# Patient Record
Sex: FEMALE | Race: WHITE | NOT HISPANIC OR LATINO | Employment: OTHER | URBAN - METROPOLITAN AREA
[De-identification: names, ages, dates, MRNs, and addresses within clinical notes are randomized per-mention and may not be internally consistent; named-entity substitution may affect disease eponyms.]

---

## 2017-02-01 ENCOUNTER — LAB VISIT (OUTPATIENT)
Dept: LAB | Facility: HOSPITAL | Age: 62
End: 2017-02-01
Attending: INTERNAL MEDICINE
Payer: COMMERCIAL

## 2017-02-01 DIAGNOSIS — Z00.00 ANNUAL PHYSICAL EXAM: ICD-10-CM

## 2017-02-01 LAB
25(OH)D3+25(OH)D2 SERPL-MCNC: 19 NG/ML
ALBUMIN SERPL BCP-MCNC: 3.7 G/DL
ALP SERPL-CCNC: 79 U/L
ALT SERPL W/O P-5'-P-CCNC: 18 U/L
ANION GAP SERPL CALC-SCNC: 10 MMOL/L
AST SERPL-CCNC: 18 U/L
BASOPHILS # BLD AUTO: 0.01 K/UL
BASOPHILS NFR BLD: 0.1 %
BILIRUB SERPL-MCNC: 0.5 MG/DL
BUN SERPL-MCNC: 17 MG/DL
CALCIUM SERPL-MCNC: 9.6 MG/DL
CHLORIDE SERPL-SCNC: 104 MMOL/L
CHOLEST/HDLC SERPL: 4.1 {RATIO}
CO2 SERPL-SCNC: 23 MMOL/L
CREAT SERPL-MCNC: 0.8 MG/DL
DIFFERENTIAL METHOD: NORMAL
EOSINOPHIL # BLD AUTO: 0.2 K/UL
EOSINOPHIL NFR BLD: 3.4 %
ERYTHROCYTE [DISTWIDTH] IN BLOOD BY AUTOMATED COUNT: 13 %
EST. GFR  (AFRICAN AMERICAN): >60 ML/MIN/1.73 M^2
EST. GFR  (NON AFRICAN AMERICAN): >60 ML/MIN/1.73 M^2
ESTIMATED AVG GLUCOSE: 278 MG/DL
GLUCOSE SERPL-MCNC: 227 MG/DL
HBA1C MFR BLD HPLC: 11.3 %
HCT VFR BLD AUTO: 41.6 %
HCV AB SERPL QL IA: NEGATIVE
HDL/CHOLESTEROL RATIO: 24.5 %
HDLC SERPL-MCNC: 208 MG/DL
HDLC SERPL-MCNC: 51 MG/DL
HGB BLD-MCNC: 14.2 G/DL
LDLC SERPL CALC-MCNC: 127.8 MG/DL
LYMPHOCYTES # BLD AUTO: 2.5 K/UL
LYMPHOCYTES NFR BLD: 35.1 %
MCH RBC QN AUTO: 30 PG
MCHC RBC AUTO-ENTMCNC: 34.1 %
MCV RBC AUTO: 88 FL
MONOCYTES # BLD AUTO: 0.4 K/UL
MONOCYTES NFR BLD: 5.5 %
NEUTROPHILS # BLD AUTO: 3.9 K/UL
NEUTROPHILS NFR BLD: 55.8 %
NONHDLC SERPL-MCNC: 157 MG/DL
PLATELET # BLD AUTO: 284 K/UL
PMV BLD AUTO: 12.2 FL
POTASSIUM SERPL-SCNC: 4 MMOL/L
PROT SERPL-MCNC: 7.4 G/DL
RBC # BLD AUTO: 4.74 M/UL
SODIUM SERPL-SCNC: 137 MMOL/L
TRIGL SERPL-MCNC: 146 MG/DL
TSH SERPL DL<=0.005 MIU/L-ACNC: 1.71 UIU/ML
WBC # BLD AUTO: 7.03 K/UL

## 2017-02-01 PROCEDURE — 85025 COMPLETE CBC W/AUTO DIFF WBC: CPT

## 2017-02-01 PROCEDURE — 84443 ASSAY THYROID STIM HORMONE: CPT

## 2017-02-01 PROCEDURE — 80061 LIPID PANEL: CPT

## 2017-02-01 PROCEDURE — 82306 VITAMIN D 25 HYDROXY: CPT

## 2017-02-01 PROCEDURE — 83036 HEMOGLOBIN GLYCOSYLATED A1C: CPT

## 2017-02-01 PROCEDURE — 36415 COLL VENOUS BLD VENIPUNCTURE: CPT | Mod: PO

## 2017-02-01 PROCEDURE — 86803 HEPATITIS C AB TEST: CPT

## 2017-02-01 PROCEDURE — 80053 COMPREHEN METABOLIC PANEL: CPT

## 2017-02-03 ENCOUNTER — OFFICE VISIT (OUTPATIENT)
Dept: INTERNAL MEDICINE | Facility: CLINIC | Age: 62
End: 2017-02-03
Payer: COMMERCIAL

## 2017-02-03 VITALS
HEART RATE: 76 BPM | DIASTOLIC BLOOD PRESSURE: 88 MMHG | RESPIRATION RATE: 16 BRPM | BODY MASS INDEX: 32.09 KG/M2 | SYSTOLIC BLOOD PRESSURE: 124 MMHG | TEMPERATURE: 100 F | HEIGHT: 62 IN | WEIGHT: 174.38 LBS

## 2017-02-03 DIAGNOSIS — E11.9 TYPE 2 DIABETES MELLITUS WITHOUT COMPLICATION, WITHOUT LONG-TERM CURRENT USE OF INSULIN: Chronic | ICD-10-CM

## 2017-02-03 DIAGNOSIS — E78.5 HYPERLIPIDEMIA, UNSPECIFIED HYPERLIPIDEMIA TYPE: Chronic | ICD-10-CM

## 2017-02-03 DIAGNOSIS — Z00.00 ANNUAL PHYSICAL EXAM: Primary | ICD-10-CM

## 2017-02-03 DIAGNOSIS — I10 ESSENTIAL HYPERTENSION: Chronic | ICD-10-CM

## 2017-02-03 DIAGNOSIS — M79.89 SWELLING OF LOWER EXTREMITY: ICD-10-CM

## 2017-02-03 DIAGNOSIS — E55.9 VITAMIN D DEFICIENCY: ICD-10-CM

## 2017-02-03 PROCEDURE — 99396 PREV VISIT EST AGE 40-64: CPT | Mod: S$GLB,,, | Performed by: INTERNAL MEDICINE

## 2017-02-03 PROCEDURE — 99999 PR PBB SHADOW E&M-EST. PATIENT-LVL III: CPT | Mod: PBBFAC,,, | Performed by: INTERNAL MEDICINE

## 2017-02-03 PROCEDURE — 3074F SYST BP LT 130 MM HG: CPT | Mod: S$GLB,,, | Performed by: INTERNAL MEDICINE

## 2017-02-03 PROCEDURE — 3079F DIAST BP 80-89 MM HG: CPT | Mod: S$GLB,,, | Performed by: INTERNAL MEDICINE

## 2017-02-03 RX ORDER — GLIPIZIDE 5 MG/1
5 TABLET ORAL
Qty: 60 TABLET | Refills: 6 | Status: SHIPPED | OUTPATIENT
Start: 2017-02-03 | End: 2017-03-06

## 2017-02-03 RX ORDER — METFORMIN HYDROCHLORIDE 1000 MG/1
TABLET ORAL
Qty: 180 TABLET | Refills: 1 | Status: SHIPPED | OUTPATIENT
Start: 2017-02-03 | End: 2018-09-07 | Stop reason: SDUPTHER

## 2017-02-03 RX ORDER — PANTOPRAZOLE SODIUM 40 MG/1
40 TABLET, DELAYED RELEASE ORAL DAILY
Qty: 90 TABLET | Refills: 3 | Status: SHIPPED | OUTPATIENT
Start: 2017-02-03 | End: 2017-06-02 | Stop reason: SDUPTHER

## 2017-02-03 NOTE — PROGRESS NOTES
Subjective:       Patient ID: Fely Wetzel is a 61 y.o. female.    Chief Complaint: Annual Exam    Patient is a 61 y.o.female who presents today for annual.    Labs: reviewed  Vaccines: Influenza (done); Tetanus (2014) ; Prevnar ( done)  Eye exam: done; appt  In february, Dr. Tom  Mammogram:  june 2016  Gyn exam: hysterectomy  Colonoscopy: 2014; up to date  Exercise: not much exercise  Diet: she eats two corn tortillas with each meal; she has not been following the diabetic diet; she restarted 2 weeks ago  Dexa: pt wants to wait until next year       Review of Systems   Constitutional: Negative for appetite change, chills, diaphoresis, fatigue and fever.   HENT: Negative for congestion, dental problem, ear discharge, ear pain, hearing loss, postnasal drip, sinus pressure and sore throat.    Eyes: Negative for discharge, redness and itching.   Respiratory: Negative for cough, chest tightness, shortness of breath and wheezing.    Cardiovascular: Negative for chest pain, palpitations and leg swelling.   Gastrointestinal: Negative for abdominal pain, constipation, diarrhea, nausea and vomiting.   Endocrine: Negative for cold intolerance and heat intolerance.   Genitourinary: Negative for difficulty urinating, frequency, hematuria and urgency.   Musculoskeletal: Negative for arthralgias, back pain, gait problem, myalgias and neck pain.   Skin: Negative for color change and rash.   Neurological: Negative for dizziness, syncope and headaches.   Hematological: Negative for adenopathy.   Psychiatric/Behavioral: Negative for behavioral problems and sleep disturbance. The patient is not nervous/anxious.        Objective:      Physical Exam   Constitutional: She is oriented to person, place, and time. She appears well-developed and well-nourished. No distress.   HENT:   Head: Normocephalic and atraumatic.   Right Ear: External ear normal.   Left Ear: External ear normal.   Eyes: Conjunctivae and EOM are normal.  Pupils are equal, round, and reactive to light. Right eye exhibits no discharge. Left eye exhibits no discharge. No scleral icterus.   Neck: Normal range of motion. Neck supple. No JVD present. No thyromegaly present.   Cardiovascular: Normal rate, regular rhythm, normal heart sounds and intact distal pulses.  Exam reveals no gallop and no friction rub.    No murmur heard.  Pulses:       Dorsalis pedis pulses are 2+ on the right side, and 2+ on the left side.        Posterior tibial pulses are 2+ on the right side, and 2+ on the left side.   Pulmonary/Chest: Effort normal and breath sounds normal. No stridor. No respiratory distress. She has no wheezes. She has no rales. She exhibits no tenderness.   Abdominal: Soft. Bowel sounds are normal. She exhibits no distension. There is no tenderness. There is no rebound.   Musculoskeletal: Normal range of motion. She exhibits no edema or tenderness.        Right foot: There is normal range of motion and no deformity.        Left foot: There is normal range of motion and no deformity.   Feet:   Right Foot:   Protective Sensation: 3 sites tested. 3 sites sensed.   Skin Integrity: Negative for ulcer, blister, skin breakdown, erythema, warmth, callus or dry skin.   Left Foot:   Protective Sensation: 3 sites tested. 3 sites sensed.   Skin Integrity: Negative for ulcer, blister, skin breakdown, erythema, warmth, callus or dry skin.   Lymphadenopathy:     She has no cervical adenopathy.   Trace pretibial edema bilaterally   Neurological: She is alert and oriented to person, place, and time.   Skin: Skin is warm. No rash noted. She is not diaphoretic. No erythema.   Psychiatric: She has a normal mood and affect. Her behavior is normal.   Nursing note and vitals reviewed.      Assessment and Plan:       1. Annual physical exam  - labs reviewed  - eye appt scheduled this month  - foot exam done today  - mammo due in June  - colonoscopy up to date  - dexa; due now; pt wants to wait  until next year    2. Type 2 diabetes mellitus without complication, without long-term current use of insulin  - no improvement in hemoglobin a1c  - discussed diabetic diet  - pt declines dietician appt or endocrine appt  - continue metformin twice daily  - continue onglyza  - pt declines insulin or victoza  - start glipizide 10 mg po bid  - discussed in detail the diet  - notify clinic of fasting glucose in two weeks  - rtc in one month  - Basic metabolic panel; Future  - Hemoglobin A1c; Future    3. Hyperlipidemia, unspecified hyperlipidemia type  - improving  - goal LDL is less than 70  - continue statin and fenofibrate; cholesterol diet    4. Essential hypertension  - stable on current meds    5. Vitamin D deficiency  - start vitamin d3 2000 units daily    6. Swelling of lower extremity  - COMPRESSION STOCKINGS        No Follow-up on file.

## 2017-02-16 ENCOUNTER — TELEPHONE (OUTPATIENT)
Dept: INTERNAL MEDICINE | Facility: CLINIC | Age: 62
End: 2017-02-16

## 2017-02-16 NOTE — TELEPHONE ENCOUNTER
----- Message from Praveena Hamm sent at 2/16/2017  8:28 AM CST -----  Contact: Self/[phone]  Pt states the last script that was prescribed (pt did not know name of script), she is not taking it any more because it makes her dizzy.Please advise.

## 2017-02-16 NOTE — TELEPHONE ENCOUNTER
Is she willing to try a new medication at this time? If she prefers to hold off, she needs to call back in one week with her log of fasting glucose readings

## 2017-02-16 NOTE — TELEPHONE ENCOUNTER
Spoke to pt who stated that she doesn't want to take the glipizide anymore as it makes her dizzy and uncomfortable. Pt reported fasting BG of 85 this AM.     Please advise.

## 2017-02-25 ENCOUNTER — LAB VISIT (OUTPATIENT)
Dept: LAB | Facility: HOSPITAL | Age: 62
End: 2017-02-25
Attending: INTERNAL MEDICINE
Payer: COMMERCIAL

## 2017-02-25 DIAGNOSIS — E11.9 TYPE 2 DIABETES MELLITUS WITHOUT COMPLICATION, WITHOUT LONG-TERM CURRENT USE OF INSULIN: Chronic | ICD-10-CM

## 2017-02-25 LAB
ANION GAP SERPL CALC-SCNC: 9 MMOL/L
BUN SERPL-MCNC: 25 MG/DL
CALCIUM SERPL-MCNC: 10.2 MG/DL
CHLORIDE SERPL-SCNC: 99 MMOL/L
CO2 SERPL-SCNC: 27 MMOL/L
CREAT SERPL-MCNC: 0.8 MG/DL
EST. GFR  (AFRICAN AMERICAN): >60 ML/MIN/1.73 M^2
EST. GFR  (NON AFRICAN AMERICAN): >60 ML/MIN/1.73 M^2
GLUCOSE SERPL-MCNC: 235 MG/DL
POTASSIUM SERPL-SCNC: 4.3 MMOL/L
SODIUM SERPL-SCNC: 135 MMOL/L

## 2017-02-25 PROCEDURE — 83036 HEMOGLOBIN GLYCOSYLATED A1C: CPT

## 2017-02-25 PROCEDURE — 80048 BASIC METABOLIC PNL TOTAL CA: CPT

## 2017-02-25 PROCEDURE — 36415 COLL VENOUS BLD VENIPUNCTURE: CPT | Mod: PO

## 2017-02-27 LAB
ESTIMATED AVG GLUCOSE: 272 MG/DL
HBA1C MFR BLD HPLC: 11.1 %

## 2017-03-06 ENCOUNTER — OFFICE VISIT (OUTPATIENT)
Dept: INTERNAL MEDICINE | Facility: CLINIC | Age: 62
End: 2017-03-06
Payer: COMMERCIAL

## 2017-03-06 VITALS
HEART RATE: 82 BPM | BODY MASS INDEX: 31.61 KG/M2 | DIASTOLIC BLOOD PRESSURE: 88 MMHG | WEIGHT: 172.81 LBS | SYSTOLIC BLOOD PRESSURE: 132 MMHG | RESPIRATION RATE: 15 BRPM | TEMPERATURE: 99 F

## 2017-03-06 DIAGNOSIS — E11.9 TYPE 2 DIABETES MELLITUS WITHOUT COMPLICATION, WITHOUT LONG-TERM CURRENT USE OF INSULIN: Primary | Chronic | ICD-10-CM

## 2017-03-06 PROCEDURE — 3079F DIAST BP 80-89 MM HG: CPT | Mod: S$GLB,,, | Performed by: INTERNAL MEDICINE

## 2017-03-06 PROCEDURE — 99999 PR PBB SHADOW E&M-EST. PATIENT-LVL III: CPT | Mod: PBBFAC,,, | Performed by: INTERNAL MEDICINE

## 2017-03-06 PROCEDURE — 3060F POS MICROALBUMINURIA REV: CPT | Mod: S$GLB,,, | Performed by: INTERNAL MEDICINE

## 2017-03-06 PROCEDURE — 3046F HEMOGLOBIN A1C LEVEL >9.0%: CPT | Mod: S$GLB,,, | Performed by: INTERNAL MEDICINE

## 2017-03-06 PROCEDURE — 3075F SYST BP GE 130 - 139MM HG: CPT | Mod: S$GLB,,, | Performed by: INTERNAL MEDICINE

## 2017-03-06 PROCEDURE — 1160F RVW MEDS BY RX/DR IN RCRD: CPT | Mod: S$GLB,,, | Performed by: INTERNAL MEDICINE

## 2017-03-06 PROCEDURE — 2022F DILAT RTA XM EVC RTNOPTHY: CPT | Mod: S$GLB,,, | Performed by: INTERNAL MEDICINE

## 2017-03-06 PROCEDURE — 99213 OFFICE O/P EST LOW 20 MIN: CPT | Mod: S$GLB,,, | Performed by: INTERNAL MEDICINE

## 2017-03-06 RX ORDER — GLIMEPIRIDE 4 MG/1
4 TABLET ORAL
Qty: 90 TABLET | Refills: 3 | Status: SHIPPED | OUTPATIENT
Start: 2017-03-06 | End: 2017-06-02 | Stop reason: SDUPTHER

## 2017-03-06 NOTE — MR AVS SNAPSHOT
[facility]   [facility]  [place] [address]  Phone: [phone]  Fax: [phone]                  Fely Wetzel   3/6/2017 7:00 AM   Office Visit    Description:  Female : 1955   Provider:  Paula Barkley DO   Department:  [facility]           Reason for Visit     Follow-up     Diabetes           Diagnoses this Visit        Comments    Type 2 diabetes mellitus without complication, without long-term current use of insulin    -  Primary            To Do List           Goals (5 Years of Data)     None      Follow-Up and Disposition     Return in about 2 months (around 2017).       These Medications        Disp Refills Start End    glimepiride (AMARYL) 4 MG tablet 90 tablet 3 3/6/2017 3/6/2018    Take 1 tablet (4 mg total) by mouth before breakfast. - Oral    Pharmacy: [facility]/pharmacy #[id number] - [place] - [address] Ph #: [phone]         [facility] On Call     [facility] On Call Nurse Care Line -  Assistance  Registered nurses in the [facility] On Call Center provide clinical advisement, health education, appointment booking, and other advisory services.  Call for this free service at [phone].             Medications           Message regarding Medications     Verify the changes and/or additions to your medication regime listed below are the same as discussed with your clinician today.  If any of these changes or additions are incorrect, please notify your healthcare provider.        START taking these NEW medications        Refills    glimepiride (AMARYL) 4 MG tablet 3    Sig: Take 1 tablet (4 mg total) by mouth before breakfast.    Class: Normal    Route: Oral      STOP taking these medications     glipiZIDE (GLUCOTROL) 5 MG tablet Take 1 tablet (5 mg total) by mouth 2 (two) times daily before meals.           Verify that the below list of medications is an accurate representation of the medications you are  currently taking.  If none reported, the list may be blank. If incorrect, please contact your healthcare provider. Carry this list with you in case of emergency.           Current Medications     ACCU-CHEK JUAN Misc CHECK FASTING GLUCOSE AND CHECK GLUCOSE TWO HOURS AFTER LUNCH OR DINNER    aspirin (ECOTRIN) 81 MG EC tablet Take 1 tablet by mouth Daily. Every day    blood sugar diagnostic Strp Check glucose twice daily    blood-glucose meter Misc Check fasting glucose and check glucose two hours after lunch or dinner    fenofibrate micronized (LOFIBRA) 134 MG Cap Take 1 capsule (134 mg total) by mouth daily with breakfast.    hydrochlorothiazide (HYDRODIURIL) 25 MG tablet Take 1 tablet (25 mg total) by mouth once daily. With breakfast    lancets Misc Check glucose twice daily    metformin (GLUCOPHAGE) 1000 MG tablet TAKE 1 TABLET (1,000 MG TOTAL) BY MOUTH 2 (TWO) TIMES DAILY. WITH MEALS    pravastatin (PRAVACHOL) 40 MG tablet Take 1 tablet (40 mg total) by mouth once daily. With dinner    SAXagliptin (ONGLYZA) 5 mg Tab tablet Take 1 tablet (5 mg total) by mouth once daily.    econazole nitrate 1 % cream Apply topically 2 (two) times daily.    glimepiride (AMARYL) 4 MG tablet Take 1 tablet (4 mg total) by mouth before breakfast.    pantoprazole (PROTONIX) 40 MG tablet Take 1 tablet (40 mg total) by mouth once daily.           Clinical Reference Information           Your Vitals Were     BP Pulse Temp Resp Weight BMI    132/88 (BP Location: Right arm, Patient Position: Sitting, BP Method: Manual) 82 99.1 °F (37.3 °C) (Oral) 15 78.4 kg (172 lb 13.5 oz) 31.61 kg/m2      Blood Pressure          Most Recent Value    BP  132/88      Allergies as of 3/6/2017     Latex, Natural Rubber    Shellfish Containing Products    Vicodin [Hydrocodone-acetaminophen]    Vytorin 10-10  [Ezetimibe-simvastatin]      Immunizations Administered on Date of Encounter - 3/6/2017     None      Orders Placed During Today's Visit     Future  Labs/Procedures Expected by Expires    Comprehensive metabolic panel  3/6/2017 3/6/2019    Hemoglobin A1c  3/6/2017 5/5/2018    Lipid panel  3/6/2017 3/6/2019      Language Assistance Services     ATTENTION: Language assistance services are available, free of charge. Please call [phone].      ATENCIÓN: Si habla español, tiene a galindo disposición servicios gratuitos de asistencia lingüística. Llame al [phone].     CHÚ Ý: N?u b?n nói Ti?ng Vi?t, có các d?ch v? h? tr? ngôn ng? mi?n phí dành cho b?n. G?i s? [phone].         [facility] complies with applicable Federal civil rights laws and does not discriminate on the basis of race, color, national origin, age, disability, or sex.

## 2017-03-06 NOTE — PROGRESS NOTES
Subjective:       Patient ID: Fely Wetzel is a 61 y.o. female.    Chief Complaint: Follow-up and Diabetes    Patient is a 61 y.o.female who presents today for follow up. She was started on glipizide but it caused her to get dizzy.     Diabetes - Patient is on metformin and onglyza. She does check her BG at home, and it ranges from 85 to 200.   She does not have numbness/tingling of her feet.  Her last A1c's were   Hemoglobin A1C   Date Value Ref Range Status   02/25/2017 11.1 (H) 4.5 - 6.2 % Final     Comment:     According to ADA guidelines, hemoglobin A1C <7.0% represents  optimal control in non-pregnant diabetic patients.  Different  metrics may apply to specific populations.   Standards of Medical Care in Diabetes - 2016.  For the purpose of screening for the presence of diabetes:  <5.7%     Consistent with the absence of diabetes  5.7-6.4%  Consistent with increasing risk for diabetes   (prediabetes)  >or=6.5%  Consistent with diabetes  Currently no consensus exists for use of hemoglobin A1C  for diagnosis of diabetes for children.     02/01/2017 11.3 (H) 4.5 - 6.2 % Final     Comment:     According to ADA guidelines, hemoglobin A1C <7.0% represents  optimal control in non-pregnant diabetic patients.  Different  metrics may apply to specific populations.   Standards of Medical Care in Diabetes - 2016.  For the purpose of screening for the presence of diabetes:  <5.7%     Consistent with the absence of diabetes  5.7-6.4%  Consistent with increasing risk for diabetes   (prediabetes)  >or=6.5%  Consistent with diabetes  Currently no consensus exists for use of hemoglobin A1C  for diagnosis of diabetes for children.     11/11/2016 11.0 (H) 4.5 - 6.2 % Final     Comment:     According to ADA guidelines, hemoglobin A1C <7.0% represents  optimal control in non-pregnant diabetic patients.  Different  metrics may apply to specific populations.   Standards of Medical Care in Diabetes - 2016.  For the purpose  of screening for the presence of diabetes:  <5.7%     Consistent with the absence of diabetes  5.7-6.4%  Consistent with increasing risk for diabetes   (prediabetes)  >or=6.5%  Consistent with diabetes  Currently no consensus exists for use of hemoglobin A1C  for diagnosis of diabetes for children.     .    Review of Systems   Constitutional: Negative for appetite change, chills, diaphoresis, fatigue and fever.   HENT: Negative for congestion, dental problem, ear discharge, ear pain, hearing loss, postnasal drip, sinus pressure and sore throat.    Eyes: Negative for discharge, redness and itching.   Respiratory: Negative for cough, chest tightness, shortness of breath and wheezing.    Cardiovascular: Negative for chest pain, palpitations and leg swelling.   Gastrointestinal: Negative for abdominal pain, constipation, diarrhea, nausea and vomiting.   Endocrine: Negative for cold intolerance and heat intolerance.   Genitourinary: Negative for difficulty urinating, frequency, hematuria and urgency.   Musculoskeletal: Negative for arthralgias, back pain, gait problem, myalgias and neck pain.   Skin: Negative for color change and rash.   Neurological: Negative for dizziness, syncope and headaches.   Hematological: Negative for adenopathy.   Psychiatric/Behavioral: Negative for behavioral problems and sleep disturbance. The patient is not nervous/anxious.        Objective:      Physical Exam   Constitutional: She is oriented to person, place, and time. She appears well-developed and well-nourished. No distress.   HENT:   Head: Normocephalic and atraumatic.   Right Ear: External ear normal.   Left Ear: External ear normal.   Eyes: Conjunctivae and EOM are normal. Pupils are equal, round, and reactive to light. Right eye exhibits no discharge. Left eye exhibits no discharge. No scleral icterus.   Neck: Normal range of motion. Neck supple. No JVD present. No thyromegaly present.   Cardiovascular: Normal rate, regular rhythm,  normal heart sounds and intact distal pulses.  Exam reveals no gallop and no friction rub.    No murmur heard.  Pulmonary/Chest: Effort normal and breath sounds normal. No stridor. No respiratory distress. She has no wheezes. She has no rales. She exhibits no tenderness.   Abdominal: Soft. Bowel sounds are normal. She exhibits no distension. There is no tenderness. There is no rebound.   Musculoskeletal: Normal range of motion. She exhibits no edema or tenderness.   Lymphadenopathy:     She has no cervical adenopathy.   Neurological: She is alert and oriented to person, place, and time.   Skin: Skin is warm. No rash noted. She is not diaphoretic. No erythema.   Psychiatric: She has a normal mood and affect. Her behavior is normal.   Nursing note and vitals reviewed.      Assessment and Plan:       1. Type 2 diabetes mellitus without complication, without long-term current use of insulin  - fasting glucose still too high at home; labs reviewed  - stopped glipizide due to dizziness  - start amaryl 4 mg in morning with breakfast  - continue metformin and onglyza  - refuses endocrine appt or insulin/ victoza  - rtc in two months with labs:  - Hemoglobin A1c; Future  - Comprehensive metabolic panel; Future  - Lipid panel; Future        No Follow-up on file.

## 2017-03-10 RX ORDER — IRBESARTAN 300 MG/1
TABLET ORAL
Qty: 90 TABLET | Refills: 2 | Status: SHIPPED | OUTPATIENT
Start: 2017-03-10 | End: 2017-06-02 | Stop reason: SDUPTHER

## 2017-03-24 DIAGNOSIS — Z00.00 ANNUAL PHYSICAL EXAM: ICD-10-CM

## 2017-03-24 RX ORDER — FENOFIBRATE 134 MG/1
CAPSULE ORAL
Qty: 90 CAPSULE | Refills: 0 | Status: SHIPPED | OUTPATIENT
Start: 2017-03-24 | End: 2017-06-02 | Stop reason: SDUPTHER

## 2017-05-08 LAB
LEFT EYE DM RETINOPATHY: NORMAL
RIGHT EYE DM RETINOPATHY: NORMAL

## 2017-05-30 ENCOUNTER — TELEPHONE (OUTPATIENT)
Dept: INTERNAL MEDICINE | Facility: CLINIC | Age: 62
End: 2017-05-30

## 2017-05-30 NOTE — TELEPHONE ENCOUNTER
----- Message from Jose Hilton sent at 5/30/2017  7:58 AM CDT -----  Contact: Self [phone]  Pt would like to speak with the nurse regarding she has a appt on Friday for a 4 month f/u and would like to know is there any way she can get in sooner because she will be losing her Insurance,Please call

## 2017-05-31 ENCOUNTER — TELEPHONE (OUTPATIENT)
Dept: INTERNAL MEDICINE | Facility: CLINIC | Age: 62
End: 2017-05-31

## 2017-05-31 RX ORDER — PRAVASTATIN SODIUM 40 MG/1
40 TABLET ORAL DAILY
Qty: 90 TABLET | Refills: 3 | Status: SHIPPED | OUTPATIENT
Start: 2017-05-31 | End: 2017-06-02 | Stop reason: SDUPTHER

## 2017-05-31 NOTE — TELEPHONE ENCOUNTER
----- Message from Erika Ordonez sent at 5/31/2017  8:49 AM CDT -----  Contact: Home: [phone]   Patient is ok on insurance and will be here for her appointment on  Friday.

## 2017-06-02 ENCOUNTER — OFFICE VISIT (OUTPATIENT)
Dept: INTERNAL MEDICINE | Facility: CLINIC | Age: 62
End: 2017-06-02
Payer: COMMERCIAL

## 2017-06-02 ENCOUNTER — HOSPITAL ENCOUNTER (OUTPATIENT)
Dept: RADIOLOGY | Facility: HOSPITAL | Age: 62
Discharge: HOME | End: 2017-06-02
Attending: INTERNAL MEDICINE
Payer: COMMERCIAL

## 2017-06-02 VITALS
BODY MASS INDEX: 34.07 KG/M2 | TEMPERATURE: 99 F | DIASTOLIC BLOOD PRESSURE: 82 MMHG | WEIGHT: 186.31 LBS | SYSTOLIC BLOOD PRESSURE: 134 MMHG | RESPIRATION RATE: 15 BRPM | HEART RATE: 69 BPM

## 2017-06-02 DIAGNOSIS — Z12.31 VISIT FOR SCREENING MAMMOGRAM: ICD-10-CM

## 2017-06-02 DIAGNOSIS — M79.644 FINGER PAIN, RIGHT: ICD-10-CM

## 2017-06-02 DIAGNOSIS — I10 ESSENTIAL HYPERTENSION: Chronic | ICD-10-CM

## 2017-06-02 DIAGNOSIS — E78.1 HYPERTRIGLYCERIDEMIA: ICD-10-CM

## 2017-06-02 DIAGNOSIS — E78.5 HYPERLIPIDEMIA, UNSPECIFIED HYPERLIPIDEMIA TYPE: Chronic | ICD-10-CM

## 2017-06-02 DIAGNOSIS — E11.9 TYPE 2 DIABETES MELLITUS WITHOUT COMPLICATION, WITHOUT LONG-TERM CURRENT USE OF INSULIN: Primary | Chronic | ICD-10-CM

## 2017-06-02 DIAGNOSIS — E55.9 VITAMIN D DEFICIENCY: ICD-10-CM

## 2017-06-02 PROCEDURE — 99999 PR PBB SHADOW E&M-EST. PATIENT-LVL III: CPT | Mod: PBBFAC,,, | Performed by: INTERNAL MEDICINE

## 2017-06-02 PROCEDURE — 73130 X-RAY EXAM OF HAND: CPT | Mod: 26,RT,, | Performed by: RADIOLOGY

## 2017-06-02 PROCEDURE — 99214 OFFICE O/P EST MOD 30 MIN: CPT | Mod: S$GLB,,, | Performed by: INTERNAL MEDICINE

## 2017-06-02 PROCEDURE — 3046F HEMOGLOBIN A1C LEVEL >9.0%: CPT | Mod: S$GLB,,, | Performed by: INTERNAL MEDICINE

## 2017-06-02 PROCEDURE — 4010F ACE/ARB THERAPY RXD/TAKEN: CPT | Mod: S$GLB,,, | Performed by: INTERNAL MEDICINE

## 2017-06-02 PROCEDURE — 73130 X-RAY EXAM OF HAND: CPT | Mod: TC,PO,RT

## 2017-06-02 RX ORDER — SAXAGLIPTIN 5 MG/1
5 TABLET, FILM COATED ORAL DAILY
Qty: 90 TABLET | Refills: 3 | Status: SHIPPED | OUTPATIENT
Start: 2017-06-02 | End: 2018-08-17

## 2017-06-02 RX ORDER — PANTOPRAZOLE SODIUM 40 MG/1
40 TABLET, DELAYED RELEASE ORAL DAILY
Qty: 90 TABLET | Refills: 3 | Status: SHIPPED | OUTPATIENT
Start: 2017-06-02 | End: 2018-11-27 | Stop reason: ALTCHOICE

## 2017-06-02 RX ORDER — GLIMEPIRIDE 4 MG/1
4 TABLET ORAL
Qty: 90 TABLET | Refills: 3 | Status: SHIPPED | OUTPATIENT
Start: 2017-06-02 | End: 2018-08-17 | Stop reason: ALTCHOICE

## 2017-06-02 RX ORDER — IRBESARTAN 300 MG/1
TABLET ORAL
Qty: 90 TABLET | Refills: 2 | Status: SHIPPED | OUTPATIENT
Start: 2017-06-02 | End: 2018-11-12 | Stop reason: SDUPTHER

## 2017-06-02 RX ORDER — PRAVASTATIN SODIUM 40 MG/1
40 TABLET ORAL DAILY
Qty: 90 TABLET | Refills: 3 | Status: SHIPPED | OUTPATIENT
Start: 2017-06-02 | End: 2019-02-22

## 2017-06-02 RX ORDER — HYDROCHLOROTHIAZIDE 25 MG/1
25 TABLET ORAL DAILY
Qty: 90 TABLET | Refills: 3 | Status: SHIPPED | OUTPATIENT
Start: 2017-06-02 | End: 2018-12-21 | Stop reason: SDUPTHER

## 2017-06-02 RX ORDER — FENOFIBRATE 134 MG/1
134 CAPSULE ORAL
Qty: 90 CAPSULE | Refills: 0 | Status: SHIPPED | OUTPATIENT
Start: 2017-06-02 | End: 2017-09-17 | Stop reason: SDUPTHER

## 2017-06-02 NOTE — PROGRESS NOTES
Subjective:       Patient ID: Fely Wetzel is a 61 y.o. female.    Chief Complaint: Follow-up; Hyperlipidemia; and Hypertension    Patient is a 61 y.o.female who presents today for follow up on chronic medical conditions. She lost her job a few days ago.    Vaccines: Influenza (done); Tetanus (2014) ; Prevnar ( done)  Eye exam: done; appt  In february, Dr. Tom  Mammogram: due now  Gyn exam: hysterectomy  Colonoscopy: 2014; up to date       Diabetes - Patient is on metformin, onglyza and amaryl. She does check her BG at home, and it ranges from 130 to 160.   Her last eye exam was in february.  She does check their feet on a regular basis.  She does not have numbness/tingling of her feet.  Her last A1c's were   Hemoglobin A1C   Date Value Ref Range Status   02/25/2017 11.1 (H) 4.5 - 6.2 % Final     Comment:     According to ADA guidelines, hemoglobin A1C <7.0% represents  optimal control in non-pregnant diabetic patients.  Different  metrics may apply to specific populations.   Standards of Medical Care in Diabetes - 2016.  For the purpose of screening for the presence of diabetes:  <5.7%     Consistent with the absence of diabetes  5.7-6.4%  Consistent with increasing risk for diabetes   (prediabetes)  >or=6.5%  Consistent with diabetes  Currently no consensus exists for use of hemoglobin A1C  for diagnosis of diabetes for children.     02/01/2017 11.3 (H) 4.5 - 6.2 % Final     Comment:     According to ADA guidelines, hemoglobin A1C <7.0% represents  optimal control in non-pregnant diabetic patients.  Different  metrics may apply to specific populations.   Standards of Medical Care in Diabetes - 2016.  For the purpose of screening for the presence of diabetes:  <5.7%     Consistent with the absence of diabetes  5.7-6.4%  Consistent with increasing risk for diabetes   (prediabetes)  >or=6.5%  Consistent with diabetes  Currently no consensus exists for use of hemoglobin A1C  for diagnosis of diabetes for  children.     11/11/2016 11.0 (H) 4.5 - 6.2 % Final     Comment:     According to ADA guidelines, hemoglobin A1C <7.0% represents  optimal control in non-pregnant diabetic patients.  Different  metrics may apply to specific populations.   Standards of Medical Care in Diabetes - 2016.  For the purpose of screening for the presence of diabetes:  <5.7%     Consistent with the absence of diabetes  5.7-6.4%  Consistent with increasing risk for diabetes   (prediabetes)  >or=6.5%  Consistent with diabetes  Currently no consensus exists for use of hemoglobin A1C  for diagnosis of diabetes for children.     .    Review of Systems   Constitutional: Negative for appetite change, chills, diaphoresis, fatigue and fever.   HENT: Negative for congestion, dental problem, ear discharge, ear pain, hearing loss, postnasal drip, sinus pressure and sore throat.    Eyes: Negative for discharge, redness and itching.   Respiratory: Negative for cough, chest tightness, shortness of breath and wheezing.    Cardiovascular: Negative for chest pain, palpitations and leg swelling.   Gastrointestinal: Negative for abdominal pain, constipation, diarrhea, nausea and vomiting.   Endocrine: Negative for cold intolerance and heat intolerance.   Genitourinary: Negative for difficulty urinating, frequency, hematuria and urgency.   Musculoskeletal: Negative for arthralgias, back pain, gait problem, myalgias and neck pain.   Skin: Negative for color change and rash.   Neurological: Negative for dizziness, syncope and headaches.   Hematological: Negative for adenopathy.   Psychiatric/Behavioral: Negative for behavioral problems and sleep disturbance. The patient is not nervous/anxious.        Objective:      Physical Exam   Constitutional: She is oriented to person, place, and time. She appears well-developed and well-nourished. No distress.   HENT:   Head: Normocephalic and atraumatic.   Right Ear: External ear normal.   Left Ear: External ear normal.    Nose: Nose normal.   Mouth/Throat: Oropharynx is clear and moist. No oropharyngeal exudate.   Eyes: Conjunctivae and EOM are normal. Pupils are equal, round, and reactive to light. Right eye exhibits no discharge. Left eye exhibits no discharge. No scleral icterus.   Neck: Normal range of motion. Neck supple. No JVD present. No thyromegaly present.   Cardiovascular: Normal rate, regular rhythm, normal heart sounds and intact distal pulses.  Exam reveals no gallop and no friction rub.    No murmur heard.  Pulmonary/Chest: Effort normal and breath sounds normal. No stridor. No respiratory distress. She has no wheezes. She has no rales. She exhibits no tenderness.   Abdominal: Soft. Bowel sounds are normal. She exhibits no distension. There is no tenderness. There is no rebound.   Musculoskeletal: Normal range of motion. She exhibits no edema or tenderness.   Lymphadenopathy:     She has no cervical adenopathy.   Neurological: She is alert and oriented to person, place, and time. No cranial nerve deficit.   Skin: Skin is warm and dry. No rash noted. She is not diaphoretic. No erythema.   Psychiatric: She has a normal mood and affect. Her behavior is normal.   Nursing note and vitals reviewed.      Assessment and Plan:       1. Type 2 diabetes mellitus without complication, without long-term current use of insulin  - on amaryl, metformin and onglyza  - Hemoglobin A1c; Future  - Comprehensive metabolic panel; Future  - Lipid panel; Future    2. Hyperlipidemia, unspecified hyperlipidemia type  - on statin  - Hemoglobin A1c; Future  - Comprehensive metabolic panel; Future  - Lipid panel; Future    3. Essential hypertension  - good control  - Hemoglobin A1c; Future  - Comprehensive metabolic panel; Future  - Lipid panel; Future    4. Vitamin D deficiency  - Hemoglobin A1c; Future  - Comprehensive metabolic panel; Future  - Lipid panel; Future    5. Visit for screening mammogram  - Mammo Digital Screening Bilat with CAD;  Future    6. HLD:  - fenofibrate micronized (LOFIBRA) 134 MG Cap; Take 1 capsule (134 mg total) by mouth before breakfast.  Dispense: 90 capsule; Refill: 0    7. Finger pain, right  - X-Ray Hand Complete Right; Future          Return in about 3 months (around 9/2/2017).

## 2017-06-02 NOTE — TELEPHONE ENCOUNTER
----- Message from Paula Barkley DO sent at 6/2/2017  8:36 AM CDT -----  Hand xray shows soft tissue swelling but no arthritis; recommend ice to the finger once a day x 1 week; recommend aleve 1 tab daily x 1 week

## 2017-06-03 ENCOUNTER — TELEPHONE (OUTPATIENT)
Dept: INTERNAL MEDICINE | Facility: CLINIC | Age: 62
End: 2017-06-03

## 2017-06-03 NOTE — TELEPHONE ENCOUNTER
Notify pt: cholesterol is borderline; recommend low fat and low cholesterol diet; diabetic marker improved to 8.1; continue metformin and glimepiride; stop onglyza due to cost; increase glimepiride to twice daily; does she want this script sent to her pharmacy?

## 2017-06-06 NOTE — TELEPHONE ENCOUNTER
Spoke to pt and informed of lab results, pt verbalized understanding. Pt recently refilled medication, will call when ready for early refill.

## 2017-06-12 ENCOUNTER — HOSPITAL ENCOUNTER (OUTPATIENT)
Dept: RADIOLOGY | Facility: HOSPITAL | Age: 62
Discharge: HOME | End: 2017-06-12
Attending: INTERNAL MEDICINE
Payer: COMMERCIAL

## 2017-06-12 VITALS — BODY MASS INDEX: 34.23 KG/M2 | WEIGHT: 186 LBS | HEIGHT: 62 IN

## 2017-06-12 DIAGNOSIS — Z12.31 VISIT FOR SCREENING MAMMOGRAM: ICD-10-CM

## 2017-06-12 PROCEDURE — 77063 BREAST TOMOSYNTHESIS BI: CPT | Mod: 26,,, | Performed by: RADIOLOGY

## 2017-06-12 PROCEDURE — 77067 SCR MAMMO BI INCL CAD: CPT | Mod: TC

## 2017-06-12 PROCEDURE — 77067 SCR MAMMO BI INCL CAD: CPT | Mod: 26,,, | Performed by: RADIOLOGY

## 2017-09-17 DIAGNOSIS — E78.1 HYPERTRIGLYCERIDEMIA: ICD-10-CM

## 2017-09-17 RX ORDER — FENOFIBRATE 134 MG/1
134 CAPSULE ORAL
Qty: 90 CAPSULE | Refills: 0 | Status: SHIPPED | OUTPATIENT
Start: 2017-09-17 | End: 2018-05-14 | Stop reason: SDUPTHER

## 2018-05-14 DIAGNOSIS — E78.1 HYPERTRIGLYCERIDEMIA: ICD-10-CM

## 2018-05-14 RX ORDER — FENOFIBRATE 134 MG/1
134 CAPSULE ORAL
Qty: 90 CAPSULE | Refills: 0 | Status: SHIPPED | OUTPATIENT
Start: 2018-05-14 | End: 2018-08-04 | Stop reason: SDUPTHER

## 2018-05-30 ENCOUNTER — TELEPHONE (OUTPATIENT)
Dept: INTERNAL MEDICINE | Facility: CLINIC | Age: 63
End: 2018-05-30

## 2018-08-04 DIAGNOSIS — E78.1 HYPERTRIGLYCERIDEMIA: ICD-10-CM

## 2018-08-06 RX ORDER — FENOFIBRATE 134 MG/1
134 CAPSULE ORAL
Qty: 90 CAPSULE | Refills: 1 | Status: SHIPPED | OUTPATIENT
Start: 2018-08-06 | End: 2018-08-22

## 2018-08-14 NOTE — PROGRESS NOTES
Subjective:       Patient ID: Fely Wetzel is a 62 y.o. female.    Chief Complaint: Follow-up (arthritis: hands and possibly feet)    Patient is a 62 y.o.female who presents today for follow up.    Labs: due now  Vaccines: Influenza (done); Tetanus (2014) ; Prevnar ( done)  Eye exam: done; appt  In february, Dr. Tom  Mammogram: done in may; dis ; normal  Gyn exam: hysterectomy  Colonoscopy: 2014; up to date      Arthritis: hands and feet; she went to [facility]. She went to a dermatologist and was diagnosed with psoriasis. She was given otezla but it caused too much nausea. She was also given clobetasol.     Review of Systems   Constitutional: Negative for appetite change, chills, diaphoresis, fatigue and fever.   HENT: Negative for congestion, dental problem, ear discharge, ear pain, hearing loss, postnasal drip, sinus pressure and sore throat.    Eyes: Negative for discharge, redness and itching.   Respiratory: Negative for cough, chest tightness, shortness of breath and wheezing.    Cardiovascular: Negative for chest pain, palpitations and leg swelling.   Gastrointestinal: Negative for abdominal pain, constipation, diarrhea, nausea and vomiting.   Endocrine: Negative for cold intolerance and heat intolerance.   Genitourinary: Negative for difficulty urinating, frequency, hematuria and urgency.   Musculoskeletal: Positive for arthralgias and myalgias. Negative for back pain, gait problem and neck pain.   Skin: Negative for color change and rash.   Neurological: Negative for dizziness, syncope and headaches.   Hematological: Negative for adenopathy.   Psychiatric/Behavioral: Negative for behavioral problems and sleep disturbance. The patient is not nervous/anxious.        Objective:      Physical Exam   Constitutional: She is oriented to person, place, and time. She appears well-developed and well-nourished. No distress.   HENT:   Head: Normocephalic and atraumatic.   Right Ear: External ear  normal.   Left Ear: External ear normal.   Nose: Nose normal.   Mouth/Throat: Oropharynx is clear and moist. No oropharyngeal exudate.   Eyes: Conjunctivae and EOM are normal. Pupils are equal, round, and reactive to light. Right eye exhibits no discharge. Left eye exhibits no discharge. No scleral icterus.   Neck: Normal range of motion. Neck supple. No JVD present. No thyromegaly present.   Cardiovascular: Normal rate, regular rhythm, normal heart sounds and intact distal pulses. Exam reveals no gallop and no friction rub.   No murmur heard.  Pulses:       Dorsalis pedis pulses are 2+ on the right side, and 2+ on the left side.        Posterior tibial pulses are 2+ on the right side, and 2+ on the left side.   Pulmonary/Chest: Effort normal and breath sounds normal. No stridor. No respiratory distress. She has no wheezes. She has no rales. She exhibits no tenderness.   Abdominal: Soft. Bowel sounds are normal. She exhibits no distension. There is no tenderness. There is no rebound.   Musculoskeletal: Normal range of motion. She exhibits no edema or tenderness.        Right foot: There is normal range of motion and no deformity.        Left foot: There is normal range of motion and no deformity.   Feet:   Right Foot:   Protective Sensation: 3 sites tested. 3 sites sensed.   Skin Integrity: Negative for ulcer, blister, skin breakdown, erythema, warmth, callus or dry skin.   Left Foot:   Protective Sensation: 3 sites tested. 3 sites sensed.   Skin Integrity: Negative for ulcer, blister, skin breakdown, erythema, warmth, callus or dry skin.   Lymphadenopathy:     She has no cervical adenopathy.   Neurological: She is alert and oriented to person, place, and time. No cranial nerve deficit.   Skin: Skin is warm and dry. No rash noted. She is not diaphoretic. No erythema.   Psychiatric: She has a normal mood and affect. Her behavior is normal.   Nursing note and vitals reviewed.      Assessment and Plan:       1.  Essential hypertension  - good control; check kidney function  - CBC auto differential; Future  - Comprehensive metabolic panel; Future  - Hemoglobin A1c; Future  - Lipid panel; Future  - TSH; Future  - Urinalysis; Future  - Vitamin D; Future    2. Hyperlipidemia, unspecified hyperlipidemia type  - on statin; due for labs  - CBC auto differential; Future  - Comprehensive metabolic panel; Future  - Hemoglobin A1c; Future  - Lipid panel; Future  - TSH; Future  - Urinalysis; Future  - Vitamin D; Future    3. Type 2 diabetes mellitus without complication, without long-term current use of insulin  - taking glipizide and metformin; not checking sugars lately; has lost about ten pounds  - CBC auto differential; Future  - Comprehensive metabolic panel; Future  - Hemoglobin A1c; Future  - Lipid panel; Future  - TSH; Future  - Urinalysis; Future  - Vitamin D; Future    4. Visit for screening mammogram  - done at [facility]    5. Arthritis  - taking mobic 7.5 mg daily    6. Myalgia    - Cyclic citrul peptide antibody, IgG; Future  - Rheumatoid factor; Future  - CHANDRA; Future    7. Arthralgia, unspecified joint    - Cyclic citrul peptide antibody, IgG; Future  - Rheumatoid factor; Future  - CHANDRA; Future    8. Psoriasis  - not taking any medicatio currently    9. Bilateral hand pain  - X-Ray Hand 3 View Bilateral; Future          No Follow-up on file.

## 2018-08-17 ENCOUNTER — OFFICE VISIT (OUTPATIENT)
Dept: INTERNAL MEDICINE | Facility: CLINIC | Age: 63
End: 2018-08-17
Payer: MEDICAID

## 2018-08-17 VITALS
HEART RATE: 82 BPM | HEIGHT: 62 IN | SYSTOLIC BLOOD PRESSURE: 140 MMHG | RESPIRATION RATE: 18 BRPM | TEMPERATURE: 99 F | WEIGHT: 171.75 LBS | BODY MASS INDEX: 31.6 KG/M2 | DIASTOLIC BLOOD PRESSURE: 74 MMHG | OXYGEN SATURATION: 97 %

## 2018-08-17 DIAGNOSIS — M79.10 MYALGIA: ICD-10-CM

## 2018-08-17 DIAGNOSIS — Z12.31 VISIT FOR SCREENING MAMMOGRAM: ICD-10-CM

## 2018-08-17 DIAGNOSIS — M25.50 ARTHRALGIA, UNSPECIFIED JOINT: ICD-10-CM

## 2018-08-17 DIAGNOSIS — I10 ESSENTIAL HYPERTENSION: Primary | Chronic | ICD-10-CM

## 2018-08-17 DIAGNOSIS — M79.641 BILATERAL HAND PAIN: ICD-10-CM

## 2018-08-17 DIAGNOSIS — E78.5 HYPERLIPIDEMIA, UNSPECIFIED HYPERLIPIDEMIA TYPE: Chronic | ICD-10-CM

## 2018-08-17 DIAGNOSIS — M19.90 ARTHRITIS: ICD-10-CM

## 2018-08-17 DIAGNOSIS — L40.9 PSORIASIS: ICD-10-CM

## 2018-08-17 DIAGNOSIS — E11.9 TYPE 2 DIABETES MELLITUS WITHOUT COMPLICATION, WITHOUT LONG-TERM CURRENT USE OF INSULIN: Chronic | ICD-10-CM

## 2018-08-17 DIAGNOSIS — M79.642 BILATERAL HAND PAIN: ICD-10-CM

## 2018-08-17 PROCEDURE — 99396 PREV VISIT EST AGE 40-64: CPT | Mod: S$PBB,,, | Performed by: INTERNAL MEDICINE

## 2018-08-17 PROCEDURE — 99999 PR PBB SHADOW E&M-EST. PATIENT-LVL IV: CPT | Mod: PBBFAC,,, | Performed by: INTERNAL MEDICINE

## 2018-08-17 PROCEDURE — 99214 OFFICE O/P EST MOD 30 MIN: CPT | Mod: PBBFAC,PO | Performed by: INTERNAL MEDICINE

## 2018-08-17 RX ORDER — MELOXICAM 7.5 MG/1
7.5 TABLET ORAL DAILY
Refills: 2 | COMMUNITY
Start: 2018-08-08 | End: 2018-08-22

## 2018-08-17 RX ORDER — MAGNESIUM 200 MG
TABLET ORAL ONCE
COMMUNITY
End: 2019-09-10

## 2018-08-17 RX ORDER — UBIDECARENONE 75 MG
1000 CAPSULE ORAL DAILY
COMMUNITY

## 2018-08-17 RX ORDER — GLIPIZIDE 10 MG/1
TABLET ORAL
Refills: 1 | COMMUNITY
Start: 2018-08-04 | End: 2018-12-12 | Stop reason: SDUPTHER

## 2018-08-17 RX ORDER — VITAMIN E (DL,TOCOPHERYL ACET) 180 MG
CAPSULE ORAL
COMMUNITY
End: 2019-09-10

## 2018-08-17 RX ORDER — APREMILAST 10-20-30MG
KIT ORAL
COMMUNITY
End: 2018-08-17

## 2018-08-20 ENCOUNTER — TELEPHONE (OUTPATIENT)
Dept: INTERNAL MEDICINE | Facility: CLINIC | Age: 63
End: 2018-08-20

## 2018-08-20 ENCOUNTER — HOSPITAL ENCOUNTER (OUTPATIENT)
Dept: RADIOLOGY | Facility: HOSPITAL | Age: 63
Discharge: HOME | End: 2018-08-20
Attending: INTERNAL MEDICINE
Payer: MEDICAID

## 2018-08-20 DIAGNOSIS — M79.642 BILATERAL HAND PAIN: ICD-10-CM

## 2018-08-20 DIAGNOSIS — M79.641 BILATERAL HAND PAIN: ICD-10-CM

## 2018-08-20 PROBLEM — M19.90 INFLAMMATORY ARTHRITIS: Status: ACTIVE | Noted: 2018-08-20

## 2018-08-20 PROCEDURE — 73130 X-RAY EXAM OF HAND: CPT | Mod: 26,50,, | Performed by: RADIOLOGY

## 2018-08-20 PROCEDURE — 73130 X-RAY EXAM OF HAND: CPT | Mod: 50,TC,PO

## 2018-08-20 NOTE — PROGRESS NOTES
Subjective:       Patient ID: Fely Wetzel is a 62 y.o. female.    Chief Complaint: Follow-up (lab results)    Patient is a 62 y.o.female who presents today for follow up    Her hand xray reveals inflammatory arthritis. Needs to see rheum    Diabetes - Patient is on metformin and she does check her BG at home, and it ranges good.   Her last eye exam was in the year.  She does check their feet on a regular basis.  She does not have numbness/tingling of her feet.  Her last A1c's were   Hemoglobin A1C   Date Value Ref Range Status   08/20/2018 6.4 (H) 4.0 - 5.6 % Final     Comment:     ADA Screening Guidelines:  5.7-6.4%  Consistent with prediabetes  >or=6.5%  Consistent with diabetes  High levels of fetal hemoglobin interfere with the HbA1C  assay. Heterozygous hemoglobin variants (HbS, HgC, etc)do  not significantly interfere with this assay.   However, presence of multiple variants may affect accuracy.     06/02/2017 8.1 (H) 4.5 - 6.2 % Final     Comment:     According to ADA guidelines, hemoglobin A1C <7.0% represents  optimal control in non-pregnant diabetic patients.  Different  metrics may apply to specific populations.   Standards of Medical Care in Diabetes - 2016.  For the purpose of screening for the presence of diabetes:  <5.7%     Consistent with the absence of diabetes  5.7-6.4%  Consistent with increasing risk for diabetes   (prediabetes)  >or=6.5%  Consistent with diabetes  Currently no consensus exists for use of hemoglobin A1C  for diagnosis of diabetes for children.     02/25/2017 11.1 (H) 4.5 - 6.2 % Final     Comment:     According to ADA guidelines, hemoglobin A1C <7.0% represents  optimal control in non-pregnant diabetic patients.  Different  metrics may apply to specific populations.   Standards of Medical Care in Diabetes - 2016.  For the purpose of screening for the presence of diabetes:  <5.7%     Consistent with the absence of diabetes  5.7-6.4%  Consistent with increasing risk for  diabetes   (prediabetes)  >or=6.5%  Consistent with diabetes  Currently no consensus exists for use of hemoglobin A1C  for diagnosis of diabetes for children.     .  Review of Systems   Constitutional: Negative for appetite change, chills, diaphoresis, fatigue and fever.   HENT: Negative for congestion, dental problem, ear discharge, ear pain, hearing loss, postnasal drip, sinus pressure and sore throat.    Eyes: Negative for discharge, redness and itching.   Respiratory: Negative for cough, chest tightness, shortness of breath and wheezing.    Cardiovascular: Negative for chest pain, palpitations and leg swelling.   Gastrointestinal: Negative for abdominal pain, constipation, diarrhea, nausea and vomiting.   Endocrine: Negative for cold intolerance and heat intolerance.   Genitourinary: Negative for difficulty urinating, frequency, hematuria and urgency.   Musculoskeletal: Negative for arthralgias, back pain, gait problem, myalgias and neck pain.   Skin: Negative for color change and rash.   Neurological: Negative for dizziness, syncope and headaches.   Hematological: Negative for adenopathy.   Psychiatric/Behavioral: Negative for behavioral problems and sleep disturbance. The patient is not nervous/anxious.        Objective:      Physical Exam   Constitutional: She is oriented to person, place, and time. She appears well-developed and well-nourished. No distress.   HENT:   Head: Normocephalic and atraumatic.   Right Ear: External ear normal.   Left Ear: External ear normal.   Nose: Nose normal.   Mouth/Throat: Oropharynx is clear and moist. No oropharyngeal exudate.   Eyes: Conjunctivae and EOM are normal. Pupils are equal, round, and reactive to light. Right eye exhibits no discharge. Left eye exhibits no discharge. No scleral icterus.   Neck: Normal range of motion. Neck supple. No JVD present. No thyromegaly present.   Cardiovascular: Normal rate, regular rhythm, normal heart sounds and intact distal pulses.  Exam reveals no gallop and no friction rub.   No murmur heard.  Pulmonary/Chest: Effort normal and breath sounds normal. No stridor. No respiratory distress. She has no wheezes. She has no rales. She exhibits no tenderness.   Abdominal: Soft. Bowel sounds are normal. She exhibits no distension. There is no tenderness. There is no rebound.   Musculoskeletal: Normal range of motion. She exhibits no edema or tenderness.   Lymphadenopathy:     She has no cervical adenopathy.   Neurological: She is alert and oriented to person, place, and time. No cranial nerve deficit.   Skin: Skin is warm and dry. No rash noted. She is not diaphoretic. No erythema.   Psychiatric: She has a normal mood and affect. Her behavior is normal.   Nursing note and vitals reviewed.      Assessment and Plan:       1. Type 2 diabetes mellitus without complication, without long-term current use of insulin  - a1c well controlled at 6.4  - continue diet, metformin and glipizide    2. Inflammatory arthritis  - found on hand xray  - recommend rheum referral; pt will contact her insurance  - continue mobic but increase to 15 mg daily    3. Essential hypertension  - good control; on arb for diabetes    4. Vitamin D deficiency  - start otc supplementation    5. Hyperlipidemia, unspecified hyperlipidemia type  - onstatin; LDL almost at goal; discussed low cholesterol diet  - pt wants to stop fenofibrate and repeat labs in three months    rtc in three months        No Follow-up on file.

## 2018-08-20 NOTE — TELEPHONE ENCOUNTER
Notify pt that her hand xray is concerning for inflammatory arthritis. Recommend rheum referral. She has medicaid so she needs to find out who is covered.

## 2018-08-22 ENCOUNTER — OFFICE VISIT (OUTPATIENT)
Dept: INTERNAL MEDICINE | Facility: CLINIC | Age: 63
End: 2018-08-22
Payer: MEDICAID

## 2018-08-22 VITALS
DIASTOLIC BLOOD PRESSURE: 70 MMHG | OXYGEN SATURATION: 97 % | BODY MASS INDEX: 31.56 KG/M2 | HEIGHT: 62 IN | HEART RATE: 78 BPM | TEMPERATURE: 99 F | SYSTOLIC BLOOD PRESSURE: 128 MMHG | WEIGHT: 171.5 LBS | RESPIRATION RATE: 18 BRPM

## 2018-08-22 DIAGNOSIS — E55.9 VITAMIN D DEFICIENCY: ICD-10-CM

## 2018-08-22 DIAGNOSIS — M19.90 INFLAMMATORY ARTHRITIS: ICD-10-CM

## 2018-08-22 DIAGNOSIS — E78.5 HYPERLIPIDEMIA, UNSPECIFIED HYPERLIPIDEMIA TYPE: Chronic | ICD-10-CM

## 2018-08-22 DIAGNOSIS — I10 ESSENTIAL HYPERTENSION: Chronic | ICD-10-CM

## 2018-08-22 DIAGNOSIS — E11.9 TYPE 2 DIABETES MELLITUS WITHOUT COMPLICATION, WITHOUT LONG-TERM CURRENT USE OF INSULIN: Primary | Chronic | ICD-10-CM

## 2018-08-22 PROCEDURE — 99214 OFFICE O/P EST MOD 30 MIN: CPT | Mod: S$PBB,,, | Performed by: INTERNAL MEDICINE

## 2018-08-22 PROCEDURE — 99999 PR PBB SHADOW E&M-EST. PATIENT-LVL IV: CPT | Mod: PBBFAC,,, | Performed by: INTERNAL MEDICINE

## 2018-08-22 PROCEDURE — 99214 OFFICE O/P EST MOD 30 MIN: CPT | Mod: PBBFAC,PO | Performed by: INTERNAL MEDICINE

## 2018-08-22 RX ORDER — MELOXICAM 15 MG/1
15 TABLET ORAL DAILY
Qty: 30 TABLET | Refills: 11 | Status: SHIPPED | OUTPATIENT
Start: 2018-08-22 | End: 2019-07-17 | Stop reason: SDUPTHER

## 2018-08-24 ENCOUNTER — TELEPHONE (OUTPATIENT)
Dept: INTERNAL MEDICINE | Facility: CLINIC | Age: 63
End: 2018-08-24

## 2018-08-24 DIAGNOSIS — M19.90 INFLAMMATORY ARTHRITIS: Primary | ICD-10-CM

## 2018-08-24 NOTE — TELEPHONE ENCOUNTER
----- Message from Sara Coreas sent at 8/24/2018 12:32 PM CDT -----  Contact: Pt [phone]  [facility] She states in order for her to see him she needs a referral to him.     Fax number [phone]

## 2018-09-05 ENCOUNTER — TELEPHONE (OUTPATIENT)
Dept: INTERNAL MEDICINE | Facility: CLINIC | Age: 63
End: 2018-09-05

## 2018-09-05 DIAGNOSIS — M19.90 INFLAMMATORY ARTHRITIS: Primary | ICD-10-CM

## 2018-09-07 ENCOUNTER — TELEPHONE (OUTPATIENT)
Dept: INTERNAL MEDICINE | Facility: CLINIC | Age: 63
End: 2018-09-07

## 2018-09-07 RX ORDER — METFORMIN HYDROCHLORIDE 1000 MG/1
TABLET ORAL
Qty: 180 TABLET | Refills: 1 | Status: SHIPPED | OUTPATIENT
Start: 2018-09-07 | End: 2019-02-27 | Stop reason: SDUPTHER

## 2018-09-07 NOTE — TELEPHONE ENCOUNTER
"----- Message from Praveena Hamm sent at 9/7/2018 11:54 AM CDT -----  Contact: Self/[phone]  RX request - refill or new RX.  Is this a refill or new RX:  refill  RX name and strength: metformin (GLUCOPHAGE) 1000 MG tablet   Directions: TAKE 1 TABLET (1,000 MG TOTAL) BY MOUTH 2 (TWO) TIMES DAILY. WITH MEALS  Is this a 30 day or 90 day RX:    Local pharmacy or mail order pharmacy:  Local  Pharmacy name and phone # (DON'T enter "on file" or "in chart"): [facility]- TAKE 1 TABLET (1,000 MG TOTAL) BY MOUTH 2 (TWO) TIMES DAILY. WITH MEALS  Comments:        "

## 2018-11-12 RX ORDER — IRBESARTAN 300 MG/1
TABLET ORAL
Qty: 90 TABLET | Refills: 0 | Status: SHIPPED | OUTPATIENT
Start: 2018-11-12 | End: 2018-11-15 | Stop reason: SDUPTHER

## 2018-11-12 NOTE — TELEPHONE ENCOUNTER
----- Message from Haylie Vergara sent at 11/12/2018 10:39 AM CST -----  Contact: Pt Mobile/Home [phone]  Patient is calling in regards to her pharmacy [facility] telling her that her script for  irbesartan (AVAPRO) 300 MG tablet was denied on last week. She said that she called them on last week and tried to have her medication refilled  but she was told to call her doctor. She would like a call back in regards to wanting to know what is the reason for the denial and what should she do.

## 2018-11-13 NOTE — PROGRESS NOTES
Subjective:       Patient ID: Fely Wetzel is a 63 y.o. female.    Chief Complaint: Follow-up (diabetes)    Patient is a 63 y.o.female who presents today for follow up    Arthritis: otezla, methotrexate and folic acid was prescribed by rheum, Dr. Garcia    Labs: reviewed  Vaccines: Influenza (done); Tetanus (2014) ; Prevnar ( done)  Eye exam: done; appt  In february, Dr. Tom  Mammogram: done in may; dis ; normal  Gyn exam: hysterectomy  Colonoscopy: 2014; up to date    Review of Systems   Constitutional: Negative for appetite change, chills, diaphoresis, fatigue and fever.   HENT: Negative for congestion, dental problem, ear discharge, ear pain, hearing loss, postnasal drip, sinus pressure and sore throat.    Eyes: Negative for discharge, redness and itching.   Respiratory: Negative for cough, chest tightness, shortness of breath and wheezing.    Cardiovascular: Negative for chest pain, palpitations and leg swelling.   Gastrointestinal: Negative for abdominal pain, constipation, diarrhea, nausea and vomiting.   Endocrine: Negative for cold intolerance and heat intolerance.   Genitourinary: Negative for difficulty urinating, frequency, hematuria and urgency.   Musculoskeletal: Negative for arthralgias, back pain, gait problem, myalgias and neck pain.   Skin: Negative for color change and rash.   Neurological: Negative for dizziness, syncope and headaches.   Hematological: Negative for adenopathy.   Psychiatric/Behavioral: Negative for behavioral problems and sleep disturbance. The patient is not nervous/anxious.        Objective:      Physical Exam   Constitutional: She is oriented to person, place, and time. She appears well-developed and well-nourished. No distress.   HENT:   Head: Normocephalic and atraumatic.   Right Ear: External ear normal.   Left Ear: External ear normal.   Nose: Nose normal.   Mouth/Throat: Oropharynx is clear and moist. No oropharyngeal exudate.   Eyes: Conjunctivae and EOM are  normal. Pupils are equal, round, and reactive to light. Right eye exhibits no discharge. Left eye exhibits no discharge. No scleral icterus.   Neck: Normal range of motion. Neck supple. No JVD present. No thyromegaly present.   Cardiovascular: Normal rate, regular rhythm, normal heart sounds and intact distal pulses. Exam reveals no gallop and no friction rub.   No murmur heard.  Pulmonary/Chest: Effort normal and breath sounds normal. No stridor. No respiratory distress. She has no wheezes. She has no rales. She exhibits no tenderness.   Abdominal: Soft. Bowel sounds are normal. She exhibits no distension. There is no tenderness. There is no rebound.   Musculoskeletal: Normal range of motion. She exhibits no edema or tenderness.   Lymphadenopathy:     She has no cervical adenopathy.   Neurological: She is alert and oriented to person, place, and time. No cranial nerve deficit.   Skin: Skin is warm and dry. No rash noted. She is not diaphoretic. No erythema.   Psychiatric: She has a normal mood and affect. Her behavior is normal.   Nursing note and vitals reviewed.      Assessment and Plan:       1. Hyperlipidemia, unspecified hyperlipidemia type  - LDL increased off cholesterol med; pt would like three months to see if she can get it lower without meds    2. Essential hypertension  - good control at home    3. Type 2 diabetes mellitus without complication, without long-term current use of insulin  - stable control; labs reviewed    4. Inflammatory arthritis  - seeing rheum          No Follow-up on file.

## 2018-11-15 RX ORDER — IRBESARTAN 300 MG/1
TABLET ORAL
Qty: 90 TABLET | Refills: 3 | Status: SHIPPED | OUTPATIENT
Start: 2018-11-15 | End: 2019-10-12 | Stop reason: SDUPTHER

## 2018-11-15 NOTE — TELEPHONE ENCOUNTER
----- Message from Gabi Duque sent at 11/15/2018  9:52 AM CST -----  Contact: self/[phone]  Patient called in regards needing to talk with Dr Graham about patient has tried to get the blood pressure medication through the pharmacy but they are needing authorization from Dr Graham. Patient stated that she is out of medication.irbesartan (AVAPRO) 300 MG tablet [facility]/pharmacy #[id number] - [facility] [address] AT [facility] [phone] (Phone) [phone] (Fax). Thank you

## 2018-11-20 ENCOUNTER — LAB VISIT (OUTPATIENT)
Dept: LAB | Facility: HOSPITAL | Age: 63
End: 2018-11-20
Attending: INTERNAL MEDICINE
Payer: MEDICAID

## 2018-11-20 DIAGNOSIS — E11.9 TYPE 2 DIABETES MELLITUS WITHOUT COMPLICATION, WITHOUT LONG-TERM CURRENT USE OF INSULIN: Chronic | ICD-10-CM

## 2018-11-20 DIAGNOSIS — M19.90 INFLAMMATORY ARTHRITIS: ICD-10-CM

## 2018-11-20 LAB
ALBUMIN SERPL BCP-MCNC: 3.4 G/DL
ALP SERPL-CCNC: 106 U/L
ALT SERPL W/O P-5'-P-CCNC: 10 U/L
ANION GAP SERPL CALC-SCNC: 9 MMOL/L
AST SERPL-CCNC: 15 U/L
BILIRUB SERPL-MCNC: 0.3 MG/DL
BUN SERPL-MCNC: 22 MG/DL
CALCIUM SERPL-MCNC: 9.9 MG/DL
CHLORIDE SERPL-SCNC: 106 MMOL/L
CHOLEST SERPL-MCNC: 235 MG/DL
CHOLEST/HDLC SERPL: 5.5 {RATIO}
CO2 SERPL-SCNC: 23 MMOL/L
CREAT SERPL-MCNC: 0.6 MG/DL
CRP SERPL-MCNC: 38.13 MG/L
ERYTHROCYTE [SEDIMENTATION RATE] IN BLOOD BY WESTERGREN METHOD: 81 MM/HR
EST. GFR  (AFRICAN AMERICAN): >60 ML/MIN/1.73 M^2
EST. GFR  (NON AFRICAN AMERICAN): >60 ML/MIN/1.73 M^2
ESTIMATED AVG GLUCOSE: 143 MG/DL
GLUCOSE SERPL-MCNC: 129 MG/DL
HBA1C MFR BLD HPLC: 6.6 %
HDLC SERPL-MCNC: 43 MG/DL
HDLC SERPL: 18.3 %
LDLC SERPL CALC-MCNC: 160.6 MG/DL
NONHDLC SERPL-MCNC: 192 MG/DL
POTASSIUM SERPL-SCNC: 4.1 MMOL/L
PROT SERPL-MCNC: 7.9 G/DL
SODIUM SERPL-SCNC: 138 MMOL/L
TRIGL SERPL-MCNC: 157 MG/DL

## 2018-11-20 PROCEDURE — 83036 HEMOGLOBIN GLYCOSYLATED A1C: CPT

## 2018-11-20 PROCEDURE — 80061 LIPID PANEL: CPT

## 2018-11-20 PROCEDURE — 85652 RBC SED RATE AUTOMATED: CPT

## 2018-11-20 PROCEDURE — 80053 COMPREHEN METABOLIC PANEL: CPT

## 2018-11-20 PROCEDURE — 36415 COLL VENOUS BLD VENIPUNCTURE: CPT | Mod: PO

## 2018-11-20 PROCEDURE — 86141 C-REACTIVE PROTEIN HS: CPT

## 2018-11-27 ENCOUNTER — OFFICE VISIT (OUTPATIENT)
Dept: INTERNAL MEDICINE | Facility: CLINIC | Age: 63
End: 2018-11-27
Payer: MEDICAID

## 2018-11-27 VITALS
OXYGEN SATURATION: 99 % | RESPIRATION RATE: 18 BRPM | WEIGHT: 180.31 LBS | DIASTOLIC BLOOD PRESSURE: 88 MMHG | HEIGHT: 62 IN | BODY MASS INDEX: 33.18 KG/M2 | SYSTOLIC BLOOD PRESSURE: 136 MMHG | TEMPERATURE: 99 F | HEART RATE: 78 BPM

## 2018-11-27 DIAGNOSIS — I10 ESSENTIAL HYPERTENSION: Chronic | ICD-10-CM

## 2018-11-27 DIAGNOSIS — E78.5 HYPERLIPIDEMIA, UNSPECIFIED HYPERLIPIDEMIA TYPE: Primary | Chronic | ICD-10-CM

## 2018-11-27 DIAGNOSIS — M19.90 INFLAMMATORY ARTHRITIS: ICD-10-CM

## 2018-11-27 DIAGNOSIS — E11.9 TYPE 2 DIABETES MELLITUS WITHOUT COMPLICATION, WITHOUT LONG-TERM CURRENT USE OF INSULIN: Chronic | ICD-10-CM

## 2018-11-27 PROCEDURE — 99213 OFFICE O/P EST LOW 20 MIN: CPT | Mod: PBBFAC,PO | Performed by: INTERNAL MEDICINE

## 2018-11-27 PROCEDURE — 99999 PR PBB SHADOW E&M-EST. PATIENT-LVL III: CPT | Mod: PBBFAC,,, | Performed by: INTERNAL MEDICINE

## 2018-11-27 PROCEDURE — 99214 OFFICE O/P EST MOD 30 MIN: CPT | Mod: S$PBB,,, | Performed by: INTERNAL MEDICINE

## 2018-11-27 RX ORDER — METHOTREXATE 2.5 MG/1
TABLET ORAL
Refills: 5 | COMMUNITY
Start: 2018-11-16 | End: 2019-07-01

## 2018-11-27 RX ORDER — FOLIC ACID 1 MG/1
1000 TABLET ORAL DAILY
Refills: 11 | COMMUNITY
Start: 2018-11-16 | End: 2019-09-10

## 2018-12-03 ENCOUNTER — TELEPHONE (OUTPATIENT)
Dept: INTERNAL MEDICINE | Facility: CLINIC | Age: 63
End: 2018-12-03

## 2018-12-03 DIAGNOSIS — E78.5 HYPERLIPIDEMIA, UNSPECIFIED HYPERLIPIDEMIA TYPE: Chronic | ICD-10-CM

## 2018-12-03 DIAGNOSIS — I10 ESSENTIAL HYPERTENSION: Chronic | ICD-10-CM

## 2018-12-03 DIAGNOSIS — E11.9 TYPE 2 DIABETES MELLITUS WITHOUT COMPLICATION, WITHOUT LONG-TERM CURRENT USE OF INSULIN: Primary | Chronic | ICD-10-CM

## 2018-12-03 NOTE — TELEPHONE ENCOUNTER
----- Message from Marv Perez sent at 12/3/2018  9:12 AM CST -----  Contact: Self [phone]  Caller is requesting a sooner appointment. Caller declined first available appointment listed below. Caller will not accept being placed on the wait list and is requesting a message be sent to the provider.    When is the next available appointment:  Unavailable   Did you offer to schedule the next available appt and put the patient on the wait list?:   yes  What visit type: EP  Symptoms:  Follow up  Patient preference of timeframe to be scheduled:  This week or next  What is the reason the patient is requesting a sooner appointment? (insurance terminating, changing jobs):  No  Would you prefer an answer via [employer]?:  no  Comments:  Pt will like to speak to the nurse

## 2018-12-12 RX ORDER — GLIPIZIDE 10 MG/1
TABLET ORAL
Qty: 60 TABLET | Refills: 2 | Status: SHIPPED | OUTPATIENT
Start: 2018-12-12 | End: 2019-03-11 | Stop reason: SDUPTHER

## 2018-12-21 RX ORDER — HYDROCHLOROTHIAZIDE 25 MG/1
25 TABLET ORAL DAILY
Qty: 90 TABLET | Refills: 0 | Status: SHIPPED | OUTPATIENT
Start: 2018-12-21 | End: 2019-03-11 | Stop reason: SDUPTHER

## 2018-12-21 NOTE — TELEPHONE ENCOUNTER
"----- Message from Nena Hernandez sent at 12/21/2018 11:58 AM CST -----  Contact: self   RX request - refill or new RX.  Is this a refill or new RX:    RX name and strength: hydrochlorothiazide (HYDRODIURIL) 25 MG tablet 90 tablet   Directions:   Is this a 30 day or 90 day RX:    Local pharmacy or mail order pharmacy:    Pharmacy name and phone # (DON'T enter "on file" or "in chart"): [facility]/pharmacy #[id number] - [place] - [address] AT [facility] [phone] (Phone)  [phone] (Fax)      Comments:        "

## 2019-02-05 ENCOUNTER — LAB VISIT (OUTPATIENT)
Dept: LAB | Facility: HOSPITAL | Age: 64
End: 2019-02-05
Attending: INTERNAL MEDICINE
Payer: MEDICAID

## 2019-02-05 DIAGNOSIS — E78.5 HYPERLIPIDEMIA, UNSPECIFIED HYPERLIPIDEMIA TYPE: Chronic | ICD-10-CM

## 2019-02-05 DIAGNOSIS — E11.9 TYPE 2 DIABETES MELLITUS WITHOUT COMPLICATION, WITHOUT LONG-TERM CURRENT USE OF INSULIN: Chronic | ICD-10-CM

## 2019-02-05 DIAGNOSIS — I10 ESSENTIAL HYPERTENSION: Chronic | ICD-10-CM

## 2019-02-05 LAB
25(OH)D3+25(OH)D2 SERPL-MCNC: 21 NG/ML
ALBUMIN SERPL BCP-MCNC: 3.2 G/DL
ALP SERPL-CCNC: 114 U/L
ALT SERPL W/O P-5'-P-CCNC: 18 U/L
ANION GAP SERPL CALC-SCNC: 8 MMOL/L
AST SERPL-CCNC: 15 U/L
BASOPHILS # BLD AUTO: 0.02 K/UL
BASOPHILS NFR BLD: 0.2 %
BILIRUB SERPL-MCNC: 0.4 MG/DL
BUN SERPL-MCNC: 21 MG/DL
CALCIUM SERPL-MCNC: 10.2 MG/DL
CHLORIDE SERPL-SCNC: 104 MMOL/L
CHOLEST SERPL-MCNC: 232 MG/DL
CHOLEST/HDLC SERPL: 5 {RATIO}
CO2 SERPL-SCNC: 24 MMOL/L
CREAT SERPL-MCNC: 0.7 MG/DL
DIFFERENTIAL METHOD: ABNORMAL
EOSINOPHIL # BLD AUTO: 0.3 K/UL
EOSINOPHIL NFR BLD: 3.3 %
ERYTHROCYTE [DISTWIDTH] IN BLOOD BY AUTOMATED COUNT: 15.2 %
EST. GFR  (AFRICAN AMERICAN): >60 ML/MIN/1.73 M^2
EST. GFR  (NON AFRICAN AMERICAN): >60 ML/MIN/1.73 M^2
ESTIMATED AVG GLUCOSE: 171 MG/DL
GLUCOSE SERPL-MCNC: 164 MG/DL
HBA1C MFR BLD HPLC: 7.6 %
HCT VFR BLD AUTO: 37.1 %
HDLC SERPL-MCNC: 46 MG/DL
HDLC SERPL: 19.8 %
HGB BLD-MCNC: 11.6 G/DL
IMM GRANULOCYTES # BLD AUTO: 0.03 K/UL
IMM GRANULOCYTES NFR BLD AUTO: 0.3 %
LDLC SERPL CALC-MCNC: 148.4 MG/DL
LYMPHOCYTES # BLD AUTO: 2.5 K/UL
LYMPHOCYTES NFR BLD: 28.1 %
MCH RBC QN AUTO: 26.9 PG
MCHC RBC AUTO-ENTMCNC: 31.3 G/DL
MCV RBC AUTO: 86 FL
MONOCYTES # BLD AUTO: 0.5 K/UL
MONOCYTES NFR BLD: 5.3 %
NEUTROPHILS # BLD AUTO: 5.7 K/UL
NEUTROPHILS NFR BLD: 62.8 %
NONHDLC SERPL-MCNC: 186 MG/DL
NRBC BLD-RTO: 0 /100 WBC
PLATELET # BLD AUTO: 368 K/UL
PMV BLD AUTO: 11.5 FL
POTASSIUM SERPL-SCNC: 4.2 MMOL/L
PROT SERPL-MCNC: 7.7 G/DL
RBC # BLD AUTO: 4.31 M/UL
SODIUM SERPL-SCNC: 136 MMOL/L
TRIGL SERPL-MCNC: 188 MG/DL
TSH SERPL DL<=0.005 MIU/L-ACNC: 2.2 UIU/ML
WBC # BLD AUTO: 9.04 K/UL

## 2019-02-05 PROCEDURE — 80061 LIPID PANEL: CPT

## 2019-02-05 PROCEDURE — 84443 ASSAY THYROID STIM HORMONE: CPT

## 2019-02-05 PROCEDURE — 36415 COLL VENOUS BLD VENIPUNCTURE: CPT | Mod: PO

## 2019-02-05 PROCEDURE — 80053 COMPREHEN METABOLIC PANEL: CPT

## 2019-02-05 PROCEDURE — 83036 HEMOGLOBIN GLYCOSYLATED A1C: CPT

## 2019-02-05 PROCEDURE — 85025 COMPLETE CBC W/AUTO DIFF WBC: CPT

## 2019-02-05 PROCEDURE — 82306 VITAMIN D 25 HYDROXY: CPT

## 2019-02-12 ENCOUNTER — TELEPHONE (OUTPATIENT)
Dept: INTERNAL MEDICINE | Facility: CLINIC | Age: 64
End: 2019-02-12

## 2019-02-12 NOTE — TELEPHONE ENCOUNTER
----- Message from Leslee Sequeira sent at 2/12/2019 11:50 AM CST -----  Contact: self/[phone]  Type: Returning a call    Who left a message? Sara     When did the practice call? Today     Comments: Please advise.          Thank You

## 2019-02-12 NOTE — TELEPHONE ENCOUNTER
----- Message from Nena Hernandez sent at 2/12/2019  7:10 AM CST -----  Contact: self/ [phone]  Patient says she cant come in this morning due to the weather and next available phys is July. Please call with patient  lab results and schedule sooner than July.

## 2019-02-12 NOTE — PROGRESS NOTES
Subjective:       Patient ID: Fely Wetzel is a 63 y.o. female.    Chief Complaint: Annual Exam    Patient is a 63 y.o.female who presents today for follow up    Arthritis:  methotrexate and folic acid was prescribed by rheum, Dr. Garcia. She is taking 10 pills per week of methotrexate    Blood pressure elevated at home over last 3 weeks.    Labs: reviewed  Vaccines: Influenza (done); Tetanus (2014) ; Prevnar ( done)  Eye exam: dr wall  Mammogram: done in may; dis ; normal  Gyn exam: hysterectomy  Colonoscopy: 2014; up to date    Review of Systems   Constitutional: Negative for appetite change, chills, diaphoresis, fatigue and fever.   HENT: Negative for congestion, dental problem, ear discharge, ear pain, hearing loss, postnasal drip, sinus pressure and sore throat.    Eyes: Negative for discharge, redness and itching.   Respiratory: Negative for cough, chest tightness, shortness of breath and wheezing.    Cardiovascular: Negative for chest pain, palpitations and leg swelling.   Gastrointestinal: Negative for abdominal pain, constipation, diarrhea, nausea and vomiting.   Endocrine: Negative for cold intolerance and heat intolerance.   Genitourinary: Negative for difficulty urinating, frequency, hematuria and urgency.   Musculoskeletal: Negative for arthralgias, back pain, gait problem, myalgias and neck pain.   Skin: Negative for color change and rash.   Neurological: Negative for dizziness, syncope and headaches.   Hematological: Negative for adenopathy.   Psychiatric/Behavioral: Negative for behavioral problems and sleep disturbance. The patient is not nervous/anxious.        Objective:      Physical Exam   Constitutional: She is oriented to person, place, and time. She appears well-developed and well-nourished. No distress.   HENT:   Head: Normocephalic and atraumatic.   Right Ear: External ear normal.   Left Ear: External ear normal.   Nose: Nose normal.   Mouth/Throat: Oropharynx is clear and  moist. No oropharyngeal exudate.   Eyes: Conjunctivae and EOM are normal. Pupils are equal, round, and reactive to light. Right eye exhibits no discharge. Left eye exhibits no discharge. No scleral icterus.   Neck: Normal range of motion. Neck supple. No JVD present. No thyromegaly present.   Cardiovascular: Normal rate, regular rhythm, normal heart sounds and intact distal pulses. Exam reveals no gallop and no friction rub.   No murmur heard.  Pulmonary/Chest: Effort normal and breath sounds normal. No stridor. No respiratory distress. She has no wheezes. She has no rales. She exhibits no tenderness.   Abdominal: Soft. Bowel sounds are normal. She exhibits no distension. There is no tenderness. There is no rebound.   Musculoskeletal: Normal range of motion. She exhibits no edema or tenderness.   Lymphadenopathy:     She has no cervical adenopathy.   Neurological: She is alert and oriented to person, place, and time. No cranial nerve deficit.   Skin: Skin is warm and dry. No rash noted. She is not diaphoretic. No erythema.   Psychiatric: She has a normal mood and affect. Her behavior is normal.   Nursing note and vitals reviewed.      Assessment and Plan:       1. Hyperlipidemia, unspecified hyperlipidemia type  - stop pravastatin; LDL not at goal  - start lipitor 40 mg daily  - low cholesterol diet    2. Essential hypertension  - continue irbesartan and hctz  - add amlodipine at night  - low salt  - call clinic with readings in 1-2 weeks    3. Inflammatory arthritis  - f/u with rheum  - trial of diclofenac gel    4. Type 2 diabetes mellitus without complication, without long-term current use of insulin  - not at goal; wants to restart diet and continue current med    5. Vitamin D deficiency  - otc supplement          No Follow-up on file.

## 2019-02-22 ENCOUNTER — OFFICE VISIT (OUTPATIENT)
Dept: INTERNAL MEDICINE | Facility: CLINIC | Age: 64
End: 2019-02-22
Payer: MEDICAID

## 2019-02-22 VITALS
WEIGHT: 178.81 LBS | DIASTOLIC BLOOD PRESSURE: 82 MMHG | TEMPERATURE: 99 F | SYSTOLIC BLOOD PRESSURE: 168 MMHG | HEART RATE: 72 BPM | HEIGHT: 62 IN | BODY MASS INDEX: 32.91 KG/M2 | RESPIRATION RATE: 16 BRPM

## 2019-02-22 DIAGNOSIS — E55.9 VITAMIN D DEFICIENCY: ICD-10-CM

## 2019-02-22 DIAGNOSIS — E78.5 HYPERLIPIDEMIA, UNSPECIFIED HYPERLIPIDEMIA TYPE: Primary | Chronic | ICD-10-CM

## 2019-02-22 DIAGNOSIS — M19.90 INFLAMMATORY ARTHRITIS: ICD-10-CM

## 2019-02-22 DIAGNOSIS — E11.9 TYPE 2 DIABETES MELLITUS WITHOUT COMPLICATION, WITHOUT LONG-TERM CURRENT USE OF INSULIN: Chronic | ICD-10-CM

## 2019-02-22 DIAGNOSIS — I10 ESSENTIAL HYPERTENSION: Chronic | ICD-10-CM

## 2019-02-22 PROCEDURE — 99214 OFFICE O/P EST MOD 30 MIN: CPT | Mod: S$PBB,,, | Performed by: INTERNAL MEDICINE

## 2019-02-22 PROCEDURE — 99213 OFFICE O/P EST LOW 20 MIN: CPT | Mod: PBBFAC,PO | Performed by: INTERNAL MEDICINE

## 2019-02-22 PROCEDURE — 99999 PR PBB SHADOW E&M-EST. PATIENT-LVL III: CPT | Mod: PBBFAC,,, | Performed by: INTERNAL MEDICINE

## 2019-02-22 PROCEDURE — 99214 PR OFFICE/OUTPT VISIT, EST, LEVL IV, 30-39 MIN: ICD-10-PCS | Mod: S$PBB,,, | Performed by: INTERNAL MEDICINE

## 2019-02-22 PROCEDURE — 99999 PR PBB SHADOW E&M-EST. PATIENT-LVL III: ICD-10-PCS | Mod: PBBFAC,,, | Performed by: INTERNAL MEDICINE

## 2019-02-22 RX ORDER — ATORVASTATIN CALCIUM 40 MG/1
40 TABLET, FILM COATED ORAL NIGHTLY
Qty: 90 TABLET | Refills: 1 | Status: SHIPPED | OUTPATIENT
Start: 2019-02-22 | End: 2019-06-11 | Stop reason: SDUPTHER

## 2019-02-22 RX ORDER — DICLOFENAC SODIUM 10 MG/G
2 GEL TOPICAL 4 TIMES DAILY
Qty: 100 G | Refills: 1 | Status: SHIPPED | OUTPATIENT
Start: 2019-02-22 | End: 2019-10-09

## 2019-02-22 RX ORDER — AMLODIPINE BESYLATE 5 MG/1
5 TABLET ORAL NIGHTLY
Qty: 90 TABLET | Refills: 1 | Status: SHIPPED | OUTPATIENT
Start: 2019-02-22 | End: 2019-06-28

## 2019-02-27 ENCOUNTER — TELEPHONE (OUTPATIENT)
Dept: INTERNAL MEDICINE | Facility: CLINIC | Age: 64
End: 2019-02-27

## 2019-02-27 DIAGNOSIS — E78.5 HYPERLIPIDEMIA, UNSPECIFIED HYPERLIPIDEMIA TYPE: Chronic | ICD-10-CM

## 2019-02-27 DIAGNOSIS — E11.9 TYPE 2 DIABETES MELLITUS WITHOUT COMPLICATION, WITHOUT LONG-TERM CURRENT USE OF INSULIN: Primary | Chronic | ICD-10-CM

## 2019-02-27 DIAGNOSIS — I10 ESSENTIAL HYPERTENSION: Chronic | ICD-10-CM

## 2019-02-27 RX ORDER — METFORMIN HYDROCHLORIDE 1000 MG/1
TABLET ORAL
Qty: 180 TABLET | Refills: 1 | Status: SHIPPED | OUTPATIENT
Start: 2019-02-27 | End: 2019-08-16 | Stop reason: SDUPTHER

## 2019-02-27 NOTE — TELEPHONE ENCOUNTER
----- Message from Denisha Guidry sent at 2/27/2019  9:10 AM CST -----  Doctor appointment and lab have been scheduled.  Please link lab orders to the lab appointment.  Date of doctor appointment:  05/23/19  Physical or EP:  EP  Date of lab appointment:05/16/19    Comments:

## 2019-02-27 NOTE — TELEPHONE ENCOUNTER
----- Message from Denisha Guidry sent at 2/27/2019  9:05 AM CST -----  Contact: [phone]  Pt states she was prescribed diclofenac sodium (VOLTAREN) 1 % Gel but unable to get from pharmacy. Pt states a Prior Authorization is required to receive medication. Please advise

## 2019-03-06 ENCOUNTER — TELEPHONE (OUTPATIENT)
Dept: INTERNAL MEDICINE | Facility: CLINIC | Age: 64
End: 2019-03-06

## 2019-03-06 NOTE — TELEPHONE ENCOUNTER
Spoke to pt. Pt stated that her symptoms have somewhat improved. Pt stated that she does not want to take any oral anti-inflammatory. Pt stated that she will discuss this further with Dr. Graham at her upcoming appointment in May.

## 2019-03-06 NOTE — TELEPHONE ENCOUNTER
Fax received from [facility] #[id number]    PA denied for:  DICLOFENAC SODIUM 1% GEL    Pt requesting a different Rx    [facility]:  [phone]

## 2019-03-06 NOTE — TELEPHONE ENCOUNTER
Please verify with patient she has no issues with peptic ulcer disease. She has no chronic kidney disease.  It looks like she has Mobic 15 mg daily.  There is unfortunately not another anti-inflammatory that can be called to pharmacy.  If she would like, we can try different anti-inflammatory from the Mobic.

## 2019-03-11 RX ORDER — HYDROCHLOROTHIAZIDE 25 MG/1
25 TABLET ORAL DAILY
Qty: 90 TABLET | Refills: 3 | Status: SHIPPED | OUTPATIENT
Start: 2019-03-11 | End: 2020-02-26

## 2019-03-11 RX ORDER — GLIPIZIDE 10 MG/1
TABLET ORAL
Qty: 60 TABLET | Refills: 7 | Status: SHIPPED | OUTPATIENT
Start: 2019-03-11 | End: 2019-10-26 | Stop reason: SDUPTHER

## 2019-04-25 ENCOUNTER — TELEPHONE (OUTPATIENT)
Dept: ADMINISTRATIVE | Facility: HOSPITAL | Age: 64
End: 2019-04-25

## 2019-04-26 DIAGNOSIS — M25.512 LEFT SHOULDER PAIN: Primary | ICD-10-CM

## 2019-05-16 ENCOUNTER — LAB VISIT (OUTPATIENT)
Dept: LAB | Facility: HOSPITAL | Age: 64
End: 2019-05-16
Attending: INTERNAL MEDICINE
Payer: MEDICAID

## 2019-05-16 DIAGNOSIS — E78.5 HYPERLIPIDEMIA, UNSPECIFIED HYPERLIPIDEMIA TYPE: Chronic | ICD-10-CM

## 2019-05-16 DIAGNOSIS — I10 ESSENTIAL HYPERTENSION: Chronic | ICD-10-CM

## 2019-05-16 DIAGNOSIS — E11.9 TYPE 2 DIABETES MELLITUS WITHOUT COMPLICATION, WITHOUT LONG-TERM CURRENT USE OF INSULIN: Chronic | ICD-10-CM

## 2019-05-16 LAB
ALBUMIN SERPL BCP-MCNC: 3.5 G/DL (ref 3.5–5.2)
ALP SERPL-CCNC: 122 U/L (ref 55–135)
ALT SERPL W/O P-5'-P-CCNC: 12 U/L (ref 10–44)
ANION GAP SERPL CALC-SCNC: 11 MMOL/L (ref 8–16)
AST SERPL-CCNC: 14 U/L (ref 10–40)
BILIRUB SERPL-MCNC: 0.4 MG/DL (ref 0.1–1)
BUN SERPL-MCNC: 19 MG/DL (ref 8–23)
CALCIUM SERPL-MCNC: 10.1 MG/DL (ref 8.7–10.5)
CHLORIDE SERPL-SCNC: 104 MMOL/L (ref 95–110)
CHOLEST SERPL-MCNC: 243 MG/DL (ref 120–199)
CHOLEST/HDLC SERPL: 5.5 {RATIO} (ref 2–5)
CO2 SERPL-SCNC: 22 MMOL/L (ref 23–29)
CREAT SERPL-MCNC: 0.7 MG/DL (ref 0.5–1.4)
EST. GFR  (AFRICAN AMERICAN): >60 ML/MIN/1.73 M^2
EST. GFR  (NON AFRICAN AMERICAN): >60 ML/MIN/1.73 M^2
ESTIMATED AVG GLUCOSE: 163 MG/DL (ref 68–131)
GLUCOSE SERPL-MCNC: 148 MG/DL (ref 70–110)
HBA1C MFR BLD HPLC: 7.3 % (ref 4–5.6)
HDLC SERPL-MCNC: 44 MG/DL (ref 40–75)
HDLC SERPL: 18.1 % (ref 20–50)
LDLC SERPL CALC-MCNC: 144.2 MG/DL (ref 63–159)
NONHDLC SERPL-MCNC: 199 MG/DL
POTASSIUM SERPL-SCNC: 4.1 MMOL/L (ref 3.5–5.1)
PROT SERPL-MCNC: 7.8 G/DL (ref 6–8.4)
SODIUM SERPL-SCNC: 137 MMOL/L (ref 136–145)
TRIGL SERPL-MCNC: 274 MG/DL (ref 30–150)

## 2019-05-16 PROCEDURE — 36415 COLL VENOUS BLD VENIPUNCTURE: CPT | Mod: PO

## 2019-05-16 PROCEDURE — 83036 HEMOGLOBIN GLYCOSYLATED A1C: CPT

## 2019-05-16 PROCEDURE — 80061 LIPID PANEL: CPT

## 2019-05-16 PROCEDURE — 80053 COMPREHEN METABOLIC PANEL: CPT

## 2019-05-21 ENCOUNTER — TELEPHONE (OUTPATIENT)
Dept: INTERNAL MEDICINE | Facility: CLINIC | Age: 64
End: 2019-05-21

## 2019-05-21 NOTE — TELEPHONE ENCOUNTER
----- Message from Shannan Turner sent at 5/21/2019 11:09 AM CDT -----  Sara, this pt was in the bookout for Thursday and she has medicaid and I cant get her in till July.  Can you make a spot for a Medicaid pt!

## 2019-05-28 NOTE — PROGRESS NOTES
Subjective:       Patient ID: Fely Wetzel is a 63 y.o. female.    Chief Complaint: Follow-up    Patient is a 63 y.o.female who presents today for follow up. Pt hasn't been taking cholesterol med. Hasnt been taking methotrexate; too many side effects. Wants to see if she can go back on onglyza b/c it helped her sugar and weight better.      Labs: reviewed  Vaccines: Influenza (done); Tetanus (2014) ; Prevnar ( done)  Eye exam: appt scheduled  Mammogram: due  Gyn exam: hysterectomy  Colonoscopy: 2014; up to date  Dexa: due  Review of Systems   Constitutional: Negative for appetite change, chills, diaphoresis, fatigue and fever.   HENT: Negative for congestion, dental problem, ear discharge, ear pain, hearing loss, postnasal drip, sinus pressure and sore throat.    Eyes: Negative for discharge, redness and itching.   Respiratory: Negative for cough, chest tightness, shortness of breath and wheezing.    Cardiovascular: Negative for chest pain, palpitations and leg swelling.   Gastrointestinal: Negative for abdominal pain, constipation, diarrhea, nausea and vomiting.   Endocrine: Negative for cold intolerance and heat intolerance.   Genitourinary: Negative for difficulty urinating, frequency, hematuria and urgency.   Musculoskeletal: Negative for arthralgias, back pain, gait problem, myalgias and neck pain.   Skin: Negative for color change and rash.   Neurological: Negative for dizziness, syncope and headaches.   Hematological: Negative for adenopathy.   Psychiatric/Behavioral: Negative for behavioral problems and sleep disturbance. The patient is not nervous/anxious.        Objective:      Physical Exam   Constitutional: She is oriented to person, place, and time. She appears well-developed and well-nourished. No distress.   HENT:   Head: Normocephalic and atraumatic.   Right Ear: External ear normal.   Left Ear: External ear normal.   Nose: Nose normal.   Mouth/Throat: Oropharynx is clear and moist. No  oropharyngeal exudate.   Eyes: Pupils are equal, round, and reactive to light. Conjunctivae and EOM are normal. Right eye exhibits no discharge. Left eye exhibits no discharge. No scleral icterus.   Neck: Normal range of motion. Neck supple. No JVD present. No thyromegaly present.   Cardiovascular: Normal rate, regular rhythm, normal heart sounds and intact distal pulses. Exam reveals no gallop and no friction rub.   No murmur heard.  Pulmonary/Chest: Effort normal and breath sounds normal. No stridor. No respiratory distress. She has no wheezes. She has no rales. She exhibits no tenderness.   Abdominal: Soft. Bowel sounds are normal. She exhibits no distension. There is no tenderness. There is no rebound.   Musculoskeletal: Normal range of motion. She exhibits no edema or tenderness.   Lymphadenopathy:     She has no cervical adenopathy.   Neurological: She is alert and oriented to person, place, and time. No cranial nerve deficit.   Skin: Skin is warm and dry. No rash noted. She is not diaphoretic. No erythema.   Psychiatric: She has a normal mood and affect. Her behavior is normal.   Nursing note and vitals reviewed.      Assessment and Plan:       1. Type 2 diabetes mellitus without complication, without long-term current use of insulin  - hold glipizide; restart onglyza; continue metformin    2. Essential hypertension  - stable on meds    3. Hyperlipidemia, unspecified hyperlipidemia type  - restart statin; labs in three months    4. Visit for screening mammogram    - Mammo Digital Screening Bilat without CA; Future    5. Postmenopausal    - DXA Bone Density Spine And Hip; Future          No follow-ups on file.

## 2019-06-11 ENCOUNTER — TELEPHONE (OUTPATIENT)
Dept: INTERNAL MEDICINE | Facility: CLINIC | Age: 64
End: 2019-06-11

## 2019-06-11 ENCOUNTER — OFFICE VISIT (OUTPATIENT)
Dept: INTERNAL MEDICINE | Facility: CLINIC | Age: 64
End: 2019-06-11
Payer: MEDICAID

## 2019-06-11 VITALS
BODY MASS INDEX: 33.55 KG/M2 | HEIGHT: 62 IN | HEART RATE: 77 BPM | WEIGHT: 182.31 LBS | DIASTOLIC BLOOD PRESSURE: 84 MMHG | RESPIRATION RATE: 16 BRPM | TEMPERATURE: 99 F | SYSTOLIC BLOOD PRESSURE: 138 MMHG

## 2019-06-11 DIAGNOSIS — Z78.0 POSTMENOPAUSAL: ICD-10-CM

## 2019-06-11 DIAGNOSIS — E11.9 TYPE 2 DIABETES MELLITUS WITHOUT COMPLICATION, WITHOUT LONG-TERM CURRENT USE OF INSULIN: Primary | Chronic | ICD-10-CM

## 2019-06-11 DIAGNOSIS — E11.9 TYPE 2 DIABETES MELLITUS WITHOUT COMPLICATION, WITHOUT LONG-TERM CURRENT USE OF INSULIN: Primary | ICD-10-CM

## 2019-06-11 DIAGNOSIS — Z01.00 DIABETIC EYE EXAM: Primary | ICD-10-CM

## 2019-06-11 DIAGNOSIS — E11.9 DIABETIC EYE EXAM: Primary | ICD-10-CM

## 2019-06-11 DIAGNOSIS — I10 ESSENTIAL HYPERTENSION: Chronic | ICD-10-CM

## 2019-06-11 DIAGNOSIS — E78.5 HYPERLIPIDEMIA, UNSPECIFIED HYPERLIPIDEMIA TYPE: Chronic | ICD-10-CM

## 2019-06-11 DIAGNOSIS — Z12.31 VISIT FOR SCREENING MAMMOGRAM: ICD-10-CM

## 2019-06-11 PROCEDURE — 99213 OFFICE O/P EST LOW 20 MIN: CPT | Mod: PBBFAC,PO | Performed by: INTERNAL MEDICINE

## 2019-06-11 PROCEDURE — 99999 PR PBB SHADOW E&M-EST. PATIENT-LVL III: ICD-10-PCS | Mod: PBBFAC,,, | Performed by: INTERNAL MEDICINE

## 2019-06-11 PROCEDURE — 99999 PR PBB SHADOW E&M-EST. PATIENT-LVL III: CPT | Mod: PBBFAC,,, | Performed by: INTERNAL MEDICINE

## 2019-06-11 PROCEDURE — 99214 OFFICE O/P EST MOD 30 MIN: CPT | Mod: S$PBB,,, | Performed by: INTERNAL MEDICINE

## 2019-06-11 PROCEDURE — 99214 PR OFFICE/OUTPT VISIT, EST, LEVL IV, 30-39 MIN: ICD-10-PCS | Mod: S$PBB,,, | Performed by: INTERNAL MEDICINE

## 2019-06-11 RX ORDER — ATORVASTATIN CALCIUM 40 MG/1
40 TABLET, FILM COATED ORAL NIGHTLY
Qty: 90 TABLET | Refills: 1 | Status: SHIPPED | OUTPATIENT
Start: 2019-06-11 | End: 2020-02-12

## 2019-06-11 RX ORDER — SAXAGLIPTIN 5 MG/1
5 TABLET, FILM COATED ORAL DAILY
Qty: 90 TABLET | Refills: 3 | Status: SHIPPED | OUTPATIENT
Start: 2019-06-11 | End: 2019-07-01

## 2019-06-11 NOTE — TELEPHONE ENCOUNTER
----- Message from Sara Coreas sent at 6/11/2019  9:10 AM CDT -----  Pt needs a 3 month follow up but has medicaid and I am unable to schedule.     Pt also needs labs ordered for 9/5    Thank you!

## 2019-06-12 ENCOUNTER — CLINICAL SUPPORT (OUTPATIENT)
Dept: REHABILITATION | Facility: OTHER | Age: 64
End: 2019-06-12
Attending: SPECIALIST
Payer: MEDICAID

## 2019-06-12 DIAGNOSIS — M25.612 DECREASED RANGE OF MOTION OF LEFT SHOULDER: ICD-10-CM

## 2019-06-12 DIAGNOSIS — G89.29 CHRONIC LEFT SHOULDER PAIN: ICD-10-CM

## 2019-06-12 DIAGNOSIS — M25.512 CHRONIC LEFT SHOULDER PAIN: ICD-10-CM

## 2019-06-12 PROCEDURE — 97110 THERAPEUTIC EXERCISES: CPT | Mod: PN | Performed by: PHYSICAL THERAPIST

## 2019-06-12 PROCEDURE — 97162 PT EVAL MOD COMPLEX 30 MIN: CPT | Mod: PN | Performed by: PHYSICAL THERAPIST

## 2019-06-12 NOTE — PLAN OF CARE
[facility]  Physical Therapy Initial Evaluation    Name: Fely Wetzel  Clinic Number: [id number]    Therapy Diagnosis:   Encounter Diagnoses   Name Primary?    Chronic left shoulder pain     Decreased range of motion of left shoulder      Physician: Ismael Garcia MD    Physician Orders: PT Eval and Treat   Medical Diagnosis from Referral: M25.512 (ICD-10-CM) - Left shoulder pain   Evaluation Date: 6/12/2019  Authorization Period Expiration: 6/30/2019  Plan of Care Expiration: 9/6/2019  Visit # / Visits authorized: 1/ 8    Time In: 7:45 AM  Time Out: 8:30  Total Billable Time: 45 minutes    Precautions: Standard and Diabetes    Subjective   Date of onset: 6 months  History of current condition - Fely reports: insidious onset of L shoulder pain about 6 months ago. Tried different medications without relief. Pain with shoulder elevation. Able to carry at her side, but unable to lift. Denies numbness/tingling. Reports occasional neck pain, relates to sleep. Pt is R hand dominant.        Past Medical History:   Diagnosis Date    Diabetes mellitus type II     Hyperlipidemia     Hypertension     Obesity     Osteopenia      Fely Wetzel  has a past surgical history that includes Hysterectomy.    Fely has a current medication list which includes the following prescription(s): accu-chek justen, amlodipine, aspirin, atorvastatin, blood sugar diagnostic, blood-glucose meter, calcium carbonate/vitamin d3, cyanocobalamin, diclofenac sodium, flucelvax quad [id number] (pf), folic acid, glipizide, hydrochlorothiazide, irbesartan, lancets, magnesium, meloxicam, metformin, methotrexate, saxagliptin, and turmeric-turmeric ext-pepper.    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 Reports no difficulty with driving    Pain:  Current 2/10, worst 7/10, best 2/10   Location: left shoulder - superior/lateral  Description: Aching  Aggravating Factors: reaching (overhead or into cabinet), pain with upper body dressing and grooming  Easing Factors: meloxicam Rx, Tylenol    Pts goals: decrease pain and improve motion    Objective     Posture: mild forward head/rounded shoulders in sitting posture      Passive Range of Motion:   Shoulder Left Right   Flexion 80    Abduction 60       Active Range of Motion:   Shoulder Left Right   Flexion 100 160   Abduction 85 160   ER at 45 45    IR 50      Upper Extremity Strength   (L) UE (R) UE   Shoulder flexion: 4-/5 5/5   Shoulder Abduction: 4-/5 4+/5   Shoulder extension 5/5 5/5   Shoulder ER 4/5 4+/5   Shoulder IR 4/5 5/5   Elbow flexion: 4+/5 5/5   Elbow extension: 4+/5 5/5         Special Tests:  AC Joint Left Right   Empty Can Test + -   Drop Arm test - -   Hawkin's Hemant + -   Neer's Test + -   Speed's test - -       Apley's Scratch Test   Left Right   Combined ER Ear  T2   Combined IR L3 L1   Cross-body reach Anterior opposite shoulder Posterior opposite shoulder       Joint Mobility: increased capsular tightness noted grossly    Palpation: tenderness to L UT, rhomboid, infraspinatus, teres minor, proximal biceps, deltoid. Mild swelling noted with tenderness to anterior deltoid    Sensation: grossly intact to light touch B UE          CMS Impairment/Limitation/Restriction for FOTO Shoulder Survey    Therapist reviewed FOTO scores  "for Fely Wetzel on 6/12/2019.   FOTO documents entered into [employer] - see Media section.    Limitation Score: 59%  Category: Body Position    Current : CK = at least 40% but < 60% impaired, limited or restricted  Goal: CK = at least 40% but < 60% impaired, limited or restricted  Discharge: n/a         TREATMENT   Treatment Time In: 8:00 AM  Treatment Time Out: 8:25 AM  Total Treatment time separate from Evaluation: 25 minutes    Fely received therapeutic exercises to develop strength, endurance, ROM and posture for 25 minutes including:  Scap squeezes 5" x 20  Shoulder Isometrics all planes 5" x 10  Table slides flexion 10" x 10       Home Exercises and Patient Education Provided    Education provided:   - Therapy rationale and plan of care.    Written Home Exercises Provided: yes.  Exercises were reviewed and Fely was able to demonstrate them prior to the end of the session.  Fely demonstrated good  understanding of the education provided.     See EMR under Patient Instructions for exercises provided 6/12/2019.    Assessment   Fely is a 63 y.o. female referred to outpatient Physical Therapy with a medical diagnosis of M25.512 (ICD-10-CM) - Left shoulder pain. Pt presents with limited L shoulder ROM, with pain with elevation. Insidious onset of shoulder pain, gradually exacerbated over the past few months. Passive motion is limited in all planes. Functional limitations with reaching, lifting, carrying, and upper body dressing/grooming.     Pt prognosis is Good.   Pt will benefit from skilled outpatient Physical Therapy to address the deficits stated above and in the chart below, provide pt/family education, and to maximize pt's level of independence.     Plan of care discussed with patient: Yes  Pt's spiritual, cultural and educational needs considered and patient is agreeable to the plan of care and goals as stated below:     Anticipated Barriers for therapy: none    Medical Necessity is " demonstrated by the following  History  Co-morbidities and personal factors that may impact the plan of care Co-morbidities:   diabetes, HTN and osteopenia    Personal Factors:   no deficits     moderate   Examination  Body Structures and Functions, activity limitations and participation restrictions that may impact the plan of care Body Regions:   upper extremities    Body Systems:    ROM  strength    Participation Restrictions:   Lifting, reaching, carrying    Activity limitations:   Learning and applying knowledge  no deficits    General Tasks and Commands  no deficits    Communication  no deficits    Mobility  lifting and carrying objects    Self care  washing oneself (bathing, drying, washing hands)  caring for body parts (brushing teeth, shaving, grooming)  dressing    Domestic Life  doing house work (cleaning house, washing dishes, laundry)  assisting others    Interactions/Relationships  no deficits    Life Areas  no deficits    Community and Social Life  community life  recreation and leisure         moderate   Clinical Presentation evolving clinical presentation with changing clinical characteristics moderate   Decision Making/ Complexity Score: moderate     Goals:  Short Term Goals (4 Weeks):   1. Pt to demonstrate improved PROM by 10% to allow pt to perform self care activities with increased ease.  2. Pt to demonstrate improved flexibility by a half grade to allow improved ADLs with increased ease.   3. Pt will report <5/10 pain at worst within the L shoulder for ease with dressing.  4. Pt will report being independent with her HEP for maintenance of improvements gained during therapy sessions  5. Pt to demonstrate improved functional ability with FOTO limitation <=50% disability.    Long Term Goals (20 Weeks):   1. Pt to demonstrate improved ROM to WNL, R=L, to allow pt to perform self care with increased ease.  2. Pt to demonstrate improved flexibility by a full grade to allow improved postural  alignment with increased ease.  3. Pt will demonstrate >=4+/5 strength within the L shoulder for ease with house hold chores  4. Pt to demonstrate improved functional ability with FOTO limitation <=41% disability.  5. Pt independent with HEP and demonstrates good return technique.    Plan   Plan of care Certification: 6/12/2019 to 9/6/2019.    Outpatient Physical Therapy 2 times weekly for 12 weeks to include the following interventions: Manual Therapy, Moist Heat/ Ice, Neuromuscular Re-ed, Patient Education and Therapeutic Exercise.     Char Neri, PT

## 2019-06-21 ENCOUNTER — CLINICAL SUPPORT (OUTPATIENT)
Dept: REHABILITATION | Facility: OTHER | Age: 64
End: 2019-06-21
Payer: MEDICAID

## 2019-06-21 DIAGNOSIS — M25.612 DECREASED RANGE OF MOTION OF LEFT SHOULDER: ICD-10-CM

## 2019-06-21 DIAGNOSIS — G89.29 CHRONIC LEFT SHOULDER PAIN: ICD-10-CM

## 2019-06-21 DIAGNOSIS — M25.512 CHRONIC LEFT SHOULDER PAIN: ICD-10-CM

## 2019-06-21 PROCEDURE — 97110 THERAPEUTIC EXERCISES: CPT | Mod: PN

## 2019-06-21 NOTE — PROGRESS NOTES
"  Physical Therapy Daily Treatment Note     Name: Fely Wetzel  Clinic Number: [id number]    Therapy Diagnosis:   Encounter Diagnoses   Name Primary?    Chronic left shoulder pain     Decreased range of motion of left shoulder      Physician: Ismael Garcia MD    Visit Date: 6/21/2019    Physician Orders: PT Eval and Treat   Medical Diagnosis from Referral: M25.512 (ICD-10-CM) - Left shoulder pain   Evaluation Date: 6/12/2019  Authorization Period Expiration: 6/30/2019  Plan of Care Expiration: 9/6/2019  Visit # / Visits authorized: 2/ 8     Time In: 9:00 AM  Time Out: 9:53  Total Billable Time: 53 minutes     Precautions: Standard and Diabetes      Subjective     Pt reports: mild reduction of pain.   She was compliant with home exercise program.  Response to previous treatment: fair  Functional change: min    Pain: 4/10  Location: left shoulder      Objective     Fely received therapeutic exercises to develop strength, endurance, ROM and posture for 53 minutes including:  Pulleys flex, scap: 3 min ea  Scap squeezes 5" x 20  Shoulder Isometrics all planes 5" x 10  Table slides flexion 10" x 10   Table slides scap 10 x 10"  Sleeper stretch 5 x 10"  SL abd 2 x 10  SL er 2 x 10  Supine flashers 1 x 15 x YTB      Home Exercises Provided and Patient Education Provided     Education provided:   - none today    Written Home Exercises Provided: Patient instructed to cont prior HEP.  Exercises were reviewed and Fely was able to demonstrate them prior to the end of the session.  Fely demonstrated good  understanding of the education provided.     See EMR under Patient Instructions for exercises provided prior visit.    Assessment     Good return technique of HEP, indicating good compliance at home. Good tolerance with therex today with god training effect noted. Improved OH ROM with AA but continued difficulty with AROM. Decreased ability to maintain scapular positioning with SL ER despite VC/TC possibly " due to decreased scap strength.  Fely is progressing well towards her goals.   Pt prognosis is Good.     Pt will continue to benefit from skilled outpatient physical therapy to address the deficits listed in the problem list box on initial evaluation, provide pt/family education and to maximize pt's level of independence in the home and community environment.     Pt's spiritual, cultural and educational needs considered and pt agreeable to plan of care and goals.     Anticipated barriers to physical therapy: none    Goals:   Short Term Goals (4 Weeks):   1. Pt to demonstrate improved PROM by 10% to allow pt to perform self care activities with increased ease. - not met, progressing  2. Pt to demonstrate improved flexibility by a half grade to allow improved ADLs with increased ease.  - not met, progressing  3. Pt will report <5/10 pain at worst within the L shoulder for ease with dressing. - not met, progressing  4. Pt will report being independent with her HEP for maintenance of improvements gained during therapy sessions - not met, progressing  5. Pt to demonstrate improved functional ability with FOTO limitation <=50% disability. - not met, progressing     Long Term Goals (20 Weeks):   1. Pt to demonstrate improved ROM to WNL, R=L, to allow pt to perform self care with increased ease. - not met, progressing  2. Pt to demonstrate improved flexibility by a full grade to allow improved postural alignment with increased ease. - not met, progressing  3. Pt will demonstrate >=4+/5 strength within the L shoulder for ease with house hold chores - not met, progressing  4. Pt to demonstrate improved functional ability with FOTO limitation <=41% disability. - not met, progressing  5. Pt independent with HEP and demonstrates good return technique. - not met, progressing      Plan     Continue with POC for ROM, flexibility, and strength.  Plan of care Certification: 6/12/2019 to 9/6/2019.       Karla Lu, PT

## 2019-06-24 ENCOUNTER — HOSPITAL ENCOUNTER (OUTPATIENT)
Dept: RADIOLOGY | Facility: HOSPITAL | Age: 64
Discharge: HOME | End: 2019-06-24
Attending: INTERNAL MEDICINE
Payer: MEDICAID

## 2019-06-24 DIAGNOSIS — Z12.31 VISIT FOR SCREENING MAMMOGRAM: ICD-10-CM

## 2019-06-24 PROCEDURE — 77063 MAMMO DIGITAL SCREENING BILAT WITH TOMOSYNTHESIS_CAD: ICD-10-PCS | Mod: 26,,, | Performed by: RADIOLOGY

## 2019-06-24 PROCEDURE — 77067 MAMMO DIGITAL SCREENING BILAT WITH TOMOSYNTHESIS_CAD: ICD-10-PCS | Mod: 26,,, | Performed by: RADIOLOGY

## 2019-06-24 PROCEDURE — 77067 SCR MAMMO BI INCL CAD: CPT | Mod: 26,,, | Performed by: RADIOLOGY

## 2019-06-24 PROCEDURE — 77067 SCR MAMMO BI INCL CAD: CPT | Mod: TC,PO

## 2019-06-24 PROCEDURE — 77063 BREAST TOMOSYNTHESIS BI: CPT | Mod: 26,,, | Performed by: RADIOLOGY

## 2019-06-25 ENCOUNTER — CLINICAL SUPPORT (OUTPATIENT)
Dept: REHABILITATION | Facility: OTHER | Age: 64
End: 2019-06-25
Payer: MEDICAID

## 2019-06-25 DIAGNOSIS — G89.29 CHRONIC LEFT SHOULDER PAIN: ICD-10-CM

## 2019-06-25 DIAGNOSIS — M25.612 DECREASED RANGE OF MOTION OF LEFT SHOULDER: ICD-10-CM

## 2019-06-25 DIAGNOSIS — M25.512 CHRONIC LEFT SHOULDER PAIN: ICD-10-CM

## 2019-06-25 PROCEDURE — 97110 THERAPEUTIC EXERCISES: CPT | Mod: PN

## 2019-06-26 ENCOUNTER — OFFICE VISIT (OUTPATIENT)
Dept: OPTOMETRY | Facility: CLINIC | Age: 64
End: 2019-06-26
Payer: MEDICAID

## 2019-06-26 DIAGNOSIS — H52.203 HYPEROPIA OF BOTH EYES WITH ASTIGMATISM AND PRESBYOPIA: ICD-10-CM

## 2019-06-26 DIAGNOSIS — E11.3293 MILD NONPROLIFERATIVE DIABETIC RETINOPATHY OF BOTH EYES WITHOUT MACULAR EDEMA ASSOCIATED WITH TYPE 2 DIABETES MELLITUS: Primary | ICD-10-CM

## 2019-06-26 DIAGNOSIS — H52.03 HYPEROPIA OF BOTH EYES WITH ASTIGMATISM AND PRESBYOPIA: ICD-10-CM

## 2019-06-26 DIAGNOSIS — H52.4 HYPEROPIA OF BOTH EYES WITH ASTIGMATISM AND PRESBYOPIA: ICD-10-CM

## 2019-06-26 DIAGNOSIS — H25.13 NUCLEAR SCLEROSIS, BILATERAL: ICD-10-CM

## 2019-06-26 PROCEDURE — 99212 OFFICE O/P EST SF 10 MIN: CPT | Mod: PBBFAC,PO | Performed by: OPTOMETRIST

## 2019-06-26 PROCEDURE — 92015 DETERMINE REFRACTIVE STATE: CPT | Mod: ,,, | Performed by: OPTOMETRIST

## 2019-06-26 PROCEDURE — 99999 PR PBB SHADOW E&M-EST. PATIENT-LVL II: CPT | Mod: PBBFAC,,, | Performed by: OPTOMETRIST

## 2019-06-26 PROCEDURE — 99999 PR PBB SHADOW E&M-EST. PATIENT-LVL II: ICD-10-PCS | Mod: PBBFAC,,, | Performed by: OPTOMETRIST

## 2019-06-26 PROCEDURE — 92015 PR REFRACTION: ICD-10-PCS | Mod: ,,, | Performed by: OPTOMETRIST

## 2019-06-26 PROCEDURE — 92004 PR EYE EXAM, NEW PATIENT,COMPREHESV: ICD-10-PCS | Mod: S$PBB,,, | Performed by: OPTOMETRIST

## 2019-06-26 PROCEDURE — 92004 COMPRE OPH EXAM NEW PT 1/>: CPT | Mod: S$PBB,,, | Performed by: OPTOMETRIST

## 2019-06-26 NOTE — PROGRESS NOTES
HPI     ANJEL: 2017  Pt states no VA problems wears OTC readers +2.75 that she states she can   see good with them. Some blur at distance  No f/f  No gtts   LBS: didn't check today   Hemoglobin A1C       Date                     Value               Ref Range             Status                05/16/2019               7.3 (H)             4.0 - 5.6 %           Final                    Last edited by Sohail Sweeney, OD on 6/26/2019  8:42 AM. (History)        ROS     Positive for: Endocrine (DM)    Negative for: Constitutional, Gastrointestinal, Neurological, Skin,   Genitourinary, Musculoskeletal, HENT, Cardiovascular, Eyes, Respiratory,   Psychiatric, Allergic/Imm, Heme/Lymph    Last edited by Sohail Sweeney, OD on 6/26/2019  8:42 AM. (History)        Assessment /Plan     For exam results, see Encounter Report.    Mild nonproliferative diabetic retinopathy of both eyes without macular edema associated with type 2 diabetes mellitus    Nuclear sclerosis, bilateral    Hyperopia of both eyes with astigmatism and presbyopia      1. Small cats OU--pt wearing otc readers, but now needs distance correction.  Wrote bifocal Rx--discussed PAls/adaptation  2. DM w mild NPDR OU.  Advised yearly DFE    PLAN:    rtc 1 yr

## 2019-06-26 NOTE — LETTER
June 26, 2019      Paula Barkley,   [address]  [place] [address]           [place] [facility]  [address] [address]  Phone: [phone]  Fax: [phone]          Patient: Fely Wetzel   MR Number: [id number]   YOB: 1955   Date of Visit: 6/26/2019       Dear Dr. Paula Barkley:    Thank you for referring Fely Wetzel to me for evaluation. Attached you will find relevant portions of my assessment and plan of care.    If you have questions, please do not hesitate to call me. I look forward to following Fely Wetzel along with you.    Sincerely,    Sohail Sweeney, OD    Enclosure  CC:  No Recipients    If you would like to receive this communication electronically, please contact [email or web address] or [phone] to request more information on [facility] Link access.    For providers and/or their staff who would like to refer a patient to [facility], please contact us through our one-stop-shop provider referral line, [facility] , at [phone].    If you feel you have received this communication in error or would no longer like to receive these types of communications, please e-mail [email or web address]

## 2019-06-27 ENCOUNTER — HOSPITAL ENCOUNTER (EMERGENCY)
Facility: HOSPITAL | Age: 64
Discharge: HOME | End: 2019-06-28
Attending: EMERGENCY MEDICINE
Payer: MEDICAID

## 2019-06-27 ENCOUNTER — CLINICAL SUPPORT (OUTPATIENT)
Dept: REHABILITATION | Facility: OTHER | Age: 64
End: 2019-06-27
Payer: MEDICAID

## 2019-06-27 DIAGNOSIS — M25.512 CHRONIC LEFT SHOULDER PAIN: ICD-10-CM

## 2019-06-27 DIAGNOSIS — I48.0 PAROXYSMAL ATRIAL FIBRILLATION: Primary | ICD-10-CM

## 2019-06-27 DIAGNOSIS — R00.0 TACHYCARDIA: ICD-10-CM

## 2019-06-27 DIAGNOSIS — M25.612 DECREASED RANGE OF MOTION OF LEFT SHOULDER: ICD-10-CM

## 2019-06-27 DIAGNOSIS — G89.29 CHRONIC LEFT SHOULDER PAIN: ICD-10-CM

## 2019-06-27 LAB
APTT BLDCRRT: 24.4 SEC (ref 21–32)
BASOPHILS # BLD AUTO: 0.03 K/UL (ref 0–0.2)
BASOPHILS NFR BLD: 0.3 % (ref 0–1.9)
DIFFERENTIAL METHOD: ABNORMAL
EOSINOPHIL # BLD AUTO: 0.3 K/UL (ref 0–0.5)
EOSINOPHIL NFR BLD: 2.9 % (ref 0–8)
ERYTHROCYTE [DISTWIDTH] IN BLOOD BY AUTOMATED COUNT: 13.6 % (ref 11.5–14.5)
HCT VFR BLD AUTO: 34.6 % (ref 37–48.5)
HGB BLD-MCNC: 11.4 G/DL (ref 12–16)
IMM GRANULOCYTES # BLD AUTO: 0.05 K/UL (ref 0–0.04)
IMM GRANULOCYTES NFR BLD AUTO: 0.4 % (ref 0–0.5)
INR PPP: 1 (ref 0.8–1.2)
LYMPHOCYTES # BLD AUTO: 3.7 K/UL (ref 1–4.8)
LYMPHOCYTES NFR BLD: 31.4 % (ref 18–48)
MCH RBC QN AUTO: 28.5 PG (ref 27–31)
MCHC RBC AUTO-ENTMCNC: 32.9 G/DL (ref 32–36)
MCV RBC AUTO: 87 FL (ref 82–98)
MONOCYTES # BLD AUTO: 0.7 K/UL (ref 0.3–1)
MONOCYTES NFR BLD: 6 % (ref 4–15)
NEUTROPHILS # BLD AUTO: 7.1 K/UL (ref 1.8–7.7)
NEUTROPHILS NFR BLD: 59 % (ref 38–73)
NRBC BLD-RTO: 0 /100 WBC
PLATELET # BLD AUTO: 319 K/UL (ref 150–350)
PMV BLD AUTO: 11.1 FL (ref 9.2–12.9)
PROTHROMBIN TIME: 10.3 SEC (ref 9–12.5)
RBC # BLD AUTO: 4 M/UL (ref 4–5.4)
WBC # BLD AUTO: 11.92 K/UL (ref 3.9–12.7)

## 2019-06-27 PROCEDURE — 97110 THERAPEUTIC EXERCISES: CPT | Mod: PN

## 2019-06-27 PROCEDURE — 84443 ASSAY THYROID STIM HORMONE: CPT

## 2019-06-27 PROCEDURE — 99291 PR CRITICAL CARE, E/M 30-74 MINUTES: ICD-10-PCS | Mod: ,,, | Performed by: EMERGENCY MEDICINE

## 2019-06-27 PROCEDURE — 85025 COMPLETE CBC W/AUTO DIFF WBC: CPT

## 2019-06-27 PROCEDURE — 83735 ASSAY OF MAGNESIUM: CPT

## 2019-06-27 PROCEDURE — 93010 ELECTROCARDIOGRAM REPORT: CPT | Mod: ,,, | Performed by: INTERNAL MEDICINE

## 2019-06-27 PROCEDURE — 93010 EKG 12-LEAD: ICD-10-PCS | Mod: ,,, | Performed by: INTERNAL MEDICINE

## 2019-06-27 PROCEDURE — 96374 THER/PROPH/DIAG INJ IV PUSH: CPT

## 2019-06-27 PROCEDURE — 99291 CRITICAL CARE FIRST HOUR: CPT | Mod: ,,, | Performed by: EMERGENCY MEDICINE

## 2019-06-27 PROCEDURE — 83880 ASSAY OF NATRIURETIC PEPTIDE: CPT

## 2019-06-27 PROCEDURE — 25000003 PHARM REV CODE 250: Performed by: STUDENT IN AN ORGANIZED HEALTH CARE EDUCATION/TRAINING PROGRAM

## 2019-06-27 PROCEDURE — 99291 CRITICAL CARE FIRST HOUR: CPT | Mod: 25

## 2019-06-27 PROCEDURE — 84484 ASSAY OF TROPONIN QUANT: CPT

## 2019-06-27 PROCEDURE — 93005 ELECTROCARDIOGRAM TRACING: CPT

## 2019-06-27 PROCEDURE — 85610 PROTHROMBIN TIME: CPT

## 2019-06-27 PROCEDURE — 80053 COMPREHEN METABOLIC PANEL: CPT

## 2019-06-27 PROCEDURE — 85730 THROMBOPLASTIN TIME PARTIAL: CPT

## 2019-06-27 RX ORDER — DILTIAZEM HYDROCHLORIDE 5 MG/ML
20 INJECTION INTRAVENOUS
Status: COMPLETED | OUTPATIENT
Start: 2019-06-27 | End: 2019-06-27

## 2019-06-27 RX ADMIN — DILTIAZEM HYDROCHLORIDE 20 MG: 5 INJECTION INTRAVENOUS at 11:06

## 2019-06-27 NOTE — PROGRESS NOTES
"  Physical Therapy Daily Treatment Note     Name: Fely Wetzel  Clinic Number: [id number]    Therapy Diagnosis:   Encounter Diagnoses   Name Primary?    Chronic left shoulder pain     Decreased range of motion of left shoulder      Physician: Ismael Garcia MD    Visit Date: 6/27/2019    Physician Orders: PT Eval and Treat   Medical Diagnosis from Referral: M25.512 (ICD-10-CM) - Left shoulder pain   Evaluation Date: 6/12/2019  Authorization Period Expiration: 6/30/2019  Plan of Care Expiration: 9/6/2019  Visit # / Visits authorized: 4/8     Time In: 3:00 PM  Time Out: 4:00 PM  Total Billable Time: 30 minutes     Precautions: Standard and Diabetes      Subjective     Pt reports: that her shoulders feel better. Stated that it only hurts during L shoulder elevation.  She was compliant with home exercise program.  Response to previous treatment: fair  Functional change: less pain    Pain: 0/10 ; 3/10 during elevation  Location: left shoulder      Objective     Fely received therapeutic exercises to develop strength, endurance, ROM and posture for 53 minutes including:  Pulleys flex, scap: 3 min ea  Scap squeezes 5" x 20  Shoulder Isometrics all planes 5" x 10  Table slides flexion 10" x 10   Table slides scap 10 x 10"  Sleeper stretch 5 x 10"  SL abd 2 x 10  SL er 2 x 10  Supine flashers 1 x 15 x YTB  +Rows w/ OTB 2 x 10  +Ext pulldown w/ OTB 2 x 10  +PROM all 4 ways x 5 mins      Home Exercises Provided and Patient Education Provided     Education provided:   - none today    Written Home Exercises Provided: Patient instructed to cont prior HEP.  Exercises were reviewed and Fely was able to demonstrate them prior to the end of the session.  Fely demonstrated good  understanding of the education provided.     See EMR under Patient Instructions for exercises provided prior visit.    Assessment     Pt tolerated therex without exacerbation of shoulder pain. Pt continues to require maximum VC / TC for " proper technique with all exercises, indicating poor compliance with HEP. Pt continues to display overactive UT recruitment during sidelying shoulder exercises.  Fely is progressing well towards her goals.   Pt prognosis is Good.     Pt will continue to benefit from skilled outpatient physical therapy to address the deficits listed in the problem list box on initial evaluation, provide pt/family education and to maximize pt's level of independence in the home and community environment.     Pt's spiritual, cultural and educational needs considered and pt agreeable to plan of care and goals.     Anticipated barriers to physical therapy: none    Goals:   Short Term Goals (4 Weeks):   1. Pt to demonstrate improved PROM by 10% to allow pt to perform self care activities with increased ease. - not met, progressing  2. Pt to demonstrate improved flexibility by a half grade to allow improved ADLs with increased ease.  - not met, progressing  3. Pt will report <5/10 pain at worst within the L shoulder for ease with dressing. - not met, progressing  4. Pt will report being independent with her HEP for maintenance of improvements gained during therapy sessions - not met, progressing  5. Pt to demonstrate improved functional ability with FOTO limitation <=50% disability. - not met, progressing     Long Term Goals (20 Weeks):   1. Pt to demonstrate improved ROM to WNL, R=L, to allow pt to perform self care with increased ease. - not met, progressing  2. Pt to demonstrate improved flexibility by a full grade to allow improved postural alignment with increased ease. - not met, progressing  3. Pt will demonstrate >=4+/5 strength within the L shoulder for ease with house hold chores - not met, progressing  4. Pt to demonstrate improved functional ability with FOTO limitation <=41% disability. - not met, progressing  5. Pt independent with HEP and demonstrates good return technique. - not met, progressing      Plan     Continue  with POC for ROM, flexibility, and strength.  Plan of care Certification: 6/12/2019 to 9/6/2019.       Usama Woods, PTA

## 2019-06-28 VITALS
RESPIRATION RATE: 14 BRPM | WEIGHT: 182.31 LBS | OXYGEN SATURATION: 98 % | BODY MASS INDEX: 33.35 KG/M2 | TEMPERATURE: 99 F | SYSTOLIC BLOOD PRESSURE: 132 MMHG | DIASTOLIC BLOOD PRESSURE: 63 MMHG | HEART RATE: 69 BPM

## 2019-06-28 PROBLEM — I48.0 PAF (PAROXYSMAL ATRIAL FIBRILLATION): Status: ACTIVE | Noted: 2019-06-28

## 2019-06-28 LAB
ALBUMIN SERPL BCP-MCNC: 3.5 G/DL (ref 3.5–5.2)
ALP SERPL-CCNC: 130 U/L (ref 55–135)
ALT SERPL W/O P-5'-P-CCNC: 19 U/L (ref 10–44)
ANION GAP SERPL CALC-SCNC: 14 MMOL/L (ref 8–16)
AST SERPL-CCNC: 20 U/L (ref 10–40)
BILIRUB SERPL-MCNC: 0.2 MG/DL (ref 0.1–1)
BNP SERPL-MCNC: 14 PG/ML (ref 0–99)
BUN SERPL-MCNC: 23 MG/DL (ref 8–23)
CALCIUM SERPL-MCNC: 10.4 MG/DL (ref 8.7–10.5)
CHLORIDE SERPL-SCNC: 103 MMOL/L (ref 95–110)
CO2 SERPL-SCNC: 20 MMOL/L (ref 23–29)
CREAT SERPL-MCNC: 0.8 MG/DL (ref 0.5–1.4)
EST. GFR  (AFRICAN AMERICAN): >60 ML/MIN/1.73 M^2
EST. GFR  (NON AFRICAN AMERICAN): >60 ML/MIN/1.73 M^2
GLUCOSE SERPL-MCNC: 222 MG/DL (ref 70–110)
MAGNESIUM SERPL-MCNC: 1.4 MG/DL (ref 1.6–2.6)
POTASSIUM SERPL-SCNC: 3.5 MMOL/L (ref 3.5–5.1)
PROT SERPL-MCNC: 7.3 G/DL (ref 6–8.4)
SODIUM SERPL-SCNC: 137 MMOL/L (ref 136–145)
TROPONIN I SERPL DL<=0.01 NG/ML-MCNC: 0.01 NG/ML (ref 0–0.03)
TSH SERPL DL<=0.005 MIU/L-ACNC: 2.29 UIU/ML (ref 0.4–4)

## 2019-06-28 PROCEDURE — 25000003 PHARM REV CODE 250: Performed by: STUDENT IN AN ORGANIZED HEALTH CARE EDUCATION/TRAINING PROGRAM

## 2019-06-28 RX ORDER — METOPROLOL SUCCINATE 50 MG/1
50 TABLET, EXTENDED RELEASE ORAL
Status: COMPLETED | OUTPATIENT
Start: 2019-06-28 | End: 2019-06-28

## 2019-06-28 RX ORDER — METOPROLOL SUCCINATE 50 MG/1
50 TABLET, EXTENDED RELEASE ORAL DAILY
Status: DISCONTINUED | OUTPATIENT
Start: 2019-06-28 | End: 2019-06-28

## 2019-06-28 RX ORDER — METOPROLOL SUCCINATE 50 MG/1
50 TABLET, EXTENDED RELEASE ORAL DAILY
Qty: 30 TABLET | Refills: 0 | Status: SHIPPED | OUTPATIENT
Start: 2019-06-28 | End: 2019-07-25 | Stop reason: SDUPTHER

## 2019-06-28 RX ADMIN — METOPROLOL SUCCINATE 50 MG: 50 TABLET, EXTENDED RELEASE ORAL at 12:06

## 2019-06-28 NOTE — PROGRESS NOTES
Subjective:       Patient ID: Fely Wetzel is a 63 y.o. female.    Chief Complaint: Follow-up (er)    Patient is a 63 y.o.female who presents today for ER follow up. Pt seen in ER on 6/27 and diagnosed with new onset atrial fibrillation. She was placed on eliquis and metoprolol. She is feeling well; was advised to see a cardiologist. No further palpitations. bp has been good at home. She never got the blood thinner at her pharmacy.    Review of Systems   Constitutional: Negative for appetite change, chills, diaphoresis and fever.   HENT: Negative for congestion, ear discharge, ear pain, postnasal drip, tinnitus, trouble swallowing and voice change.    Eyes: Negative for discharge, redness and itching.   Respiratory: Negative for cough, chest tightness, shortness of breath and wheezing.    Cardiovascular: Negative for chest pain, palpitations and leg swelling.   Gastrointestinal: Negative for abdominal pain, constipation, diarrhea, nausea and vomiting.   Endocrine: Negative for cold intolerance and heat intolerance.   Genitourinary: Negative for difficulty urinating, flank pain, hematuria and urgency.   Musculoskeletal: Negative for arthralgias, gait problem, myalgias and neck stiffness.   Skin: Negative for color change and rash.   Neurological: Negative for dizziness, seizures, syncope and headaches.   Hematological: Negative for adenopathy.   Psychiatric/Behavioral: Negative for agitation, behavioral problems, confusion and sleep disturbance.       Objective:      Physical Exam   Constitutional: She is oriented to person, place, and time. She appears well-developed and well-nourished. No distress.   HENT:   Head: Normocephalic and atraumatic.   Right Ear: External ear normal.   Left Ear: External ear normal.   Nose: Nose normal.   Mouth/Throat: Oropharynx is clear and moist. No oropharyngeal exudate.   Eyes: Pupils are equal, round, and reactive to light. Conjunctivae and EOM are normal. Right eye  exhibits no discharge. Left eye exhibits no discharge. No scleral icterus.   Neck: Neck supple. No JVD present. No tracheal deviation present. No thyromegaly present.   Cardiovascular: Normal rate, normal heart sounds and intact distal pulses. Exam reveals no gallop and no friction rub.   No murmur heard.  Pulmonary/Chest: Effort normal and breath sounds normal. No stridor. No respiratory distress. She has no wheezes. She has no rales. She exhibits no tenderness.   Abdominal: Soft. Bowel sounds are normal. She exhibits no distension. There is no tenderness. There is no rebound.   Musculoskeletal: She exhibits no edema or tenderness.   Lymphadenopathy:     She has no cervical adenopathy.   Neurological: She is alert and oriented to person, place, and time.   Skin: Skin is warm and dry. No rash noted. She is not diaphoretic. No erythema.   Psychiatric: She has a normal mood and affect. Her behavior is normal.   Nursing note and vitals reviewed.      Assessment and Plan:       1. PAF (paroxysmal atrial fibrillation)  - resent eliquis  - continue metoprolol  - will get appt for cardio  - apixaban (ELIQUIS) 5 mg Tab; Take 1 tablet (5 mg total) by mouth 2 (two) times daily.  Dispense: 60 tablet; Refill: 11  - Ambulatory Referral to Cardiology          No follow-ups on file.

## 2019-06-28 NOTE — ED NOTES
Fely Wetzel, a 63 y.o. female presents to the ED w/ complaint of Afib. Pt states she was on the couch watching tv when she experienced palpitations. Pt denies history of Afib.     Triage note:  Chief Complaint   Patient presents with    Atrial Fibrillation     Patient brought in with AFib RVR. Orginally heart rate was 170s to 220s. Patient was given 20mg of Cardizem total by EMS. Patient now has heart rate of 110s to 140s. Patient denies history of AFib. Palpitations started at approx 2100.      Review of patient's allergies indicates:   Allergen Reactions    Corticosteroids (glucocorticoids)     Latex, natural rubber     Shellfish containing products Other (See Comments)     Other reaction(s): Unknown  Other reaction(s): Anaphylaxis    Vicodin [hydrocodone-acetaminophen]     Vytorin 10-10  [ezetimibe-simvastatin]      Other reaction(s): Joint pain  Other reaction(s): Joint pain     Past Medical History:   Diagnosis Date    Diabetes mellitus type II     Hyperlipidemia     Hypertension     Obesity     Osteopenia      Adult Physical Assessment  LOC: Fely Wetzel, 63 y.o. female verified via two identifiers.  The patient is awake, alert, oriented and speaking appropriately at this time.  APPEARANCE: Patient resting comfortably and appears to be in no acute distress at this time. Patient is clean and well groomed, patient's clothing is properly fastened.  SKIN:The skin is warm and dry, color consistent with ethnicity, patient has normal skin turgor and moist mucus membranes, skin intact, no breakdown or brusing noted.  MUSCULOSKELETAL: Patient moving all extremities well, no obvious swelling or deformities noted.  RESPIRATORY: Airway is open and patent, respirations are spontaneous, patient has a normal effort and rate, no accessory muscle use noted. Breath sounds clear bilaterally.  CARDIAC: Patient in AFIB with RVR; , no periphreal edema noted in any extremity, capillary refill <  "3 seconds in all extremities. Pt denies SOB and CP. Pt reports "palpitations."  ABDOMEN: Soft and non tender to palpation, no abdominal distention noted. Bowel sounds present in all four quadrants. Denies N/V/D.  NEUROLOGIC: Eyes open spontaneously, behavior appropriate to situation, follows commands, facial expression symmetrical, bilateral hand grasp equal and even, purposeful motor response noted, normal sensation in all extremities when touched with a finger. Denies numbness/tingling.    "

## 2019-06-28 NOTE — ED TRIAGE NOTES
Atrial Fibrillation (Patient brought in with AFib RVR New Onset. . Orginally heart rate was 170s to 220s. Patient was given 20mg of Cardizem @ 2237 total by EMS. Patient now has heart rate of 110s to 140s. Patient denies history of AFib. Palpitations started at approx 2100.

## 2019-06-28 NOTE — ED NOTES
Pt resting comfortably in stretcher. HR NSR on monitor. No further needs at this time. Will continue to monitor.

## 2019-06-28 NOTE — ED PROVIDER NOTES
Encounter Date: 6/27/2019       History     Chief Complaint   Patient presents with    Atrial Fibrillation     Patient brought in with AFib RVR. Orginally heart rate was 170s to 220s. Patient was given 20mg of Cardizem total by EMS. Patient now has heart rate of 110s to 140s. Patient denies history of AFib. Palpitations started at approx 2100.      HPI   63F w/ Hx DM, HTN, as well as Hx as below w/ acute onset palpitations at 9pm, 2h PTA. Pt states she was sitting, watching television, and developed sudden onset of palpitations, w/o associated CP, SOB, dizziness, diaphoresis. Denies caffeine use or other drug/substance use recently. No Hx of similar symptoms. Never had palpitations in the past. No recent prolonged immobility. EMS provided w/ 10mg diltiazem x2 w/ reduction in HR to the 130's, remaining irregular, after finding pt w/ HR in the 190's.     Review of patient's allergies indicates:   Allergen Reactions    Corticosteroids (glucocorticoids)     Latex, natural rubber     Shellfish containing products Other (See Comments)     Other reaction(s): Unknown  Other reaction(s): Anaphylaxis    Vicodin [hydrocodone-acetaminophen]     Vytorin 10-10  [ezetimibe-simvastatin]      Other reaction(s): Joint pain  Other reaction(s): Joint pain     Past Medical History:   Diagnosis Date    Diabetes mellitus type II     Hyperlipidemia     Hypertension     Obesity     Osteopenia      Past Surgical History:   Procedure Laterality Date    COLONOSCOPY N/A 11/20/2014    Performed by Darrell [facility]ado [facility] at [facility] ([facility])    HYSTERECTOMY       Family History   Problem Relation Age of Onset    Diabetes Mother     Glaucoma Mother     Heart disease Father     Cancer Father         prostate cancer    Heart disease Brother     Cataracts Brother      Social History     Tobacco Use    Smoking status: Never Smoker   Substance Use Topics    Alcohol use: Yes     Alcohol/week: 0.0 oz     Comment: rarely    Drug  use: No     Review of Systems   Constitutional: Negative for chills and fever.   HENT: Negative for congestion and rhinorrhea.    Respiratory: Negative for cough and shortness of breath.    Cardiovascular: Positive for palpitations. Negative for chest pain.   Gastrointestinal: Negative for nausea and vomiting.   Genitourinary: Negative for dysuria and frequency.   Musculoskeletal: Negative for arthralgias and myalgias.   Skin: Negative for color change and pallor.   Neurological: Negative for dizziness and headaches.   Psychiatric/Behavioral: Negative for agitation and confusion.   All other systems reviewed and are negative.      Physical Exam     Initial Vitals [06/27/19 2244]   BP Pulse Resp Temp SpO2   (!) 108/52 (!) 130 18 98.6 °F (37 °C) 96 %      MAP       --         Physical Exam    Nursing note and vitals reviewed.  Constitutional: She appears well-developed. No distress.   HENT:   Head: Normocephalic and atraumatic.   Eyes: Conjunctivae are normal. No scleral icterus.   Neck: Normal range of motion.   Cardiovascular: Intact distal pulses. Exam reveals no gallop and no friction rub.    No murmur heard.  Irregularly irregular HR in 120-140's, then in 170's.    Pulmonary/Chest: Breath sounds normal. No stridor. No respiratory distress.   Abdominal: Soft. She exhibits no distension.   Musculoskeletal: Normal range of motion. She exhibits no edema.   Neurological: She is alert and oriented to person, place, and time.   Skin: Skin is warm. No pallor.   Psychiatric: She has a normal mood and affect. Her behavior is normal.         ED Course   Procedures         Labs Reviewed   CBC W/ AUTO DIFFERENTIAL - Abnormal; Notable for the following components:       Result Value    Hemoglobin 11.4 (*)     Hematocrit 34.6 (*)     Immature Grans (Abs) 0.05 (*)     All other components within normal limits   COMPREHENSIVE METABOLIC PANEL - Abnormal; Notable for the following components:    CO2 20 (*)     Glucose 222 (*)      "All other components within normal limits   MAGNESIUM - Abnormal; Notable for the following components:    Magnesium 1.4 (*)     All other components within normal limits   TROPONIN I   TSH   PROTIME-INR   APTT   B-TYPE NATRIURETIC PEPTIDE        ECG Results          EKG 12-lead (In process)  Result time 06/28/19 07:53:14    In process by Interface, Lab In [facility] (06/28/19 07:53:14)                 Narrative:    Test Reason : R00.0,    Vent. Rate : 158 BPM     Atrial Rate : 150 BPM     P-R Int : 000 ms          QRS Dur : 084 ms      QT Int : 314 ms       P-R-T Axes : 000 -21 064 degrees     QTc Int : 509 ms    Atrial fibrillation with rapid ventricular response with premature  ventricular or aberrantly conducted complexes  Septal infarct ,age undetermined  Abnormal ECG  When compared with ECG of 15-MAR-2016 13:15,  Previous ECG has undetermined rhythm, needs review  Septal infarct is now Present  ST now depressed in Inferior leads  ST now depressed in Lateral leads    Referred By: [email or web address]   SELF           Confirmed By:                             Imaging Results          X-Ray Chest AP Portable (Final result)  Result time 06/27/19 23:50:20    Final result by Robbin Vlaverde MD (06/27/19 23:50:20)                 Impression:      No convincing acute process identified on this hypoventilatory single-view.      Electronically signed by: Robbin Valverde MD  Date:    06/27/2019  Time:    23:50             Narrative:    EXAMINATION:  XR CHEST AP PORTABLE    CLINICAL HISTORY:  Provided history is "  Tachycardia, unspecified".    TECHNIQUE:  One view of the chest.    COMPARISON:  03/07/2016.    FINDINGS:  Cardiac wires overlie the chest.  Cardiac silhouette is magnified by portable technique but appears stable.  Lung volumes are relatively low.  No large focal consolidation.  No sizable pleural effusion.  No pneumothorax.  No detrimental change.                                       APC / Resident Notes:   House " Officer Medical Decision Makin y.o. female, Hx HTN, DM, emergently evaluated for acute onset palpitations, w/ vital signs irregular tachycardia, and cardiopulmonary auscultation that is unremarkable. Targeted exam is notable for absence of volume overload, shock, confusion, ischemic-type chest pain, or other suggestion of end-organ effect from tachycardia. Pt is hemodynamically stable and non-toxic appearing. EKG identifies Atrial Fibrillation w/ RVR, no ST/TW changes in regional pattern to suggest acute ischemia or heart strain.   DDx includes but not limited primary arrhythmia, thyroid dysfunction, metabolic derangement, ACS.   Dispo pending cardiac biomarkers, dose of diltiazem for rate control, re-assessment.    Ismael Viramontes MD  House Officer, PGY-3  Emergency Medicine  2019 11:07 PM    -3 Update:  After 20mg Diltiazem IV push, pt now in sinus rhythm, HR 76, w/ no symptoms. Case discussed with in-house cardiologist. Based on [id number] score of 2, will discuss anticoagulation w/ patient, and otherwise will recommend daily dosing of BBlocker for rate control w/ outpatient follow-up if labwork shows no signficant metabolic derangement. Dispo pending.   Ismael Viramontes M.D.  Emergency Medicine, PGY-3  2019 11:31 PM    -3 Update:  TSH not elevated. Tn negative. Remaining of labs do not suggest acute metabolic reason for Afib. After discussion w/ patient, will place on Eliquis 5mg BID in addition to Metoprolol for rate control, w/ f/u.   Ismael Viramontes M.D.  Emergency Medicine, PGY-3  2019 12:23 AM             Attending Attestation:   Physician Attestation Statement for Resident:  As the supervising MD   Physician Attestation Statement: I have personally seen and examined this patient.   I agree with the above history. -:   As the supervising MD I agree with the above PE.    As the supervising MD I agree with the above treatment, course, plan, and disposition.        Attending  Critical Care:   Critical Care Times:   Direct Patient Care (initial evaluation, reassessments, and time considering the case)................................................................15 minutes.   Additional History from reviewing old medical records or taking additional history from the family, EMS, PCP, etc.......................10 minutes.   Ordering, Reviewing, and Interpreting Diagnostic Studies...............................................................................................................5 minutes.   Documentation..................................................................................................................................................................................5 minutes.   Consultation with other Physicians. .................................................................................................................................................5 minutes.   ==============================================================  · Total Critical Care Time - exclusive of procedural time: 40 minutes.  ==============================================================  Critical care was necessary to treat or prevent imminent or life-threatening deterioration of the following conditions: cardiac arrhythmia.       Attending ED Notes:   Emergent evaluation of acute cardiac dysrhythmia.  Patient presented with a flutter, RVR.  Received medication by EMS but was not rate controlled on arrival.  Blood pressure low, but not hypotensive or hemodynamically unstable.  She was given additional IV diltiazem which rate controlled and converted her.  Discussed with Cardiology.  Based on her risk factors, will start anticoagulant.  Referred to Cardiology for follow-up.             Clinical Impression:       ICD-10-CM ICD-9-CM   1. Paroxysmal atrial fibrillation I48.0 427.31   2. Tachycardia R00.0 785.0                                Suresh Daley MD  06/29/19 0018

## 2019-07-01 ENCOUNTER — OFFICE VISIT (OUTPATIENT)
Dept: INTERNAL MEDICINE | Facility: CLINIC | Age: 64
End: 2019-07-01
Payer: MEDICAID

## 2019-07-01 VITALS
DIASTOLIC BLOOD PRESSURE: 80 MMHG | TEMPERATURE: 99 F | SYSTOLIC BLOOD PRESSURE: 122 MMHG | HEART RATE: 76 BPM | HEIGHT: 62 IN | WEIGHT: 181.88 LBS | BODY MASS INDEX: 33.47 KG/M2

## 2019-07-01 DIAGNOSIS — I48.0 PAF (PAROXYSMAL ATRIAL FIBRILLATION): ICD-10-CM

## 2019-07-01 PROCEDURE — 99213 OFFICE O/P EST LOW 20 MIN: CPT | Mod: S$PBB,,, | Performed by: INTERNAL MEDICINE

## 2019-07-01 PROCEDURE — 99214 OFFICE O/P EST MOD 30 MIN: CPT | Mod: PBBFAC,PO | Performed by: INTERNAL MEDICINE

## 2019-07-01 PROCEDURE — 99999 PR PBB SHADOW E&M-EST. PATIENT-LVL IV: ICD-10-PCS | Mod: PBBFAC,,, | Performed by: INTERNAL MEDICINE

## 2019-07-01 PROCEDURE — 99999 PR PBB SHADOW E&M-EST. PATIENT-LVL IV: CPT | Mod: PBBFAC,,, | Performed by: INTERNAL MEDICINE

## 2019-07-01 PROCEDURE — 99213 PR OFFICE/OUTPT VISIT, EST, LEVL III, 20-29 MIN: ICD-10-PCS | Mod: S$PBB,,, | Performed by: INTERNAL MEDICINE

## 2019-07-01 RX ORDER — AMLODIPINE BESYLATE 5 MG/1
5 TABLET ORAL DAILY
Qty: 30 TABLET | Refills: 11
Start: 2019-07-01 | End: 2019-10-09 | Stop reason: SDUPTHER

## 2019-07-03 NOTE — PROGRESS NOTES
"  Physical Therapy Daily Treatment Note     Name: Fely Wetzel  Clinic Number: [id number]    Therapy Diagnosis:   Encounter Diagnoses   Name Primary?    Chronic left shoulder pain     Decreased range of motion of left shoulder      Physician: Ismael Garcia MD    Visit Date: 7/5/2019    Physician Orders: PT Eval and Treat   Medical Diagnosis from Referral: M25.512 (ICD-10-CM) - Left shoulder pain   Evaluation Date: 6/12/2019  Authorization Period Expiration: 6/30/2019  Plan of Care Expiration: 9/6/2019  Visit # / Visits authorized: 5/8     Time In: 9:00 AM  Time Out: 10:00 AM  Total Billable Time: 60 minutes     Precautions: Standard and Diabetes      Subjective     Pt reports: she feels better after therapy, but still has stiffness in the mornings and pain with elevation of arm.   She was compliant with home exercise program.  Response to previous treatment: fair  Functional change: less pain    Pain: 0/10 ; 5/10 during elevation  Location: left shoulder      Objective     Fely received therapeutic exercises to develop strength, endurance, ROM and posture for 60 minutes including:  Pulleys flex, scap: 3 min ea  Scap squeezes 5" x 20  Shoulder Isometrics all planes 5" x 10  Table slides flexion 10" x 10   Table slides scap 10 x 10"  Sleeper stretch 5 x 10"  SL abd 2 x 10  SL er 2 x 10  Flashers 1 x 15 x YTB  Rows w/ OTB 2 x 10  Ext pulldown w/ OTB 2 x 10  PROM all 4 ways x 5 mins (+manual subscap release)      Home Exercises Provided and Patient Education Provided     Education provided:   - none today    Written Home Exercises Provided: Patient instructed to cont prior HEP.  Exercises were reviewed and Fely was able to demonstrate them prior to the end of the session.  Fely demonstrated good  understanding of the education provided.     See EMR under Patient Instructions for exercises provided 6/12/2019.    Assessment     Good tolerance to treatment today. With PROM, noted restriction to L " subscap, manual release performed today.   Fely is progressing well towards her goals.   Pt prognosis is Good.     Pt will continue to benefit from skilled outpatient physical therapy to address the deficits listed in the problem list box on initial evaluation, provide pt/family education and to maximize pt's level of independence in the home and community environment.     Pt's spiritual, cultural and educational needs considered and pt agreeable to plan of care and goals.     Anticipated barriers to physical therapy: none    Goals:   Short Term Goals (4 Weeks):   1. Pt to demonstrate improved PROM by 10% to allow pt to perform self care activities with increased ease. - not met, progressing  2. Pt to demonstrate improved flexibility by a half grade to allow improved ADLs with increased ease.  - not met, progressing  3. Pt will report <5/10 pain at worst within the L shoulder for ease with dressing. - not met, progressing  4. Pt will report being independent with her HEP for maintenance of improvements gained during therapy sessions - not met, progressing  5. Pt to demonstrate improved functional ability with FOTO limitation <=50% disability. - not met, progressing     Long Term Goals (20 Weeks):   1. Pt to demonstrate improved ROM to WNL, R=L, to allow pt to perform self care with increased ease. - not met, progressing  2. Pt to demonstrate improved flexibility by a full grade to allow improved postural alignment with increased ease. - not met, progressing  3. Pt will demonstrate >=4+/5 strength within the L shoulder for ease with house hold chores - not met, progressing  4. Pt to demonstrate improved functional ability with FOTO limitation <=41% disability. - not met, progressing  5. Pt independent with HEP and demonstrates good return technique. - not met, progressing      Plan     Continue with POC for ROM, flexibility, and strength.  Plan of care Certification: 6/12/2019 to 9/6/2019.       Char Neri, PT

## 2019-07-05 ENCOUNTER — CLINICAL SUPPORT (OUTPATIENT)
Dept: REHABILITATION | Facility: OTHER | Age: 64
End: 2019-07-05
Attending: SPECIALIST
Payer: MEDICAID

## 2019-07-05 DIAGNOSIS — G89.29 CHRONIC LEFT SHOULDER PAIN: ICD-10-CM

## 2019-07-05 DIAGNOSIS — M25.612 DECREASED RANGE OF MOTION OF LEFT SHOULDER: ICD-10-CM

## 2019-07-05 DIAGNOSIS — M25.512 CHRONIC LEFT SHOULDER PAIN: ICD-10-CM

## 2019-07-05 PROCEDURE — 97110 THERAPEUTIC EXERCISES: CPT | Mod: PN | Performed by: PHYSICAL THERAPIST

## 2019-07-09 ENCOUNTER — DOCUMENTATION ONLY (OUTPATIENT)
Dept: REHABILITATION | Facility: OTHER | Age: 64
End: 2019-07-09

## 2019-07-09 NOTE — PROGRESS NOTES
Physical Therapy No Show Note       Patient was scheduled for physical therapy at [facility] for 7/9/2019. Pt failed to appear for appointment without prior notification for today.        Char Neri, PT

## 2019-07-10 ENCOUNTER — TELEPHONE (OUTPATIENT)
Dept: INTERNAL MEDICINE | Facility: CLINIC | Age: 64
End: 2019-07-10

## 2019-07-10 NOTE — TELEPHONE ENCOUNTER
Ok. See if pt is willing to try januvia first. We can stop the glipizide or metformin and do januvia instead if she likes

## 2019-07-11 ENCOUNTER — DOCUMENTATION ONLY (OUTPATIENT)
Dept: REHABILITATION | Facility: OTHER | Age: 64
End: 2019-07-11

## 2019-07-11 NOTE — TELEPHONE ENCOUNTER
I told her dr's comments.  She prefers to stay on glipizide and metformin.  She feels like she can do better with diet.    I reminded her to eat more clean foods and less prepared  Foods that have hidden sugars.  She expressed understanding.  I told her I would let dr pedraza know and if she wanted glucose checked sooner than sept as scheduled we would let her know.      She was also concerned she has to wait to see card till October for afib.  I added her to waiting list for cancellation in dept and told her someone would call when a sooner appt opens up.    I spoke to dr pedraza about patient decision on meds and she said repeat labs as scheduled is fine.

## 2019-07-11 NOTE — PROGRESS NOTES
Physical Therapy No Show Note       Patient was scheduled for physical therapy at [facility] at [facility] for 7/11/2019. Pt failed to appear for appointment without prior notification for today, as well as 7/9/2019. Pt has no further appointments scheduled. Per clinic policy, pt will need to contact the clinic for future appointments.        Char Neri, PT

## 2019-07-17 RX ORDER — MELOXICAM 15 MG/1
TABLET ORAL
Qty: 30 TABLET | Refills: 11 | Status: SHIPPED | OUTPATIENT
Start: 2019-07-17 | End: 2021-03-03

## 2019-07-25 ENCOUNTER — TELEPHONE (OUTPATIENT)
Dept: INTERNAL MEDICINE | Facility: CLINIC | Age: 64
End: 2019-07-25

## 2019-07-25 RX ORDER — METOPROLOL SUCCINATE 50 MG/1
50 TABLET, EXTENDED RELEASE ORAL DAILY
Qty: 30 TABLET | Refills: 11 | Status: SHIPPED | OUTPATIENT
Start: 2019-07-25 | End: 2019-12-10 | Stop reason: SDUPTHER

## 2019-07-25 NOTE — TELEPHONE ENCOUNTER
----- Message from Nena Hernandez sent at 7/25/2019 12:39 PM CDT -----  Contact: self/ [phone]  Patient is calling for an RX refill or new RX.  Is this a refill or new RX:    RX name and strength: metoprolol succinate (TOPROL-XL) 50 MG 24 hr tablet  Directions (copy/paste from chart):    Is this a 30 day or 90 day RX:    Local pharmacy or mail order pharmacy:    Pharmacy name and phone # (copy/paste from chart):  [facility]/pharmacy #[id number] - [place], [place] - [address] AT [facility] [phone] (Phone)  [phone] (Fax)       Comments:

## 2019-07-29 ENCOUNTER — TELEPHONE (OUTPATIENT)
Dept: INTERNAL MEDICINE | Facility: CLINIC | Age: 64
End: 2019-07-29

## 2019-07-29 NOTE — TELEPHONE ENCOUNTER
----- Message from Justine Harris sent at 7/29/2019 11:51 AM CDT -----  Contact: self   Patient is calling about her Rx for metoprolol succinate (TOPROL-XL) 50 MG 24 hr tablet. Patient states her [facility] pharmacy sent her a text stating they are trying to reach the ER doctor  Ismael Márquez. Please call & advise

## 2019-08-10 RX ORDER — AMLODIPINE BESYLATE 5 MG/1
TABLET ORAL
Qty: 30 TABLET | Refills: 5 | Status: SHIPPED | OUTPATIENT
Start: 2019-08-10 | End: 2020-01-30

## 2019-08-16 RX ORDER — METFORMIN HYDROCHLORIDE 1000 MG/1
TABLET ORAL
Qty: 60 TABLET | Refills: 5 | Status: SHIPPED | OUTPATIENT
Start: 2019-08-16 | End: 2019-12-10 | Stop reason: SDUPTHER

## 2019-08-21 ENCOUNTER — OFFICE VISIT (OUTPATIENT)
Dept: OPTOMETRY | Facility: CLINIC | Age: 64
End: 2019-08-21
Payer: MEDICAID

## 2019-08-21 DIAGNOSIS — H52.4 HYPEROPIA OF BOTH EYES WITH ASTIGMATISM AND PRESBYOPIA: Primary | ICD-10-CM

## 2019-08-21 DIAGNOSIS — H52.03 HYPEROPIA OF BOTH EYES WITH ASTIGMATISM AND PRESBYOPIA: Primary | ICD-10-CM

## 2019-08-21 DIAGNOSIS — H52.203 HYPEROPIA OF BOTH EYES WITH ASTIGMATISM AND PRESBYOPIA: Primary | ICD-10-CM

## 2019-08-21 PROCEDURE — 99999 PR PBB SHADOW E&M-EST. PATIENT-LVL II: ICD-10-PCS | Mod: PBBFAC,,, | Performed by: OPTOMETRIST

## 2019-08-21 PROCEDURE — 99499 NO LOS: ICD-10-PCS | Mod: S$PBB,,, | Performed by: OPTOMETRIST

## 2019-08-21 PROCEDURE — 99499 UNLISTED E&M SERVICE: CPT | Mod: S$PBB,,, | Performed by: OPTOMETRIST

## 2019-08-21 PROCEDURE — 99999 PR PBB SHADOW E&M-EST. PATIENT-LVL II: CPT | Mod: PBBFAC,,, | Performed by: OPTOMETRIST

## 2019-08-21 PROCEDURE — 99212 OFFICE O/P EST SF 10 MIN: CPT | Mod: PBBFAC,PO | Performed by: OPTOMETRIST

## 2019-08-21 NOTE — PROGRESS NOTES
HPI     DLS: 6/26/19  Pt states she picked up her new glasses about 2 weeks ago,and was having   trouble seeing with them. States she tried wearing them for two weeks  and   states she can not read with them on. Dist. [place] is fine .   No f/f  Visin gtts   LBS: didn't check yet   Hemoglobin A1C       Date                     Value               Ref Range             Status                05/16/2019               7.3 (H)             4.0 - 5.6 %           Final                     02/05/2019               7.6 (H)             4.0 - 5.6 %           Final                    11/20/2018               6.6 (H)             4.0 - 5.6 %           Final                 Last edited by Dana Willingham MA on 8/21/2019  8:16 AM. (History)        ROS     Positive for: Endocrine (DM)    Negative for: Constitutional, Gastrointestinal, Neurological, Skin,   Genitourinary, Musculoskeletal, HENT, Cardiovascular, Eyes, Respiratory,   Psychiatric, Allergic/Imm, Heme/Lymph    Last edited by Sohail Sweeney, ARDEN on 8/21/2019  8:56 AM. (History)        Assessment /Plan     For exam results, see Encounter Report.    Hyperopia of both eyes with astigmatism and presbyopia      First time PAL wearer--got at [facility].  Near portion too low--rests on pts cheek    PLAN:    Will have Figueroa Cartagena call pt to sched time for adjustment.  May need frame w nosepads  NOLOS EPRx today

## 2019-08-27 NOTE — PROGRESS NOTES
Subjective:       Patient ID: Fely Wetzel is a 63 y.o. female.    Chief Complaint: Follow-up    Patient is a 63 y.o.female who presents today for follow up    Labs: reviewed  Vaccines: Influenza (done); Tetanus (2014) ; Prevnar ( done)  Eye exam: up to date; dr gonzalez  Mammogram: june 2019  Gyn exam: hysterectomy  Colonoscopy: 2014; up to date  Dexa: 2019    DM: she couldn't get onglyza. She is taking glipizide and metformin; not following diet and activity.   Review of Systems   Constitutional: Negative for appetite change, chills, diaphoresis and fever.   HENT: Negative for congestion, ear discharge, ear pain, postnasal drip, tinnitus, trouble swallowing and voice change.    Eyes: Negative for discharge, redness and itching.   Respiratory: Negative for cough, chest tightness, shortness of breath and wheezing.    Cardiovascular: Negative for chest pain, palpitations and leg swelling.   Gastrointestinal: Negative for abdominal pain, constipation, diarrhea, nausea and vomiting.   Endocrine: Negative for cold intolerance and heat intolerance.   Genitourinary: Negative for difficulty urinating, flank pain, hematuria and urgency.   Musculoskeletal: Negative for arthralgias, gait problem, myalgias and neck stiffness.   Skin: Negative for color change and rash.   Neurological: Negative for dizziness, seizures, syncope and headaches.   Hematological: Negative for adenopathy.   Psychiatric/Behavioral: Negative for agitation, behavioral problems, confusion and sleep disturbance.       Objective:      Physical Exam   Constitutional: She is oriented to person, place, and time. She appears well-developed and well-nourished. No distress.   HENT:   Head: Normocephalic and atraumatic.   Right Ear: External ear normal.   Left Ear: External ear normal.   Nose: Nose normal.   Mouth/Throat: Oropharynx is clear and moist. No oropharyngeal exudate.   Eyes: Pupils are equal, round, and reactive to light. Conjunctivae and EOM  are normal. Right eye exhibits no discharge. Left eye exhibits no discharge. No scleral icterus.   Neck: Neck supple. No JVD present. No tracheal deviation present. No thyromegaly present.   Cardiovascular: Normal rate, normal heart sounds and intact distal pulses. Exam reveals no gallop and no friction rub.   No murmur heard.  Pulses:       Dorsalis pedis pulses are 2+ on the right side, and 2+ on the left side.        Posterior tibial pulses are 2+ on the right side, and 2+ on the left side.   Pulmonary/Chest: Effort normal and breath sounds normal. No stridor. No respiratory distress. She has no wheezes. She has no rales. She exhibits no tenderness.   Abdominal: Soft. Bowel sounds are normal. She exhibits no distension. There is no tenderness. There is no rebound.   Musculoskeletal: She exhibits no edema or tenderness.        Right foot: There is normal range of motion and no deformity.        Left foot: There is normal range of motion and no deformity.   Feet:   Right Foot:   Protective Sensation: 3 sites tested. 3 sites sensed.   Skin Integrity: Negative for ulcer, blister, skin breakdown, erythema, warmth or callus.   Left Foot:   Protective Sensation: 3 sites tested. 3 sites sensed.   Skin Integrity: Negative for ulcer, blister, skin breakdown, erythema, warmth, callus or dry skin.   Lymphadenopathy:     She has no cervical adenopathy.   Neurological: She is alert and oriented to person, place, and time.   Skin: Skin is warm and dry. No rash noted. She is not diaphoretic. No erythema.   Psychiatric: She has a normal mood and affect. Her behavior is normal.   Nursing note and vitals reviewed.      Assessment and Plan:       1. Hyperlipidemia, unspecified hyperlipidemia type  - improved ; low fat diet; on lipitor  - CBC auto differential; Future  - Comprehensive metabolic panel; Future  - Lipid panel; Future  - Hemoglobin A1c; Future  - Microalbumin/creatinine urine ratio; Future  - TSH; Future  - Urinalysis;  Future  - Vitamin D; Future    2. Essential hypertension  - CBC auto differential; Future  - Comprehensive metabolic panel; Future  - Lipid panel; Future  - Hemoglobin A1c; Future  - Microalbumin/creatinine urine ratio; Future  - TSH; Future  - Urinalysis; Future  - Vitamin D; Future    3. Type 2 diabetes mellitus without complication, without long-term current use of insulin  - not at goal; continue metformin and glipizide; add tradjenta daily  - CBC auto differential; Future  - Comprehensive metabolic panel; Future  - Lipid panel; Future  - Hemoglobin A1c; Future  - Microalbumin/creatinine urine ratio; Future  - TSH; Future  - Urinalysis; Future  - Vitamin D; Future    4. Vitamin D deficiency    - CBC auto differential; Future  - Comprehensive metabolic panel; Future  - Lipid panel; Future  - Hemoglobin A1c; Future  - Microalbumin/creatinine urine ratio; Future  - TSH; Future  - Urinalysis; Future  - Vitamin D; Future          No follow-ups on file.

## 2019-09-05 ENCOUNTER — LAB VISIT (OUTPATIENT)
Dept: LAB | Facility: HOSPITAL | Age: 64
End: 2019-09-05
Attending: INTERNAL MEDICINE
Payer: MEDICAID

## 2019-09-05 DIAGNOSIS — E11.9 TYPE 2 DIABETES MELLITUS WITHOUT COMPLICATION, WITHOUT LONG-TERM CURRENT USE OF INSULIN: ICD-10-CM

## 2019-09-05 LAB
ALBUMIN SERPL BCP-MCNC: 3.8 G/DL (ref 3.5–5.2)
ALP SERPL-CCNC: 115 U/L (ref 55–135)
ALT SERPL W/O P-5'-P-CCNC: 12 U/L (ref 10–44)
ANION GAP SERPL CALC-SCNC: 9 MMOL/L (ref 8–16)
AST SERPL-CCNC: 14 U/L (ref 10–40)
BILIRUB SERPL-MCNC: 0.5 MG/DL (ref 0.1–1)
BUN SERPL-MCNC: 15 MG/DL (ref 8–23)
CALCIUM SERPL-MCNC: 9.9 MG/DL (ref 8.7–10.5)
CHLORIDE SERPL-SCNC: 105 MMOL/L (ref 95–110)
CHOLEST SERPL-MCNC: 184 MG/DL (ref 120–199)
CHOLEST/HDLC SERPL: 4 {RATIO} (ref 2–5)
CO2 SERPL-SCNC: 27 MMOL/L (ref 23–29)
CREAT SERPL-MCNC: 0.7 MG/DL (ref 0.5–1.4)
EST. GFR  (AFRICAN AMERICAN): >60 ML/MIN/1.73 M^2
EST. GFR  (NON AFRICAN AMERICAN): >60 ML/MIN/1.73 M^2
ESTIMATED AVG GLUCOSE: 174 MG/DL (ref 68–131)
GLUCOSE SERPL-MCNC: 143 MG/DL (ref 70–110)
HBA1C MFR BLD HPLC: 7.7 % (ref 4–5.6)
HDLC SERPL-MCNC: 46 MG/DL (ref 40–75)
HDLC SERPL: 25 % (ref 20–50)
LDLC SERPL CALC-MCNC: 97.6 MG/DL (ref 63–159)
NONHDLC SERPL-MCNC: 138 MG/DL
POTASSIUM SERPL-SCNC: 3.8 MMOL/L (ref 3.5–5.1)
PROT SERPL-MCNC: 7.8 G/DL (ref 6–8.4)
SODIUM SERPL-SCNC: 141 MMOL/L (ref 136–145)
TRIGL SERPL-MCNC: 202 MG/DL (ref 30–150)

## 2019-09-05 PROCEDURE — 80053 COMPREHEN METABOLIC PANEL: CPT

## 2019-09-05 PROCEDURE — 83036 HEMOGLOBIN GLYCOSYLATED A1C: CPT

## 2019-09-05 PROCEDURE — 36415 COLL VENOUS BLD VENIPUNCTURE: CPT | Mod: PO

## 2019-09-05 PROCEDURE — 80061 LIPID PANEL: CPT

## 2019-09-10 ENCOUNTER — OFFICE VISIT (OUTPATIENT)
Dept: INTERNAL MEDICINE | Facility: CLINIC | Age: 64
End: 2019-09-10
Payer: MEDICAID

## 2019-09-10 ENCOUNTER — TELEPHONE (OUTPATIENT)
Dept: INTERNAL MEDICINE | Facility: CLINIC | Age: 64
End: 2019-09-10

## 2019-09-10 VITALS
HEIGHT: 62 IN | RESPIRATION RATE: 16 BRPM | BODY MASS INDEX: 34.37 KG/M2 | HEART RATE: 72 BPM | WEIGHT: 186.75 LBS | TEMPERATURE: 99 F | SYSTOLIC BLOOD PRESSURE: 136 MMHG | DIASTOLIC BLOOD PRESSURE: 76 MMHG

## 2019-09-10 DIAGNOSIS — E55.9 VITAMIN D DEFICIENCY: ICD-10-CM

## 2019-09-10 DIAGNOSIS — E11.9 TYPE 2 DIABETES MELLITUS WITHOUT COMPLICATION, WITHOUT LONG-TERM CURRENT USE OF INSULIN: Primary | ICD-10-CM

## 2019-09-10 DIAGNOSIS — E11.9 TYPE 2 DIABETES MELLITUS WITHOUT COMPLICATION, WITHOUT LONG-TERM CURRENT USE OF INSULIN: Chronic | ICD-10-CM

## 2019-09-10 DIAGNOSIS — I10 ESSENTIAL HYPERTENSION: Chronic | ICD-10-CM

## 2019-09-10 DIAGNOSIS — E78.5 HYPERLIPIDEMIA, UNSPECIFIED HYPERLIPIDEMIA TYPE: Primary | Chronic | ICD-10-CM

## 2019-09-10 PROCEDURE — 99214 OFFICE O/P EST MOD 30 MIN: CPT | Mod: S$PBB,,, | Performed by: INTERNAL MEDICINE

## 2019-09-10 PROCEDURE — 99999 PR PBB SHADOW E&M-EST. PATIENT-LVL III: ICD-10-PCS | Mod: PBBFAC,,, | Performed by: INTERNAL MEDICINE

## 2019-09-10 PROCEDURE — 99999 PR PBB SHADOW E&M-EST. PATIENT-LVL III: CPT | Mod: PBBFAC,,, | Performed by: INTERNAL MEDICINE

## 2019-09-10 PROCEDURE — 99214 PR OFFICE/OUTPT VISIT, EST, LEVL IV, 30-39 MIN: ICD-10-PCS | Mod: S$PBB,,, | Performed by: INTERNAL MEDICINE

## 2019-09-10 PROCEDURE — 99213 OFFICE O/P EST LOW 20 MIN: CPT | Mod: PBBFAC,PO | Performed by: INTERNAL MEDICINE

## 2019-09-10 NOTE — TELEPHONE ENCOUNTER
----- Message from Sara Coreas sent at 9/10/2019  8:41 AM CDT -----  Pt needs a 3 month follow up she prefers a Tuesday between 8a-9a  I am unable to schedule pt she has medicaid. Her labs are scheduled for 12/3

## 2019-09-12 ENCOUNTER — TELEPHONE (OUTPATIENT)
Dept: INTERNAL MEDICINE | Facility: CLINIC | Age: 64
End: 2019-09-12

## 2019-09-12 NOTE — TELEPHONE ENCOUNTER
----- Message from Haylie Vergara sent at 9/12/2019 11:09 AM CDT -----  Contact: Ms. Doran @ [facility] Direct# [phone]  Ms. Doran a  at [facility] is calling in regards to her wanting to speak with you about taking the patient off of her blood thinners for four days. She said that the patient is having a surgery on next Tuesday and she would like a call back.     Comment: Ms. Doran said that they need to speak with you on today please.

## 2019-09-12 NOTE — TELEPHONE ENCOUNTER
She cannot do so, she was recently diagnosed with atrial fibrillation; she needs to see cardio to make that decision; pt has an appt upcoming; I will not clear her prior to her seeing them

## 2019-10-02 ENCOUNTER — TELEPHONE (OUTPATIENT)
Dept: INTERNAL MEDICINE | Facility: CLINIC | Age: 64
End: 2019-10-02

## 2019-10-02 NOTE — TELEPHONE ENCOUNTER
Pt returning call. C/o headache, sore throat, stuffy nose, and cough. Pt reported coughing up a little bit of phlegm last night. Doesn't report color.     Spoke to Dr. Graham who recommended xyzal 5 mg nightly and delsym for 2-3 days and o/v if no improvement. Pt verbalized understanding.

## 2019-10-02 NOTE — TELEPHONE ENCOUNTER
----- Message from Kavitha Gorman sent at 10/2/2019  7:45 AM CDT -----  Contact: patient [phone]  Patient wants to know if you can prescribe medicine for a cold. Pt c/o sore throat/ stuffy nose and cough and is a diabetic so she can't take otc medication.Please notify pt if you order meds.    [id number] - [place], [place] - [address] AT [facility] [phone]

## 2019-10-04 ENCOUNTER — PATIENT OUTREACH (OUTPATIENT)
Dept: ADMINISTRATIVE | Facility: OTHER | Age: 64
End: 2019-10-04

## 2019-10-09 ENCOUNTER — OFFICE VISIT (OUTPATIENT)
Dept: CARDIOLOGY | Facility: CLINIC | Age: 64
End: 2019-10-09
Payer: MEDICAID

## 2019-10-09 ENCOUNTER — TELEPHONE (OUTPATIENT)
Dept: ELECTROPHYSIOLOGY | Facility: CLINIC | Age: 64
End: 2019-10-09

## 2019-10-09 VITALS
HEART RATE: 88 BPM | SYSTOLIC BLOOD PRESSURE: 144 MMHG | DIASTOLIC BLOOD PRESSURE: 80 MMHG | BODY MASS INDEX: 34.29 KG/M2 | WEIGHT: 186.31 LBS | HEIGHT: 62 IN

## 2019-10-09 DIAGNOSIS — I48.0 PAF (PAROXYSMAL ATRIAL FIBRILLATION): ICD-10-CM

## 2019-10-09 DIAGNOSIS — E11.00 TYPE 2 DIABETES MELLITUS WITH HYPEROSMOLARITY WITHOUT COMA, WITHOUT LONG-TERM CURRENT USE OF INSULIN: Chronic | ICD-10-CM

## 2019-10-09 DIAGNOSIS — R69 DIAGNOSIS DEFERRED: ICD-10-CM

## 2019-10-09 DIAGNOSIS — I48.0 PAF (PAROXYSMAL ATRIAL FIBRILLATION): Primary | ICD-10-CM

## 2019-10-09 DIAGNOSIS — I10 ESSENTIAL HYPERTENSION: Primary | Chronic | ICD-10-CM

## 2019-10-09 DIAGNOSIS — E66.01 CLASS 2 SEVERE OBESITY DUE TO EXCESS CALORIES WITH SERIOUS COMORBIDITY IN ADULT, UNSPECIFIED BMI: ICD-10-CM

## 2019-10-09 DIAGNOSIS — E78.2 MIXED HYPERLIPIDEMIA: Chronic | ICD-10-CM

## 2019-10-09 PROCEDURE — 93010 ELECTROCARDIOGRAM REPORT: CPT | Mod: S$PBB,,, | Performed by: INTERNAL MEDICINE

## 2019-10-09 PROCEDURE — 99999 PR PBB SHADOW E&M-EST. PATIENT-LVL III: ICD-10-PCS | Mod: PBBFAC,,, | Performed by: INTERNAL MEDICINE

## 2019-10-09 PROCEDURE — 93005 ELECTROCARDIOGRAM TRACING: CPT | Mod: PBBFAC,PO | Performed by: INTERNAL MEDICINE

## 2019-10-09 PROCEDURE — 99204 PR OFFICE/OUTPT VISIT, NEW, LEVL IV, 45-59 MIN: ICD-10-PCS | Mod: S$PBB,,, | Performed by: INTERNAL MEDICINE

## 2019-10-09 PROCEDURE — 93010 EKG 12-LEAD: ICD-10-PCS | Mod: S$PBB,,, | Performed by: INTERNAL MEDICINE

## 2019-10-09 PROCEDURE — 99213 OFFICE O/P EST LOW 20 MIN: CPT | Mod: PBBFAC,PO,25 | Performed by: INTERNAL MEDICINE

## 2019-10-09 PROCEDURE — 99999 PR PBB SHADOW E&M-EST. PATIENT-LVL III: CPT | Mod: PBBFAC,,, | Performed by: INTERNAL MEDICINE

## 2019-10-09 PROCEDURE — 99204 OFFICE O/P NEW MOD 45 MIN: CPT | Mod: S$PBB,,, | Performed by: INTERNAL MEDICINE

## 2019-10-09 NOTE — LETTER
October 9, 2019      Paula Barkley DO  [address] [facility] [facility]  [place] [address]           [place] - Cardiology  [address]  [facility] [address]  Phone: [phone]          Patient: Fely Wetzel   MR Number: [id number]   YOB: 1955   Date of Visit: 10/9/2019       Dear Dr. Paula Barkley:    Thank you for referring Fely Wetzel to me for evaluation. Attached you will find relevant portions of my assessment and plan of care.    If you have questions, please do not hesitate to call me. I look forward to following Fely Wetzel along with you.    Sincerely,    Marie Dominguez MD    Enclosure  CC:  No Recipients    If you would like to receive this communication electronically, please contact [email or web address] or [phone] to request more information on [employer] Link access.    For providers and/or their staff who would like to refer a patient to [facility], please contact us through our one-stop-shop provider referral line, [facility] , at [phone].    If you feel you have received this communication in error or would no longer like to receive these types of communications, please e-mail [email or web address]

## 2019-10-09 NOTE — PROGRESS NOTES
Subjective:   Patient ID:  Fely Wetzel is a 63 y.o. female who presents for evaulation of Atrial Fibrillation      HPI: Hypertensive, diabetic middle-aged female presents for cardiac evaluation.  She was in her usual state of health when suddenly in June at evening she felt palpitations. They did not resolve with drinking water. Patient ended up in ER, where she was diagnosed with atrial fibrillation with uncontrolled VR. ( strips and ECG's from that event are missing in from EMR). High [id number] score. Started on Eliquis and Metoprolol. Now in SNR , no recurrences of palpitations. Last ECG NSR.    Patient would like to be cleared for dental procedure.  She also would like to now if there is any chance she could be taken off chronic anticoagulation.     Past Medical History:   Diagnosis Date    Diabetes mellitus type II     Hyperlipidemia     Hypertension     Obesity     Osteopenia        Past Surgical History:   Procedure Laterality Date    HYSTERECTOMY         Social History     Socioeconomic History    Marital status: Single     Spouse name: Not on file    Number of children: Not on file    Years of education: Not on file    Highest education level: Not on file   Occupational History    Occupation: works at a [employer]     Employer: Identify    Occupation: [employer]     Employer: [employer]   Social Needs    Financial resource strain: Not on file    Food insecurity:     Worry: Not on file     Inability: Not on file    Transportation needs:     Medical: Not on file     Non-medical: Not on file   Tobacco Use    Smoking status: Never Smoker    Smokeless tobacco: Never Used   Substance and Sexual Activity    Alcohol use: Yes     Alcohol/week: 0.0 standard drinks     Comment: rarely    Drug use: No    Sexual activity: Never     Partners: Male   Lifestyle    Physical activity:     Days per week: Not on file     Minutes per session: Not on file    Stress: Not  on file   Relationships    Social connections:     Talks on phone: Not on file     Gets together: Not on file     Attends [employer] service: Not on file     Active member of club or organization: Not on file     Attends meetings of clubs or organizations: Not on file     Relationship status: Not on file   Other Topics Concern    Not on file   Social History Narrative    She does not exercise regularly.       Review of patient's allergies indicates:   Allergen Reactions    Corticosteroids (glucocorticoids)     Latex, natural rubber     Shellfish containing products Other (See Comments)     Other reaction(s): Unknown  Other reaction(s): Anaphylaxis    Vicodin [hydrocodone-acetaminophen]     Vytorin 10-10  [ezetimibe-simvastatin]      Other reaction(s): Joint pain  Other reaction(s): Joint pain       Lab Results   Component Value Date     09/05/2019    K 3.8 09/05/2019     09/05/2019    CO2 27 09/05/2019    BUN 15 09/05/2019    CREATININE 0.7 09/05/2019     (H) 09/05/2019    HGBA1C 7.7 (H) 09/05/2019    MG 1.4 (L) 06/27/2019    AST 14 09/05/2019    ALT 12 09/05/2019    ALBUMIN 3.8 09/05/2019    PROT 7.8 09/05/2019    BILITOT 0.5 09/05/2019    WBC 11.92 06/27/2019    HGB 11.4 (L) 06/27/2019    HCT 34.6 (L) 06/27/2019    MCV 87 06/27/2019     06/27/2019    INR 1.0 06/27/2019    TSH 2.292 06/27/2019    CHOL 184 09/05/2019    HDL 46 09/05/2019    LDLCALC 97.6 09/05/2019    TRIG 202 (H) 09/05/2019       Review of Systems   Constitution: Negative.   HENT: Negative.    Eyes: Negative.    Cardiovascular: Negative.  Negative for chest pain, claudication, dyspnea on exertion, irregular heartbeat, leg swelling, near-syncope, palpitations and syncope.   Respiratory: Negative.  Negative for cough, shortness of breath, snoring and wheezing.    Endocrine: Negative.  Negative for cold intolerance, heat intolerance, polydipsia, polyphagia and polyuria.   Skin: Negative.    Musculoskeletal: Positive for  "arthritis, joint pain and muscle cramps.   Gastrointestinal: Negative.    Genitourinary: Negative.    Neurological: Negative.    Psychiatric/Behavioral: Negative.        Objective:   Physical Exam   Constitutional: She is oriented to person, place, and time. She appears well-developed and well-nourished.   BP (!) 144/80   Pulse 88   Ht 5' 2" (1.575 m)   Wt 84.5 kg (186 lb 4.6 oz)   BMI 34.07 kg/m²      HENT:   Head: Normocephalic.   Eyes: Pupils are equal, round, and reactive to light.   Neck: Normal range of motion. Neck supple. Carotid bruit is not present. No thyromegaly present.   Cardiovascular: Normal rate, regular rhythm, normal heart sounds and intact distal pulses. Exam reveals no gallop and no friction rub.   No murmur heard.  Pulses:       Carotid pulses are 2+ on the right side, and 2+ on the left side.       Radial pulses are 2+ on the right side, and 2+ on the left side.        Femoral pulses are 2+ on the right side, and 2+ on the left side.       Popliteal pulses are 2+ on the right side, and 2+ on the left side.        Dorsalis pedis pulses are 2+ on the right side, and 2+ on the left side.        Posterior tibial pulses are 2+ on the right side, and 2+ on the left side.   Pulmonary/Chest: Effort normal and breath sounds normal. No respiratory distress. She has no wheezes. She has no rales. She exhibits no tenderness.   Abdominal: Soft. Bowel sounds are normal.   Musculoskeletal: Normal range of motion. She exhibits no edema.   Neurological: She is alert and oriented to person, place, and time.   Skin: Skin is warm and dry.   Psychiatric: She has a normal mood and affect.   Nursing note and vitals reviewed.      Current Outpatient Medications   Medication Sig    ACCU-CHEK JUAN Misc CHECK FASTING GLUCOSE AND CHECK GLUCOSE TWO HOURS AFTER LUNCH OR DINNER    amLODIPine (NORVASC) 5 MG tablet TAKE 1 TABLET BY MOUTH EVERY DAY IN THE EVENING    apixaban (ELIQUIS) 5 mg Tab Take 1 tablet (5 mg total) " by mouth 2 (two) times daily.    atorvastatin (LIPITOR) 40 MG tablet Take 1 tablet (40 mg total) by mouth every evening.    blood sugar diagnostic Strp Check glucose twice daily    blood-glucose meter Misc Check fasting glucose and check glucose two hours after lunch or dinner    calcium carbonate/vitamin D3 (VITAMIN D-3 ORAL) Take 1 tablet by mouth once daily.     cyanocobalamin (VITAMIN B-12) 500 MCG tablet Take 500 mcg by mouth once daily.    glipiZIDE (GLUCOTROL) 10 MG tablet TAKE 1 TABLET (10 MG) BY ORAL ROUTE 2 TIMES PER DAY BEFORE MEALS    hydroCHLOROthiazide (HYDRODIURIL) 25 MG tablet TAKE 1 TABLET (25 MG TOTAL) BY MOUTH ONCE DAILY. WITH BREAKFAST    irbesartan (AVAPRO) 300 MG tablet TAKE 1 TABLET (300 MG TOTAL) BY MOUTH ONCE DAILY. WITH BREAKFAST    lancets Misc Check glucose twice daily    linaGLIPtin (TRADJENTA) 5 mg Tab tablet Take 1 tablet (5 mg total) by mouth once daily.    meloxicam (MOBIC) 15 MG tablet TAKE 1 TABLET BY MOUTH EVERY DAY    metFORMIN (GLUCOPHAGE) 1000 MG tablet TAKE 1 TABLET (1,000 MG TOTAL) BY MOUTH 2 (TWO) TIMES DAILY. WITH MEALS    metoprolol succinate (TOPROL-XL) 50 MG 24 hr tablet Take 1 tablet (50 mg total) by mouth once daily.     No current facility-administered medications for this visit.        Assessment and Plan:     Problem List Items Addressed This Visit        Cardiology Problems    Hypertension - Primary (Chronic)    Relevant Orders    IN OFFICE EKG 12-LEAD (to Muse)    Echo Color Flow Doppler? Yes    Hyperlipidemia (Chronic)    Relevant Orders    IN OFFICE EKG 12-LEAD (to Muse)    Echo Color Flow Doppler? Yes    PAF (paroxysmal atrial fibrillation)    Relevant Orders    IN OFFICE EKG 12-LEAD (to Muse)    Echo Color Flow Doppler? Yes       Other    Diabetes mellitus, type 2 (Chronic)    Relevant Orders    IN OFFICE EKG 12-LEAD (to Muse)    Echo Color Flow Doppler? Yes    Obesity    Relevant Orders    IN OFFICE EKG 12-LEAD (to Muse)    Echo Color Flow  Doppler? Yes      Other Visit Diagnoses     Diagnosis deferred        Relevant Orders    IN OFFICE EKG 12-LEAD (to [place])          Patient's Medications   New Prescriptions    No medications on file   Previous Medications    ACCU-CHEK JUAN MISC    CHECK FASTING GLUCOSE AND CHECK GLUCOSE TWO HOURS AFTER LUNCH OR DINNER    AMLODIPINE (NORVASC) 5 MG TABLET    TAKE 1 TABLET BY MOUTH EVERY DAY IN THE EVENING    APIXABAN (ELIQUIS) 5 MG TAB    Take 1 tablet (5 mg total) by mouth 2 (two) times daily.    ATORVASTATIN (LIPITOR) 40 MG TABLET    Take 1 tablet (40 mg total) by mouth every evening.    BLOOD SUGAR DIAGNOSTIC STRP    Check glucose twice daily    BLOOD-GLUCOSE METER MISC    Check fasting glucose and check glucose two hours after lunch or dinner    CALCIUM CARBONATE/VITAMIN D3 (VITAMIN D-3 ORAL)    Take 1 tablet by mouth once daily.     CYANOCOBALAMIN (VITAMIN B-12) 500 MCG TABLET    Take 500 mcg by mouth once daily.    GLIPIZIDE (GLUCOTROL) 10 MG TABLET    TAKE 1 TABLET (10 MG) BY ORAL ROUTE 2 TIMES PER DAY BEFORE MEALS    HYDROCHLOROTHIAZIDE (HYDRODIURIL) 25 MG TABLET    TAKE 1 TABLET (25 MG TOTAL) BY MOUTH ONCE DAILY. WITH BREAKFAST    IRBESARTAN (AVAPRO) 300 MG TABLET    TAKE 1 TABLET (300 MG TOTAL) BY MOUTH ONCE DAILY. WITH BREAKFAST    LANCETS MISC    Check glucose twice daily    LINAGLIPTIN (TRADJENTA) 5 MG TAB TABLET    Take 1 tablet (5 mg total) by mouth once daily.    MELOXICAM (MOBIC) 15 MG TABLET    TAKE 1 TABLET BY MOUTH EVERY DAY    METFORMIN (GLUCOPHAGE) 1000 MG TABLET    TAKE 1 TABLET (1,000 MG TOTAL) BY MOUTH 2 (TWO) TIMES DAILY. WITH MEALS    METOPROLOL SUCCINATE (TOPROL-XL) 50 MG 24 HR TABLET    Take 1 tablet (50 mg total) by mouth once daily.   Modified Medications    No medications on file   Discontinued Medications    AMLODIPINE (NORVASC) 5 MG TABLET    Take 1 tablet (5 mg total) by mouth once daily.    DICLOFENAC SODIUM (VOLTAREN) 1 % GEL    Apply 2 g topically 4 (four) times daily.   She has  increased CHADS score.  Before making any recommendations I would like to review actual strips/ECG  from her ER visit. Will try to obtain.  I will repeat CFD to assess LV systolic and diastolic function, Aston and LVH and PA pressure.  If CD is normal I would consider ILR and possibly PVI in order to even entertain the option of d/c DOAC.  Refer to EP service for further recommendations.  Dental procedures are considered low risk bleeding risk and in general there is no need to stop Elquis except for skipping the dose on the morning of the procedure.  If dentist feels that procedure is a moderate bleeding risk it can be held for 24 hours before the procedure and if the bleeding risk is high then 48 hours is recommended.  Defer risk assessment to patient's dentist.

## 2019-10-09 NOTE — TELEPHONE ENCOUNTER
Received call from cardiology requesting sooner apt for consult visit with Dr. Mathias. Pt referred by Dr. Dominguez. Called pt and arranged apt on 10/22 at 8 AM with EKG prior. Pt agrees and verbalized understanding. Patient denies outside records.

## 2019-10-12 RX ORDER — IRBESARTAN 300 MG/1
TABLET ORAL
Qty: 30 TABLET | Refills: 11 | Status: SHIPPED | OUTPATIENT
Start: 2019-10-12 | End: 2020-02-05

## 2019-10-15 ENCOUNTER — HOSPITAL ENCOUNTER (OUTPATIENT)
Dept: CARDIOLOGY | Facility: CLINIC | Age: 64
Discharge: HOME | End: 2019-10-15
Attending: INTERNAL MEDICINE
Payer: MEDICAID

## 2019-10-15 ENCOUNTER — TELEPHONE (OUTPATIENT)
Dept: CARDIOLOGY | Facility: CLINIC | Age: 64
End: 2019-10-15

## 2019-10-15 VITALS
DIASTOLIC BLOOD PRESSURE: 64 MMHG | SYSTOLIC BLOOD PRESSURE: 134 MMHG | HEART RATE: 65 BPM | WEIGHT: 186 LBS | BODY MASS INDEX: 34.23 KG/M2 | HEIGHT: 62 IN

## 2019-10-15 DIAGNOSIS — I48.0 PAF (PAROXYSMAL ATRIAL FIBRILLATION): ICD-10-CM

## 2019-10-15 DIAGNOSIS — E78.2 MIXED HYPERLIPIDEMIA: ICD-10-CM

## 2019-10-15 DIAGNOSIS — E66.01 CLASS 2 SEVERE OBESITY DUE TO EXCESS CALORIES WITH SERIOUS COMORBIDITY IN ADULT, UNSPECIFIED BMI: ICD-10-CM

## 2019-10-15 DIAGNOSIS — E11.00 TYPE 2 DIABETES MELLITUS WITH HYPEROSMOLARITY WITHOUT COMA, WITHOUT LONG-TERM CURRENT USE OF INSULIN: ICD-10-CM

## 2019-10-15 DIAGNOSIS — I10 ESSENTIAL HYPERTENSION: ICD-10-CM

## 2019-10-15 LAB
ASCENDING AORTA: 3.18 CM
BSA FOR ECHO PROCEDURE: 1.92 M2
CV ECHO LV RWT: 0.28 CM
DOP CALC LVOT AREA: 3.3 CM2
DOP CALC LVOT DIAMETER: 2.05 CM
DOP CALC LVOT PEAK VEL: 1.35 M/S
DOP CALC LVOT STROKE VOLUME: 97.88 CM3
DOP CALCLVOT PEAK VEL VTI: 29.67 CM
E WAVE DECELERATION TIME: 175.54 MSEC
E/A RATIO: 0.91
E/E' RATIO: 10.5 M/S
ECHO LV POSTERIOR WALL: 0.72 CM (ref 0.6–1.1)
FRACTIONAL SHORTENING: 51 % (ref 28–44)
INTERVENTRICULAR SEPTUM: 0.74 CM (ref 0.6–1.1)
IVRT: 0.09 MSEC
LA MAJOR: 5.6 CM
LA MINOR: 5.48 CM
LA WIDTH: 4.44 CM
LEFT ATRIUM SIZE: 4.2 CM
LEFT ATRIUM VOLUME INDEX: 47.4 ML/M2
LEFT ATRIUM VOLUME: 87.8 CM3
LEFT INTERNAL DIMENSION IN SYSTOLE: 2.51 CM (ref 2.1–4)
LEFT VENTRICLE DIASTOLIC VOLUME INDEX: 68.11 ML/M2
LEFT VENTRICLE DIASTOLIC VOLUME: 126.24 ML
LEFT VENTRICLE MASS INDEX: 68 G/M2
LEFT VENTRICLE SYSTOLIC VOLUME INDEX: 12.1 ML/M2
LEFT VENTRICLE SYSTOLIC VOLUME: 22.51 ML
LEFT VENTRICULAR INTERNAL DIMENSION IN DIASTOLE: 5.14 CM (ref 3.5–6)
LEFT VENTRICULAR MASS: 126.81 G
LV LATERAL E/E' RATIO: 9.33 M/S
LV SEPTAL E/E' RATIO: 12 M/S
MV PEAK A VEL: 0.92 M/S
MV PEAK E VEL: 0.84 M/S
PISA TR MAX VEL: 2.75 M/S
PULM VEIN S/D RATIO: 2.38
PV PEAK D VEL: 0.26 M/S
PV PEAK S VEL: 0.62 M/S
RA MAJOR: 4.8 CM
RA PRESSURE: 3 MMHG
RA WIDTH: 3.5 CM
RIGHT VENTRICULAR END-DIASTOLIC DIMENSION: 3.49 CM
RV TISSUE DOPPLER FREE WALL SYSTOLIC VELOCITY 1 (APICAL 4 CHAMBER VIEW): 16.35 CM/S
SINUS: 3.59 CM
STJ: 2.78 CM
TDI LATERAL: 0.09 M/S
TDI SEPTAL: 0.07 M/S
TDI: 0.08 M/S
TR MAX PG: 30 MMHG
TRICUSPID ANNULAR PLANE SYSTOLIC EXCURSION: 3.46 CM
TV REST PULMONARY ARTERY PRESSURE: 33 MMHG

## 2019-10-15 PROCEDURE — 93306 TTE W/DOPPLER COMPLETE: CPT | Mod: PBBFAC | Performed by: INTERNAL MEDICINE

## 2019-10-15 PROCEDURE — 93306 ECHO (CUPID ONLY): ICD-10-PCS | Mod: 26,S$PBB,, | Performed by: INTERNAL MEDICINE

## 2019-10-15 NOTE — TELEPHONE ENCOUNTER
----- Message from Marie Dominguez MD sent at 10/15/2019 10:23 AM CDT -----  Please inform the patient that her heart echo showed normal heart function and moderately enlarged small chamber of the heart. This could predispose her to recurrent atrial fibrillation. Let;s see if the arrhythmia docotrs have anyu other opinion, But I woould continue on Anticoagulation

## 2019-10-18 ENCOUNTER — PATIENT OUTREACH (OUTPATIENT)
Dept: ADMINISTRATIVE | Facility: OTHER | Age: 64
End: 2019-10-18

## 2019-10-22 ENCOUNTER — INITIAL CONSULT (OUTPATIENT)
Dept: ELECTROPHYSIOLOGY | Facility: CLINIC | Age: 64
End: 2019-10-22
Payer: MEDICAID

## 2019-10-22 VITALS
HEART RATE: 76 BPM | BODY MASS INDEX: 34.78 KG/M2 | DIASTOLIC BLOOD PRESSURE: 72 MMHG | SYSTOLIC BLOOD PRESSURE: 141 MMHG | HEIGHT: 62 IN | WEIGHT: 189 LBS

## 2019-10-22 DIAGNOSIS — I48.0 PAF (PAROXYSMAL ATRIAL FIBRILLATION): Primary | ICD-10-CM

## 2019-10-22 DIAGNOSIS — I10 ESSENTIAL HYPERTENSION: Chronic | ICD-10-CM

## 2019-10-22 DIAGNOSIS — E11.00 TYPE 2 DIABETES MELLITUS WITH HYPEROSMOLARITY WITHOUT COMA, WITHOUT LONG-TERM CURRENT USE OF INSULIN: Chronic | ICD-10-CM

## 2019-10-22 PROCEDURE — 99999 PR PBB SHADOW E&M-EST. PATIENT-LVL IV: CPT | Mod: PBBFAC,,, | Performed by: INTERNAL MEDICINE

## 2019-10-22 PROCEDURE — 93010 ELECTROCARDIOGRAM REPORT: CPT | Mod: S$PBB,,, | Performed by: INTERNAL MEDICINE

## 2019-10-22 PROCEDURE — 99204 OFFICE O/P NEW MOD 45 MIN: CPT | Mod: S$PBB,,, | Performed by: INTERNAL MEDICINE

## 2019-10-22 PROCEDURE — 99204 PR OFFICE/OUTPT VISIT, NEW, LEVL IV, 45-59 MIN: ICD-10-PCS | Mod: S$PBB,,, | Performed by: INTERNAL MEDICINE

## 2019-10-22 PROCEDURE — 99999 PR PBB SHADOW E&M-EST. PATIENT-LVL IV: ICD-10-PCS | Mod: PBBFAC,,, | Performed by: INTERNAL MEDICINE

## 2019-10-22 PROCEDURE — 93010 EKG 12-LEAD: ICD-10-PCS | Mod: S$PBB,,, | Performed by: INTERNAL MEDICINE

## 2019-10-22 PROCEDURE — 99214 OFFICE O/P EST MOD 30 MIN: CPT | Mod: PBBFAC | Performed by: INTERNAL MEDICINE

## 2019-10-22 PROCEDURE — 93005 ELECTROCARDIOGRAM TRACING: CPT | Mod: PBBFAC | Performed by: INTERNAL MEDICINE

## 2019-10-22 NOTE — LETTER
October 22, 2019      Marie Dominguez MD  [address]  [address]  [place] [address]           [facility] - Arrhythmia  [address]  [facility] [address]  Phone: [phone]  Fax: [phone]          Patient: Fely Wetzel   MR Number: [id number]   YOB: 1955   Date of Visit: 10/22/2019       Dear Dr. Marie Dominguez:    Thank you for referring Fely Wetzel to me for evaluation. Attached you will find relevant portions of my assessment and plan of care.    If you have questions, please do not hesitate to call me. I look forward to following Fely Wetzel along with you.    Sincerely,    Beck Mathias MD    Enclosure  CC:  No Recipients    If you would like to receive this communication electronically, please contact [email or web address] or [phone] to request more information on [employer] Link access.    For providers and/or their staff who would like to refer a patient to [facility], please contact us through our one-stop-shop provider referral line, [facility], at [phone].    If you feel you have received this communication in error or would no longer like to receive these types of communications, please e-mail [email or web address]

## 2019-10-22 NOTE — PROGRESS NOTES
Subjective:    Patient ID:  Fely Wetzel is a 63 y.o. female who presents for evaluation of No chief complaint on file.    Referring Cardiologist: Marie Dominguez MD  Primary Care Physician: Paula Barkley, DO    HPI  I had the pleasure of seeing Mrs. Wetzel today in our electrophysiology clinic in consultation for her atrial fibrillation. As you are aware she is a pleasant 63 year-old woman with hypertension, diabetes mellitus type 2, and obesity. She was in her usual state of health until June 2019 when she developed sudden palpitations. She called EMS and reportedly was in AF with ventricular rates up to 170s-220s. She was given IV cardizem and her ventricular rate lowered to 110s-170s. Per ER physician she was in atrial fibrillation however no ECGs document her arrhythmia. She was given 20mg more of cardizem and spontaneously converted to sinus rhythm. TSH was normal. She was discharged on eliquis and metoprolol. She was referred to Dr. Dominguez and discussed with her if there was possibility at some point of stopping anticoagulation. She reports for the past few weeks she has been having episodes of palpitations with radiation into her neck. These are different than when she went to the ER.     An exercise stress echocardiogram noted normal LV function and no ischemic changes. An echocardiogram performed on 10/15/2019 showed normal LV function, normal valves, and moderate LA enlargement (MURIEL 47).    I reviewed all available ECGs in Epic which show sinus rhythm.    My interpretation of today's in clinic ECG is normal sinus rhythm with normal intervals.    Review of Systems   Constitution: Negative for fever and malaise/fatigue.   HENT: Negative for congestion and sore throat.    Eyes: Negative for blurred vision and visual disturbance.   Cardiovascular: Positive for irregular heartbeat (per HPI) and palpitations (per HPI). Negative for chest pain and dyspnea on exertion.   Respiratory: Positive  for sleep disturbances due to breathing and snoring. Negative for cough and shortness of breath.    Hematologic/Lymphatic: Negative for bleeding problem. Does not bruise/bleed easily.   Skin: Negative.    Musculoskeletal: Negative.    Gastrointestinal: Negative for bloating and abdominal pain.   Neurological: Positive for excessive daytime sleepiness. Negative for dizziness and focal weakness.        Objective:    Physical Exam   Constitutional: She is oriented to person, place, and time. She appears well-developed and well-nourished. No distress.   HENT:   Head: Normocephalic and atraumatic.   Eyes: Conjunctivae are normal. Right eye exhibits no discharge. Left eye exhibits no discharge.   Neck: Neck supple. No JVD present.   Cardiovascular: Normal rate and regular rhythm. Exam reveals no gallop and no friction rub.   No murmur heard.  Pulmonary/Chest: Effort normal and breath sounds normal. No respiratory distress. She has no wheezes. She has no rales.   Abdominal: Soft. Bowel sounds are normal. She exhibits no distension. There is no tenderness. There is no rebound.   Musculoskeletal: She exhibits no edema.   Neurological: She is alert and oriented to person, place, and time.   Skin: Skin is warm and dry. She is not diaphoretic.   Psychiatric: She has a normal mood and affect. Her behavior is normal. Judgment and thought content normal.   Vitals reviewed.        Assessment:       1. PAF (paroxysmal atrial fibrillation)    2. Essential hypertension    3. Type 2 diabetes mellitus with hyperosmolarity without coma, without long-term current use of insulin         Plan:       In summary, Mrs. Wetzel is a pleasant 63 year-old woman with hypertension, diabetes mellitus type 2, and obesity presenting for evaluation of new onset symptomatic paroxysmal atrial fibrillation with RVR. I had a long discussion with the patient about the pathophysiology and risks of atrial fibrillation and its basic pathophysiology, including  its health implications and treatment options. Specifically, I addressed the need for CVA (stroke) prophylaxis with aspirin versus oral anticoagulation (warfarin vs DOACs, discussed bleeding risks, and need to come to the ER for any head trauma for CT scanning even if asymptomatic). Her [id number] score is 3 and indefinite anticoagulation is recommended. We discussed unfortunately at this point long-term anticoagulation recommendations are based on long-term stroke risk factors and not on long-term ability to provide effective rhythm control. I also discussed the goal to reduce symptomatic arrhythmic episodes by pharmacologic and/or procedural methods and utilizing a rhythm versus a rate control strategy. Since she was very symptomatic with her episode I recommend rhythm control strategy. I would like to see what her arrhythmia is that she is feeling over the past week (an SVT?) as it sounds like something different that atrial fibrillation. If it is short paroxysms of AF I would recommend starting flecainide. Recommend sleep study as well.    Plan  30 day monitor  Sleep study  Likely will start flecainide after 30 day monitor is completed.    Thank you for allowing me to participate in the care of this patient. Please do not hesitate to call me with any questions or concerns.    Beck Mathias MD, PhD  Cardiac Electrophysiology

## 2019-10-23 ENCOUNTER — IMMUNIZATION (OUTPATIENT)
Dept: PHARMACY | Facility: CLINIC | Age: 64
End: 2019-10-23
Payer: MEDICAID

## 2019-10-26 RX ORDER — GLIPIZIDE 10 MG/1
TABLET ORAL
Qty: 60 TABLET | Refills: 7 | Status: SHIPPED | OUTPATIENT
Start: 2019-10-26 | End: 2019-12-10 | Stop reason: SDUPTHER

## 2019-11-25 ENCOUNTER — HOSPITAL ENCOUNTER (EMERGENCY)
Facility: HOSPITAL | Age: 64
Discharge: HOME | End: 2019-11-25
Attending: EMERGENCY MEDICINE
Payer: MEDICAID

## 2019-11-25 VITALS
DIASTOLIC BLOOD PRESSURE: 67 MMHG | TEMPERATURE: 98 F | OXYGEN SATURATION: 97 % | SYSTOLIC BLOOD PRESSURE: 156 MMHG | WEIGHT: 180 LBS | HEIGHT: 62 IN | BODY MASS INDEX: 33.13 KG/M2 | RESPIRATION RATE: 13 BRPM | HEART RATE: 61 BPM

## 2019-11-25 DIAGNOSIS — R00.2 PALPITATIONS: ICD-10-CM

## 2019-11-25 DIAGNOSIS — R07.9 CHEST PAIN: ICD-10-CM

## 2019-11-25 DIAGNOSIS — I48.0 PAROXYSMAL ATRIAL FIBRILLATION: Primary | ICD-10-CM

## 2019-11-25 DIAGNOSIS — E83.42 HYPOMAGNESEMIA: ICD-10-CM

## 2019-11-25 LAB
ALBUMIN SERPL BCP-MCNC: 3.4 G/DL (ref 3.5–5.2)
ALP SERPL-CCNC: 129 U/L (ref 55–135)
ALT SERPL W/O P-5'-P-CCNC: 16 U/L (ref 10–44)
ANION GAP SERPL CALC-SCNC: 11 MMOL/L (ref 8–16)
AST SERPL-CCNC: 16 U/L (ref 10–40)
BASOPHILS # BLD AUTO: 0.03 K/UL (ref 0–0.2)
BASOPHILS NFR BLD: 0.3 % (ref 0–1.9)
BILIRUB SERPL-MCNC: 0.5 MG/DL (ref 0.1–1)
BNP SERPL-MCNC: 34 PG/ML (ref 0–99)
BUN SERPL-MCNC: 17 MG/DL (ref 8–23)
CALCIUM SERPL-MCNC: 9.4 MG/DL (ref 8.7–10.5)
CHLORIDE SERPL-SCNC: 105 MMOL/L (ref 95–110)
CO2 SERPL-SCNC: 21 MMOL/L (ref 23–29)
CREAT SERPL-MCNC: 0.8 MG/DL (ref 0.5–1.4)
DIFFERENTIAL METHOD: NORMAL
EOSINOPHIL # BLD AUTO: 0.2 K/UL (ref 0–0.5)
EOSINOPHIL NFR BLD: 2.4 % (ref 0–8)
ERYTHROCYTE [DISTWIDTH] IN BLOOD BY AUTOMATED COUNT: 13.5 % (ref 11.5–14.5)
EST. GFR  (AFRICAN AMERICAN): >60 ML/MIN/1.73 M^2
EST. GFR  (NON AFRICAN AMERICAN): >60 ML/MIN/1.73 M^2
GLUCOSE SERPL-MCNC: 323 MG/DL (ref 70–110)
HCT VFR BLD AUTO: 39.9 % (ref 37–48.5)
HGB BLD-MCNC: 13.2 G/DL (ref 12–16)
IMM GRANULOCYTES # BLD AUTO: 0.03 K/UL (ref 0–0.04)
IMM GRANULOCYTES NFR BLD AUTO: 0.3 % (ref 0–0.5)
LYMPHOCYTES # BLD AUTO: 3.1 K/UL (ref 1–4.8)
LYMPHOCYTES NFR BLD: 32.6 % (ref 18–48)
MAGNESIUM SERPL-MCNC: 1.5 MG/DL (ref 1.6–2.6)
MCH RBC QN AUTO: 29.1 PG (ref 27–31)
MCHC RBC AUTO-ENTMCNC: 33.1 G/DL (ref 32–36)
MCV RBC AUTO: 88 FL (ref 82–98)
MONOCYTES # BLD AUTO: 0.6 K/UL (ref 0.3–1)
MONOCYTES NFR BLD: 6.4 % (ref 4–15)
NEUTROPHILS # BLD AUTO: 5.5 K/UL (ref 1.8–7.7)
NEUTROPHILS NFR BLD: 58 % (ref 38–73)
NRBC BLD-RTO: 0 /100 WBC
PLATELET # BLD AUTO: 302 K/UL (ref 150–350)
PMV BLD AUTO: 11.4 FL (ref 9.2–12.9)
POTASSIUM SERPL-SCNC: 3.6 MMOL/L (ref 3.5–5.1)
PROT SERPL-MCNC: 7.5 G/DL (ref 6–8.4)
RBC # BLD AUTO: 4.54 M/UL (ref 4–5.4)
SODIUM SERPL-SCNC: 137 MMOL/L (ref 136–145)
TROPONIN I SERPL DL<=0.01 NG/ML-MCNC: <0.006 NG/ML (ref 0–0.03)
WBC # BLD AUTO: 9.51 K/UL (ref 3.9–12.7)

## 2019-11-25 PROCEDURE — 85025 COMPLETE CBC W/AUTO DIFF WBC: CPT

## 2019-11-25 PROCEDURE — 83735 ASSAY OF MAGNESIUM: CPT

## 2019-11-25 PROCEDURE — 93010 EKG 12-LEAD: ICD-10-PCS | Mod: ,,, | Performed by: INTERNAL MEDICINE

## 2019-11-25 PROCEDURE — 99284 PR EMERGENCY DEPT VISIT,LEVEL IV: ICD-10-PCS | Mod: ,,, | Performed by: PHYSICIAN ASSISTANT

## 2019-11-25 PROCEDURE — 83880 ASSAY OF NATRIURETIC PEPTIDE: CPT

## 2019-11-25 PROCEDURE — 84484 ASSAY OF TROPONIN QUANT: CPT

## 2019-11-25 PROCEDURE — 25000003 PHARM REV CODE 250: Performed by: EMERGENCY MEDICINE

## 2019-11-25 PROCEDURE — 63600175 PHARM REV CODE 636 W HCPCS: Performed by: EMERGENCY MEDICINE

## 2019-11-25 PROCEDURE — 99284 EMERGENCY DEPT VISIT MOD MDM: CPT | Mod: ,,, | Performed by: PHYSICIAN ASSISTANT

## 2019-11-25 PROCEDURE — 99285 EMERGENCY DEPT VISIT HI MDM: CPT | Mod: 25

## 2019-11-25 PROCEDURE — 93005 ELECTROCARDIOGRAM TRACING: CPT

## 2019-11-25 PROCEDURE — 93010 ELECTROCARDIOGRAM REPORT: CPT | Mod: ,,, | Performed by: INTERNAL MEDICINE

## 2019-11-25 PROCEDURE — 96365 THER/PROPH/DIAG IV INF INIT: CPT

## 2019-11-25 PROCEDURE — 96375 TX/PRO/DX INJ NEW DRUG ADDON: CPT

## 2019-11-25 PROCEDURE — 80053 COMPREHEN METABOLIC PANEL: CPT

## 2019-11-25 RX ORDER — DILTIAZEM HYDROCHLORIDE 5 MG/ML
10 INJECTION INTRAVENOUS
Status: COMPLETED | OUTPATIENT
Start: 2019-11-25 | End: 2019-11-25

## 2019-11-25 RX ORDER — DILTIAZEM HCL 1 MG/ML
5 INJECTION, SOLUTION INTRAVENOUS CONTINUOUS
Status: DISCONTINUED | OUTPATIENT
Start: 2019-11-25 | End: 2019-11-25

## 2019-11-25 RX ADMIN — MAGNESIUM SULFATE HEPTAHYDRATE 1 G: 500 INJECTION, SOLUTION INTRAMUSCULAR; INTRAVENOUS at 07:11

## 2019-11-25 RX ADMIN — DILTIAZEM HYDROCHLORIDE 10 MG: 5 INJECTION INTRAVENOUS at 05:11

## 2019-11-25 NOTE — PROVIDER PROGRESS NOTES - EMERGENCY DEPT.
Encounter Date: 11/25/2019    ED Physician Progress Notes             ED Physician Hand-off Note:    ED Course: I assumed care of patient from off-going ED physician team. Briefly, Patient is a 64-year-old female with history of paroxysmal atrial fibrillation currently on Eliquis, type 2 diabetes, hypertension, hyperlipidemia presenting with AFib with RVR.  She was given a dose of diltiazem, with improvement in symptoms. Case was discussed with Cardiology, pending labs..    At the time of signout plan was pending labs.    CMP and basic labs resulted.  Patient was found to be hypomagnesemic, treated with IV magnesium given arrhythmia.  Discharged home with plan in place. Patient agreeable to discharge plan. Strict ED precautions and return instructions discussed at length and patient verbalized understanding. All questions were answered and ample time was given for questions.        Medications given in the ED:    Medications   diltiaZEM injection 10 mg (10 mg Intravenous Given 11/25/19 0520)   diltiaZEM injection 10 mg (10 mg Intravenous Given 11/25/19 0528)   magnesium sulfate 1 g in dextrose 5 % 50 mL IVPB (0 g Intravenous Stopped 11/25/19 0755)       Imaging Results          X-Ray Chest AP Portable (Final result)  Result time 11/25/19 05:53:37    Final result by Blair Celis MD (11/25/19 05:53:37)                 Impression:      No significant intrathoracic abnormality directly referable to the clinical history of chest pain.  No significant detrimental interval change in the appearance of the chest since 06/27/2019.      Electronically signed by: Blair Celis MD  Date:    11/25/2019  Time:    05:53             Narrative:    EXAMINATION:  XR CHEST AP PORTABLE    CLINICAL HISTORY:  Chest Pain;    COMPARISON:  Comparison is made to 06/27/2019.    FINDINGS:  Heart size is normal, as is the appearance of the pulmonary vascularity.  Inferior left hemothorax is obscured to a considerable degree by an opacity relating to  structure external to the patient, but the lung zones overall appear clear and are free of significant airspace consolidation or volume loss.  No pleural fluid.  No abnormal mediastinal widening.  No pneumothorax.                                Disposition:  Discharge    Patient comfortable with discharge. Patient counseled regarding exam, results, diagnosis, treatment, and plan.    Impression:     Final diagnoses:  [R00.2] Palpitations  [R07.9] Chest pain  [I48.0] Paroxysmal atrial fibrillation (Primary)  [E83.42] Hypomagnesemia    Sukumar Regalado DO  Dept of Emergency Medicine   [facility]  Spectralink: [id number]

## 2019-11-25 NOTE — DISCHARGE INSTRUCTIONS
Diagnosis:  Paroxysmal atrial fibrillation    Follow-Up Plan:  - Follow-up with primary care doctor within 3 - 5 days  - Additional testing and/or evaluation as directed by your primary doctor  - Follow-up with your electrophysiologist    Return to the Emergency Department for symptoms including but not limited to: worsening symptoms, shortness of breath or chest pain, vomiting with inability to hold down fluids, fevers greater than 100.4°F, passing out/fainting/unconsciousness, or other concerning symptoms.

## 2019-11-25 NOTE — ED PROVIDER NOTES
"Encounter Date: 11/25/2019       History     Chief Complaint   Patient presents with    Palpitations     Pt c/o palpitations and "very fast heart rate" since this morning. PT hx of CHUCK Lopez, reports compliance with medications.      Ms Wetzel is a 64yoF presents for AFib RVR; pertinent PMHx history paroxysmal AFib on Eliquis, dm 2, HTN, HLD, h/o hysterectomy.  Patient awoke this morning to severe palpitations described as fast, irregular heartbeat.  Associated with a "weird feeling" at the base of her anterior neck.  Denies current shortness of breath, chest pain or pressure, other neck pain, shoulder pain, abdominal pain, nausea vomiting, lightheadedness.  She has been in usual state of health over the past few days and denies fever, chills, GI losses, recent illness.  Compliant with metoprolol and amlodipine.  The patients available PMH, PSH, Social History, medications, allergies, and triage vital signs were reviewed just prior to their medical evaluation.  A ten point review of systems was completed and is negative except as documented above.  Patient denies any other acute medical complaint.    Please be advised this text was dictated with [employer] software and may contain errors due to translation.           Review of patient's allergies indicates:   Allergen Reactions    Corticosteroids (glucocorticoids)     Latex, natural rubber     Shellfish containing products Other (See Comments)     Other reaction(s): Unknown  Other reaction(s): Anaphylaxis    Vicodin [hydrocodone-acetaminophen]     Vytorin 10-10  [ezetimibe-simvastatin]      Other reaction(s): Joint pain  Other reaction(s): Joint pain     Past Medical History:   Diagnosis Date    Diabetes mellitus type II     Hyperlipidemia     Hypertension     Obesity     Osteopenia      Past Surgical History:   Procedure Laterality Date    HYSTERECTOMY       Family History   Problem Relation Age of Onset    Diabetes Mother     Glaucoma Mother     " Hyperlipidemia Mother     Hypertension Mother     Heart disease Father     Cancer Father         prostate cancer    Heart attack Father     Heart disease Brother     Cataracts Brother     Heart failure Neg Hx     Stroke Neg Hx      Social History     Tobacco Use    Smoking status: Never Smoker    Smokeless tobacco: Never Used   Substance Use Topics    Alcohol use: Yes     Alcohol/week: 0.0 standard drinks     Comment: rarely    Drug use: No     Review of Systems   Constitutional: Negative for chills and fever.   Respiratory: Negative for shortness of breath.    Cardiovascular: Positive for palpitations. Negative for chest pain and leg swelling.   Gastrointestinal: Negative for abdominal pain, nausea and vomiting.   Musculoskeletal: Negative for neck pain.   Neurological: Negative for weakness, light-headedness and headaches.   Psychiatric/Behavioral: Negative for confusion.       Physical Exam     Initial Vitals [11/25/19 0507]   BP Pulse Resp Temp SpO2   (!) 147/70 (!) 127 16 98.1 °F (36.7 °C) 99 %      MAP       --         Physical Exam    Vitals reviewed.  Constitutional: She appears well-developed and well-nourished. She is not diaphoretic. No distress.   HENT:   Head: Normocephalic and atraumatic.   Right Ear: External ear normal.   Left Ear: External ear normal.   Nose: Nose normal.   Mouth/Throat: Oropharynx is clear and moist. No oropharyngeal exudate.   Eyes: Conjunctivae and EOM are normal. Pupils are equal, round, and reactive to light. No scleral icterus.   Neck: No JVD present.   Cardiovascular: Normal heart sounds and intact distal pulses.   Irregular irregular tachycardia 120s-180s   Pulmonary/Chest: Breath sounds normal. No respiratory distress. She has no wheezes. She has no rhonchi. She has no rales.   Abdominal: Soft. There is no tenderness.   Musculoskeletal: Edema: Trace BLE.   Neurological: She is alert and oriented to person, place, and time. She has normal strength. No cranial  nerve deficit or sensory deficit.   Skin: Skin is warm and dry. Capillary refill takes less than 2 seconds. No erythema. No pallor.   Psychiatric: She has a normal mood and affect. Her behavior is normal. Judgment and thought content normal.         ED Course   Procedures  Labs Reviewed   COMPREHENSIVE METABOLIC PANEL - Abnormal; Notable for the following components:       Result Value    CO2 21 (*)     Glucose 323 (*)     Albumin 3.4 (*)     All other components within normal limits    Narrative:     ADD ON MAGNESIUM PER DR DI COSTA/ORDER# [id number] @ 5:54AM   11/25/19   MAGNESIUM - Abnormal; Notable for the following components:    Magnesium 1.5 (*)     All other components within normal limits    Narrative:     ADD ON MAGNESIUM PER DR DI COSTA/ORDER# [id number] @ 5:54AM   11/25/19   CBC W/ AUTO DIFFERENTIAL    Narrative:     ADD ON MAGNESIUM PER DR DI COSTA/ORDER# [id number] @ 5:54AM   11/25/19   TROPONIN I    Narrative:     ADD ON MAGNESIUM PER DR DI COSTA/ORDER# [id number] @ 5:54AM   11/25/19   B-TYPE NATRIURETIC PEPTIDE    Narrative:     ADD ON MAGNESIUM PER DR DI COSTA/ORDER# [id number] @ 5:54AM   11/25/19   MAGNESIUM        ECG Results          EKG 12-lead (Final result)  Result time 11/25/19 17:30:31    Final result by Interface, Lab In [facility] (11/25/19 17:30:31)                 Narrative:    Test Reason : R07.9,    Vent. Rate : 067 BPM     Atrial Rate : 067 BPM     P-R Int : 156 ms          QRS Dur : 080 ms      QT Int : 410 ms       P-R-T Axes : 042 079 029 degrees     QTc Int : 433 ms    Normal sinus rhythm  Normal ECG  When compared with ECG of 25-NOV-2019 05:13,  Sinus rhythm has replaced Atrial fibrillation  Vent. rate has decreased BY  96 BPM  ST no longer depressed in Inferior leads  ST no longer depressed in Lateral leads  Confirmed by Yeison Jones MD ([id number]) on 11/25/2019 5:30:21 PM    Referred By: JUAN   SELF           Confirmed By:Yeison Jones MD                              EKG 12-lead (Final result)  Result time 11/25/19 17:32:17    Final result by Interface, Lab In [facility] (11/25/19 17:32:17)                 Narrative:    Test Reason : R00.2,    Vent. Rate : 163 BPM     Atrial Rate : 174 BPM     P-R Int : 000 ms          QRS Dur : 082 ms      QT Int : 292 ms       P-R-T Axes : 000 -08 057 degrees     QTc Int : 480 ms    Atrial fibrillation with rapid ventricular response  ST changes consisted with demand ischemia.  Abnormal ECG  When compared with ECG of 22-OCT-2019 07:52,  Atrial fibrillation has replaced Sinus rhythm  Vent. rate has increased BY  87 BPM  ST now depressed in Inferior leads  ST now depressed in Anterior-lateral leads  Confirmed by Yeison Jones MD ([id number]) on 11/25/2019 5:32:07 PM    Referred By: DONNAERR   SELF           Confirmed By:Yeison Jones MD                            Imaging Results          X-Ray Chest AP Portable (Final result)  Result time 11/25/19 05:53:37    Final result by Blair Celis MD (11/25/19 05:53:37)                 Impression:      No significant intrathoracic abnormality directly referable to the clinical history of chest pain.  No significant detrimental interval change in the appearance of the chest since 06/27/2019.      Electronically signed by: Blair Celis MD  Date:    11/25/2019  Time:    05:53             Narrative:    EXAMINATION:  XR CHEST AP PORTABLE    CLINICAL HISTORY:  Chest Pain;    COMPARISON:  Comparison is made to 06/27/2019.    FINDINGS:  Heart size is normal, as is the appearance of the pulmonary vascularity.  Inferior left hemothorax is obscured to a considerable degree by an opacity relating to structure external to the patient, but the lung zones overall appear clear and are free of significant airspace consolidation or volume loss.  No pleural fluid.  No abnormal mediastinal widening.  No pneumothorax.                                 Medical Decision Making:   History:   Old Medical Records: I decided  to obtain old medical records.  Old Records Summarized: records from clinic visits and records from previous admission(s).  Initial Assessment:   Patient with history paroxysmal Afib (previously converted after 20 mg IV Cardizem) presents for AFib RVR rate 120s to 180s + palpitations, BP stable, afebrile, no recent illness  Differential Diagnosis:   DDx includes AFib RVR, electrolyte disturbance. Physical exam and history taking lower clinical suspicion for SVT, anginal equivalent, aortic dissection.  Independently Interpreted Test(s):   I have ordered and independently interpreted X-rays - see prior notes.  I have ordered and independently interpreted EKG Reading(s) - see prior notes  Clinical Tests:   Lab Tests: Ordered and Reviewed  Radiological Study: Ordered and Reviewed  Medical Tests: Ordered and Reviewed  ED Management:  EKG with afib RVR rate 167, ST elevations in AVR with reciprocal depressions in inferolateral leads, likely rate dependent.  Given 10 mg IV Cardizem.  BP remained stable >105/70.  Rate continued to be 120s to 140s Without resolution of palpitation symptom.  Given additional dose 10 mg IV Cardizem with spontaneous conversion into sinus rhythm, BP stable, rate 68.  Improvement palpitations.  Improvement in rate dependent ST elevation in AVR.  Will continue to monitor.  Labs ordered.  Signed out to oncoming team at shift change pending remainder of lab work.  The patient remains stable in ED, I suspect discharge home as she is already anticoagulated and on appropriate medications with close follow-up to EP. Patient agreed to plan of care and voiced understanding.  Update: discussed case with cardiology, agree with outpatient f/u to EP next week. Referral placed.     Shannna Thomas PA-C  11/25/2019    I discussed the following case, diagnosis and plan of care with attending physician.    Other:   I have discussed this case with another health care provider.              Attending Attestation:      Physician Attestation Statement for NP/PA:   I reviewed the chart but I did not personally examine the patient. The face to face encounter was performed by the NP/PA.    Other NP/PA Attestation Additions:      Medical Decision Makin-year-old female with history of paroxysmal atrial fibrillation, presenting to ER with complaint of atrial fibrillation with RVR.  The patient cardioverted with administration of diltiazem which was given for purpose of rate control.  Post cardioversion EKG is sinus rhythm.  Labs unremarkable.  No inciting event or prodrome is noted.  Patient is compliant with her Eliquis and metoprolol.  Plan for follow-up with electrophysiology.                               Clinical Impression:       ICD-10-CM ICD-9-CM   1. Paroxysmal atrial fibrillation I48.0 427.31   2. Palpitations R00.2 785.1   3. Chest pain R07.9 786.50   4. Hypomagnesemia E83.42 275.2         Disposition:   Disposition: Discharged  Condition: Stable                     Shannan Thomas PA-C  19       Shannan Thomas PA-C  19       Mariam Garcia MD  [phone]

## 2019-11-25 NOTE — ED NOTES
"Pt resting in ED stretcher, AAOX4. Breathing equal and non-labored, in no acute distress. Pt States, "I feel much better now." No new needs voiced at this time. Side rails upx2, call light in reach, family at bedside. Will continue to frequently monitor.   "

## 2019-11-26 ENCOUNTER — PES CALL (OUTPATIENT)
Dept: ADMINISTRATIVE | Facility: CLINIC | Age: 64
End: 2019-11-26

## 2019-11-26 NOTE — PROGRESS NOTES
Subjective:       Patient ID: Fely Wetzel is a 64 y.o. female.    Chief Complaint: Follow-up (3 mo)    Patient is a 64 y.o.female who presents today for follow up    Labs: reviewed  Vaccines: Influenza (done); Tetanus (2014) ; Prevnar ( done)  Eye exam: up to date; dr gonzalez  Mammogram: june 2019  Gyn exam: hysterectomy  Colonoscopy: 2014; up to date  Dexa: 2019      DM2: she has not been taking tradjenta; she has been taking the metformin and glipizide regularly. She has been following the diet closely.   Review of Systems   Constitutional: Negative for appetite change, chills, diaphoresis and fever.   HENT: Negative for congestion, ear discharge, ear pain, postnasal drip, tinnitus, trouble swallowing and voice change.    Eyes: Negative for discharge, redness and itching.   Respiratory: Negative for cough, chest tightness, shortness of breath and wheezing.    Cardiovascular: Negative for chest pain, palpitations and leg swelling.   Gastrointestinal: Negative for abdominal pain, constipation, diarrhea, nausea and vomiting.   Endocrine: Negative for cold intolerance and heat intolerance.   Genitourinary: Negative for difficulty urinating, flank pain, hematuria and urgency.   Musculoskeletal: Negative for arthralgias, gait problem, myalgias and neck stiffness.   Skin: Negative for color change and rash.   Neurological: Negative for dizziness, seizures, syncope and headaches.   Hematological: Negative for adenopathy.   Psychiatric/Behavioral: Negative for agitation, behavioral problems, confusion and sleep disturbance.       Objective:      Physical Exam   Constitutional: She is oriented to person, place, and time. She appears well-developed and well-nourished. No distress.   HENT:   Head: Normocephalic and atraumatic.   Right Ear: External ear normal.   Left Ear: External ear normal.   Nose: Nose normal.   Mouth/Throat: Oropharynx is clear and moist. No oropharyngeal exudate.   Eyes: Pupils are equal,  round, and reactive to light. Conjunctivae and EOM are normal. Right eye exhibits no discharge. Left eye exhibits no discharge. No scleral icterus.   Neck: Neck supple. No JVD present. No tracheal deviation present. No thyromegaly present.   Cardiovascular: Normal rate, normal heart sounds and intact distal pulses. Exam reveals no gallop and no friction rub.   No murmur heard.  Pulmonary/Chest: Effort normal and breath sounds normal. No stridor. No respiratory distress. She has no wheezes. She has no rales. She exhibits no tenderness.   Abdominal: Soft. Bowel sounds are normal. She exhibits no distension. There is no tenderness. There is no rebound.   Musculoskeletal: She exhibits no edema or tenderness.   Lymphadenopathy:     She has no cervical adenopathy.   Neurological: She is alert and oriented to person, place, and time.   Skin: Skin is warm and dry. No rash noted. She is not diaphoretic. No erythema.   Psychiatric: She has a normal mood and affect. Her behavior is normal.   Nursing note and vitals reviewed.      Assessment and Plan:       1. PAF (paroxysmal atrial fibrillation)  - going to do a monitor soon; on beta blocker and anticoagulant    2. Essential hypertension  - stable    3. Mixed hyperlipidemia  - not at goal; better diet discussed    4. Type 2 diabetes mellitus with hyperosmolarity without coma, without long-term current use of insulin  - pt wants to start tradjenta instead of insulin; continue metformin and glipizide    5. Vitamin D deficiency  - stable          No follow-ups on file.

## 2019-12-03 ENCOUNTER — LAB VISIT (OUTPATIENT)
Dept: LAB | Facility: HOSPITAL | Age: 64
End: 2019-12-03
Attending: INTERNAL MEDICINE
Payer: MEDICAID

## 2019-12-03 ENCOUNTER — CLINICAL SUPPORT (OUTPATIENT)
Dept: CARDIOLOGY | Facility: HOSPITAL | Age: 64
End: 2019-12-03
Attending: INTERNAL MEDICINE
Payer: MEDICAID

## 2019-12-03 DIAGNOSIS — I48.0 PAF (PAROXYSMAL ATRIAL FIBRILLATION): ICD-10-CM

## 2019-12-03 DIAGNOSIS — E11.9 TYPE 2 DIABETES MELLITUS WITHOUT COMPLICATION, WITHOUT LONG-TERM CURRENT USE OF INSULIN: ICD-10-CM

## 2019-12-03 LAB
ALBUMIN SERPL BCP-MCNC: 3.5 G/DL (ref 3.5–5.2)
ALP SERPL-CCNC: 123 U/L (ref 55–135)
ALT SERPL W/O P-5'-P-CCNC: 14 U/L (ref 10–44)
ANION GAP SERPL CALC-SCNC: 11 MMOL/L (ref 8–16)
AST SERPL-CCNC: 15 U/L (ref 10–40)
BILIRUB SERPL-MCNC: 0.5 MG/DL (ref 0.1–1)
BUN SERPL-MCNC: 13 MG/DL (ref 8–23)
CALCIUM SERPL-MCNC: 9.8 MG/DL (ref 8.7–10.5)
CHLORIDE SERPL-SCNC: 104 MMOL/L (ref 95–110)
CHOLEST SERPL-MCNC: 214 MG/DL (ref 120–199)
CHOLEST/HDLC SERPL: 4.6 {RATIO} (ref 2–5)
CO2 SERPL-SCNC: 23 MMOL/L (ref 23–29)
CREAT SERPL-MCNC: 0.7 MG/DL (ref 0.5–1.4)
EST. GFR  (AFRICAN AMERICAN): >60 ML/MIN/1.73 M^2
EST. GFR  (NON AFRICAN AMERICAN): >60 ML/MIN/1.73 M^2
ESTIMATED AVG GLUCOSE: 226 MG/DL (ref 68–131)
GLUCOSE SERPL-MCNC: 234 MG/DL (ref 70–110)
HBA1C MFR BLD HPLC: 9.5 % (ref 4–5.6)
HDLC SERPL-MCNC: 47 MG/DL (ref 40–75)
HDLC SERPL: 22 % (ref 20–50)
LDLC SERPL CALC-MCNC: 108.2 MG/DL (ref 63–159)
NONHDLC SERPL-MCNC: 167 MG/DL
POTASSIUM SERPL-SCNC: 4.3 MMOL/L (ref 3.5–5.1)
PROT SERPL-MCNC: 7.7 G/DL (ref 6–8.4)
SODIUM SERPL-SCNC: 138 MMOL/L (ref 136–145)
TRIGL SERPL-MCNC: 294 MG/DL (ref 30–150)

## 2019-12-03 PROCEDURE — 83036 HEMOGLOBIN GLYCOSYLATED A1C: CPT

## 2019-12-03 PROCEDURE — 80053 COMPREHEN METABOLIC PANEL: CPT

## 2019-12-03 PROCEDURE — 93271 ECG/MONITORING AND ANALYSIS: CPT

## 2019-12-03 PROCEDURE — 80061 LIPID PANEL: CPT

## 2019-12-03 PROCEDURE — 36415 COLL VENOUS BLD VENIPUNCTURE: CPT | Mod: PO

## 2019-12-10 ENCOUNTER — OFFICE VISIT (OUTPATIENT)
Dept: INTERNAL MEDICINE | Facility: CLINIC | Age: 64
End: 2019-12-10
Payer: MEDICAID

## 2019-12-10 ENCOUNTER — TELEPHONE (OUTPATIENT)
Dept: INTERNAL MEDICINE | Facility: CLINIC | Age: 64
End: 2019-12-10

## 2019-12-10 VITALS
BODY MASS INDEX: 34.85 KG/M2 | TEMPERATURE: 99 F | RESPIRATION RATE: 18 BRPM | HEART RATE: 68 BPM | HEIGHT: 62 IN | SYSTOLIC BLOOD PRESSURE: 130 MMHG | DIASTOLIC BLOOD PRESSURE: 80 MMHG | WEIGHT: 189.38 LBS

## 2019-12-10 DIAGNOSIS — E11.00 TYPE 2 DIABETES MELLITUS WITH HYPEROSMOLARITY WITHOUT COMA, WITHOUT LONG-TERM CURRENT USE OF INSULIN: Chronic | ICD-10-CM

## 2019-12-10 DIAGNOSIS — E55.9 VITAMIN D DEFICIENCY: ICD-10-CM

## 2019-12-10 DIAGNOSIS — I48.0 PAF (PAROXYSMAL ATRIAL FIBRILLATION): Primary | ICD-10-CM

## 2019-12-10 DIAGNOSIS — I10 ESSENTIAL HYPERTENSION: Chronic | ICD-10-CM

## 2019-12-10 DIAGNOSIS — E78.2 MIXED HYPERLIPIDEMIA: Chronic | ICD-10-CM

## 2019-12-10 PROCEDURE — 99214 PR OFFICE/OUTPT VISIT, EST, LEVL IV, 30-39 MIN: ICD-10-PCS | Mod: S$PBB,,, | Performed by: INTERNAL MEDICINE

## 2019-12-10 PROCEDURE — 99214 OFFICE O/P EST MOD 30 MIN: CPT | Mod: S$PBB,,, | Performed by: INTERNAL MEDICINE

## 2019-12-10 PROCEDURE — 99999 PR PBB SHADOW E&M-EST. PATIENT-LVL III: CPT | Mod: PBBFAC,,, | Performed by: INTERNAL MEDICINE

## 2019-12-10 PROCEDURE — 99999 PR PBB SHADOW E&M-EST. PATIENT-LVL III: ICD-10-PCS | Mod: PBBFAC,,, | Performed by: INTERNAL MEDICINE

## 2019-12-10 PROCEDURE — 99213 OFFICE O/P EST LOW 20 MIN: CPT | Mod: PBBFAC,PO | Performed by: INTERNAL MEDICINE

## 2019-12-10 RX ORDER — METOPROLOL SUCCINATE 50 MG/1
50 TABLET, EXTENDED RELEASE ORAL DAILY
Qty: 90 TABLET | Refills: 3 | Status: SHIPPED | OUTPATIENT
Start: 2019-12-10 | End: 2020-12-31 | Stop reason: SDUPTHER

## 2019-12-10 RX ORDER — METFORMIN HYDROCHLORIDE 1000 MG/1
1000 TABLET ORAL 2 TIMES DAILY WITH MEALS
Qty: 180 TABLET | Refills: 3 | Status: SHIPPED | OUTPATIENT
Start: 2019-12-10 | End: 2021-01-02 | Stop reason: SDUPTHER

## 2019-12-10 RX ORDER — GLIPIZIDE 10 MG/1
10 TABLET ORAL 2 TIMES DAILY WITH MEALS
Qty: 180 TABLET | Refills: 3 | Status: SHIPPED | OUTPATIENT
Start: 2019-12-10 | End: 2021-01-02 | Stop reason: SDUPTHER

## 2019-12-10 NOTE — TELEPHONE ENCOUNTER
----- Message from Sara Coreas sent at 12/10/2019  8:52 AM CST -----  Contact: pt [phone]  Pt needs annual scheduled but has medicaid so I am unable to book the appt please call pt to schedule

## 2020-01-15 ENCOUNTER — OFFICE VISIT (OUTPATIENT)
Dept: OPTOMETRY | Facility: CLINIC | Age: 65
End: 2020-01-15
Payer: MEDICAID

## 2020-01-15 DIAGNOSIS — E11.3293 MILD NONPROLIFERATIVE DIABETIC RETINOPATHY OF BOTH EYES WITHOUT MACULAR EDEMA ASSOCIATED WITH TYPE 2 DIABETES MELLITUS: Primary | ICD-10-CM

## 2020-01-15 DIAGNOSIS — H25.13 NUCLEAR SCLEROSIS, BILATERAL: ICD-10-CM

## 2020-01-15 PROCEDURE — 99999 PR PBB SHADOW E&M-EST. PATIENT-LVL III: ICD-10-PCS | Mod: PBBFAC,,, | Performed by: OPTOMETRIST

## 2020-01-15 PROCEDURE — 92014 COMPRE OPH EXAM EST PT 1/>: CPT | Mod: S$PBB,,, | Performed by: OPTOMETRIST

## 2020-01-15 PROCEDURE — 99999 PR PBB SHADOW E&M-EST. PATIENT-LVL III: CPT | Mod: PBBFAC,,, | Performed by: OPTOMETRIST

## 2020-01-15 PROCEDURE — 99213 OFFICE O/P EST LOW 20 MIN: CPT | Mod: PBBFAC,PO | Performed by: OPTOMETRIST

## 2020-01-15 PROCEDURE — 92014 PR EYE EXAM, EST PATIENT,COMPREHESV: ICD-10-PCS | Mod: S$PBB,,, | Performed by: OPTOMETRIST

## 2020-01-15 NOTE — PROGRESS NOTES
HPI     DLS: 8/21/19  Pt states  the week of christmas she was driving and had a sudden change   on her dist VA with blurry, pt states she was not wearing her glasses but   when she did put them of her VA slowly got better. Pt states she still can   not get used to her glasses, states she might want to get two pair of   glasses or might want to try contacts. Pt has never worn contacts, also   states she did see zhanna mackey.   No f/f  Visine gtts Prn   Hemoglobin A1C       Date                     Value               Ref Range             Status                12/03/2019               9.5 (H)             4.0 - 5.6 %           Final              Comment:      ----------        Last edited by Sohail Sweeney, OD on 1/15/2020 10:54 AM. (History)        ROS     Positive for: Endocrine (DM)    Negative for: Constitutional, Gastrointestinal, Neurological, Skin,   Genitourinary, Musculoskeletal, HENT, Cardiovascular, Eyes, Respiratory,   Psychiatric, Allergic/Imm, Heme/Lymph    Last edited by Sohail Sweeney, OD on 1/15/2020 12:55 PM. (History)        Assessment /Plan     For exam results, see Encounter Report.    Mild nonproliferative diabetic retinopathy of both eyes without macular edema associated with type 2 diabetes mellitus    Nuclear sclerosis, bilateral      See notes from 6 mos ago:  1. Small cats OU--pt wearing otc readers, but now needs distance correction.  Wrote bifocal Rx--discussed PAls/adaptation  2. DM w mild NPDR OU.  Advised yearly DFE    See notes from 4 mos ago:  First time PAL wearer--got at [facility].  Near portion too low--rests on pts cheek    Pt presents today w 2 complaints:  1. Pt was driving last month and noticed her vision suddenly changing and getting blurry. w as not wearing her spex.  Her A1C was high that month, and pt says her sugar has been very high, but getting better now that the holidays are over.  DFE today shows no mac edema--discussed w pt visual flux assoc w sugar flux  2.  Pt still has difficulty getting used to periph blur in PALs, even after re-adjustment.  She wishes to try distance CLs and wear otc readers    PLAN:    rtc for CLFIT--discussed monthly vs daily disp--pt will figure out what she wants to try and let me know her decision at the fitting

## 2020-01-17 ENCOUNTER — TELEPHONE (OUTPATIENT)
Dept: SLEEP MEDICINE | Facility: CLINIC | Age: 65
End: 2020-01-17

## 2020-01-17 NOTE — TELEPHONE ENCOUNTER
----- Message from Nika Mosqueda sent at 1/17/2020  1:21 PM CST -----  Contact: YANDY STEVENS [[id number]]  Contact: YANDY STEVENS [[id number]]    What is the request in detail:   Requesting a call in regards to scheduling from referral patient states she doesn't know what she is being scheduled for      Can the clinic reply by [facility]:  No      [facility]:  [phone]

## 2020-01-28 ENCOUNTER — OFFICE VISIT (OUTPATIENT)
Dept: OPTOMETRY | Facility: CLINIC | Age: 65
End: 2020-01-28
Payer: MEDICAID

## 2020-01-28 DIAGNOSIS — H52.203 HYPEROPIA OF BOTH EYES WITH ASTIGMATISM AND PRESBYOPIA: Primary | ICD-10-CM

## 2020-01-28 DIAGNOSIS — H52.03 HYPEROPIA OF BOTH EYES WITH ASTIGMATISM AND PRESBYOPIA: Primary | ICD-10-CM

## 2020-01-28 DIAGNOSIS — H52.4 HYPEROPIA OF BOTH EYES WITH ASTIGMATISM AND PRESBYOPIA: Primary | ICD-10-CM

## 2020-01-28 PROCEDURE — 92310 CONTACT LENS FITTING OU: CPT | Mod: ,,, | Performed by: OPTOMETRIST

## 2020-01-28 PROCEDURE — 92310 PR CONTACT LENS FITTING (NO CHANGE): ICD-10-PCS | Mod: ,,, | Performed by: OPTOMETRIST

## 2020-01-28 PROCEDURE — 99212 OFFICE O/P EST SF 10 MIN: CPT | Mod: PBBFAC,PO | Performed by: OPTOMETRIST

## 2020-01-28 PROCEDURE — 99499 UNLISTED E&M SERVICE: CPT | Mod: S$PBB,,, | Performed by: OPTOMETRIST

## 2020-01-28 PROCEDURE — 99999 PR PBB SHADOW E&M-EST. PATIENT-LVL II: CPT | Mod: PBBFAC,,, | Performed by: OPTOMETRIST

## 2020-01-28 PROCEDURE — 99499 NO LOS: ICD-10-PCS | Mod: S$PBB,,, | Performed by: OPTOMETRIST

## 2020-01-28 PROCEDURE — 99999 PR PBB SHADOW E&M-EST. PATIENT-LVL II: ICD-10-PCS | Mod: PBBFAC,,, | Performed by: OPTOMETRIST

## 2020-01-28 NOTE — PROGRESS NOTES
HPI     DLS; 1/15/2020  Pt states she is here for a contacts fit, never worn contacts before.   No f/f  visin gtts   Hemoglobin A1C       Date                     Value               Ref Range             Status                12/03/2019               9.5 (H)             4.0 - 5.6 %           Final                     09/05/2019               7.7 (H)             4.0 - 5.6 %           Final                      05/16/2019               7.3 (H)             4.0 - 5.6 %           Final                   Last edited by Dana Willingham MA on 1/28/2020  8:32 AM. (History)        ROS     Positive for: Endocrine (DM)    Negative for: Constitutional, Gastrointestinal, Neurological, Skin,   Genitourinary, Musculoskeletal, HENT, Cardiovascular, Eyes, Respiratory,   Psychiatric, Allergic/Imm, Heme/Lymph    Last edited by Sohail Sweeney, ARDEN on 1/28/2020  9:17 AM. (History)        Assessment /Plan     For exam results, see Encounter Report.    Hyperopia of both eyes with astigmatism and presbyopia      See prev notes:  1. Small cats OU--pt wearing otc readers, but now needs distance correction.  Wrote bifocal Rx--discussed PAls/adaptation  2. DM w mild NPDR OU.  Advised yearly DFE    Pt here TODAY for CLFIT because had difficulty w PALs.  Wishes to try dist only Cls and will wear otc readers--discussed charleen, but pt feels too expensive.  Good fit/VA today w [employer]YS CLs    PLAN:    DISP TRIAL CLS  Reviewed insertion/removal and cleaning  Build up wearing time-4 hours first day, add one hour per day, not to exceed 12 hours per day wear  DW only, clean and disinfect nightly, exchange monthly  Pt advised to use refresh ATs prn for dryness  Pt will get otc readers to wear with Cls  rtc 1 week CLFU

## 2020-01-30 RX ORDER — AMLODIPINE BESYLATE 5 MG/1
TABLET ORAL
Qty: 30 TABLET | Refills: 5 | Status: SHIPPED | OUTPATIENT
Start: 2020-01-30 | End: 2020-09-21 | Stop reason: SDUPTHER

## 2020-02-05 ENCOUNTER — TELEPHONE (OUTPATIENT)
Dept: INTERNAL MEDICINE | Facility: CLINIC | Age: 65
End: 2020-02-05

## 2020-02-05 ENCOUNTER — TELEPHONE (OUTPATIENT)
Dept: OPTOMETRY | Facility: CLINIC | Age: 65
End: 2020-02-05

## 2020-02-05 RX ORDER — LOSARTAN POTASSIUM 100 MG/1
100 TABLET ORAL DAILY
Qty: 30 TABLET | Refills: 11 | Status: SHIPPED | OUTPATIENT
Start: 2020-02-05 | End: 2020-02-24 | Stop reason: SDUPTHER

## 2020-02-05 NOTE — TELEPHONE ENCOUNTER
Message from pharmacist states that med is not in stock in any strength, please advise of alternative.

## 2020-02-05 NOTE — TELEPHONE ENCOUNTER
----- Message from Nena Hernandez sent at 2/5/2020  1:47 PM CST -----  Contact: [facility]  Pharmacy is calling to clarify an RX.  RX name: irbesartan (AVAPRO) 300 MG tablet   What do they need to clarify:  Is not in stock in any strength, please call to change medication.  Comments: [facility]/pharmacy #[id number] - [place], [place] - [address] AT [facility] [phone] (Phone)  [phone] (Fax)

## 2020-02-06 NOTE — TELEPHONE ENCOUNTER
----- Message from Iris Dimas sent at 2/6/2020  2:09 PM CST -----  Contact: self [phone]  Patient is returning a phone call.  Who left a message for the patient: Sara  Does patient know what this is regarding:  hima  Comments:

## 2020-02-07 ENCOUNTER — DOCUMENTATION ONLY (OUTPATIENT)
Dept: CARDIOLOGY | Facility: HOSPITAL | Age: 65
End: 2020-02-07

## 2020-02-07 NOTE — PROGRESS NOTES
Patient has notified [employer] that they would not like to proceed with event monitor. Test canceled.

## 2020-02-10 ENCOUNTER — PATIENT OUTREACH (OUTPATIENT)
Dept: ADMINISTRATIVE | Facility: HOSPITAL | Age: 65
End: 2020-02-10

## 2020-02-10 NOTE — PROGRESS NOTES
Subjective:       Patient ID: Fely Wetzel is a 64 y.o. female.    Chief Complaint: Annual Exam    Patient is a 64 y.o.female who presents today for annual    Labs: reviewed  Vaccines: Influenza (done); Tetanus (2014) ; Prevnar ( done)  Eye exam: up to date; dr gonzalez  Mammogram: june 2019  Gyn exam: hysterectomy  Colonoscopy: 2014; up to date  Dexa: 2019    Pt was not able to complete the event monitor. She called cardio to reschedule with dr ross but staff was rude. Pt wants us to schedule her with him again so she doesn't have to call over there again.  Review of Systems   Constitutional: Negative for appetite change, chills, diaphoresis and fever.   HENT: Negative for congestion, ear discharge, ear pain, postnasal drip, tinnitus, trouble swallowing and voice change.    Eyes: Negative for discharge, redness and itching.   Respiratory: Negative for cough, chest tightness, shortness of breath and wheezing.    Cardiovascular: Negative for chest pain, palpitations and leg swelling.   Gastrointestinal: Negative for abdominal pain, constipation, diarrhea, nausea and vomiting.   Endocrine: Negative for cold intolerance and heat intolerance.   Genitourinary: Negative for difficulty urinating, flank pain, hematuria and urgency.   Musculoskeletal: Negative for arthralgias, gait problem, myalgias and neck stiffness.   Skin: Negative for color change and rash.   Neurological: Negative for dizziness, seizures, syncope and headaches.   Hematological: Negative for adenopathy.   Psychiatric/Behavioral: Negative for agitation, behavioral problems, confusion and sleep disturbance.       Objective:      Physical Exam   Constitutional: She is oriented to person, place, and time. She appears well-developed and well-nourished. No distress.   HENT:   Head: Normocephalic and atraumatic.   Right Ear: External ear normal.   Left Ear: External ear normal.   Nose: Nose normal.   Mouth/Throat: Oropharynx is clear and moist. No  oropharyngeal exudate.   Eyes: Pupils are equal, round, and reactive to light. Conjunctivae and EOM are normal. Right eye exhibits no discharge. Left eye exhibits no discharge. No scleral icterus.   Neck: Neck supple. No JVD present. No tracheal deviation present. No thyromegaly present.   Cardiovascular: Normal rate, normal heart sounds and intact distal pulses. Exam reveals no gallop and no friction rub.   No murmur heard.  Pulmonary/Chest: Effort normal and breath sounds normal. No stridor. No respiratory distress. She has no wheezes. She has no rales. She exhibits no tenderness.   Abdominal: Soft. Bowel sounds are normal. She exhibits no distension. There is no tenderness. There is no rebound.   Musculoskeletal: She exhibits no edema or tenderness.   Lymphadenopathy:     She has no cervical adenopathy.   Neurological: She is alert and oriented to person, place, and time.   Skin: Skin is warm and dry. No rash noted. She is not diaphoretic. No erythema.   Psychiatric: She has a normal mood and affect. Her behavior is normal.   Nursing note and vitals reviewed.      Assessment and Plan:       1. Annual physical exam  - labs reviewed    2. PAF (paroxysmal atrial fibrillation)  - will get pt a f/u with cardio  - Ambulatory referral/consult to Cardiology; Future    3. Essential hypertension  - good control; on losartan instead of irbesartan due to back order    4. Mixed hyperlipidemia  - improving    5. Type 2 diabetes mellitus with hyperosmolarity without coma, without long-term current use of insulin  - slow improvement in a1c; continue meds and diet    6. Vitamin D deficiency  - stable; otc supplement    7. Uti: macrobid          No follow-ups on file.

## 2020-02-12 RX ORDER — ATORVASTATIN CALCIUM 40 MG/1
TABLET, FILM COATED ORAL
Qty: 30 TABLET | Refills: 5 | Status: SHIPPED | OUTPATIENT
Start: 2020-02-12 | End: 2020-08-05

## 2020-02-13 ENCOUNTER — OFFICE VISIT (OUTPATIENT)
Dept: OPTOMETRY | Facility: CLINIC | Age: 65
End: 2020-02-13
Payer: MEDICAID

## 2020-02-13 DIAGNOSIS — Z46.0 ENCOUNTER FOR FITTING OR ADJUSTMENT OF SPECTACLES OR CONTACT LENSES: Primary | ICD-10-CM

## 2020-02-13 PROCEDURE — 99499 NO LOS: ICD-10-PCS | Mod: S$PBB,,, | Performed by: OPTOMETRIST

## 2020-02-13 PROCEDURE — 99499 UNLISTED E&M SERVICE: CPT | Mod: S$PBB,,, | Performed by: OPTOMETRIST

## 2020-02-13 PROCEDURE — 92310 PR CONTACT LENS FITTING (NO CHANGE): ICD-10-PCS | Mod: ,,, | Performed by: OPTOMETRIST

## 2020-02-13 PROCEDURE — 92310 CONTACT LENS FITTING OU: CPT | Mod: ,,, | Performed by: OPTOMETRIST

## 2020-02-13 NOTE — PROGRESS NOTES
HPI     DLS:1/28/20  Pt states he is happy with her contacts ([employer]), wears RX readers.   No f/f  No gtts      Last edited by Sohail Sweeney, OD on 2/13/2020  8:50 AM. (History)        ROS     Positive for: Endocrine (DM)    Negative for: Constitutional, Gastrointestinal, Neurological, Skin,   Genitourinary, Musculoskeletal, HENT, Cardiovascular, Eyes, Respiratory,   Psychiatric, Allergic/Imm, Heme/Lymph    Last edited by Sohail Sweeney, OD on 2/13/2020  8:50 AM. (History)        Assessment /Plan     For exam results, see Encounter Report.    Encounter for fitting or adjustment of spectacles or contact lenses      See prev notes:  1. Small cats OU--pt wearing otc readers, but now needs distance correction.    2. DM w mild NPDR OU.  Advised yearly DFE    TODAY pt presents for 2 week CLFU--new fit w [employer] CLs w otc readers.  Good fit/VA--pt happy    PLAN:    1. wrote CLRx  2. Continue Daily Wear schedule.  NO SLEEPING IN CONTACT LENSES. Clean and disinfect nightly.  exchange monthly.    3. rtc 1 yr

## 2020-02-17 ENCOUNTER — LAB VISIT (OUTPATIENT)
Dept: LAB | Facility: HOSPITAL | Age: 65
End: 2020-02-17
Attending: INTERNAL MEDICINE
Payer: MEDICAID

## 2020-02-17 DIAGNOSIS — E55.9 VITAMIN D DEFICIENCY: ICD-10-CM

## 2020-02-17 DIAGNOSIS — E11.00 TYPE 2 DIABETES MELLITUS WITH HYPEROSMOLARITY WITHOUT COMA, WITHOUT LONG-TERM CURRENT USE OF INSULIN: Chronic | ICD-10-CM

## 2020-02-17 DIAGNOSIS — I48.0 PAF (PAROXYSMAL ATRIAL FIBRILLATION): ICD-10-CM

## 2020-02-17 DIAGNOSIS — E78.2 MIXED HYPERLIPIDEMIA: Chronic | ICD-10-CM

## 2020-02-17 DIAGNOSIS — I10 ESSENTIAL HYPERTENSION: Chronic | ICD-10-CM

## 2020-02-17 LAB
25(OH)D3+25(OH)D2 SERPL-MCNC: 20 NG/ML (ref 30–96)
ALBUMIN SERPL BCP-MCNC: 3.8 G/DL (ref 3.5–5.2)
ALP SERPL-CCNC: 109 U/L (ref 55–135)
ALT SERPL W/O P-5'-P-CCNC: 15 U/L (ref 10–44)
ANION GAP SERPL CALC-SCNC: 12 MMOL/L (ref 8–16)
AST SERPL-CCNC: 19 U/L (ref 10–40)
BASOPHILS # BLD AUTO: 0.02 K/UL (ref 0–0.2)
BASOPHILS NFR BLD: 0.2 % (ref 0–1.9)
BILIRUB SERPL-MCNC: 0.3 MG/DL (ref 0.1–1)
BUN SERPL-MCNC: 16 MG/DL (ref 8–23)
CALCIUM SERPL-MCNC: 9.6 MG/DL (ref 8.7–10.5)
CHLORIDE SERPL-SCNC: 106 MMOL/L (ref 95–110)
CHOLEST SERPL-MCNC: 179 MG/DL (ref 120–199)
CHOLEST/HDLC SERPL: 3.9 {RATIO} (ref 2–5)
CO2 SERPL-SCNC: 21 MMOL/L (ref 23–29)
CREAT SERPL-MCNC: 0.7 MG/DL (ref 0.5–1.4)
DIFFERENTIAL METHOD: ABNORMAL
EOSINOPHIL # BLD AUTO: 0.2 K/UL (ref 0–0.5)
EOSINOPHIL NFR BLD: 1.9 % (ref 0–8)
ERYTHROCYTE [DISTWIDTH] IN BLOOD BY AUTOMATED COUNT: 14 % (ref 11.5–14.5)
EST. GFR  (AFRICAN AMERICAN): >60 ML/MIN/1.73 M^2
EST. GFR  (NON AFRICAN AMERICAN): >60 ML/MIN/1.73 M^2
ESTIMATED AVG GLUCOSE: 206 MG/DL (ref 68–131)
GLUCOSE SERPL-MCNC: 127 MG/DL (ref 70–110)
HBA1C MFR BLD HPLC: 8.8 % (ref 4–5.6)
HCT VFR BLD AUTO: 41.8 % (ref 37–48.5)
HDLC SERPL-MCNC: 46 MG/DL (ref 40–75)
HDLC SERPL: 25.7 % (ref 20–50)
HGB BLD-MCNC: 12.9 G/DL (ref 12–16)
IMM GRANULOCYTES # BLD AUTO: 0.03 K/UL (ref 0–0.04)
IMM GRANULOCYTES NFR BLD AUTO: 0.3 % (ref 0–0.5)
LDLC SERPL CALC-MCNC: 97.8 MG/DL (ref 63–159)
LYMPHOCYTES # BLD AUTO: 2.5 K/UL (ref 1–4.8)
LYMPHOCYTES NFR BLD: 25.1 % (ref 18–48)
MCH RBC QN AUTO: 28.2 PG (ref 27–31)
MCHC RBC AUTO-ENTMCNC: 30.9 G/DL (ref 32–36)
MCV RBC AUTO: 92 FL (ref 82–98)
MONOCYTES # BLD AUTO: 0.6 K/UL (ref 0.3–1)
MONOCYTES NFR BLD: 5.9 % (ref 4–15)
NEUTROPHILS # BLD AUTO: 6.6 K/UL (ref 1.8–7.7)
NEUTROPHILS NFR BLD: 66.6 % (ref 38–73)
NONHDLC SERPL-MCNC: 133 MG/DL
NRBC BLD-RTO: 0 /100 WBC
PLATELET # BLD AUTO: 265 K/UL (ref 150–350)
PMV BLD AUTO: 12.8 FL (ref 9.2–12.9)
POTASSIUM SERPL-SCNC: 4.2 MMOL/L (ref 3.5–5.1)
PROT SERPL-MCNC: 8.1 G/DL (ref 6–8.4)
RBC # BLD AUTO: 4.57 M/UL (ref 4–5.4)
SODIUM SERPL-SCNC: 139 MMOL/L (ref 136–145)
TRIGL SERPL-MCNC: 176 MG/DL (ref 30–150)
TSH SERPL DL<=0.005 MIU/L-ACNC: 1.51 UIU/ML (ref 0.4–4)
WBC # BLD AUTO: 9.9 K/UL (ref 3.9–12.7)

## 2020-02-17 PROCEDURE — 80061 LIPID PANEL: CPT

## 2020-02-17 PROCEDURE — 36415 COLL VENOUS BLD VENIPUNCTURE: CPT | Mod: PO

## 2020-02-17 PROCEDURE — 80053 COMPREHEN METABOLIC PANEL: CPT

## 2020-02-17 PROCEDURE — 85025 COMPLETE CBC W/AUTO DIFF WBC: CPT

## 2020-02-17 PROCEDURE — 84443 ASSAY THYROID STIM HORMONE: CPT

## 2020-02-17 PROCEDURE — 82306 VITAMIN D 25 HYDROXY: CPT

## 2020-02-17 PROCEDURE — 83036 HEMOGLOBIN GLYCOSYLATED A1C: CPT

## 2020-02-24 ENCOUNTER — OFFICE VISIT (OUTPATIENT)
Dept: INTERNAL MEDICINE | Facility: CLINIC | Age: 65
End: 2020-02-24
Payer: MEDICAID

## 2020-02-24 ENCOUNTER — TELEPHONE (OUTPATIENT)
Dept: ELECTROPHYSIOLOGY | Facility: CLINIC | Age: 65
End: 2020-02-24

## 2020-02-24 VITALS
TEMPERATURE: 98 F | RESPIRATION RATE: 18 BRPM | DIASTOLIC BLOOD PRESSURE: 86 MMHG | HEIGHT: 62 IN | SYSTOLIC BLOOD PRESSURE: 136 MMHG | WEIGHT: 187.19 LBS | HEART RATE: 77 BPM | OXYGEN SATURATION: 96 % | BODY MASS INDEX: 34.45 KG/M2

## 2020-02-24 DIAGNOSIS — I10 ESSENTIAL HYPERTENSION: Chronic | ICD-10-CM

## 2020-02-24 DIAGNOSIS — E55.9 VITAMIN D DEFICIENCY: ICD-10-CM

## 2020-02-24 DIAGNOSIS — N39.0 URINARY TRACT INFECTION WITHOUT HEMATURIA, SITE UNSPECIFIED: ICD-10-CM

## 2020-02-24 DIAGNOSIS — E78.2 MIXED HYPERLIPIDEMIA: Chronic | ICD-10-CM

## 2020-02-24 DIAGNOSIS — I49.8 OTHER SPECIFIED CARDIAC ARRHYTHMIAS: Primary | ICD-10-CM

## 2020-02-24 DIAGNOSIS — Z00.00 ANNUAL PHYSICAL EXAM: Primary | ICD-10-CM

## 2020-02-24 DIAGNOSIS — I48.0 PAF (PAROXYSMAL ATRIAL FIBRILLATION): ICD-10-CM

## 2020-02-24 DIAGNOSIS — E11.00 TYPE 2 DIABETES MELLITUS WITH HYPEROSMOLARITY WITHOUT COMA, WITHOUT LONG-TERM CURRENT USE OF INSULIN: Chronic | ICD-10-CM

## 2020-02-24 PROCEDURE — 99999 PR PBB SHADOW E&M-EST. PATIENT-LVL IV: CPT | Mod: PBBFAC,,, | Performed by: INTERNAL MEDICINE

## 2020-02-24 PROCEDURE — 99396 PR PREVENTIVE VISIT,EST,40-64: ICD-10-PCS | Mod: S$PBB,,, | Performed by: INTERNAL MEDICINE

## 2020-02-24 PROCEDURE — 99396 PREV VISIT EST AGE 40-64: CPT | Mod: S$PBB,,, | Performed by: INTERNAL MEDICINE

## 2020-02-24 PROCEDURE — 99214 OFFICE O/P EST MOD 30 MIN: CPT | Mod: PBBFAC,PO | Performed by: INTERNAL MEDICINE

## 2020-02-24 PROCEDURE — 99999 PR PBB SHADOW E&M-EST. PATIENT-LVL IV: ICD-10-PCS | Mod: PBBFAC,,, | Performed by: INTERNAL MEDICINE

## 2020-02-24 RX ORDER — NITROFURANTOIN 25; 75 MG/1; MG/1
100 CAPSULE ORAL 2 TIMES DAILY
Qty: 10 CAPSULE | Refills: 0 | Status: SHIPPED | OUTPATIENT
Start: 2020-02-24 | End: 2022-04-06

## 2020-02-24 RX ORDER — LOSARTAN POTASSIUM 100 MG/1
100 TABLET ORAL DAILY
Qty: 30 TABLET | Refills: 11 | Status: SHIPPED | OUTPATIENT
Start: 2020-02-24 | End: 2021-01-29

## 2020-02-24 NOTE — TELEPHONE ENCOUNTER
Pt needs to be scheduled with  , she is an established pt   ----- Message from Nan Stevens sent at 2/24/2020  8:30 AM CST -----  Contact: [phone]  Hi,     Dr. Lafluer put in an order for patient to see cardiology. She has seen Dr. Mathias before and would like to see him again. Can we get her in?     Diagnostics code: PAF (paroxysmal atrial fibrillation) [I48.0]

## 2020-02-24 NOTE — TELEPHONE ENCOUNTER
----- Message from Lilo Crandall sent at 2/24/2020  9:12 AM CST -----  Contact: Patient  The Pt is returning a call. Please call her back @ [phone]. Thanks, Lilo

## 2020-02-26 RX ORDER — HYDROCHLOROTHIAZIDE 25 MG/1
25 TABLET ORAL DAILY
Qty: 30 TABLET | Refills: 11 | Status: SHIPPED | OUTPATIENT
Start: 2020-02-26 | End: 2021-01-29

## 2020-03-02 ENCOUNTER — TELEPHONE (OUTPATIENT)
Dept: ELECTROPHYSIOLOGY | Facility: CLINIC | Age: 65
End: 2020-03-02

## 2020-03-02 DIAGNOSIS — I49.8 OTHER SPECIFIED CARDIAC ARRHYTHMIAS: ICD-10-CM

## 2020-03-02 DIAGNOSIS — I48.0 PAF (PAROXYSMAL ATRIAL FIBRILLATION): Primary | ICD-10-CM

## 2020-03-02 NOTE — TELEPHONE ENCOUNTER
Voice message left for patient to have a 30 day monitor prior to seeing KEL Wan NP. Requested patient to call [phone] to schedule 30 day monitor.

## 2020-03-06 ENCOUNTER — CLINICAL SUPPORT (OUTPATIENT)
Dept: CARDIOLOGY | Facility: HOSPITAL | Age: 65
End: 2020-03-06
Attending: INTERNAL MEDICINE
Payer: MEDICAID

## 2020-03-06 DIAGNOSIS — I48.0 PAF (PAROXYSMAL ATRIAL FIBRILLATION): ICD-10-CM

## 2020-03-06 DIAGNOSIS — I49.8 OTHER SPECIFIED CARDIAC ARRHYTHMIAS: ICD-10-CM

## 2020-03-06 PROCEDURE — 93272 CARDIAC EVENT MONITOR (CUPID ONLY): ICD-10-PCS | Mod: ,,, | Performed by: INTERNAL MEDICINE

## 2020-03-06 PROCEDURE — 93272 ECG/REVIEW INTERPRET ONLY: CPT | Mod: ,,, | Performed by: INTERNAL MEDICINE

## 2020-03-06 PROCEDURE — 93271 ECG/MONITORING AND ANALYSIS: CPT

## 2020-03-09 ENCOUNTER — TELEPHONE (OUTPATIENT)
Dept: INTERNAL MEDICINE | Facility: CLINIC | Age: 65
End: 2020-03-09

## 2020-03-09 NOTE — TELEPHONE ENCOUNTER
----- Message from Maira Llyod sent at 3/9/2020  3:00 PM CDT -----  Contact: Patient [phone]  Patient wants to know when she had the flu shot.    Please call and advise.    Thank You

## 2020-04-01 ENCOUNTER — TELEPHONE (OUTPATIENT)
Dept: ELECTROPHYSIOLOGY | Facility: CLINIC | Age: 65
End: 2020-04-01

## 2020-04-01 NOTE — TELEPHONE ENCOUNTER
Spoke with patient concerning appointments on 4/16/2020 with KEL Wan being cancelled. She is still wearing the 30 day event monitor (appt date 3/6/2020, when applied.) Denies current cardiac/arrhythmia issues/concerns at this time. Would prefer to defer virtual visual visit with Dr. Mathias at this time, at least until after 30 day monitor is complete. Has a smart phone. May want to wait before deciding whether to have a virtual visit.

## 2020-04-01 NOTE — TELEPHONE ENCOUNTER
Attempted to call patient concerning appointments on 4/16/2020. No answer, unable to leave voice message.

## 2020-05-11 ENCOUNTER — TELEPHONE (OUTPATIENT)
Dept: ELECTROPHYSIOLOGY | Facility: CLINIC | Age: 65
End: 2020-05-11

## 2020-05-11 NOTE — TELEPHONE ENCOUNTER
Lvm to r/s canceled appointment with .      ----- Message from Gray Barrett MA sent at 5/11/2020  1:35 PM CDT -----  Contact: Patient  Overdue f/u. Wore 30day monitor  ----- Message -----  From: Lilo Crandall  Sent: 5/11/2020   1:27 PM CDT  To: Russel Rogers Staff    The Pt is calling to reschedule her canceled appointment. Please call her back @ [phone]. Thanks, Lilo

## 2020-05-12 ENCOUNTER — TELEPHONE (OUTPATIENT)
Dept: ELECTROPHYSIOLOGY | Facility: CLINIC | Age: 65
End: 2020-05-12

## 2020-05-15 ENCOUNTER — OFFICE VISIT (OUTPATIENT)
Dept: OTOLARYNGOLOGY | Facility: CLINIC | Age: 65
End: 2020-05-15
Payer: MEDICAID

## 2020-05-15 VITALS — WEIGHT: 183.88 LBS | BODY MASS INDEX: 33.84 KG/M2 | HEIGHT: 62 IN

## 2020-05-15 DIAGNOSIS — H61.22 IMPACTED CERUMEN OF LEFT EAR: Primary | ICD-10-CM

## 2020-05-15 PROCEDURE — 69210 REMOVE IMPACTED EAR WAX UNI: CPT | Mod: PBBFAC | Performed by: OTOLARYNGOLOGY

## 2020-05-15 PROCEDURE — 69210 REMOVE IMPACTED EAR WAX UNI: CPT | Mod: S$PBB,,, | Performed by: OTOLARYNGOLOGY

## 2020-05-15 PROCEDURE — 99499 NO LOS: ICD-10-PCS | Mod: S$PBB,,, | Performed by: OTOLARYNGOLOGY

## 2020-05-15 PROCEDURE — 99213 OFFICE O/P EST LOW 20 MIN: CPT | Mod: PBBFAC | Performed by: OTOLARYNGOLOGY

## 2020-05-15 PROCEDURE — 99499 UNLISTED E&M SERVICE: CPT | Mod: S$PBB,,, | Performed by: OTOLARYNGOLOGY

## 2020-05-15 PROCEDURE — 69210 PR REMOVAL IMPACTED CERUMEN REQUIRING INSTRUMENTATION, UNILATERAL: ICD-10-PCS | Mod: S$PBB,,, | Performed by: OTOLARYNGOLOGY

## 2020-05-15 PROCEDURE — 99999 PR PBB SHADOW E&M-EST. PATIENT-LVL III: CPT | Mod: PBBFAC,,, | Performed by: OTOLARYNGOLOGY

## 2020-05-15 PROCEDURE — 99999 PR PBB SHADOW E&M-EST. PATIENT-LVL III: ICD-10-PCS | Mod: PBBFAC,,, | Performed by: OTOLARYNGOLOGY

## 2020-05-15 NOTE — PROGRESS NOTES
Name: Fely Wetzel  Clinic Number: [id number]  Date of Treatment: 05/15/2020   Diagnosis: No diagnosis found.      Subjective: 65 y/o F presents with cerumen impaction of the left ear that has been present for a month and a half. She complains of decreased hearing and tinnitus in this ear as well. Denies any otalgia, otorrhea, or vertigo. She feels like her right ear is normal. No other issues.      Objective    Past Medical History:   Diagnosis Date    Diabetes mellitus type II     Hyperlipidemia     Hypertension     Obesity     Osteopenia      Past Surgical History:   Procedure Laterality Date    HYSTERECTOMY       Current Outpatient Medications on File Prior to Visit   Medication Sig Dispense Refill    ACCU-CHEK JUAN Misc CHECK FASTING GLUCOSE AND CHECK GLUCOSE TWO HOURS AFTER LUNCH OR DINNER 1 each 0    amLODIPine (NORVASC) 5 MG tablet TAKE 1 TABLET BY MOUTH EVERY DAY IN THE EVENING 30 tablet 5    apixaban (ELIQUIS) 5 mg Tab Take 1 tablet (5 mg total) by mouth 2 (two) times daily. 60 tablet 11    atorvastatin (LIPITOR) 40 MG tablet TAKE 1 TABLET BY MOUTH EVERY DAY IN THE EVENING 30 tablet 5    blood sugar diagnostic Strp Check glucose twice daily 100 each 3    blood-glucose meter Misc Check fasting glucose and check glucose two hours after lunch or dinner 1 each 0    calcium carbonate/vitamin D3 (VITAMIN D-3 ORAL) Take 1 tablet by mouth once daily.       cyanocobalamin (VITAMIN B-12) 500 MCG tablet Take 500 mcg by mouth once daily.      glipiZIDE (GLUCOTROL) 10 MG tablet Take 1 tablet (10 mg total) by mouth 2 (two) times daily with meals. 180 tablet 3    hydroCHLOROthiazide (HYDRODIURIL) 25 MG tablet TAKE 1 TABLET (25 MG TOTAL) BY MOUTH ONCE DAILY. WITH BREAKFAST 30 tablet 11    lancets Misc Check glucose twice daily 100 each 3    linaGLIPtin (TRADJENTA) 5 mg Tab tablet Take 1 tablet (5 mg total) by mouth once daily. 90 tablet 3    losartan (COZAAR) 100 MG tablet Take 1 tablet (100 mg  total) by mouth once daily. 30 tablet 11    meloxicam (MOBIC) 15 MG tablet TAKE 1 TABLET BY MOUTH EVERY DAY 30 tablet 11    metFORMIN (GLUCOPHAGE) 1000 MG tablet Take 1 tablet (1,000 mg total) by mouth 2 (two) times daily with meals. 180 tablet 3    metoprolol succinate (TOPROL-XL) 50 MG 24 hr tablet Take 1 tablet (50 mg total) by mouth once daily. 90 tablet 3    nitrofurantoin, macrocrystal-monohydrate, (MACROBID) 100 MG capsule Take 1 capsule (100 mg total) by mouth 2 (two) times daily. 10 capsule 0     No current facility-administered medications on file prior to visit.      Review of Systems   Constitutional: Negative for chills and fever.   HENT: Positive for hearing loss and tinnitus. Negative for ear discharge, ear pain and nosebleeds.    Eyes: Negative for pain and discharge.   Respiratory: Negative.    Cardiovascular: Negative.    Gastrointestinal: Negative.    Genitourinary: Negative.    Musculoskeletal: Negative.    Skin: Positive for itching.   Neurological: Negative.  Negative for dizziness and headaches.   Endo/Heme/Allergies: Does not bruise/bleed easily.   Psychiatric/Behavioral: Negative.      Physical Exam   Constitutional: She is oriented to person, place, and time and well-developed, well-nourished, and in no distress. No distress.   HENT:   Head: Normocephalic and atraumatic.   Right Ear: External ear normal. Tympanic membrane is not injected, not scarred, not erythematous and not retracted. No middle ear effusion.   Left Ear: External ear normal. Tympanic membrane is not injected, not scarred, not erythematous and not retracted.  No middle ear effusion. Decreased hearing is noted.   Left ear with cerumen impaction abutting tympanic membrane. Erythematous and dry EAC bilaterally.   Eyes: Pupils are equal, round, and reactive to light.   Neck: Normal range of motion.   Cardiovascular: Normal rate.   Pulmonary/Chest: Effort normal.   Musculoskeletal: Normal range of motion.   Neurological: She  is alert and oriented to person, place, and time.   Skin: Skin is warm and dry. She is not diaphoretic.     After verbal consent was obtained, cerumen removal was performed under the operating microscope without complication using hydrogen peroxide drops to moisten cerumen followed by suction. Patient tolerated the procedure well.    Assessment:    65 y/o F with left cerumen impaction removed under the operating microscope.      Plan:  -mineral drops to bilateral ears  -RTC prn

## 2020-05-20 NOTE — PROGRESS NOTES
Subjective:    Patient ID:  Fely Wetzel is a 64 y.o. female who presents for evaluation of Atrial Fibrillation    Referring Cardiologist: Marie Dominguez MD  Primary Care Physician: Paula Barkley DO    HPI  Prior Hx:  I had the pleasure of seeing Mrs. Wetzel today in our electrophysiology clinic in follow-up for her atrial fibrillation. As you are aware she is a pleasant 64 year-old woman with hypertension, diabetes mellitus type 2, and obesity. She was in her usual state of health until June 2019 when she developed sudden palpitations. She called EMS and reportedly was in AF with ventricular rates up to 170s-220s. She was given IV cardizem and her ventricular rate lowered to 110s-170s. Per ER physician she was in atrial fibrillation however no ECGs document her arrhythmia. She was given 20mg more of cardizem and spontaneously converted to sinus rhythm. TSH was normal. She was discharged on eliquis and metoprolol. She was referred to Dr. Dominguez and discussed with her if there was possibility at some point of stopping anticoagulation. She reports for the past few weeks she has been having episodes of palpitations with radiation into her neck. These are different than when she went to the ER.     An exercise stress echocardiogram noted normal LV function and no ischemic changes. An echocardiogram performed on 10/15/2019 showed normal LV function, normal valves, and moderate LA enlargement (MURIEL 47).    Our plan was for a 30 day monitor, sleep study and possibly starting flecainide.    Interim Hx:  Mrs. Wetzel presents for follow-up. A 30 day monitor done in March of 2020 which showed no atrial fibrillation. She presented to the ER in November of 2019 with recurrent symptomatic AF with RVR that was rate controlled with cardizem and spontaneously converted. She notes occasional palpitations at night. She has not yet had a sleep study.    My interpretation of today's in clinic ECG is sinus rhythm at  74 bpm with a narrow QRS.      Review of Systems   Constitution: Negative for fever and malaise/fatigue.   HENT: Negative for congestion and sore throat.    Eyes: Negative for blurred vision and visual disturbance.   Cardiovascular: Positive for irregular heartbeat (per HPI) and palpitations (per HPI). Negative for chest pain and dyspnea on exertion.   Respiratory: Positive for sleep disturbances due to breathing and snoring. Negative for cough and shortness of breath.    Hematologic/Lymphatic: Negative for bleeding problem. Does not bruise/bleed easily.   Skin: Negative.    Musculoskeletal: Negative.    Gastrointestinal: Negative for bloating and abdominal pain.   Neurological: Positive for excessive daytime sleepiness. Negative for dizziness and focal weakness.        Objective:    Physical Exam   Constitutional: She is oriented to person, place, and time. She appears well-developed and well-nourished. No distress.   HENT:   Head: Normocephalic and atraumatic.   Eyes: Conjunctivae are normal. Right eye exhibits no discharge. Left eye exhibits no discharge.   Neck: Neck supple. No JVD present.   Cardiovascular: Normal rate and regular rhythm. Exam reveals no gallop and no friction rub.   No murmur heard.  Pulmonary/Chest: Effort normal and breath sounds normal. No respiratory distress. She has no wheezes. She has no rales.   Abdominal: Soft. Bowel sounds are normal. She exhibits no distension. There is no tenderness. There is no rebound.   Musculoskeletal: She exhibits no edema.   Neurological: She is alert and oriented to person, place, and time.   Skin: Skin is warm and dry. She is not diaphoretic.   Psychiatric: She has a normal mood and affect. Her behavior is normal. Judgment and thought content normal.   Vitals reviewed.        Assessment:       1. PAF (paroxysmal atrial fibrillation)    2. Type 2 diabetes mellitus with hyperosmolarity without coma, without long-term current use of insulin    3. Essential  hypertension         Plan:       In summary, Mrs. Wetzel is a pleasant 64 year-old woman with hypertension, diabetes mellitus type 2, and obesity presenting for follow-up for recurrent symptomatic paroxysmal atrial fibrillation with RVR. I had a long discussion with the patient about the pathophysiology and risks of atrial fibrillation and its basic pathophysiology, including its health implications and treatment options. Specifically, I addressed the need for CVA (stroke) prophylaxis with aspirin versus oral anticoagulation (warfarin vs DOACs, discussed bleeding risks, and need to come to the ER for any head trauma for CT scanning even if asymptomatic). Her [id number] score is 3 and indefinite anticoagulation is recommended. She remains on eliquis. Discussed starting low-dose flecainide. She is agreeable    Plan:  Flecainide 50mg bid  Continue metoprolol/eliquis    RTC in 6 months, sooner if needed      Thank you for allowing me to participate in the care of this patient. Please do not hesitate to call me with any questions or concerns.    Beck Mathias MD, PhD  Cardiac Electrophysiology

## 2020-05-21 ENCOUNTER — HOSPITAL ENCOUNTER (OUTPATIENT)
Dept: CARDIOLOGY | Facility: CLINIC | Age: 65
Discharge: HOME | End: 2020-05-21
Payer: MEDICAID

## 2020-05-21 ENCOUNTER — OFFICE VISIT (OUTPATIENT)
Dept: ELECTROPHYSIOLOGY | Facility: CLINIC | Age: 65
End: 2020-05-21
Payer: MEDICAID

## 2020-05-21 VITALS
SYSTOLIC BLOOD PRESSURE: 122 MMHG | HEIGHT: 62 IN | DIASTOLIC BLOOD PRESSURE: 90 MMHG | HEART RATE: 74 BPM | WEIGHT: 185.44 LBS | BODY MASS INDEX: 34.12 KG/M2

## 2020-05-21 DIAGNOSIS — I10 ESSENTIAL HYPERTENSION: Chronic | ICD-10-CM

## 2020-05-21 DIAGNOSIS — I48.0 PAF (PAROXYSMAL ATRIAL FIBRILLATION): Primary | ICD-10-CM

## 2020-05-21 DIAGNOSIS — I49.8 OTHER SPECIFIED CARDIAC ARRHYTHMIAS: ICD-10-CM

## 2020-05-21 DIAGNOSIS — E11.00 TYPE 2 DIABETES MELLITUS WITH HYPEROSMOLARITY WITHOUT COMA, WITHOUT LONG-TERM CURRENT USE OF INSULIN: Chronic | ICD-10-CM

## 2020-05-21 PROCEDURE — 99214 PR OFFICE/OUTPT VISIT, EST, LEVL IV, 30-39 MIN: ICD-10-PCS | Mod: S$PBB,,, | Performed by: INTERNAL MEDICINE

## 2020-05-21 PROCEDURE — 93010 RHYTHM STRIP: ICD-10-PCS | Mod: S$PBB,,, | Performed by: INTERNAL MEDICINE

## 2020-05-21 PROCEDURE — 99999 PR PBB SHADOW E&M-EST. PATIENT-LVL III: CPT | Mod: PBBFAC,,, | Performed by: INTERNAL MEDICINE

## 2020-05-21 PROCEDURE — 99214 OFFICE O/P EST MOD 30 MIN: CPT | Mod: S$PBB,,, | Performed by: INTERNAL MEDICINE

## 2020-05-21 PROCEDURE — 99213 OFFICE O/P EST LOW 20 MIN: CPT | Mod: PBBFAC,25 | Performed by: INTERNAL MEDICINE

## 2020-05-21 PROCEDURE — 93005 ELECTROCARDIOGRAM TRACING: CPT | Mod: PBBFAC | Performed by: INTERNAL MEDICINE

## 2020-05-21 PROCEDURE — 99999 PR PBB SHADOW E&M-EST. PATIENT-LVL III: ICD-10-PCS | Mod: PBBFAC,,, | Performed by: INTERNAL MEDICINE

## 2020-05-21 PROCEDURE — 93010 ELECTROCARDIOGRAM REPORT: CPT | Mod: S$PBB,,, | Performed by: INTERNAL MEDICINE

## 2020-05-21 RX ORDER — FLECAINIDE ACETATE 50 MG/1
50 TABLET ORAL EVERY 12 HOURS
Qty: 60 TABLET | Refills: 11 | Status: SHIPPED | OUTPATIENT
Start: 2020-05-21 | End: 2021-04-16

## 2020-05-21 NOTE — LETTER
May 21, 2020      Paula Barkley, DO  [address]  [place] [address]           [facility] Arrhythm[facility]  [address]  [place] [address]  Phone: [phone]  Fax: [phone]          Patient: Fely Wetzel   MR Number: [id number]   YOB: 1955   Date of Visit: 5/21/2020       Dear Dr. Palua Barkley:    Thank you for referring Fely Wetzel to me for evaluation. Attached you will find relevant portions of my assessment and plan of care.    If you have questions, please do not hesitate to call me. I look forward to following Fely Wetzel along with you.    Sincerely,    Beck Mathias MD    Enclosure  CC:  No Recipients    If you would like to receive this communication electronically, please contact [email or web address] or [phone] to request more information on [employer] access.    For providers and/or their staff who would like to refer a patient to [facility], please contact us through our one-stop-shop provider referral line, [facility], at [phone].    If you feel you have received this communication in error or would no longer like to receive these types of communications, please e-mail [email or web address]

## 2020-05-25 ENCOUNTER — LAB VISIT (OUTPATIENT)
Dept: LAB | Facility: HOSPITAL | Age: 65
End: 2020-05-25
Attending: INTERNAL MEDICINE
Payer: MEDICAID

## 2020-05-25 ENCOUNTER — OFFICE VISIT (OUTPATIENT)
Dept: INTERNAL MEDICINE | Facility: CLINIC | Age: 65
End: 2020-05-25
Payer: MEDICAID

## 2020-05-25 VITALS
OXYGEN SATURATION: 98 % | RESPIRATION RATE: 16 BRPM | BODY MASS INDEX: 34.37 KG/M2 | DIASTOLIC BLOOD PRESSURE: 80 MMHG | TEMPERATURE: 97 F | SYSTOLIC BLOOD PRESSURE: 130 MMHG | HEIGHT: 62 IN | HEART RATE: 73 BPM | WEIGHT: 186.75 LBS

## 2020-05-25 DIAGNOSIS — L02.612 CELLULITIS AND ABSCESS OF TOE, LEFT: Primary | ICD-10-CM

## 2020-05-25 DIAGNOSIS — E66.01 CLASS 2 SEVERE OBESITY DUE TO EXCESS CALORIES WITH SERIOUS COMORBIDITY IN ADULT, UNSPECIFIED BMI: ICD-10-CM

## 2020-05-25 DIAGNOSIS — L03.032 CELLULITIS AND ABSCESS OF TOE, LEFT: Primary | ICD-10-CM

## 2020-05-25 DIAGNOSIS — E11.00 TYPE 2 DIABETES MELLITUS WITH HYPEROSMOLARITY WITHOUT COMA, WITHOUT LONG-TERM CURRENT USE OF INSULIN: Chronic | ICD-10-CM

## 2020-05-25 DIAGNOSIS — I10 ESSENTIAL HYPERTENSION: Chronic | ICD-10-CM

## 2020-05-25 DIAGNOSIS — L03.032 CELLULITIS AND ABSCESS OF TOE, LEFT: ICD-10-CM

## 2020-05-25 DIAGNOSIS — L02.612 CELLULITIS AND ABSCESS OF TOE, LEFT: ICD-10-CM

## 2020-05-25 LAB
ALBUMIN SERPL BCP-MCNC: 3.9 G/DL (ref 3.5–5.2)
ALP SERPL-CCNC: 134 U/L (ref 55–135)
ALT SERPL W/O P-5'-P-CCNC: 15 U/L (ref 10–44)
ANION GAP SERPL CALC-SCNC: 14 MMOL/L (ref 8–16)
AST SERPL-CCNC: 17 U/L (ref 10–40)
BASOPHILS # BLD AUTO: 0.03 K/UL (ref 0–0.2)
BASOPHILS NFR BLD: 0.3 % (ref 0–1.9)
BILIRUB SERPL-MCNC: 0.3 MG/DL (ref 0.1–1)
BUN SERPL-MCNC: 16 MG/DL (ref 8–23)
CALCIUM SERPL-MCNC: 10.3 MG/DL (ref 8.7–10.5)
CHLORIDE SERPL-SCNC: 101 MMOL/L (ref 95–110)
CO2 SERPL-SCNC: 20 MMOL/L (ref 23–29)
CREAT SERPL-MCNC: 0.9 MG/DL (ref 0.5–1.4)
DIFFERENTIAL METHOD: ABNORMAL
EOSINOPHIL # BLD AUTO: 0.2 K/UL (ref 0–0.5)
EOSINOPHIL NFR BLD: 2.3 % (ref 0–8)
ERYTHROCYTE [DISTWIDTH] IN BLOOD BY AUTOMATED COUNT: 13.7 % (ref 11.5–14.5)
EST. GFR  (AFRICAN AMERICAN): >60 ML/MIN/1.73 M^2
EST. GFR  (NON AFRICAN AMERICAN): >60 ML/MIN/1.73 M^2
ESTIMATED AVG GLUCOSE: 197 MG/DL (ref 68–131)
GLUCOSE SERPL-MCNC: 356 MG/DL (ref 70–110)
HBA1C MFR BLD HPLC: 8.5 % (ref 4–5.6)
HCT VFR BLD AUTO: 38.2 % (ref 37–48.5)
HGB BLD-MCNC: 12 G/DL (ref 12–16)
IMM GRANULOCYTES # BLD AUTO: 0.06 K/UL (ref 0–0.04)
IMM GRANULOCYTES NFR BLD AUTO: 0.6 % (ref 0–0.5)
LYMPHOCYTES # BLD AUTO: 2.2 K/UL (ref 1–4.8)
LYMPHOCYTES NFR BLD: 21.6 % (ref 18–48)
MCH RBC QN AUTO: 28.7 PG (ref 27–31)
MCHC RBC AUTO-ENTMCNC: 31.4 G/DL (ref 32–36)
MCV RBC AUTO: 91 FL (ref 82–98)
MONOCYTES # BLD AUTO: 0.5 K/UL (ref 0.3–1)
MONOCYTES NFR BLD: 5.2 % (ref 4–15)
NEUTROPHILS # BLD AUTO: 7.2 K/UL (ref 1.8–7.7)
NEUTROPHILS NFR BLD: 70 % (ref 38–73)
NRBC BLD-RTO: 0 /100 WBC
PLATELET # BLD AUTO: 295 K/UL (ref 150–350)
PMV BLD AUTO: 12.4 FL (ref 9.2–12.9)
POTASSIUM SERPL-SCNC: 4.3 MMOL/L (ref 3.5–5.1)
PROT SERPL-MCNC: 8.4 G/DL (ref 6–8.4)
RBC # BLD AUTO: 4.18 M/UL (ref 4–5.4)
SODIUM SERPL-SCNC: 135 MMOL/L (ref 136–145)
WBC # BLD AUTO: 10.35 K/UL (ref 3.9–12.7)

## 2020-05-25 PROCEDURE — 80053 COMPREHEN METABOLIC PANEL: CPT

## 2020-05-25 PROCEDURE — 99214 PR OFFICE/OUTPT VISIT, EST, LEVL IV, 30-39 MIN: ICD-10-PCS | Mod: S$PBB,,, | Performed by: NURSE PRACTITIONER

## 2020-05-25 PROCEDURE — 99213 OFFICE O/P EST LOW 20 MIN: CPT | Mod: PBBFAC,PO | Performed by: NURSE PRACTITIONER

## 2020-05-25 PROCEDURE — 99214 OFFICE O/P EST MOD 30 MIN: CPT | Mod: S$PBB,,, | Performed by: NURSE PRACTITIONER

## 2020-05-25 PROCEDURE — 36415 COLL VENOUS BLD VENIPUNCTURE: CPT | Mod: PO

## 2020-05-25 PROCEDURE — 99999 PR PBB SHADOW E&M-EST. PATIENT-LVL III: CPT | Mod: PBBFAC,,, | Performed by: NURSE PRACTITIONER

## 2020-05-25 PROCEDURE — 99999 PR PBB SHADOW E&M-EST. PATIENT-LVL III: ICD-10-PCS | Mod: PBBFAC,,, | Performed by: NURSE PRACTITIONER

## 2020-05-25 PROCEDURE — 85025 COMPLETE CBC W/AUTO DIFF WBC: CPT

## 2020-05-25 PROCEDURE — 83036 HEMOGLOBIN GLYCOSYLATED A1C: CPT

## 2020-05-25 RX ORDER — DOXYCYCLINE HYCLATE 100 MG
100 TABLET ORAL 2 TIMES DAILY
Qty: 14 TABLET | Refills: 0 | Status: SHIPPED | OUTPATIENT
Start: 2020-05-25 | End: 2020-06-01

## 2020-05-25 RX ORDER — MUPIROCIN 20 MG/G
OINTMENT TOPICAL 3 TIMES DAILY
Qty: 30 G | Refills: 0 | Status: ON HOLD | OUTPATIENT
Start: 2020-05-25 | End: 2022-03-17 | Stop reason: HOSPADM

## 2020-05-27 ENCOUNTER — PATIENT MESSAGE (OUTPATIENT)
Dept: INTERNAL MEDICINE | Facility: CLINIC | Age: 65
End: 2020-05-27

## 2020-05-27 DIAGNOSIS — Z12.39 SCREENING FOR BREAST CANCER: Primary | ICD-10-CM

## 2020-05-27 NOTE — PROGRESS NOTES
[facility] Note    Chief Complaint      Chief Complaint   Patient presents with    Follow-up     3 month     History of Present Illness      Fely Wetzel is a 64 y.o. female patient of Dr. Barkley's with chronic conditions of hypertension, hyperlipidemia, paroxysmal atrial fibrillation, dm 2, obesity, vitamin-D deficiency, osteopenia, inflammatory arthritis, chronic left shoulder pain, chronic cough with phlegm, murmur who is new to me and presents today for her 3 month f/u.  Patient feeling well complains of right great toe infection from ingrowing toenail for the past week, has been applying Neosporin without much relief.    Patient saw Dr. Davila cardiology for atrial fibrillation shortness of breath and fatigue, flecainide 50 b.i.d. added to metoprolol and Eliquis. Patient states sleeping much better no shortness of breath not feeling tired, sats 98% on room air    Labs: repeat A1c, cbc, and cmp  Vaccines: Influenza (done); Tetanus (2014) ; Prevnar ( done)  Eye exam: up to date, 2/2020; dr gonzalez  Mammogram: june 2019  Gyn exam: hysterectomy  Colonoscopy: 2014; up to date, repeat in 5-10 years  Dexa: 2019     Problem List Items Addressed This Visit        Cardiac/Vascular    Hypertension (Chronic)    Relevant Orders    Comprehensive metabolic panel (Completed)    CBC auto differential (Completed)       Endocrine    Diabetes mellitus, type 2 (Chronic)    Relevant Orders    Hemoglobin A1C (Completed)    Comprehensive metabolic panel (Completed)    CBC auto differential (Completed)    Obesity    Relevant Orders    Comprehensive metabolic panel (Completed)    CBC auto differential (Completed)      Other Visit Diagnoses     Cellulitis and abscess of toe, left    -  Primary    Relevant Medications    doxycycline (VIBRA-TABS) 100 MG tablet    mupirocin (BACTROBAN) 2 % ointment    Other Relevant Orders    CBC auto differential (Completed)          Health Maintenance   Topic Date Due     Mammogram  06/24/2020    Foot Exam  09/10/2020    Hemoglobin A1c  11/25/2020    Eye Exam  01/15/2021    Lipid Panel  02/17/2021    Low Dose Statin  05/25/2021    DEXA SCAN  06/24/2021    TETANUS VACCINE  08/05/2024    Colonoscopy  11/20/2024    Hepatitis C Screening  Completed    Pneumococcal Vaccine (Medium Risk)  Completed       Past Medical History:   Diagnosis Date    Diabetes mellitus type II     Hyperlipidemia     Hypertension     Obesity     Osteopenia        Past Surgical History:   Procedure Laterality Date    HYSTERECTOMY         family history includes Cancer in her father; Cataracts in her brother; Diabetes in her mother; Glaucoma in her mother; Heart attack in her father; Heart disease in her brother and father; Hyperlipidemia in her mother; Hypertension in her mother.    Social History     Tobacco Use    Smoking status: Never Smoker    Smokeless tobacco: Never Used   Substance Use Topics    Alcohol use: Yes     Alcohol/week: 0.0 standard drinks     Comment: rarely    Drug use: No       Review of Systems   Constitutional: Negative for chills and fever.   HENT: Negative for congestion, sinus pain and sore throat.    Eyes: Negative for blurred vision.   Respiratory: Negative for cough, shortness of breath and wheezing.    Cardiovascular: Negative for chest pain, palpitations and leg swelling.   Gastrointestinal: Negative for abdominal pain, constipation, diarrhea, nausea and vomiting.   Genitourinary: Negative for dysuria.   Musculoskeletal: Negative for myalgias.   Skin: Negative for rash.   Neurological: Negative for dizziness, weakness and headaches.   Psychiatric/Behavioral: Negative for depression. The patient is not nervous/anxious.         Outpatient Encounter Medications as of 5/25/2020   Medication Sig Dispense Refill    ACCU-CHEK JUAN Misc CHECK FASTING GLUCOSE AND CHECK GLUCOSE TWO HOURS AFTER LUNCH OR DINNER 1 each 0    amLODIPine (NORVASC) 5 MG tablet TAKE 1 TABLET BY  MOUTH EVERY DAY IN THE EVENING 30 tablet 5    apixaban (ELIQUIS) 5 mg Tab Take 1 tablet (5 mg total) by mouth 2 (two) times daily. 60 tablet 11    atorvastatin (LIPITOR) 40 MG tablet TAKE 1 TABLET BY MOUTH EVERY DAY IN THE EVENING 30 tablet 5    blood sugar diagnostic Strp Check glucose twice daily 100 each 3    blood-glucose meter Misc Check fasting glucose and check glucose two hours after lunch or dinner 1 each 0    calcium carbonate/vitamin D3 (VITAMIN D-3 ORAL) Take 1 tablet by mouth once daily.       cyanocobalamin (VITAMIN B-12) 500 MCG tablet Take 500 mcg by mouth once daily.      flecainide (TAMBOCOR) 50 MG Tab Take 1 tablet (50 mg total) by mouth every 12 (twelve) hours. 60 tablet 11    glipiZIDE (GLUCOTROL) 10 MG tablet Take 1 tablet (10 mg total) by mouth 2 (two) times daily with meals. 180 tablet 3    hydroCHLOROthiazide (HYDRODIURIL) 25 MG tablet TAKE 1 TABLET (25 MG TOTAL) BY MOUTH ONCE DAILY. WITH BREAKFAST 30 tablet 11    lancets Misc Check glucose twice daily 100 each 3    linaGLIPtin (TRADJENTA) 5 mg Tab tablet Take 1 tablet (5 mg total) by mouth once daily. 90 tablet 3    losartan (COZAAR) 100 MG tablet Take 1 tablet (100 mg total) by mouth once daily. 30 tablet 11    meloxicam (MOBIC) 15 MG tablet TAKE 1 TABLET BY MOUTH EVERY DAY 30 tablet 11    metFORMIN (GLUCOPHAGE) 1000 MG tablet Take 1 tablet (1,000 mg total) by mouth 2 (two) times daily with meals. 180 tablet 3    metoprolol succinate (TOPROL-XL) 50 MG 24 hr tablet Take 1 tablet (50 mg total) by mouth once daily. 90 tablet 3    doxycycline (VIBRA-TABS) 100 MG tablet Take 1 tablet (100 mg total) by mouth 2 (two) times daily. for 7 days 14 tablet 0    mupirocin (BACTROBAN) 2 % ointment Apply topically 3 (three) times daily. 30 g 0    nitrofurantoin, macrocrystal-monohydrate, (MACROBID) 100 MG capsule Take 1 capsule (100 mg total) by mouth 2 (two) times daily. (Patient not taking: Reported on 5/25/2020) 10 capsule 0     No  "facility-administered encounter medications on file as of 5/25/2020.         Review of patient's allergies indicates:   Allergen Reactions    Corticosteroids (glucocorticoids)     Latex, natural rubber     Shellfish containing products Other (See Comments)     Other reaction(s): Unknown  Other reaction(s): Anaphylaxis    Vicodin [hydrocodone-acetaminophen]     Vytorin 10-10  [ezetimibe-simvastatin]      Other reaction(s): Joint pain  Other reaction(s): Joint pain       Physical Exam      Vital Signs  Temp: 97.4 °F (36.3 °C)  Temp src: Oral  Pulse: 73  Resp: 16  SpO2: 98 %  BP: 130/80  BP Location: Right arm  Patient Position: Sitting  Pain Score: 0-No pain  Height and Weight  Height: 5' 2" (157.5 cm)  Weight: 84.7 kg (186 lb 11.7 oz)  BSA (Calculated - sq m): 1.92 sq meters  BMI (Calculated): 34.1  Weight in (lb) to have BMI = 25: 136.4]    Physical Exam   Constitutional: She is oriented to person, place, and time. She appears well-developed and well-nourished.   HENT:   Head: Normocephalic and atraumatic.   Right Ear: External ear normal.   Left Ear: External ear normal.   Mouth/Throat: Oropharynx is clear and moist.   Eyes: Pupils are equal, round, and reactive to light. Conjunctivae and EOM are normal.   Neck: Normal range of motion. Neck supple.   Cardiovascular: Normal rate, regular rhythm and normal heart sounds.   No murmur heard.  Pulmonary/Chest: Effort normal and breath sounds normal. She exhibits no tenderness.   Abdominal: Soft. There is no tenderness. There is no guarding.   Musculoskeletal: Normal range of motion.   Neurological: She is alert and oriented to person, place, and time.   Skin: Skin is warm and dry.   Psychiatric: She has a normal mood and affect. Her behavior is normal. Judgment and thought content normal.   Nursing note and vitals reviewed.       Laboratory:  CBC:  Recent Labs   Lab Result Units 05/25/20  0950   WBC K/uL 10.35   RBC M/uL 4.18   Hemoglobin g/dL 12.0   Hematocrit % " 38.2   Platelets K/uL 295   Mean Corpuscular Volume fL 91   Mean Corpuscular Hemoglobin pg 28.7   Mean Corpuscular Hemoglobin Conc g/dL 31.4*     CMP:  Recent Labs   Lab Result Units 05/25/20  0950   Glucose mg/dL 356*   Calcium mg/dL 10.3   Albumin g/dL 3.9   Total Protein g/dL 8.4   Sodium mmol/L 135*   Potassium mmol/L 4.3   CO2 mmol/L 20*   Chloride mmol/L 101   BUN, Bld mg/dL 16   Alkaline Phosphatase U/L 134   ALT U/L 15   AST U/L 17   Total Bilirubin mg/dL 0.3     URINALYSIS:  No results for input(s): COLORU, CLARITYU, SPECGRAV, PHUR, PROTEINUA, GLUCOSEU, BILIRUBINCON, BLOODU, WBCU, RBCU, BACTERIA, MUCUS, NITRITE, LEUKOCYTESUR, UROBILINOGEN, HYALINECASTS in the last 2160 hours.   LIPIDS:  No results for input(s): TSH, HDL, CHOL, TRIG, LDLCALC, CHOLHDL, NONHDLCHOL, TOTALCHOLEST in the last 2160 hours.  TSH:  No results for input(s): TSH in the last 2160 hours.  A1C:  Recent Labs   Lab Result Units 05/25/20  0950   Hemoglobin A1C % 8.5*         Assessment/Plan     Fley Wetzel is a 64 y.o.female with:    1. Cellulitis and abscess of toe, left  - doxycycline (VIBRA-TABS) 100 MG tablet; Take 1 tablet (100 mg total) by mouth 2 (two) times daily. for 7 days  Dispense: 14 tablet; Refill: 0  - mupirocin (BACTROBAN) 2 % ointment; Apply topically 3 (three) times daily.  Dispense: 30 g; Refill: 0  - CBC auto differential; Future    2. Type 2 diabetes mellitus with hyperosmolarity without coma, without long-term current use of insulin  - Hemoglobin A1C; Future  - Comprehensive metabolic panel; Future  - CBC auto differential; Future    3. Essential hypertension  - Comprehensive metabolic panel; Future  - CBC auto differential; Future    4. Class 2 severe obesity due to excess calories with serious comorbidity in adult, unspecified BMI  - Comprehensive metabolic panel; Future  - CBC auto differential; Future      -Continue current medications and maintain follow up with specialists.  Return to clinic in 3 months  with  Dr. Rubia Burnett, NP-C  [facility]

## 2020-05-29 DIAGNOSIS — I48.0 PAF (PAROXYSMAL ATRIAL FIBRILLATION): ICD-10-CM

## 2020-05-29 RX ORDER — APIXABAN 5 MG/1
TABLET, FILM COATED ORAL
Qty: 60 TABLET | Refills: 0 | Status: SHIPPED | OUTPATIENT
Start: 2020-05-29 | End: 2020-07-10

## 2020-06-12 ENCOUNTER — TELEPHONE (OUTPATIENT)
Dept: INTERNAL MEDICINE | Facility: CLINIC | Age: 65
End: 2020-06-12

## 2020-06-12 NOTE — TELEPHONE ENCOUNTER
----- Message from Pauline Velazquez sent at 6/12/2020 11:05 AM CDT -----  Type:  Needs Medical Advice    Who Called: [facility]     Would the patient rather a call back or a response via [facility]? Call back     Best Call Back Number: [phone]    Additional Information: pt would like to schedule an appt for 8/2020 but no appt to schedule

## 2020-06-30 ENCOUNTER — PATIENT MESSAGE (OUTPATIENT)
Dept: INTERNAL MEDICINE | Facility: CLINIC | Age: 65
End: 2020-06-30

## 2020-06-30 ENCOUNTER — HOSPITAL ENCOUNTER (OUTPATIENT)
Dept: RADIOLOGY | Facility: HOSPITAL | Age: 65
Discharge: HOME | End: 2020-06-30
Attending: NURSE PRACTITIONER
Payer: MEDICAID

## 2020-06-30 DIAGNOSIS — Z12.39 SCREENING FOR BREAST CANCER: ICD-10-CM

## 2020-06-30 PROCEDURE — 77063 BREAST TOMOSYNTHESIS BI: CPT | Mod: 26,,, | Performed by: RADIOLOGY

## 2020-06-30 PROCEDURE — 77067 SCR MAMMO BI INCL CAD: CPT | Mod: TC,PO

## 2020-06-30 PROCEDURE — 77067 MAMMO DIGITAL SCREENING BILAT WITH TOMOSYNTHESIS_CAD: ICD-10-PCS | Mod: 26,,, | Performed by: RADIOLOGY

## 2020-06-30 PROCEDURE — 77063 MAMMO DIGITAL SCREENING BILAT WITH TOMOSYNTHESIS_CAD: ICD-10-PCS | Mod: 26,,, | Performed by: RADIOLOGY

## 2020-06-30 PROCEDURE — 77067 SCR MAMMO BI INCL CAD: CPT | Mod: 26,,, | Performed by: RADIOLOGY

## 2020-07-10 DIAGNOSIS — I48.0 PAF (PAROXYSMAL ATRIAL FIBRILLATION): ICD-10-CM

## 2020-07-10 RX ORDER — APIXABAN 5 MG/1
TABLET, FILM COATED ORAL
Qty: 60 TABLET | Refills: 2 | Status: SHIPPED | OUTPATIENT
Start: 2020-07-10 | End: 2020-09-21 | Stop reason: SDUPTHER

## 2020-09-14 ENCOUNTER — LAB VISIT (OUTPATIENT)
Dept: LAB | Facility: HOSPITAL | Age: 65
End: 2020-09-14
Attending: INTERNAL MEDICINE
Payer: MEDICAID

## 2020-09-14 DIAGNOSIS — E55.9 VITAMIN D DEFICIENCY: ICD-10-CM

## 2020-09-14 DIAGNOSIS — E11.00 TYPE 2 DIABETES MELLITUS WITH HYPEROSMOLARITY WITHOUT COMA, WITHOUT LONG-TERM CURRENT USE OF INSULIN: Chronic | ICD-10-CM

## 2020-09-14 DIAGNOSIS — E11.9 TYPE 2 DIABETES MELLITUS WITHOUT COMPLICATION, WITHOUT LONG-TERM CURRENT USE OF INSULIN: Chronic | ICD-10-CM

## 2020-09-14 DIAGNOSIS — E78.2 MIXED HYPERLIPIDEMIA: Chronic | ICD-10-CM

## 2020-09-14 DIAGNOSIS — I10 ESSENTIAL HYPERTENSION: Chronic | ICD-10-CM

## 2020-09-14 DIAGNOSIS — E78.5 HYPERLIPIDEMIA, UNSPECIFIED HYPERLIPIDEMIA TYPE: Chronic | ICD-10-CM

## 2020-09-14 LAB
25(OH)D3+25(OH)D2 SERPL-MCNC: 25 NG/ML (ref 30–96)
ALBUMIN SERPL BCP-MCNC: 3.8 G/DL (ref 3.5–5.2)
ALP SERPL-CCNC: 102 U/L (ref 55–135)
ALT SERPL W/O P-5'-P-CCNC: 13 U/L (ref 10–44)
ANION GAP SERPL CALC-SCNC: 11 MMOL/L (ref 8–16)
AST SERPL-CCNC: 15 U/L (ref 10–40)
BASOPHILS # BLD AUTO: 0.03 K/UL (ref 0–0.2)
BASOPHILS NFR BLD: 0.3 % (ref 0–1.9)
BILIRUB SERPL-MCNC: 0.3 MG/DL (ref 0.1–1)
BUN SERPL-MCNC: 24 MG/DL (ref 8–23)
CALCIUM SERPL-MCNC: 10 MG/DL (ref 8.7–10.5)
CHLORIDE SERPL-SCNC: 103 MMOL/L (ref 95–110)
CHOLEST SERPL-MCNC: 150 MG/DL (ref 120–199)
CHOLEST/HDLC SERPL: 3.8 {RATIO} (ref 2–5)
CO2 SERPL-SCNC: 26 MMOL/L (ref 23–29)
CREAT SERPL-MCNC: 0.8 MG/DL (ref 0.5–1.4)
DIFFERENTIAL METHOD: ABNORMAL
EOSINOPHIL # BLD AUTO: 0.2 K/UL (ref 0–0.5)
EOSINOPHIL NFR BLD: 2 % (ref 0–8)
ERYTHROCYTE [DISTWIDTH] IN BLOOD BY AUTOMATED COUNT: 13.3 % (ref 11.5–14.5)
EST. GFR  (AFRICAN AMERICAN): >60 ML/MIN/1.73 M^2
EST. GFR  (NON AFRICAN AMERICAN): >60 ML/MIN/1.73 M^2
ESTIMATED AVG GLUCOSE: 183 MG/DL (ref 68–131)
GLUCOSE SERPL-MCNC: 145 MG/DL (ref 70–110)
HBA1C MFR BLD HPLC: 8 % (ref 4–5.6)
HCT VFR BLD AUTO: 37.8 % (ref 37–48.5)
HDLC SERPL-MCNC: 40 MG/DL (ref 40–75)
HDLC SERPL: 26.7 % (ref 20–50)
HGB BLD-MCNC: 11.9 G/DL (ref 12–16)
IMM GRANULOCYTES # BLD AUTO: 0.03 K/UL (ref 0–0.04)
IMM GRANULOCYTES NFR BLD AUTO: 0.3 % (ref 0–0.5)
LDLC SERPL CALC-MCNC: 70 MG/DL (ref 63–159)
LYMPHOCYTES # BLD AUTO: 2.1 K/UL (ref 1–4.8)
LYMPHOCYTES NFR BLD: 21.8 % (ref 18–48)
MCH RBC QN AUTO: 28.5 PG (ref 27–31)
MCHC RBC AUTO-ENTMCNC: 31.5 G/DL (ref 32–36)
MCV RBC AUTO: 91 FL (ref 82–98)
MONOCYTES # BLD AUTO: 0.6 K/UL (ref 0.3–1)
MONOCYTES NFR BLD: 6.1 % (ref 4–15)
NEUTROPHILS # BLD AUTO: 6.7 K/UL (ref 1.8–7.7)
NEUTROPHILS NFR BLD: 69.5 % (ref 38–73)
NONHDLC SERPL-MCNC: 110 MG/DL
NRBC BLD-RTO: 0 /100 WBC
PLATELET # BLD AUTO: 303 K/UL (ref 150–350)
PMV BLD AUTO: 12.2 FL (ref 9.2–12.9)
POTASSIUM SERPL-SCNC: 3.8 MMOL/L (ref 3.5–5.1)
PROT SERPL-MCNC: 7.9 G/DL (ref 6–8.4)
RBC # BLD AUTO: 4.17 M/UL (ref 4–5.4)
SODIUM SERPL-SCNC: 140 MMOL/L (ref 136–145)
TRIGL SERPL-MCNC: 200 MG/DL (ref 30–150)
TSH SERPL DL<=0.005 MIU/L-ACNC: 2.26 UIU/ML (ref 0.4–4)
WBC # BLD AUTO: 9.56 K/UL (ref 3.9–12.7)

## 2020-09-14 PROCEDURE — 80061 LIPID PANEL: CPT

## 2020-09-14 PROCEDURE — 36415 COLL VENOUS BLD VENIPUNCTURE: CPT | Mod: PO

## 2020-09-14 PROCEDURE — 84443 ASSAY THYROID STIM HORMONE: CPT

## 2020-09-14 PROCEDURE — 85025 COMPLETE CBC W/AUTO DIFF WBC: CPT

## 2020-09-14 PROCEDURE — 83036 HEMOGLOBIN GLYCOSYLATED A1C: CPT

## 2020-09-14 PROCEDURE — 82306 VITAMIN D 25 HYDROXY: CPT

## 2020-09-14 PROCEDURE — 80053 COMPREHEN METABOLIC PANEL: CPT

## 2020-09-21 ENCOUNTER — OFFICE VISIT (OUTPATIENT)
Dept: INTERNAL MEDICINE | Facility: CLINIC | Age: 65
End: 2020-09-21
Payer: MEDICAID

## 2020-09-21 VITALS
HEART RATE: 68 BPM | RESPIRATION RATE: 18 BRPM | WEIGHT: 188.5 LBS | DIASTOLIC BLOOD PRESSURE: 82 MMHG | HEIGHT: 62 IN | OXYGEN SATURATION: 97 % | TEMPERATURE: 98 F | SYSTOLIC BLOOD PRESSURE: 138 MMHG | BODY MASS INDEX: 34.69 KG/M2

## 2020-09-21 DIAGNOSIS — I10 ESSENTIAL HYPERTENSION: ICD-10-CM

## 2020-09-21 DIAGNOSIS — I48.0 PAF (PAROXYSMAL ATRIAL FIBRILLATION): ICD-10-CM

## 2020-09-21 DIAGNOSIS — R30.0 BURNING WITH URINATION: ICD-10-CM

## 2020-09-21 DIAGNOSIS — N30.00 ACUTE CYSTITIS WITHOUT HEMATURIA: ICD-10-CM

## 2020-09-21 DIAGNOSIS — E55.9 VITAMIN D INSUFFICIENCY: Primary | ICD-10-CM

## 2020-09-21 DIAGNOSIS — E78.2 MIXED HYPERLIPIDEMIA: Chronic | ICD-10-CM

## 2020-09-21 DIAGNOSIS — E11.00 TYPE 2 DIABETES MELLITUS WITH HYPEROSMOLARITY WITHOUT COMA, WITHOUT LONG-TERM CURRENT USE OF INSULIN: Chronic | ICD-10-CM

## 2020-09-21 LAB
BACTERIA #/AREA URNS AUTO: ABNORMAL /HPF
BILIRUB SERPL-MCNC: NEGATIVE MG/DL
BILIRUB UR QL STRIP: NEGATIVE
BLOOD URINE, POC: NEGATIVE
CLARITY UR REFRACT.AUTO: ABNORMAL
CLARITY, POC UA: CLEAR
COLOR UR AUTO: YELLOW
COLOR, POC UA: YELLOW
GLUCOSE UR QL STRIP: ABNORMAL
GLUCOSE UR QL STRIP: ABNORMAL
HGB UR QL STRIP: NEGATIVE
KETONES UR QL STRIP: NEGATIVE
KETONES UR QL STRIP: NEGATIVE
LEUKOCYTE ESTERASE UR QL STRIP: ABNORMAL
LEUKOCYTE ESTERASE URINE, POC: POSITIVE
MICROSCOPIC COMMENT: ABNORMAL
NITRITE UR QL STRIP: NEGATIVE
NITRITE, POC UA: NEGATIVE
PH UR STRIP: 5 [PH] (ref 5–8)
PH, POC UA: 5
PROT UR QL STRIP: NEGATIVE
PROTEIN, POC: POSITIVE
RBC #/AREA URNS AUTO: 1 /HPF (ref 0–4)
SP GR UR STRIP: 1.01 (ref 1–1.03)
SPECIFIC GRAVITY, POC UA: 1020
SQUAMOUS #/AREA URNS AUTO: 7 /HPF
URATE CRY UR QL COMP ASSIST: ABNORMAL
URN SPEC COLLECT METH UR: ABNORMAL
UROBILINOGEN, POC UA: NEGATIVE
WBC #/AREA URNS AUTO: 7 /HPF (ref 0–5)

## 2020-09-21 PROCEDURE — 99999 PR PBB SHADOW E&M-EST. PATIENT-LVL V: ICD-10-PCS | Mod: PBBFAC,,, | Performed by: NURSE PRACTITIONER

## 2020-09-21 PROCEDURE — 99999 PR PBB SHADOW E&M-EST. PATIENT-LVL V: CPT | Mod: PBBFAC,,, | Performed by: NURSE PRACTITIONER

## 2020-09-21 PROCEDURE — 81002 URINALYSIS NONAUTO W/O SCOPE: CPT | Mod: PBBFAC,PO,91 | Performed by: NURSE PRACTITIONER

## 2020-09-21 PROCEDURE — 99214 OFFICE O/P EST MOD 30 MIN: CPT | Mod: S$PBB,,, | Performed by: NURSE PRACTITIONER

## 2020-09-21 PROCEDURE — 99214 PR OFFICE/OUTPT VISIT, EST, LEVL IV, 30-39 MIN: ICD-10-PCS | Mod: S$PBB,,, | Performed by: NURSE PRACTITIONER

## 2020-09-21 PROCEDURE — 81001 URINALYSIS AUTO W/SCOPE: CPT

## 2020-09-21 PROCEDURE — 99215 OFFICE O/P EST HI 40 MIN: CPT | Mod: PBBFAC,PO | Performed by: NURSE PRACTITIONER

## 2020-09-21 RX ORDER — NITROFURANTOIN 25; 75 MG/1; MG/1
100 CAPSULE ORAL 2 TIMES DAILY
Qty: 14 CAPSULE | Refills: 0 | Status: SHIPPED | OUTPATIENT
Start: 2020-09-21 | End: 2020-09-28

## 2020-09-21 RX ORDER — AMLODIPINE BESYLATE 5 MG/1
5 TABLET ORAL NIGHTLY
Qty: 30 TABLET | Refills: 5 | Status: SHIPPED | OUTPATIENT
Start: 2020-09-21 | End: 2021-03-01

## 2020-09-21 RX ORDER — ERGOCALCIFEROL 1.25 MG/1
50000 CAPSULE ORAL
Qty: 12 CAPSULE | Refills: 0 | Status: SHIPPED | OUTPATIENT
Start: 2020-09-21 | End: 2020-12-17

## 2020-09-21 NOTE — PROGRESS NOTES
[facility] Note    Chief Complaint      Chief Complaint   Patient presents with    Follow-up     3 mo     History of Present Illness      Fely Wetzel is a 64 y.o. female patient of Dr. Hutchison who presents today for 3 month f/u. Feeling ok, is having some burning with urination lately, 4/10 on pain scale w/ right low back discomfort. Denies sob, cp, fever, or chills, hematuria or cloudy urine.     Labs- reviewed, will repeat urinalysis  A1c- decreased from 8.5%- 8.0%    Vaccines: Influenza (done at [facility]-2020); Tetanus (2014) ; Prevnar ( done)  Eye exam: up to date, 2/2020; dr gonzalez  Mammogram: june 2019  Gyn exam: hysterectomy  Colonoscopy: 2014; up to date, repeat in 5-10 years  Dexa: 2019, repeat 2-4 years, osteopenia, pt taking calcium w/ vit d    Problem List Items Addressed This Visit        Cardiac/Vascular    Hypertension (Chronic)    Relevant Medications    amLODIPine (NORVASC) 5 MG tablet    Hyperlipidemia (Chronic)    PAF (paroxysmal atrial fibrillation)    Relevant Medications    apixaban (ELIQUIS) 5 mg Tab       Endocrine    Diabetes mellitus, type 2 (Chronic)      Other Visit Diagnoses     Vitamin D insufficiency    -  Primary    Relevant Medications    ergocalciferol (ERGOCALCIFEROL) 50,000 unit Cap    Burning with urination        Relevant Medications    nitrofurantoin, macrocrystal-monohydrate, (MACROBID) 100 MG capsule    Other Relevant Orders    POCT URINE DIPSTICK WITHOUT MICROSCOPE (Completed)    URINALYSIS    Urine culture    Acute cystitis without hematuria        Relevant Medications    nitrofurantoin, macrocrystal-monohydrate, (MACROBID) 100 MG capsule          Health Maintenance   Topic Date Due    Foot Exam  09/10/2020    Eye Exam  01/15/2021    Hemoglobin A1c  03/14/2021    DEXA SCAN  06/24/2021    Mammogram  06/30/2021    Lipid Panel  09/14/2021    Low Dose Statin  09/21/2021    TETANUS VACCINE  08/05/2024    Hepatitis C Screening  Completed     Pneumococcal Vaccine (Medium Risk)  Completed       Past Medical History:   Diagnosis Date    Diabetes mellitus type II     Hyperlipidemia     Hypertension     Obesity     Osteopenia        Past Surgical History:   Procedure Laterality Date    HYSTERECTOMY         family history includes Cancer in her father; Cataracts in her brother; Diabetes in her mother; Glaucoma in her mother; Heart attack in her father; Heart disease in her brother and father; Hyperlipidemia in her mother; Hypertension in her mother.    Social History     Tobacco Use    Smoking status: Never Smoker    Smokeless tobacco: Never Used   Substance Use Topics    Alcohol use: Yes     Alcohol/week: 0.0 standard drinks     Comment: rarely    Drug use: No       Review of Systems   Constitutional: Negative for chills and fever.   HENT: Negative for congestion and sore throat.    Respiratory: Negative for shortness of breath.    Cardiovascular: Negative for chest pain and palpitations.   Gastrointestinal: Negative for abdominal pain, diarrhea, nausea and vomiting.   Genitourinary: Positive for flank pain. Negative for dysuria, frequency, hematuria and urgency.        Right kidney pain, burning with urination   Musculoskeletal: Negative for myalgias.   Neurological: Negative for weakness.   Psychiatric/Behavioral: The patient is not nervous/anxious.         Outpatient Encounter Medications as of 9/21/2020   Medication Sig Dispense Refill    ACCU-CHEK JUAN Misc CHECK FASTING GLUCOSE AND CHECK GLUCOSE TWO HOURS AFTER LUNCH OR DINNER 1 each 0    amLODIPine (NORVASC) 5 MG tablet Take 1 tablet (5 mg total) by mouth every evening. 30 tablet 5    apixaban (ELIQUIS) 5 mg Tab Take 1 tablet (5 mg total) by mouth 2 (two) times daily. 60 tablet 2    atorvastatin (LIPITOR) 40 MG tablet TAKE 1 TABLET BY MOUTH EVERY DAY IN THE EVENING 30 tablet 5    blood sugar diagnostic Strp Check glucose twice daily 100 each 3    blood-glucose meter Misc Check fasting  glucose and check glucose two hours after lunch or dinner 1 each 0    calcium carbonate/vitamin D3 (VITAMIN D-3 ORAL) Take 1 tablet by mouth once daily.       cyanocobalamin (VITAMIN B-12) 500 MCG tablet Take 500 mcg by mouth once daily.      flecainide (TAMBOCOR) 50 MG Tab Take 1 tablet (50 mg total) by mouth every 12 (twelve) hours. 60 tablet 11    glipiZIDE (GLUCOTROL) 10 MG tablet Take 1 tablet (10 mg total) by mouth 2 (two) times daily with meals. 180 tablet 3    hydroCHLOROthiazide (HYDRODIURIL) 25 MG tablet TAKE 1 TABLET (25 MG TOTAL) BY MOUTH ONCE DAILY. WITH BREAKFAST 30 tablet 11    lancets Misc Check glucose twice daily 100 each 3    linaGLIPtin (TRADJENTA) 5 mg Tab tablet Take 1 tablet (5 mg total) by mouth once daily. 90 tablet 3    losartan (COZAAR) 100 MG tablet Take 1 tablet (100 mg total) by mouth once daily. 30 tablet 11    meloxicam (MOBIC) 15 MG tablet TAKE 1 TABLET BY MOUTH EVERY DAY 30 tablet 11    metFORMIN (GLUCOPHAGE) 1000 MG tablet Take 1 tablet (1,000 mg total) by mouth 2 (two) times daily with meals. 180 tablet 3    metoprolol succinate (TOPROL-XL) 50 MG 24 hr tablet Take 1 tablet (50 mg total) by mouth once daily. 90 tablet 3    mupirocin (BACTROBAN) 2 % ointment Apply topically 3 (three) times daily. 30 g 0    nitrofurantoin, macrocrystal-monohydrate, (MACROBID) 100 MG capsule Take 1 capsule (100 mg total) by mouth 2 (two) times daily. 10 capsule 0    [DISCONTINUED] amLODIPine (NORVASC) 5 MG tablet TAKE 1 TABLET BY MOUTH EVERY DAY IN THE EVENING 30 tablet 5    [DISCONTINUED] ELIQUIS 5 mg Tab TAKE 1 TABLET BY MOUTH TWICE A DAY 60 tablet 2    ergocalciferol (ERGOCALCIFEROL) 50,000 unit Cap Take 1 capsule (50,000 Units total) by mouth every 7 days. for 12 doses 12 capsule 0    nitrofurantoin, macrocrystal-monohydrate, (MACROBID) 100 MG capsule Take 1 capsule (100 mg total) by mouth 2 (two) times daily. for 7 days 14 capsule 0     No facility-administered encounter  "medications on file as of 9/21/2020.         Review of patient's allergies indicates:   Allergen Reactions    Corticosteroids (glucocorticoids)     Latex, natural rubber     Shellfish containing products Other (See Comments)     Other reaction(s): Unknown  Other reaction(s): Anaphylaxis    Vicodin [hydrocodone-acetaminophen]     Vytorin 10-10  [ezetimibe-simvastatin]      Other reaction(s): Joint pain  Other reaction(s): Joint pain       Physical Exam      Vital Signs  Temp: 97.7 °F (36.5 °C)  Temp src: Temporal  Pulse: 68  Resp: 18  SpO2: 97 %  BP: 138/82  BP Location: Left arm  Patient Position: Sitting  Pain Score: 0-No pain  Height and Weight  Height: 5' 2" (157.5 cm)  Weight: 85.5 kg (188 lb 7.9 oz)  BSA (Calculated - sq m): 1.93 sq meters  BMI (Calculated): 34.5  Weight in (lb) to have BMI = 25: 136.4]    Physical Exam  Vitals signs and nursing note reviewed.   Constitutional:       Appearance: Normal appearance. She is well-developed.   HENT:      Head: Normocephalic and atraumatic.      Right Ear: External ear normal.      Left Ear: External ear normal.   Eyes:      Conjunctiva/sclera: Conjunctivae normal.   Neck:      Musculoskeletal: Normal range of motion and neck supple.      Thyroid: No thyromegaly.      Vascular: No JVD.      Trachea: No tracheal deviation.   Cardiovascular:      Rate and Rhythm: Normal rate and regular rhythm.      Heart sounds: Normal heart sounds.   Pulmonary:      Effort: Pulmonary effort is normal.      Breath sounds: Normal breath sounds.   Abdominal:      Palpations: Abdomen is soft.   Musculoskeletal: Normal range of motion.   Skin:     General: Skin is warm and dry.   Neurological:      Mental Status: She is alert and oriented to person, place, and time.   Psychiatric:         Behavior: Behavior normal.         Thought Content: Thought content normal.         Judgment: Judgment normal.          Laboratory:  CBC:  Recent Labs   Lab Result Units 09/14/20  0700   WBC K/uL " 9.56   RBC M/uL 4.17   Hemoglobin g/dL 11.9*   Hematocrit % 37.8   Platelets K/uL 303   Mean Corpuscular Volume fL 91   Mean Corpuscular Hemoglobin pg 28.5   Mean Corpuscular Hemoglobin Conc g/dL 31.5*     CMP:  Recent Labs   Lab Result Units 09/14/20  0700   Glucose mg/dL 145*   Calcium mg/dL 10.0   Albumin g/dL 3.8   Total Protein g/dL 7.9   Sodium mmol/L 140   Potassium mmol/L 3.8   CO2 mmol/L 26   Chloride mmol/L 103   BUN, Bld mg/dL 24*   Alkaline Phosphatase U/L 102   ALT U/L 13   AST U/L 15   Total Bilirubin mg/dL 0.3     URINALYSIS:  Recent Labs   Lab Result Units 09/14/20  0651 09/21/20  0848   Color, UA  Yellow Yellow   Clarity, UA   --  Clear   Specific Gravity, UA  1.020  --    Spec Grav UA   --  1,020   pH, UA  5.0 5   Protein, UA  Negative  --    Bacteria /hpf Occasional  --    Nitrite, UA  Negative negative   Leukocytes, UA  2+*  --    Urobilinogen, UA   --  negative      LIPIDS:  Recent Labs   Lab Result Units 09/14/20  0700   TSH uIU/mL 2.260   HDL mg/dL 40   Cholesterol mg/dL 150   Triglycerides mg/dL 200*   LDL Cholesterol mg/dL 70.0   Hdl/Cholesterol Ratio % 26.7   Non-HDL Cholesterol mg/dL 110   Total Cholesterol/HDL Ratio  3.8     TSH:  Recent Labs   Lab Result Units 09/14/20  0700   TSH uIU/mL 2.260     A1C:  Recent Labs   Lab Result Units 09/14/20  0700   Hemoglobin A1C % 8.0*         Assessment/Plan     Fely Wetzel is a 64 y.o.female with:    1. Essential hypertension  - amLODIPine (NORVASC) 5 MG tablet; Take 1 tablet (5 mg total) by mouth every evening.  Dispense: 30 tablet; Refill: 5    2. Type 2 diabetes mellitus with hyperosmolarity without coma, without long-term current use of insulin    3. Mixed hyperlipidemia    4. Vitamin D insufficiency  - ergocalciferol (ERGOCALCIFEROL) 50,000 unit Cap; Take 1 capsule (50,000 Units total) by mouth every 7 days. for 12 doses  Dispense: 12 capsule; Refill: 0    5. Burning with urination  - POCT URINE DIPSTICK WITHOUT MICROSCOPE  -  URINALYSIS  - Urine culture  - nitrofurantoin, macrocrystal-monohydrate, (MACROBID) 100 MG capsule; Take 1 capsule (100 mg total) by mouth 2 (two) times daily. for 7 days  Dispense: 14 capsule; Refill: 0    6. Acute cystitis without hematuria  - nitrofurantoin, macrocrystal-monohydrate, (MACROBID) 100 MG capsule; Take 1 capsule (100 mg total) by mouth 2 (two) times daily. for 7 days  Dispense: 14 capsule; Refill: 0    7. PAF (paroxysmal atrial fibrillation)  - apixaban (ELIQUIS) 5 mg Tab; Take 1 tablet (5 mg total) by mouth 2 (two) times daily.  Dispense: 60 tablet; Refill: 2      -Continue current medications and maintain follow up with specialists.  Return to clinic in 3 months with Dr. Barkley or sooner for any concerns       Erin Jiminez, NP-C [facility] [place]

## 2020-09-22 ENCOUNTER — PATIENT MESSAGE (OUTPATIENT)
Dept: INTERNAL MEDICINE | Facility: CLINIC | Age: 65
End: 2020-09-22

## 2020-09-23 NOTE — TELEPHONE ENCOUNTER
Continue to med I prescribed, I am waiting for the culture results, it looks like you are having glucose in your urine, I want to refer you to the diabetic NP for eval, you may need a change in your med regimen. The NP is in our office.

## 2020-09-29 ENCOUNTER — PATIENT MESSAGE (OUTPATIENT)
Dept: INTERNAL MEDICINE | Facility: CLINIC | Age: 65
End: 2020-09-29

## 2020-10-27 ENCOUNTER — PATIENT OUTREACH (OUTPATIENT)
Dept: ADMINISTRATIVE | Facility: HOSPITAL | Age: 65
End: 2020-10-27

## 2020-12-11 ENCOUNTER — PATIENT MESSAGE (OUTPATIENT)
Dept: OTHER | Facility: OTHER | Age: 65
End: 2020-12-11

## 2020-12-22 ENCOUNTER — TELEPHONE (OUTPATIENT)
Dept: INTERNAL MEDICINE | Facility: CLINIC | Age: 65
End: 2020-12-22

## 2020-12-22 DIAGNOSIS — I10 ESSENTIAL HYPERTENSION: ICD-10-CM

## 2020-12-22 DIAGNOSIS — E55.9 VITAMIN D INSUFFICIENCY: ICD-10-CM

## 2020-12-22 DIAGNOSIS — E78.2 MIXED HYPERLIPIDEMIA: ICD-10-CM

## 2020-12-22 DIAGNOSIS — E11.00 TYPE 2 DIABETES MELLITUS WITH HYPEROSMOLARITY WITHOUT COMA, WITHOUT LONG-TERM CURRENT USE OF INSULIN: Primary | ICD-10-CM

## 2020-12-22 NOTE — TELEPHONE ENCOUNTER
----- Message from Mariam Booker sent at 12/22/2020  2:44 PM CST -----  Contact: [phone]  ESTABLISHED patient with Medicaid insurance is requesting an appointment and first available is too far out.  Patient is established with which PCP:   Reason for the visit: Annual    Patient has an appt on 1/05/2021 and would like to reschedule to another date. Please call and advise

## 2020-12-24 ENCOUNTER — PATIENT OUTREACH (OUTPATIENT)
Dept: ADMINISTRATIVE | Facility: OTHER | Age: 65
End: 2020-12-24

## 2020-12-24 NOTE — PROGRESS NOTES
Patient's chart was reviewed for overdue CALVIN topics.  Media reviewed for outside colonoscopy.  Immunizations reconciled.

## 2020-12-30 ENCOUNTER — TELEPHONE (OUTPATIENT)
Dept: ELECTROPHYSIOLOGY | Facility: CLINIC | Age: 65
End: 2020-12-30

## 2020-12-30 NOTE — PROGRESS NOTES
Subjective:    Patient ID:  Fely Wetzel is a 65 y.o. female who presents for evaluation of Atrial Fibrillation    Referring Cardiologist: Marie Dominguez MD  Primary Care Physician: Paula Barkley DO    HPI  Prior Hx:  I had the pleasure of seeing Mrs. Wetzel today in our electrophysiology clinic in follow-up for her atrial fibrillation. As you are aware she is a pleasant 65 year-old woman with hypertension, diabetes mellitus type 2, and obesity. She was in her usual state of health until June 2019 when she developed sudden palpitations. She called EMS and reportedly was in AF with ventricular rates up to 170s-220s. She was given IV cardizem and her ventricular rate lowered to 110s-170s. Per ER physician she was in atrial fibrillation however no ECGs document her arrhythmia. She was given 20mg more of cardizem and spontaneously converted to sinus rhythm. TSH was normal. She was discharged on eliquis and metoprolol. She was referred to Dr. Dominguez and discussed with her if there was possibility at some point of stopping anticoagulation. She reports for the past few weeks she has been having episodes of palpitations with radiation into her neck. These are different than when she went to the ER.     An exercise stress echocardiogram noted normal LV function and no ischemic changes. An echocardiogram performed on 10/15/2019 showed normal LV function, normal valves, and moderate LA enlargement ([id number] [id number]).    Our plan was for a 30 day monitor, sleep study and possibly starting flecainide.    Mrs. Wetzel presented 5/2020 for follow-up. A 30 day monitor done in March of 2020 which showed no atrial fibrillation. She presented to the ER in November of 2019 with recurrent symptomatic AF with RVR that was rate controlled with cardizem and spontaneously converted. She notes occasional palpitations at night. She has not yet had a sleep study.    Interim Hx:  Mrs. Wetzel returns for follow-up. She is on  flecainide. She continues on metoprolol/eliquis. She notes occasional episodes of chest discomfort at rest, not with activity. She drinks water and it goes away. She does have indigestion. No symptomatic AF. Recent CBC noted stable H/H.    My interpretation of today's in clinic ECG is sinus rhythm at 71 bpm with a narrow QRS.      Review of Systems   Constitution: Negative for fever and malaise/fatigue.   HENT: Negative for congestion and sore throat.    Eyes: Negative for blurred vision and visual disturbance.   Cardiovascular: Negative for chest pain, dyspnea on exertion, irregular heartbeat and palpitations.   Respiratory: Positive for sleep disturbances due to breathing and snoring. Negative for cough and shortness of breath.    Hematologic/Lymphatic: Negative for bleeding problem. Does not bruise/bleed easily.   Skin: Negative.    Musculoskeletal: Negative.    Gastrointestinal: Positive for heartburn. Negative for bloating and abdominal pain.   Neurological: Positive for excessive daytime sleepiness. Negative for dizziness and focal weakness.        Objective:    Physical Exam   Constitutional: She is oriented to person, place, and time. She appears well-developed and well-nourished. No distress.   HENT:   Head: Normocephalic and atraumatic.   Eyes: Conjunctivae are normal. Right eye exhibits no discharge. Left eye exhibits no discharge.   Neck: Neck supple. No JVD present.   Cardiovascular: Normal rate and regular rhythm. Exam reveals no gallop and no friction rub.   No murmur heard.  Pulmonary/Chest: Effort normal and breath sounds normal. No respiratory distress. She has no wheezes. She has no rales.   Abdominal: Soft. Bowel sounds are normal. She exhibits no distension. There is no abdominal tenderness. There is no rebound.   Musculoskeletal:         General: No edema.   Neurological: She is alert and oriented to person, place, and time.   Skin: Skin is warm and dry. She is not diaphoretic.   Psychiatric:  She has a normal mood and affect. Her behavior is normal. Judgment and thought content normal.   Vitals reviewed.        Assessment:       1. PAF (paroxysmal atrial fibrillation)    2. Essential hypertension    3. Type 2 diabetes mellitus with hyperosmolarity without coma, without long-term current use of insulin         Plan:       In summary, Mrs. Wetzel is a pleasant 65 year-old woman with hypertension, diabetes mellitus type 2, and obesity presenting for follow-up for recurrent symptomatic paroxysmal atrial fibrillation with RVR. I had a long discussion with the patient about the pathophysiology and risks of atrial fibrillation and its basic pathophysiology, including its health implications and treatment options. Specifically, I addressed the need for CVA (stroke) prophylaxis with aspirin versus oral anticoagulation (warfarin vs DOACs, discussed bleeding risks, and need to come to the ER for any head trauma for CT scanning even if asymptomatic). Her [id number] score is 3 and indefinite anticoagulation is recommended. She remains on eliquis. She has had symptomatic benefit with flecainide/metoprolol.    RTC in 6 months, sooner if needed      Thank you for allowing me to participate in the care of this patient. Please do not hesitate to call me with any questions or concerns.    Beck Mathias MD, PhD  Cardiac Electrophysiology

## 2020-12-31 ENCOUNTER — OFFICE VISIT (OUTPATIENT)
Dept: ELECTROPHYSIOLOGY | Facility: CLINIC | Age: 65
End: 2020-12-31
Payer: MEDICAID

## 2020-12-31 ENCOUNTER — HOSPITAL ENCOUNTER (OUTPATIENT)
Dept: CARDIOLOGY | Facility: CLINIC | Age: 65
Discharge: HOME | End: 2020-12-31
Payer: MEDICAID

## 2020-12-31 VITALS
SYSTOLIC BLOOD PRESSURE: 152 MMHG | WEIGHT: 189.63 LBS | HEIGHT: 62 IN | DIASTOLIC BLOOD PRESSURE: 96 MMHG | HEART RATE: 71 BPM | BODY MASS INDEX: 34.89 KG/M2

## 2020-12-31 DIAGNOSIS — I10 ESSENTIAL HYPERTENSION: Chronic | ICD-10-CM

## 2020-12-31 DIAGNOSIS — E11.00 TYPE 2 DIABETES MELLITUS WITH HYPEROSMOLARITY WITHOUT COMA, WITHOUT LONG-TERM CURRENT USE OF INSULIN: Chronic | ICD-10-CM

## 2020-12-31 DIAGNOSIS — I48.0 PAF (PAROXYSMAL ATRIAL FIBRILLATION): Primary | ICD-10-CM

## 2020-12-31 DIAGNOSIS — I48.0 PAF (PAROXYSMAL ATRIAL FIBRILLATION): ICD-10-CM

## 2020-12-31 PROCEDURE — 99999 PR PBB SHADOW E&M-EST. PATIENT-LVL IV: ICD-10-PCS | Mod: PBBFAC,,, | Performed by: INTERNAL MEDICINE

## 2020-12-31 PROCEDURE — 99999 PR PBB SHADOW E&M-EST. PATIENT-LVL IV: CPT | Mod: PBBFAC,,, | Performed by: INTERNAL MEDICINE

## 2020-12-31 PROCEDURE — 99214 PR OFFICE/OUTPT VISIT, EST, LEVL IV, 30-39 MIN: ICD-10-PCS | Mod: S$PBB,,, | Performed by: INTERNAL MEDICINE

## 2020-12-31 PROCEDURE — 93010 EKG 12-LEAD: ICD-10-PCS | Mod: S$PBB,,, | Performed by: INTERNAL MEDICINE

## 2020-12-31 PROCEDURE — 99214 OFFICE O/P EST MOD 30 MIN: CPT | Mod: S$PBB,,, | Performed by: INTERNAL MEDICINE

## 2020-12-31 PROCEDURE — 93005 ELECTROCARDIOGRAM TRACING: CPT | Mod: PBBFAC | Performed by: INTERNAL MEDICINE

## 2020-12-31 PROCEDURE — 99214 OFFICE O/P EST MOD 30 MIN: CPT | Mod: PBBFAC,25 | Performed by: INTERNAL MEDICINE

## 2020-12-31 PROCEDURE — 93010 ELECTROCARDIOGRAM REPORT: CPT | Mod: S$PBB,,, | Performed by: INTERNAL MEDICINE

## 2020-12-31 RX ORDER — METOPROLOL SUCCINATE 50 MG/1
50 TABLET, EXTENDED RELEASE ORAL DAILY
Qty: 90 TABLET | Refills: 3 | Status: SHIPPED | OUTPATIENT
Start: 2020-12-31 | End: 2021-12-29

## 2021-01-20 ENCOUNTER — TELEPHONE (OUTPATIENT)
Dept: ELECTROPHYSIOLOGY | Facility: CLINIC | Age: 66
End: 2021-01-20

## 2021-02-22 ENCOUNTER — PATIENT MESSAGE (OUTPATIENT)
Dept: OPTOMETRY | Facility: CLINIC | Age: 66
End: 2021-02-22

## 2021-02-22 ENCOUNTER — LAB VISIT (OUTPATIENT)
Dept: LAB | Facility: HOSPITAL | Age: 66
End: 2021-02-22
Attending: INTERNAL MEDICINE
Payer: MEDICAID

## 2021-02-22 DIAGNOSIS — E11.9 TYPE 2 DIABETES MELLITUS WITHOUT COMPLICATION, WITHOUT LONG-TERM CURRENT USE OF INSULIN: Chronic | ICD-10-CM

## 2021-02-22 DIAGNOSIS — I10 ESSENTIAL HYPERTENSION: Chronic | ICD-10-CM

## 2021-02-22 DIAGNOSIS — E11.00 TYPE 2 DIABETES MELLITUS WITH HYPEROSMOLARITY WITHOUT COMA, WITHOUT LONG-TERM CURRENT USE OF INSULIN: ICD-10-CM

## 2021-02-22 DIAGNOSIS — E78.2 MIXED HYPERLIPIDEMIA: ICD-10-CM

## 2021-02-22 DIAGNOSIS — E78.5 HYPERLIPIDEMIA, UNSPECIFIED HYPERLIPIDEMIA TYPE: Chronic | ICD-10-CM

## 2021-02-22 DIAGNOSIS — E55.9 VITAMIN D DEFICIENCY: ICD-10-CM

## 2021-02-22 DIAGNOSIS — E55.9 VITAMIN D INSUFFICIENCY: ICD-10-CM

## 2021-02-22 LAB
ALBUMIN SERPL BCP-MCNC: 3.8 G/DL (ref 3.5–5.2)
ALP SERPL-CCNC: 99 U/L (ref 55–135)
ALT SERPL W/O P-5'-P-CCNC: 13 U/L (ref 10–44)
ANION GAP SERPL CALC-SCNC: 12 MMOL/L (ref 8–16)
AST SERPL-CCNC: 17 U/L (ref 10–40)
BASOPHILS # BLD AUTO: 0.02 K/UL (ref 0–0.2)
BASOPHILS NFR BLD: 0.3 % (ref 0–1.9)
BILIRUB SERPL-MCNC: 0.4 MG/DL (ref 0.1–1)
BUN SERPL-MCNC: 14 MG/DL (ref 8–23)
CALCIUM SERPL-MCNC: 9.7 MG/DL (ref 8.7–10.5)
CHLORIDE SERPL-SCNC: 104 MMOL/L (ref 95–110)
CHOLEST SERPL-MCNC: 176 MG/DL (ref 120–199)
CHOLEST/HDLC SERPL: 4 {RATIO} (ref 2–5)
CO2 SERPL-SCNC: 25 MMOL/L (ref 23–29)
CREAT SERPL-MCNC: 0.8 MG/DL (ref 0.5–1.4)
DIFFERENTIAL METHOD: ABNORMAL
EOSINOPHIL # BLD AUTO: 0.3 K/UL (ref 0–0.5)
EOSINOPHIL NFR BLD: 3.2 % (ref 0–8)
ERYTHROCYTE [DISTWIDTH] IN BLOOD BY AUTOMATED COUNT: 13.8 % (ref 11.5–14.5)
EST. GFR  (AFRICAN AMERICAN): >60 ML/MIN/1.73 M^2
EST. GFR  (NON AFRICAN AMERICAN): >60 ML/MIN/1.73 M^2
ESTIMATED AVG GLUCOSE: 226 MG/DL (ref 68–131)
GLUCOSE SERPL-MCNC: 208 MG/DL (ref 70–110)
HBA1C MFR BLD: 9.5 % (ref 4–5.6)
HCT VFR BLD AUTO: 38.5 % (ref 37–48.5)
HDLC SERPL-MCNC: 44 MG/DL (ref 40–75)
HDLC SERPL: 25 % (ref 20–50)
HGB BLD-MCNC: 12.2 G/DL (ref 12–16)
IMM GRANULOCYTES # BLD AUTO: 0.04 K/UL (ref 0–0.04)
IMM GRANULOCYTES NFR BLD AUTO: 0.5 % (ref 0–0.5)
LDLC SERPL CALC-MCNC: 92.4 MG/DL (ref 63–159)
LYMPHOCYTES # BLD AUTO: 2 K/UL (ref 1–4.8)
LYMPHOCYTES NFR BLD: 25.7 % (ref 18–48)
MCH RBC QN AUTO: 28.2 PG (ref 27–31)
MCHC RBC AUTO-ENTMCNC: 31.7 G/DL (ref 32–36)
MCV RBC AUTO: 89 FL (ref 82–98)
MONOCYTES # BLD AUTO: 0.5 K/UL (ref 0.3–1)
MONOCYTES NFR BLD: 6.1 % (ref 4–15)
NEUTROPHILS # BLD AUTO: 5.1 K/UL (ref 1.8–7.7)
NEUTROPHILS NFR BLD: 64.2 % (ref 38–73)
NONHDLC SERPL-MCNC: 132 MG/DL
NRBC BLD-RTO: 0 /100 WBC
PLATELET # BLD AUTO: 275 K/UL (ref 150–350)
PMV BLD AUTO: 11.8 FL (ref 9.2–12.9)
POTASSIUM SERPL-SCNC: 3.9 MMOL/L (ref 3.5–5.1)
PROT SERPL-MCNC: 7.6 G/DL (ref 6–8.4)
RBC # BLD AUTO: 4.33 M/UL (ref 4–5.4)
SODIUM SERPL-SCNC: 141 MMOL/L (ref 136–145)
TRIGL SERPL-MCNC: 198 MG/DL (ref 30–150)
TSH SERPL DL<=0.005 MIU/L-ACNC: 2.18 UIU/ML (ref 0.4–4)
WBC # BLD AUTO: 7.93 K/UL (ref 3.9–12.7)

## 2021-02-22 PROCEDURE — 82306 VITAMIN D 25 HYDROXY: CPT

## 2021-02-22 PROCEDURE — 80061 LIPID PANEL: CPT

## 2021-02-22 PROCEDURE — 80053 COMPREHEN METABOLIC PANEL: CPT

## 2021-02-22 PROCEDURE — 83036 HEMOGLOBIN GLYCOSYLATED A1C: CPT

## 2021-02-22 PROCEDURE — 84443 ASSAY THYROID STIM HORMONE: CPT

## 2021-02-22 PROCEDURE — 36415 COLL VENOUS BLD VENIPUNCTURE: CPT | Mod: PO

## 2021-02-22 PROCEDURE — 85025 COMPLETE CBC W/AUTO DIFF WBC: CPT

## 2021-02-23 LAB — 25(OH)D3+25(OH)D2 SERPL-MCNC: 29 NG/ML (ref 30–96)

## 2021-03-01 ENCOUNTER — OFFICE VISIT (OUTPATIENT)
Dept: INTERNAL MEDICINE | Facility: CLINIC | Age: 66
End: 2021-03-01
Payer: MEDICAID

## 2021-03-01 VITALS
DIASTOLIC BLOOD PRESSURE: 82 MMHG | HEIGHT: 62 IN | OXYGEN SATURATION: 98 % | WEIGHT: 190.69 LBS | TEMPERATURE: 98 F | RESPIRATION RATE: 16 BRPM | SYSTOLIC BLOOD PRESSURE: 156 MMHG | BODY MASS INDEX: 35.09 KG/M2 | HEART RATE: 67 BPM

## 2021-03-01 DIAGNOSIS — E55.9 VITAMIN D DEFICIENCY: ICD-10-CM

## 2021-03-01 DIAGNOSIS — M79.674 PAIN OF TOE OF RIGHT FOOT: ICD-10-CM

## 2021-03-01 DIAGNOSIS — Z00.00 ANNUAL PHYSICAL EXAM: Primary | ICD-10-CM

## 2021-03-01 DIAGNOSIS — E78.2 MIXED HYPERLIPIDEMIA: Chronic | ICD-10-CM

## 2021-03-01 DIAGNOSIS — I15.2 HYPERTENSION DUE TO ENDOCRINE DISORDER: Chronic | ICD-10-CM

## 2021-03-01 DIAGNOSIS — E11.00 TYPE 2 DIABETES MELLITUS WITH HYPEROSMOLARITY WITHOUT COMA, WITHOUT LONG-TERM CURRENT USE OF INSULIN: Chronic | ICD-10-CM

## 2021-03-01 DIAGNOSIS — M85.80 OSTEOPENIA, UNSPECIFIED LOCATION: ICD-10-CM

## 2021-03-01 DIAGNOSIS — Z12.39 ENCOUNTER FOR SCREENING FOR MALIGNANT NEOPLASM OF BREAST, UNSPECIFIED SCREENING MODALITY: ICD-10-CM

## 2021-03-01 DIAGNOSIS — I48.0 PAF (PAROXYSMAL ATRIAL FIBRILLATION): ICD-10-CM

## 2021-03-01 DIAGNOSIS — E66.9 OBESITY (BMI 30.0-34.9): ICD-10-CM

## 2021-03-01 PROCEDURE — 99999 PR PBB SHADOW E&M-EST. PATIENT-LVL V: ICD-10-PCS | Mod: PBBFAC,,, | Performed by: NURSE PRACTITIONER

## 2021-03-01 PROCEDURE — 99215 OFFICE O/P EST HI 40 MIN: CPT | Mod: PBBFAC,PO | Performed by: NURSE PRACTITIONER

## 2021-03-01 PROCEDURE — 99999 PR PBB SHADOW E&M-EST. PATIENT-LVL V: CPT | Mod: PBBFAC,,, | Performed by: NURSE PRACTITIONER

## 2021-03-01 PROCEDURE — 99396 PREV VISIT EST AGE 40-64: CPT | Mod: S$PBB,,, | Performed by: NURSE PRACTITIONER

## 2021-03-01 PROCEDURE — 99396 PR PREVENTIVE VISIT,EST,40-64: ICD-10-PCS | Mod: S$PBB,,, | Performed by: NURSE PRACTITIONER

## 2021-03-01 RX ORDER — AMLODIPINE BESYLATE 10 MG/1
10 TABLET ORAL DAILY
Qty: 90 TABLET | Refills: 3 | Status: SHIPPED | OUTPATIENT
Start: 2021-03-01 | End: 2021-04-09 | Stop reason: SDUPTHER

## 2021-03-02 ENCOUNTER — PATIENT OUTREACH (OUTPATIENT)
Dept: ADMINISTRATIVE | Facility: OTHER | Age: 66
End: 2021-03-02

## 2021-03-03 ENCOUNTER — TELEPHONE (OUTPATIENT)
Dept: INTERNAL MEDICINE | Facility: CLINIC | Age: 66
End: 2021-03-03

## 2021-03-03 ENCOUNTER — OFFICE VISIT (OUTPATIENT)
Dept: PODIATRY | Facility: CLINIC | Age: 66
End: 2021-03-03
Payer: MEDICAID

## 2021-03-03 ENCOUNTER — TELEPHONE (OUTPATIENT)
Dept: PODIATRY | Facility: CLINIC | Age: 66
End: 2021-03-03

## 2021-03-03 ENCOUNTER — APPOINTMENT (OUTPATIENT)
Dept: RADIOLOGY | Facility: OTHER | Age: 66
End: 2021-03-03
Attending: PODIATRIST
Payer: MEDICAID

## 2021-03-03 VITALS
HEIGHT: 62 IN | SYSTOLIC BLOOD PRESSURE: 184 MMHG | HEART RATE: 78 BPM | DIASTOLIC BLOOD PRESSURE: 75 MMHG | BODY MASS INDEX: 34.96 KG/M2 | WEIGHT: 190 LBS

## 2021-03-03 DIAGNOSIS — M79.671 FOOT PAIN, RIGHT: ICD-10-CM

## 2021-03-03 DIAGNOSIS — S96.911A STRAIN OF FOOT, RIGHT, INITIAL ENCOUNTER: ICD-10-CM

## 2021-03-03 DIAGNOSIS — E11.42 TYPE 2 DIABETES MELLITUS WITH DIABETIC POLYNEUROPATHY, UNSPECIFIED WHETHER LONG TERM INSULIN USE: ICD-10-CM

## 2021-03-03 DIAGNOSIS — M79.674 PAIN OF TOE OF RIGHT FOOT: ICD-10-CM

## 2021-03-03 DIAGNOSIS — M21.621 TAILOR'S BUNION OF RIGHT FOOT: ICD-10-CM

## 2021-03-03 DIAGNOSIS — M21.621 TAILOR'S BUNION OF RIGHT FOOT: Primary | ICD-10-CM

## 2021-03-03 PROCEDURE — 29540 PR STRAPPING; ANKLE &/OR FOOT: ICD-10-PCS | Mod: S$PBB,RT,, | Performed by: PODIATRIST

## 2021-03-03 PROCEDURE — 99215 OFFICE O/P EST HI 40 MIN: CPT | Mod: PBBFAC,PN,25 | Performed by: PODIATRIST

## 2021-03-03 PROCEDURE — 29540 STRAPPING ANKLE &/FOOT: CPT | Mod: PBBFAC,PN,RT | Performed by: PODIATRIST

## 2021-03-03 PROCEDURE — 29540 STRAPPING ANKLE &/FOOT: CPT | Mod: S$PBB,RT,, | Performed by: PODIATRIST

## 2021-03-03 PROCEDURE — 99204 OFFICE O/P NEW MOD 45 MIN: CPT | Mod: 25,S$PBB,, | Performed by: PODIATRIST

## 2021-03-03 PROCEDURE — 99204 PR OFFICE/OUTPT VISIT, NEW, LEVL IV, 45-59 MIN: ICD-10-PCS | Mod: 25,S$PBB,, | Performed by: PODIATRIST

## 2021-03-03 PROCEDURE — 73630 X-RAY EXAM OF FOOT: CPT | Mod: TC,RT

## 2021-03-03 PROCEDURE — 73630 X-RAY EXAM OF FOOT: CPT | Mod: 26,RT,, | Performed by: RADIOLOGY

## 2021-03-03 PROCEDURE — 99999 PR PBB SHADOW E&M-EST. PATIENT-LVL V: CPT | Mod: PBBFAC,,, | Performed by: PODIATRIST

## 2021-03-03 PROCEDURE — 73630 XR FOOT COMPLETE 3 VIEW RIGHT: ICD-10-PCS | Mod: 26,RT,, | Performed by: RADIOLOGY

## 2021-03-03 PROCEDURE — 99999 PR PBB SHADOW E&M-EST. PATIENT-LVL V: ICD-10-PCS | Mod: PBBFAC,,, | Performed by: PODIATRIST

## 2021-03-03 RX ORDER — DICLOFENAC SODIUM 10 MG/G
2 GEL TOPICAL 4 TIMES DAILY
Qty: 100 G | Refills: 2 | Status: SHIPPED | OUTPATIENT
Start: 2021-03-03 | End: 2022-03-17

## 2021-03-05 ENCOUNTER — PATIENT MESSAGE (OUTPATIENT)
Dept: OPTOMETRY | Facility: CLINIC | Age: 66
End: 2021-03-05

## 2021-03-10 ENCOUNTER — PATIENT MESSAGE (OUTPATIENT)
Dept: ADMINISTRATIVE | Facility: HOSPITAL | Age: 66
End: 2021-03-10

## 2021-03-10 ENCOUNTER — PATIENT MESSAGE (OUTPATIENT)
Dept: INTERNAL MEDICINE | Facility: CLINIC | Age: 66
End: 2021-03-10

## 2021-03-15 ENCOUNTER — PATIENT MESSAGE (OUTPATIENT)
Dept: INTERNAL MEDICINE | Facility: CLINIC | Age: 66
End: 2021-03-15

## 2021-03-20 ENCOUNTER — IMMUNIZATION (OUTPATIENT)
Dept: PRIMARY CARE CLINIC | Facility: CLINIC | Age: 66
End: 2021-03-20
Payer: MEDICAID

## 2021-03-20 DIAGNOSIS — Z23 NEED FOR VACCINATION: Primary | ICD-10-CM

## 2021-03-20 PROCEDURE — 91300 PR SARS-COV- 2 COVID-19 VACCINE, NO PRSV, 30MCG/0.3ML, IM: CPT | Mod: S$GLB,,, | Performed by: INTERNAL MEDICINE

## 2021-03-20 PROCEDURE — 91300 PR SARS-COV- 2 COVID-19 VACCINE, NO PRSV, 30MCG/0.3ML, IM: ICD-10-PCS | Mod: S$GLB,,, | Performed by: INTERNAL MEDICINE

## 2021-03-20 PROCEDURE — 0001A PR IMMUNIZ ADMIN, SARS-COV-2 COVID-19 VACC, 30MCG/0.3ML, 1ST DOSE: ICD-10-PCS | Mod: CV19,S$GLB,, | Performed by: INTERNAL MEDICINE

## 2021-03-20 PROCEDURE — 0001A PR IMMUNIZ ADMIN, SARS-COV-2 COVID-19 VACC, 30MCG/0.3ML, 1ST DOSE: CPT | Mod: CV19,S$GLB,, | Performed by: INTERNAL MEDICINE

## 2021-03-20 RX ADMIN — Medication 0.3 ML: at 03:03

## 2021-04-06 ENCOUNTER — PATIENT OUTREACH (OUTPATIENT)
Dept: ADMINISTRATIVE | Facility: OTHER | Age: 66
End: 2021-04-06

## 2021-04-07 ENCOUNTER — OFFICE VISIT (OUTPATIENT)
Dept: PODIATRY | Facility: CLINIC | Age: 66
End: 2021-04-07
Payer: MEDICAID

## 2021-04-07 VITALS
HEIGHT: 62 IN | HEART RATE: 66 BPM | DIASTOLIC BLOOD PRESSURE: 63 MMHG | SYSTOLIC BLOOD PRESSURE: 136 MMHG | BODY MASS INDEX: 34.96 KG/M2 | WEIGHT: 190 LBS

## 2021-04-07 DIAGNOSIS — S96.911A STRAIN OF FOOT, RIGHT, INITIAL ENCOUNTER: ICD-10-CM

## 2021-04-07 DIAGNOSIS — M21.621 TAILOR'S BUNION OF RIGHT FOOT: Primary | ICD-10-CM

## 2021-04-07 DIAGNOSIS — M79.671 FOOT PAIN, RIGHT: ICD-10-CM

## 2021-04-07 PROCEDURE — 99999 PR PBB SHADOW E&M-EST. PATIENT-LVL IV: ICD-10-PCS | Mod: PBBFAC,,, | Performed by: PODIATRIST

## 2021-04-07 PROCEDURE — 99999 PR PBB SHADOW E&M-EST. PATIENT-LVL IV: CPT | Mod: PBBFAC,,, | Performed by: PODIATRIST

## 2021-04-07 PROCEDURE — 99213 OFFICE O/P EST LOW 20 MIN: CPT | Mod: S$PBB,,, | Performed by: PODIATRIST

## 2021-04-07 PROCEDURE — 99213 PR OFFICE/OUTPT VISIT, EST, LEVL III, 20-29 MIN: ICD-10-PCS | Mod: S$PBB,,, | Performed by: PODIATRIST

## 2021-04-07 PROCEDURE — 99214 OFFICE O/P EST MOD 30 MIN: CPT | Mod: PBBFAC,PN | Performed by: PODIATRIST

## 2021-04-07 RX ORDER — AMLODIPINE BESYLATE 5 MG/1
5 TABLET ORAL DAILY
COMMUNITY
Start: 2021-03-27 | End: 2021-04-09 | Stop reason: ALTCHOICE

## 2021-04-09 ENCOUNTER — OFFICE VISIT (OUTPATIENT)
Dept: INTERNAL MEDICINE | Facility: CLINIC | Age: 66
End: 2021-04-09
Payer: MEDICAID

## 2021-04-09 VITALS
TEMPERATURE: 98 F | OXYGEN SATURATION: 98 % | SYSTOLIC BLOOD PRESSURE: 118 MMHG | BODY MASS INDEX: 35.54 KG/M2 | RESPIRATION RATE: 16 BRPM | WEIGHT: 193.13 LBS | DIASTOLIC BLOOD PRESSURE: 80 MMHG | HEIGHT: 62 IN | HEART RATE: 82 BPM

## 2021-04-09 DIAGNOSIS — E11.69 HYPERLIPIDEMIA ASSOCIATED WITH TYPE 2 DIABETES MELLITUS: ICD-10-CM

## 2021-04-09 DIAGNOSIS — E66.01 SEVERE OBESITY (BMI 35.0-39.9) WITH COMORBIDITY: ICD-10-CM

## 2021-04-09 DIAGNOSIS — E11.00 TYPE 2 DIABETES MELLITUS WITH HYPEROSMOLARITY WITHOUT COMA, WITHOUT LONG-TERM CURRENT USE OF INSULIN: Primary | ICD-10-CM

## 2021-04-09 DIAGNOSIS — I15.2 HYPERTENSION DUE TO ENDOCRINE DISORDER: Chronic | ICD-10-CM

## 2021-04-09 DIAGNOSIS — M85.80 OSTEOPENIA, UNSPECIFIED LOCATION: ICD-10-CM

## 2021-04-09 DIAGNOSIS — I48.0 PAF (PAROXYSMAL ATRIAL FIBRILLATION): ICD-10-CM

## 2021-04-09 DIAGNOSIS — E11.65 TYPE 2 DIABETES MELLITUS WITH HYPERGLYCEMIA, WITHOUT LONG-TERM CURRENT USE OF INSULIN: ICD-10-CM

## 2021-04-09 DIAGNOSIS — E78.5 HYPERLIPIDEMIA ASSOCIATED WITH TYPE 2 DIABETES MELLITUS: ICD-10-CM

## 2021-04-09 PROBLEM — E11.9 TYPE 2 DIABETES MELLITUS, WITHOUT LONG-TERM CURRENT USE OF INSULIN: Status: ACTIVE | Noted: 2021-04-09

## 2021-04-09 LAB — GLUCOSE SERPL-MCNC: 288 MG/DL (ref 70–110)

## 2021-04-09 PROCEDURE — 99215 OFFICE O/P EST HI 40 MIN: CPT | Mod: S$PBB,,, | Performed by: NURSE PRACTITIONER

## 2021-04-09 PROCEDURE — 82962 GLUCOSE BLOOD TEST: CPT | Mod: PBBFAC,PO | Performed by: NURSE PRACTITIONER

## 2021-04-09 PROCEDURE — 99215 PR OFFICE/OUTPT VISIT, EST, LEVL V, 40-54 MIN: ICD-10-PCS | Mod: S$PBB,,, | Performed by: NURSE PRACTITIONER

## 2021-04-09 PROCEDURE — 99999 PR PBB SHADOW E&M-EST. PATIENT-LVL V: CPT | Mod: PBBFAC,,, | Performed by: NURSE PRACTITIONER

## 2021-04-09 PROCEDURE — 99999 PR PBB SHADOW E&M-EST. PATIENT-LVL V: ICD-10-PCS | Mod: PBBFAC,,, | Performed by: NURSE PRACTITIONER

## 2021-04-09 PROCEDURE — 99215 OFFICE O/P EST HI 40 MIN: CPT | Mod: PBBFAC,PO | Performed by: NURSE PRACTITIONER

## 2021-04-09 RX ORDER — GLIPIZIDE 10 MG/1
10 TABLET ORAL
Qty: 90 TABLET | Refills: 3 | Status: SHIPPED | OUTPATIENT
Start: 2021-04-09 | End: 2021-06-25

## 2021-04-09 RX ORDER — PEN NEEDLE, DIABETIC 30 GX3/16"
1 NEEDLE, DISPOSABLE MISCELLANEOUS DAILY
Qty: 90 EACH | Refills: 3 | Status: SHIPPED | OUTPATIENT
Start: 2021-04-09 | End: 2021-09-23 | Stop reason: SDUPTHER

## 2021-04-09 RX ORDER — LIRAGLUTIDE 6 MG/ML
1.8 INJECTION SUBCUTANEOUS DAILY
Qty: 9 ML | Refills: 11 | Status: SHIPPED | OUTPATIENT
Start: 2021-04-09 | End: 2022-01-19

## 2021-04-09 RX ORDER — LANCETS 26 GAUGE
1 EACH MISCELLANEOUS
Qty: 100 EACH | Refills: 3 | Status: SHIPPED | OUTPATIENT
Start: 2021-04-09 | End: 2021-05-03 | Stop reason: SDUPTHER

## 2021-04-09 RX ORDER — LANCETS 30 GAUGE
EACH MISCELLANEOUS
Qty: 1 EACH | Refills: 0 | Status: SHIPPED | OUTPATIENT
Start: 2021-04-09 | End: 2021-05-03

## 2021-04-09 RX ORDER — AMLODIPINE BESYLATE 10 MG/1
10 TABLET ORAL DAILY
Qty: 90 TABLET | Refills: 3 | Status: SHIPPED | OUTPATIENT
Start: 2021-04-09 | End: 2022-03-27

## 2021-04-11 ENCOUNTER — IMMUNIZATION (OUTPATIENT)
Dept: PRIMARY CARE CLINIC | Facility: CLINIC | Age: 66
End: 2021-04-11
Payer: MEDICAID

## 2021-04-11 DIAGNOSIS — Z23 NEED FOR VACCINATION: Primary | ICD-10-CM

## 2021-04-11 PROCEDURE — 91300 PR SARS-COV- 2 COVID-19 VACCINE, NO PRSV, 30MCG/0.3ML, IM: CPT | Mod: S$GLB,,, | Performed by: INTERNAL MEDICINE

## 2021-04-11 PROCEDURE — 0002A PR IMMUNIZ ADMIN, SARS-COV-2 COVID-19 VACC, 30MCG/0.3ML, 2ND DOSE: CPT | Mod: CV19,S$GLB,, | Performed by: INTERNAL MEDICINE

## 2021-04-11 PROCEDURE — 0002A PR IMMUNIZ ADMIN, SARS-COV-2 COVID-19 VACC, 30MCG/0.3ML, 2ND DOSE: ICD-10-PCS | Mod: CV19,S$GLB,, | Performed by: INTERNAL MEDICINE

## 2021-04-11 PROCEDURE — 91300 PR SARS-COV- 2 COVID-19 VACCINE, NO PRSV, 30MCG/0.3ML, IM: ICD-10-PCS | Mod: S$GLB,,, | Performed by: INTERNAL MEDICINE

## 2021-04-11 RX ADMIN — Medication 0.3 ML: at 09:04

## 2021-04-21 ENCOUNTER — TELEPHONE (OUTPATIENT)
Dept: OPTOMETRY | Facility: CLINIC | Age: 66
End: 2021-04-21

## 2021-04-23 ENCOUNTER — CLINICAL SUPPORT (OUTPATIENT)
Dept: DIABETES | Facility: CLINIC | Age: 66
End: 2021-04-23
Payer: MEDICAID

## 2021-04-23 DIAGNOSIS — E11.00 TYPE 2 DIABETES MELLITUS WITH HYPEROSMOLARITY WITHOUT COMA, WITHOUT LONG-TERM CURRENT USE OF INSULIN: ICD-10-CM

## 2021-04-23 PROCEDURE — 99999 PR PBB SHADOW E&M-EST. PATIENT-LVL I: CPT | Mod: PBBFAC,,, | Performed by: DIETITIAN, REGISTERED

## 2021-04-23 PROCEDURE — 99211 OFF/OP EST MAY X REQ PHY/QHP: CPT | Mod: PBBFAC,PO | Performed by: DIETITIAN, REGISTERED

## 2021-04-23 PROCEDURE — G0108 DIAB MANAGE TRN  PER INDIV: HCPCS | Mod: PBBFAC,PO | Performed by: DIETITIAN, REGISTERED

## 2021-04-23 PROCEDURE — 99999 PR PBB SHADOW E&M-EST. PATIENT-LVL I: ICD-10-PCS | Mod: PBBFAC,,, | Performed by: DIETITIAN, REGISTERED

## 2021-05-01 ENCOUNTER — PATIENT MESSAGE (OUTPATIENT)
Dept: INTERNAL MEDICINE | Facility: CLINIC | Age: 66
End: 2021-05-01

## 2021-05-03 ENCOUNTER — PATIENT MESSAGE (OUTPATIENT)
Dept: INTERNAL MEDICINE | Facility: CLINIC | Age: 66
End: 2021-05-03

## 2021-05-03 DIAGNOSIS — E11.00 TYPE 2 DIABETES MELLITUS WITH HYPEROSMOLARITY WITHOUT COMA, WITHOUT LONG-TERM CURRENT USE OF INSULIN: ICD-10-CM

## 2021-05-03 RX ORDER — DEXTROSE 4 G
TABLET,CHEWABLE ORAL
Qty: 1 EACH | Refills: 0 | Status: SHIPPED | OUTPATIENT
Start: 2021-05-03 | End: 2022-04-27 | Stop reason: SDUPTHER

## 2021-05-03 RX ORDER — LANCETS 26 GAUGE
1 EACH MISCELLANEOUS
Qty: 100 EACH | Refills: 3 | Status: SHIPPED | OUTPATIENT
Start: 2021-05-03 | End: 2021-10-07 | Stop reason: SDUPTHER

## 2021-05-04 ENCOUNTER — CLINICAL SUPPORT (OUTPATIENT)
Dept: REHABILITATION | Facility: OTHER | Age: 66
End: 2021-05-04
Attending: PODIATRIST
Payer: MEDICAID

## 2021-05-04 DIAGNOSIS — M79.671 FOOT PAIN, RIGHT: ICD-10-CM

## 2021-05-04 DIAGNOSIS — M25.512 CHRONIC LEFT SHOULDER PAIN: ICD-10-CM

## 2021-05-04 DIAGNOSIS — M21.621 TAILOR'S BUNION OF RIGHT FOOT: ICD-10-CM

## 2021-05-04 DIAGNOSIS — G89.29 CHRONIC LEFT SHOULDER PAIN: ICD-10-CM

## 2021-05-04 DIAGNOSIS — M25.612 DECREASED RANGE OF MOTION OF LEFT SHOULDER: ICD-10-CM

## 2021-05-04 DIAGNOSIS — S96.911A STRAIN OF FOOT, RIGHT, INITIAL ENCOUNTER: ICD-10-CM

## 2021-05-04 PROCEDURE — 97110 THERAPEUTIC EXERCISES: CPT | Mod: PN

## 2021-05-04 PROCEDURE — 97161 PT EVAL LOW COMPLEX 20 MIN: CPT | Mod: PN

## 2021-05-11 ENCOUNTER — TELEPHONE (OUTPATIENT)
Dept: REHABILITATION | Facility: OTHER | Age: 66
End: 2021-05-11

## 2021-05-12 ENCOUNTER — TELEPHONE (OUTPATIENT)
Dept: ELECTROPHYSIOLOGY | Facility: CLINIC | Age: 66
End: 2021-05-12

## 2021-05-12 ENCOUNTER — PATIENT MESSAGE (OUTPATIENT)
Dept: INTERNAL MEDICINE | Facility: CLINIC | Age: 66
End: 2021-05-12

## 2021-05-12 DIAGNOSIS — I49.8 OTHER SPECIFIED CARDIAC ARRHYTHMIAS: Primary | ICD-10-CM

## 2021-05-12 DIAGNOSIS — Z12.31 ENCOUNTER FOR SCREENING MAMMOGRAM FOR MALIGNANT NEOPLASM OF BREAST: Primary | ICD-10-CM

## 2021-05-18 ENCOUNTER — PATIENT OUTREACH (OUTPATIENT)
Dept: ADMINISTRATIVE | Facility: OTHER | Age: 66
End: 2021-05-18

## 2021-05-19 ENCOUNTER — OFFICE VISIT (OUTPATIENT)
Dept: PODIATRY | Facility: CLINIC | Age: 66
End: 2021-05-19
Payer: MEDICAID

## 2021-05-19 VITALS
WEIGHT: 193 LBS | BODY MASS INDEX: 35.51 KG/M2 | HEIGHT: 62 IN | DIASTOLIC BLOOD PRESSURE: 64 MMHG | HEART RATE: 74 BPM | SYSTOLIC BLOOD PRESSURE: 135 MMHG

## 2021-05-19 DIAGNOSIS — E11.42 TYPE 2 DIABETES MELLITUS WITH DIABETIC POLYNEUROPATHY, UNSPECIFIED WHETHER LONG TERM INSULIN USE: ICD-10-CM

## 2021-05-19 DIAGNOSIS — M21.621 TAILOR'S BUNION OF RIGHT FOOT: Primary | ICD-10-CM

## 2021-05-19 PROCEDURE — 99999 PR PBB SHADOW E&M-EST. PATIENT-LVL V: ICD-10-PCS | Mod: PBBFAC,,, | Performed by: PODIATRIST

## 2021-05-19 PROCEDURE — 99215 OFFICE O/P EST HI 40 MIN: CPT | Mod: PBBFAC,PN | Performed by: PODIATRIST

## 2021-05-19 PROCEDURE — 99212 PR OFFICE/OUTPT VISIT, EST, LEVL II, 10-19 MIN: ICD-10-PCS | Mod: S$PBB,,, | Performed by: PODIATRIST

## 2021-05-19 PROCEDURE — 99212 OFFICE O/P EST SF 10 MIN: CPT | Mod: S$PBB,,, | Performed by: PODIATRIST

## 2021-05-19 PROCEDURE — 99999 PR PBB SHADOW E&M-EST. PATIENT-LVL V: CPT | Mod: PBBFAC,,, | Performed by: PODIATRIST

## 2021-05-28 ENCOUNTER — OFFICE VISIT (OUTPATIENT)
Dept: OPTOMETRY | Facility: CLINIC | Age: 66
End: 2021-05-28
Payer: MEDICAID

## 2021-05-28 DIAGNOSIS — Z46.0 FITTING AND ADJUSTMENT OF SPECTACLES AND CONTACT LENSES: Primary | ICD-10-CM

## 2021-05-28 DIAGNOSIS — H25.13 NUCLEAR SCLEROSIS, BILATERAL: ICD-10-CM

## 2021-05-28 DIAGNOSIS — H52.4 HYPEROPIA OF BOTH EYES WITH ASTIGMATISM AND PRESBYOPIA: Primary | ICD-10-CM

## 2021-05-28 DIAGNOSIS — H52.03 HYPEROPIA OF BOTH EYES WITH ASTIGMATISM AND PRESBYOPIA: Primary | ICD-10-CM

## 2021-05-28 DIAGNOSIS — E11.3293 MILD NONPROLIFERATIVE DIABETIC RETINOPATHY OF BOTH EYES WITHOUT MACULAR EDEMA ASSOCIATED WITH TYPE 2 DIABETES MELLITUS: ICD-10-CM

## 2021-05-28 DIAGNOSIS — H52.203 HYPEROPIA OF BOTH EYES WITH ASTIGMATISM AND PRESBYOPIA: Primary | ICD-10-CM

## 2021-05-28 PROCEDURE — 99499 NO LOS: ICD-10-PCS | Mod: S$PBB,,, | Performed by: OPTOMETRIST

## 2021-05-28 PROCEDURE — 92014 COMPRE OPH EXAM EST PT 1/>: CPT | Mod: S$PBB,,, | Performed by: OPTOMETRIST

## 2021-05-28 PROCEDURE — 99999 PR PBB SHADOW E&M-EST. PATIENT-LVL IV: ICD-10-PCS | Mod: PBBFAC,,, | Performed by: OPTOMETRIST

## 2021-05-28 PROCEDURE — 92014 PR EYE EXAM, EST PATIENT,COMPREHESV: ICD-10-PCS | Mod: S$PBB,,, | Performed by: OPTOMETRIST

## 2021-05-28 PROCEDURE — 99214 OFFICE O/P EST MOD 30 MIN: CPT | Mod: PBBFAC,PO | Performed by: OPTOMETRIST

## 2021-05-28 PROCEDURE — 92015 PR REFRACTION: ICD-10-PCS | Mod: ,,, | Performed by: OPTOMETRIST

## 2021-05-28 PROCEDURE — 92015 DETERMINE REFRACTIVE STATE: CPT | Mod: ,,, | Performed by: OPTOMETRIST

## 2021-05-28 PROCEDURE — 99499 UNLISTED E&M SERVICE: CPT | Mod: S$PBB,,, | Performed by: OPTOMETRIST

## 2021-05-28 PROCEDURE — 99999 PR PBB SHADOW E&M-EST. PATIENT-LVL IV: CPT | Mod: PBBFAC,,, | Performed by: OPTOMETRIST

## 2021-06-01 ENCOUNTER — CLINICAL SUPPORT (OUTPATIENT)
Dept: REHABILITATION | Facility: OTHER | Age: 66
End: 2021-06-01
Payer: MEDICAID

## 2021-06-01 DIAGNOSIS — M79.671 RIGHT FOOT PAIN: ICD-10-CM

## 2021-06-01 PROCEDURE — 97110 THERAPEUTIC EXERCISES: CPT | Mod: PN

## 2021-06-18 ENCOUNTER — TELEPHONE (OUTPATIENT)
Dept: INTERNAL MEDICINE | Facility: CLINIC | Age: 66
End: 2021-06-18

## 2021-06-25 ENCOUNTER — OFFICE VISIT (OUTPATIENT)
Dept: INTERNAL MEDICINE | Facility: CLINIC | Age: 66
End: 2021-06-25
Payer: MEDICAID

## 2021-06-25 VITALS
SYSTOLIC BLOOD PRESSURE: 120 MMHG | RESPIRATION RATE: 16 BRPM | TEMPERATURE: 98 F | HEIGHT: 62 IN | OXYGEN SATURATION: 98 % | BODY MASS INDEX: 34.03 KG/M2 | HEART RATE: 86 BPM | WEIGHT: 184.94 LBS | DIASTOLIC BLOOD PRESSURE: 80 MMHG

## 2021-06-25 DIAGNOSIS — E78.5 HYPERLIPIDEMIA ASSOCIATED WITH TYPE 2 DIABETES MELLITUS: ICD-10-CM

## 2021-06-25 DIAGNOSIS — E66.01 SEVERE OBESITY (BMI 35.0-39.9) WITH COMORBIDITY: ICD-10-CM

## 2021-06-25 DIAGNOSIS — E11.69 HYPERLIPIDEMIA ASSOCIATED WITH TYPE 2 DIABETES MELLITUS: ICD-10-CM

## 2021-06-25 DIAGNOSIS — E11.65 TYPE 2 DIABETES MELLITUS WITH HYPERGLYCEMIA, WITHOUT LONG-TERM CURRENT USE OF INSULIN: Primary | ICD-10-CM

## 2021-06-25 DIAGNOSIS — I10 ELEVATED BLOOD PRESSURE READING WITH DIAGNOSIS OF HYPERTENSION: ICD-10-CM

## 2021-06-25 PROCEDURE — 99214 PR OFFICE/OUTPT VISIT, EST, LEVL IV, 30-39 MIN: ICD-10-PCS | Mod: S$PBB,,, | Performed by: NURSE PRACTITIONER

## 2021-06-25 PROCEDURE — 99215 OFFICE O/P EST HI 40 MIN: CPT | Mod: PBBFAC,PO | Performed by: NURSE PRACTITIONER

## 2021-06-25 PROCEDURE — 99999 PR PBB SHADOW E&M-EST. PATIENT-LVL V: ICD-10-PCS | Mod: PBBFAC,,, | Performed by: NURSE PRACTITIONER

## 2021-06-25 PROCEDURE — 99999 PR PBB SHADOW E&M-EST. PATIENT-LVL V: CPT | Mod: PBBFAC,,, | Performed by: NURSE PRACTITIONER

## 2021-06-25 PROCEDURE — 99214 OFFICE O/P EST MOD 30 MIN: CPT | Mod: S$PBB,,, | Performed by: NURSE PRACTITIONER

## 2021-07-01 ENCOUNTER — TELEPHONE (OUTPATIENT)
Dept: RESEARCH | Facility: HOSPITAL | Age: 66
End: 2021-07-01

## 2021-07-02 ENCOUNTER — HOSPITAL ENCOUNTER (OUTPATIENT)
Dept: RADIOLOGY | Facility: HOSPITAL | Age: 66
Discharge: HOME | End: 2021-07-02
Attending: NURSE PRACTITIONER
Payer: MEDICAID

## 2021-07-02 ENCOUNTER — PATIENT OUTREACH (OUTPATIENT)
Dept: ADMINISTRATIVE | Facility: OTHER | Age: 66
End: 2021-07-02

## 2021-07-02 DIAGNOSIS — Z12.39 ENCOUNTER FOR SCREENING FOR MALIGNANT NEOPLASM OF BREAST, UNSPECIFIED SCREENING MODALITY: ICD-10-CM

## 2021-07-02 PROCEDURE — 77067 SCR MAMMO BI INCL CAD: CPT | Mod: TC,PO

## 2021-07-02 PROCEDURE — 77063 BREAST TOMOSYNTHESIS BI: CPT | Mod: 26,,, | Performed by: RADIOLOGY

## 2021-07-02 PROCEDURE — 77067 SCR MAMMO BI INCL CAD: CPT | Mod: 26,,, | Performed by: RADIOLOGY

## 2021-07-02 PROCEDURE — 77063 MAMMO DIGITAL SCREENING BILAT WITH TOMO: ICD-10-PCS | Mod: 26,,, | Performed by: RADIOLOGY

## 2021-07-02 PROCEDURE — 77067 MAMMO DIGITAL SCREENING BILAT WITH TOMO: ICD-10-PCS | Mod: 26,,, | Performed by: RADIOLOGY

## 2021-07-06 ENCOUNTER — OFFICE VISIT (OUTPATIENT)
Dept: ELECTROPHYSIOLOGY | Facility: CLINIC | Age: 66
End: 2021-07-06
Payer: MEDICAID

## 2021-07-06 ENCOUNTER — HOSPITAL ENCOUNTER (OUTPATIENT)
Dept: CARDIOLOGY | Facility: CLINIC | Age: 66
Discharge: HOME | End: 2021-07-06
Payer: MEDICAID

## 2021-07-06 VITALS
BODY MASS INDEX: 33.63 KG/M2 | DIASTOLIC BLOOD PRESSURE: 72 MMHG | WEIGHT: 182.75 LBS | HEIGHT: 62 IN | SYSTOLIC BLOOD PRESSURE: 147 MMHG | HEART RATE: 74 BPM

## 2021-07-06 DIAGNOSIS — I10 ELEVATED BLOOD PRESSURE READING WITH DIAGNOSIS OF HYPERTENSION: ICD-10-CM

## 2021-07-06 DIAGNOSIS — I48.0 PAF (PAROXYSMAL ATRIAL FIBRILLATION): Primary | ICD-10-CM

## 2021-07-06 DIAGNOSIS — E11.65 TYPE 2 DIABETES MELLITUS WITH HYPERGLYCEMIA, WITHOUT LONG-TERM CURRENT USE OF INSULIN: ICD-10-CM

## 2021-07-06 DIAGNOSIS — K30 INDIGESTION: ICD-10-CM

## 2021-07-06 DIAGNOSIS — I49.8 OTHER SPECIFIED CARDIAC ARRHYTHMIAS: ICD-10-CM

## 2021-07-06 DIAGNOSIS — E66.9 OBESITY, CLASS I, BMI 30-34.9: ICD-10-CM

## 2021-07-06 PROBLEM — E66.811 OBESITY, CLASS I, BMI 30-34.9: Status: ACTIVE | Noted: 2021-04-09

## 2021-07-06 PROCEDURE — 99214 OFFICE O/P EST MOD 30 MIN: CPT | Mod: S$PBB,,, | Performed by: INTERNAL MEDICINE

## 2021-07-06 PROCEDURE — 93005 ELECTROCARDIOGRAM TRACING: CPT | Mod: PBBFAC | Performed by: INTERNAL MEDICINE

## 2021-07-06 PROCEDURE — 99999 PR PBB SHADOW E&M-EST. PATIENT-LVL IV: CPT | Mod: PBBFAC,,, | Performed by: INTERNAL MEDICINE

## 2021-07-06 PROCEDURE — 99214 OFFICE O/P EST MOD 30 MIN: CPT | Mod: PBBFAC | Performed by: INTERNAL MEDICINE

## 2021-07-06 PROCEDURE — 99214 PR OFFICE/OUTPT VISIT, EST, LEVL IV, 30-39 MIN: ICD-10-PCS | Mod: S$PBB,,, | Performed by: INTERNAL MEDICINE

## 2021-07-06 PROCEDURE — 93010 RHYTHM STRIP: ICD-10-PCS | Mod: S$PBB,,, | Performed by: INTERNAL MEDICINE

## 2021-07-06 PROCEDURE — 99999 PR PBB SHADOW E&M-EST. PATIENT-LVL IV: ICD-10-PCS | Mod: PBBFAC,,, | Performed by: INTERNAL MEDICINE

## 2021-07-06 PROCEDURE — 93010 ELECTROCARDIOGRAM REPORT: CPT | Mod: S$PBB,,, | Performed by: INTERNAL MEDICINE

## 2021-07-06 RX ORDER — PANTOPRAZOLE SODIUM 20 MG/1
20 TABLET, DELAYED RELEASE ORAL DAILY
Qty: 30 TABLET | Refills: 11 | Status: SHIPPED | OUTPATIENT
Start: 2021-07-06 | End: 2021-10-07

## 2021-07-12 ENCOUNTER — PATIENT MESSAGE (OUTPATIENT)
Dept: INTERNAL MEDICINE | Facility: CLINIC | Age: 66
End: 2021-07-12

## 2021-07-13 ENCOUNTER — CLINICAL SUPPORT (OUTPATIENT)
Dept: REHABILITATION | Facility: OTHER | Age: 66
End: 2021-07-13
Payer: MEDICAID

## 2021-07-13 DIAGNOSIS — M79.671 RIGHT FOOT PAIN: ICD-10-CM

## 2021-07-13 PROCEDURE — 97110 THERAPEUTIC EXERCISES: CPT | Mod: PN

## 2021-07-15 ENCOUNTER — TELEPHONE (OUTPATIENT)
Dept: INTERNAL MEDICINE | Facility: CLINIC | Age: 66
End: 2021-07-15

## 2021-07-20 ENCOUNTER — CLINICAL SUPPORT (OUTPATIENT)
Dept: REHABILITATION | Facility: OTHER | Age: 66
End: 2021-07-20
Payer: MEDICAID

## 2021-07-20 DIAGNOSIS — M79.671 RIGHT FOOT PAIN: ICD-10-CM

## 2021-07-20 PROCEDURE — 97110 THERAPEUTIC EXERCISES: CPT | Mod: PN

## 2021-08-14 RX ORDER — ATORVASTATIN CALCIUM 40 MG/1
TABLET, FILM COATED ORAL
Qty: 30 TABLET | Refills: 5 | Status: SHIPPED | OUTPATIENT
Start: 2021-08-14 | End: 2022-02-13

## 2021-08-23 ENCOUNTER — LAB VISIT (OUTPATIENT)
Dept: LAB | Facility: HOSPITAL | Age: 66
End: 2021-08-23
Attending: INTERNAL MEDICINE
Payer: MEDICAID

## 2021-08-23 DIAGNOSIS — E11.69 HYPERLIPIDEMIA ASSOCIATED WITH TYPE 2 DIABETES MELLITUS: ICD-10-CM

## 2021-08-23 DIAGNOSIS — E78.5 HYPERLIPIDEMIA ASSOCIATED WITH TYPE 2 DIABETES MELLITUS: ICD-10-CM

## 2021-08-23 DIAGNOSIS — E11.65 TYPE 2 DIABETES MELLITUS WITH HYPERGLYCEMIA, WITHOUT LONG-TERM CURRENT USE OF INSULIN: ICD-10-CM

## 2021-08-23 LAB
ALBUMIN SERPL BCP-MCNC: 3.7 G/DL (ref 3.5–5.2)
ALP SERPL-CCNC: 96 U/L (ref 55–135)
ALT SERPL W/O P-5'-P-CCNC: 23 U/L (ref 10–44)
ANION GAP SERPL CALC-SCNC: 12 MMOL/L (ref 8–16)
AST SERPL-CCNC: 19 U/L (ref 10–40)
BILIRUB SERPL-MCNC: 0.5 MG/DL (ref 0.1–1)
BUN SERPL-MCNC: 22 MG/DL (ref 8–23)
CALCIUM SERPL-MCNC: 10.3 MG/DL (ref 8.7–10.5)
CHLORIDE SERPL-SCNC: 100 MMOL/L (ref 95–110)
CHOLEST SERPL-MCNC: 196 MG/DL (ref 120–199)
CHOLEST/HDLC SERPL: 4.9 {RATIO} (ref 2–5)
CO2 SERPL-SCNC: 23 MMOL/L (ref 23–29)
CREAT SERPL-MCNC: 0.9 MG/DL (ref 0.5–1.4)
EST. GFR  (AFRICAN AMERICAN): >60 ML/MIN/1.73 M^2
EST. GFR  (NON AFRICAN AMERICAN): >60 ML/MIN/1.73 M^2
ESTIMATED AVG GLUCOSE: 183 MG/DL (ref 68–131)
GLUCOSE SERPL-MCNC: 204 MG/DL (ref 70–110)
HBA1C MFR BLD: 8 % (ref 4–5.6)
HDLC SERPL-MCNC: 40 MG/DL (ref 40–75)
HDLC SERPL: 20.4 % (ref 20–50)
LDLC SERPL CALC-MCNC: 90.6 MG/DL (ref 63–159)
NONHDLC SERPL-MCNC: 156 MG/DL
POTASSIUM SERPL-SCNC: 3.5 MMOL/L (ref 3.5–5.1)
PROT SERPL-MCNC: 7.5 G/DL (ref 6–8.4)
SODIUM SERPL-SCNC: 135 MMOL/L (ref 136–145)
TRIGL SERPL-MCNC: 327 MG/DL (ref 30–150)

## 2021-08-23 PROCEDURE — 80061 LIPID PANEL: CPT | Performed by: NURSE PRACTITIONER

## 2021-08-23 PROCEDURE — 80053 COMPREHEN METABOLIC PANEL: CPT | Performed by: NURSE PRACTITIONER

## 2021-08-23 PROCEDURE — 36415 COLL VENOUS BLD VENIPUNCTURE: CPT | Mod: PO | Performed by: NURSE PRACTITIONER

## 2021-08-23 PROCEDURE — 83036 HEMOGLOBIN GLYCOSYLATED A1C: CPT | Performed by: NURSE PRACTITIONER

## 2021-09-23 ENCOUNTER — OFFICE VISIT (OUTPATIENT)
Dept: INTERNAL MEDICINE | Facility: CLINIC | Age: 66
End: 2021-09-23
Payer: MEDICAID

## 2021-09-23 VITALS
HEIGHT: 62 IN | RESPIRATION RATE: 16 BRPM | SYSTOLIC BLOOD PRESSURE: 120 MMHG | TEMPERATURE: 97 F | BODY MASS INDEX: 33.51 KG/M2 | DIASTOLIC BLOOD PRESSURE: 80 MMHG | WEIGHT: 182.13 LBS | OXYGEN SATURATION: 99 % | HEART RATE: 75 BPM

## 2021-09-23 DIAGNOSIS — E66.9 OBESITY, CLASS I, BMI 30-34.9: ICD-10-CM

## 2021-09-23 DIAGNOSIS — E11.69 HYPERLIPIDEMIA ASSOCIATED WITH TYPE 2 DIABETES MELLITUS: ICD-10-CM

## 2021-09-23 DIAGNOSIS — E55.9 VITAMIN D DEFICIENCY: ICD-10-CM

## 2021-09-23 DIAGNOSIS — E11.65 TYPE 2 DIABETES MELLITUS WITH HYPERGLYCEMIA, WITHOUT LONG-TERM CURRENT USE OF INSULIN: Primary | ICD-10-CM

## 2021-09-23 DIAGNOSIS — E11.00 TYPE 2 DIABETES MELLITUS WITH HYPEROSMOLARITY WITHOUT COMA, WITHOUT LONG-TERM CURRENT USE OF INSULIN: ICD-10-CM

## 2021-09-23 DIAGNOSIS — I10 ELEVATED BLOOD PRESSURE READING WITH DIAGNOSIS OF HYPERTENSION: ICD-10-CM

## 2021-09-23 DIAGNOSIS — E78.5 HYPERLIPIDEMIA ASSOCIATED WITH TYPE 2 DIABETES MELLITUS: ICD-10-CM

## 2021-09-23 PROCEDURE — 99999 PR PBB SHADOW E&M-EST. PATIENT-LVL V: ICD-10-PCS | Mod: PBBFAC,,, | Performed by: NURSE PRACTITIONER

## 2021-09-23 PROCEDURE — 99214 OFFICE O/P EST MOD 30 MIN: CPT | Mod: S$PBB,,, | Performed by: NURSE PRACTITIONER

## 2021-09-23 PROCEDURE — 99999 PR PBB SHADOW E&M-EST. PATIENT-LVL V: CPT | Mod: PBBFAC,,, | Performed by: NURSE PRACTITIONER

## 2021-09-23 PROCEDURE — 99214 PR OFFICE/OUTPT VISIT, EST, LEVL IV, 30-39 MIN: ICD-10-PCS | Mod: S$PBB,,, | Performed by: NURSE PRACTITIONER

## 2021-09-23 PROCEDURE — 99215 OFFICE O/P EST HI 40 MIN: CPT | Mod: PBBFAC,PO | Performed by: NURSE PRACTITIONER

## 2021-09-23 RX ORDER — DAPAGLIFLOZIN AND METFORMIN HYDROCHLORIDE 5; 1000 MG/1; MG/1
1 TABLET, FILM COATED, EXTENDED RELEASE ORAL 2 TIMES DAILY WITH MEALS
Qty: 60 TABLET | Refills: 3 | Status: SHIPPED | OUTPATIENT
Start: 2021-09-23 | End: 2022-01-19 | Stop reason: SDUPTHER

## 2021-09-23 RX ORDER — CHOLECALCIFEROL (VITAMIN D3) 1250 MCG
1250 TABLET ORAL
Qty: 12 TABLET | Refills: 1 | Status: SHIPPED | OUTPATIENT
Start: 2021-09-23 | End: 2022-03-22 | Stop reason: SDUPTHER

## 2021-09-23 RX ORDER — PEN NEEDLE, DIABETIC 30 GX3/16"
1 NEEDLE, DISPOSABLE MISCELLANEOUS DAILY
Qty: 90 EACH | Refills: 3 | Status: ON HOLD | OUTPATIENT
Start: 2021-09-23 | End: 2022-03-17 | Stop reason: HOSPADM

## 2021-10-07 ENCOUNTER — OFFICE VISIT (OUTPATIENT)
Dept: INTERNAL MEDICINE | Facility: CLINIC | Age: 66
End: 2021-10-07
Payer: MEDICAID

## 2021-10-07 ENCOUNTER — LAB VISIT (OUTPATIENT)
Dept: LAB | Facility: HOSPITAL | Age: 66
End: 2021-10-07
Attending: HOSPITALIST
Payer: MEDICAID

## 2021-10-07 VITALS
SYSTOLIC BLOOD PRESSURE: 104 MMHG | DIASTOLIC BLOOD PRESSURE: 66 MMHG | RESPIRATION RATE: 14 BRPM | HEART RATE: 79 BPM | HEIGHT: 62 IN | WEIGHT: 180.56 LBS | TEMPERATURE: 98 F | BODY MASS INDEX: 33.23 KG/M2

## 2021-10-07 DIAGNOSIS — K21.9 GASTROESOPHAGEAL REFLUX DISEASE, UNSPECIFIED WHETHER ESOPHAGITIS PRESENT: ICD-10-CM

## 2021-10-07 DIAGNOSIS — K30 INDIGESTION: ICD-10-CM

## 2021-10-07 DIAGNOSIS — I15.2 HYPERTENSION ASSOCIATED WITH TYPE 2 DIABETES MELLITUS: ICD-10-CM

## 2021-10-07 DIAGNOSIS — Z12.11 SCREENING FOR COLORECTAL CANCER: ICD-10-CM

## 2021-10-07 DIAGNOSIS — E11.59 HYPERTENSION ASSOCIATED WITH TYPE 2 DIABETES MELLITUS: ICD-10-CM

## 2021-10-07 DIAGNOSIS — E11.65 TYPE 2 DIABETES MELLITUS WITH HYPERGLYCEMIA, WITHOUT LONG-TERM CURRENT USE OF INSULIN: Primary | ICD-10-CM

## 2021-10-07 DIAGNOSIS — E11.69 HYPERLIPIDEMIA ASSOCIATED WITH TYPE 2 DIABETES MELLITUS: ICD-10-CM

## 2021-10-07 DIAGNOSIS — E78.5 HYPERLIPIDEMIA ASSOCIATED WITH TYPE 2 DIABETES MELLITUS: ICD-10-CM

## 2021-10-07 DIAGNOSIS — Z12.12 SCREENING FOR COLORECTAL CANCER: ICD-10-CM

## 2021-10-07 DIAGNOSIS — I48.0 PAF (PAROXYSMAL ATRIAL FIBRILLATION): ICD-10-CM

## 2021-10-07 PROCEDURE — 99214 OFFICE O/P EST MOD 30 MIN: CPT | Mod: S$PBB,,, | Performed by: HOSPITALIST

## 2021-10-07 PROCEDURE — 99215 OFFICE O/P EST HI 40 MIN: CPT | Mod: PBBFAC,PO | Performed by: HOSPITALIST

## 2021-10-07 PROCEDURE — 99999 PR PBB SHADOW E&M-EST. PATIENT-LVL V: ICD-10-PCS | Mod: PBBFAC,,, | Performed by: HOSPITALIST

## 2021-10-07 PROCEDURE — 36415 COLL VENOUS BLD VENIPUNCTURE: CPT | Mod: PO | Performed by: HOSPITALIST

## 2021-10-07 PROCEDURE — 99214 PR OFFICE/OUTPT VISIT, EST, LEVL IV, 30-39 MIN: ICD-10-PCS | Mod: S$PBB,,, | Performed by: HOSPITALIST

## 2021-10-07 PROCEDURE — 86677 HELICOBACTER PYLORI ANTIBODY: CPT | Performed by: HOSPITALIST

## 2021-10-07 PROCEDURE — 99999 PR PBB SHADOW E&M-EST. PATIENT-LVL V: CPT | Mod: PBBFAC,,, | Performed by: HOSPITALIST

## 2021-10-07 RX ORDER — PANTOPRAZOLE SODIUM 40 MG/1
40 TABLET, DELAYED RELEASE ORAL DAILY
Qty: 30 TABLET | Refills: 11 | Status: SHIPPED | OUTPATIENT
Start: 2021-10-07 | End: 2022-01-19

## 2021-10-07 RX ORDER — LANCETS 26 GAUGE
1 EACH MISCELLANEOUS
Qty: 100 EACH | Refills: 5 | Status: SHIPPED | OUTPATIENT
Start: 2021-10-07 | End: 2022-04-27 | Stop reason: SDUPTHER

## 2021-10-08 ENCOUNTER — PATIENT MESSAGE (OUTPATIENT)
Dept: INTERNAL MEDICINE | Facility: CLINIC | Age: 66
End: 2021-10-08

## 2021-10-08 DIAGNOSIS — A04.8 H. PYLORI INFECTION: ICD-10-CM

## 2021-10-08 LAB — H PYLORI IGG SERPL QL IA: POSITIVE

## 2021-10-08 RX ORDER — DOXYCYCLINE HYCLATE 100 MG
100 TABLET ORAL 2 TIMES DAILY
Qty: 28 TABLET | Refills: 0 | Status: SHIPPED | OUTPATIENT
Start: 2021-10-08 | End: 2021-10-22

## 2021-10-08 RX ORDER — METRONIDAZOLE 500 MG/1
500 TABLET ORAL 3 TIMES DAILY
Qty: 42 TABLET | Refills: 0 | Status: SHIPPED | OUTPATIENT
Start: 2021-10-08 | End: 2021-10-22

## 2021-10-10 ENCOUNTER — PATIENT MESSAGE (OUTPATIENT)
Dept: INTERNAL MEDICINE | Facility: CLINIC | Age: 66
End: 2021-10-10

## 2021-11-04 ENCOUNTER — TELEPHONE (OUTPATIENT)
Dept: ENDOSCOPY | Facility: HOSPITAL | Age: 66
End: 2021-11-04
Payer: MEDICAID

## 2021-11-15 ENCOUNTER — TELEPHONE (OUTPATIENT)
Dept: ENDOSCOPY | Facility: HOSPITAL | Age: 66
End: 2021-11-15
Payer: MEDICAID

## 2021-11-15 ENCOUNTER — PATIENT MESSAGE (OUTPATIENT)
Dept: ENDOSCOPY | Facility: HOSPITAL | Age: 66
End: 2021-11-15
Payer: MEDICAID

## 2021-12-02 ENCOUNTER — TELEPHONE (OUTPATIENT)
Dept: ENDOSCOPY | Facility: HOSPITAL | Age: 66
End: 2021-12-02
Payer: MEDICAID

## 2021-12-03 ENCOUNTER — TELEPHONE (OUTPATIENT)
Dept: ENDOSCOPY | Facility: HOSPITAL | Age: 66
End: 2021-12-03
Payer: MEDICAID

## 2021-12-03 DIAGNOSIS — Z12.11 SPECIAL SCREENING FOR MALIGNANT NEOPLASMS, COLON: Primary | ICD-10-CM

## 2021-12-03 RX ORDER — SODIUM, POTASSIUM,MAG SULFATES 17.5-3.13G
1 SOLUTION, RECONSTITUTED, ORAL ORAL DAILY
Qty: 1 KIT | Refills: 0 | Status: SHIPPED | OUTPATIENT
Start: 2021-12-03 | End: 2021-12-05

## 2022-01-05 ENCOUNTER — LAB VISIT (OUTPATIENT)
Dept: PRIMARY CARE CLINIC | Facility: CLINIC | Age: 67
End: 2022-01-05
Payer: MEDICAID

## 2022-01-05 DIAGNOSIS — Z20.822 CONTACT WITH AND (SUSPECTED) EXPOSURE TO COVID-19: ICD-10-CM

## 2022-01-05 LAB
CTP QC/QA: YES
SARS-COV-2 AG RESP QL IA.RAPID: POSITIVE

## 2022-01-05 PROCEDURE — 87811 SARS-COV-2 COVID19 W/OPTIC: CPT

## 2022-01-07 ENCOUNTER — PATIENT MESSAGE (OUTPATIENT)
Dept: INTERNAL MEDICINE | Facility: CLINIC | Age: 67
End: 2022-01-07
Payer: MEDICAID

## 2022-01-07 ENCOUNTER — PATIENT MESSAGE (OUTPATIENT)
Dept: ADMINISTRATIVE | Facility: OTHER | Age: 67
End: 2022-01-07
Payer: MEDICAID

## 2022-01-12 ENCOUNTER — LAB VISIT (OUTPATIENT)
Dept: PRIMARY CARE CLINIC | Facility: CLINIC | Age: 67
End: 2022-01-12
Payer: MEDICAID

## 2022-01-12 DIAGNOSIS — Z20.822 CONTACT WITH AND (SUSPECTED) EXPOSURE TO COVID-19: ICD-10-CM

## 2022-01-12 LAB
CTP QC/QA: YES
SARS-COV-2 AG RESP QL IA.RAPID: NEGATIVE

## 2022-01-12 PROCEDURE — 87811 SARS-COV-2 COVID19 W/OPTIC: CPT

## 2022-01-13 ENCOUNTER — IMMUNIZATION (OUTPATIENT)
Dept: INTERNAL MEDICINE | Facility: CLINIC | Age: 67
End: 2022-01-13
Payer: MEDICAID

## 2022-01-13 DIAGNOSIS — Z23 NEED FOR VACCINATION: Primary | ICD-10-CM

## 2022-01-13 PROCEDURE — 0004A COVID-19, MRNA, LNP-S, PF, 30 MCG/0.3 ML DOSE VACCINE: CPT | Mod: PBBFAC,CV19

## 2022-01-13 NOTE — TELEPHONE ENCOUNTER
No new care gaps identified.  Powered by [employer]. Reference number: [id number].   1/13/2022 12:10:51 AM CST

## 2022-01-14 ENCOUNTER — LAB VISIT (OUTPATIENT)
Dept: LAB | Facility: HOSPITAL | Age: 67
End: 2022-01-14
Attending: NURSE PRACTITIONER
Payer: MEDICAID

## 2022-01-14 DIAGNOSIS — E11.65 TYPE 2 DIABETES MELLITUS WITH HYPERGLYCEMIA, WITHOUT LONG-TERM CURRENT USE OF INSULIN: ICD-10-CM

## 2022-01-14 DIAGNOSIS — E11.69 HYPERLIPIDEMIA ASSOCIATED WITH TYPE 2 DIABETES MELLITUS: ICD-10-CM

## 2022-01-14 DIAGNOSIS — E78.5 HYPERLIPIDEMIA ASSOCIATED WITH TYPE 2 DIABETES MELLITUS: ICD-10-CM

## 2022-01-14 LAB
ALBUMIN SERPL BCP-MCNC: 3.7 G/DL (ref 3.5–5.2)
ALP SERPL-CCNC: 88 U/L (ref 55–135)
ALT SERPL W/O P-5'-P-CCNC: 18 U/L (ref 10–44)
ANION GAP SERPL CALC-SCNC: 12 MMOL/L (ref 8–16)
AST SERPL-CCNC: 17 U/L (ref 10–40)
BILIRUB SERPL-MCNC: 0.6 MG/DL (ref 0.1–1)
BUN SERPL-MCNC: 14 MG/DL (ref 8–23)
CALCIUM SERPL-MCNC: 10.1 MG/DL (ref 8.7–10.5)
CHLORIDE SERPL-SCNC: 100 MMOL/L (ref 95–110)
CHOLEST SERPL-MCNC: 159 MG/DL (ref 120–199)
CHOLEST/HDLC SERPL: 3.5 {RATIO} (ref 2–5)
CO2 SERPL-SCNC: 23 MMOL/L (ref 23–29)
CREAT SERPL-MCNC: 0.7 MG/DL (ref 0.5–1.4)
EST. GFR  (AFRICAN AMERICAN): >60 ML/MIN/1.73 M^2
EST. GFR  (NON AFRICAN AMERICAN): >60 ML/MIN/1.73 M^2
ESTIMATED AVG GLUCOSE: 183 MG/DL (ref 68–131)
GLUCOSE SERPL-MCNC: 137 MG/DL (ref 70–110)
HBA1C MFR BLD: 8 % (ref 4–5.6)
HDLC SERPL-MCNC: 45 MG/DL (ref 40–75)
HDLC SERPL: 28.3 % (ref 20–50)
LDLC SERPL CALC-MCNC: 73.4 MG/DL (ref 63–159)
NONHDLC SERPL-MCNC: 114 MG/DL
POTASSIUM SERPL-SCNC: 4 MMOL/L (ref 3.5–5.1)
PROT SERPL-MCNC: 7.5 G/DL (ref 6–8.4)
SODIUM SERPL-SCNC: 135 MMOL/L (ref 136–145)
TRIGL SERPL-MCNC: 203 MG/DL (ref 30–150)

## 2022-01-14 PROCEDURE — 83036 HEMOGLOBIN GLYCOSYLATED A1C: CPT | Performed by: NURSE PRACTITIONER

## 2022-01-14 PROCEDURE — 80061 LIPID PANEL: CPT | Performed by: NURSE PRACTITIONER

## 2022-01-14 PROCEDURE — 36415 COLL VENOUS BLD VENIPUNCTURE: CPT | Mod: PO | Performed by: NURSE PRACTITIONER

## 2022-01-14 PROCEDURE — 80053 COMPREHEN METABOLIC PANEL: CPT | Performed by: NURSE PRACTITIONER

## 2022-01-18 ENCOUNTER — TELEPHONE (OUTPATIENT)
Dept: ENDOSCOPY | Facility: HOSPITAL | Age: 67
End: 2022-01-18
Payer: MEDICAID

## 2022-01-19 ENCOUNTER — PATIENT MESSAGE (OUTPATIENT)
Dept: INTERNAL MEDICINE | Facility: CLINIC | Age: 67
End: 2022-01-19

## 2022-01-19 ENCOUNTER — PATIENT MESSAGE (OUTPATIENT)
Dept: INTERNAL MEDICINE | Facility: CLINIC | Age: 67
End: 2022-01-19
Payer: MEDICAID

## 2022-01-19 ENCOUNTER — OFFICE VISIT (OUTPATIENT)
Dept: INTERNAL MEDICINE | Facility: CLINIC | Age: 67
End: 2022-01-19
Payer: MEDICAID

## 2022-01-19 ENCOUNTER — TELEPHONE (OUTPATIENT)
Dept: INTERNAL MEDICINE | Facility: CLINIC | Age: 67
End: 2022-01-19
Payer: MEDICAID

## 2022-01-19 VITALS
DIASTOLIC BLOOD PRESSURE: 80 MMHG | TEMPERATURE: 98 F | SYSTOLIC BLOOD PRESSURE: 120 MMHG | OXYGEN SATURATION: 98 % | RESPIRATION RATE: 13 BRPM | BODY MASS INDEX: 31.36 KG/M2 | HEIGHT: 62 IN | HEART RATE: 94 BPM | WEIGHT: 170.44 LBS

## 2022-01-19 DIAGNOSIS — M85.80 OSTEOPENIA, UNSPECIFIED LOCATION: ICD-10-CM

## 2022-01-19 DIAGNOSIS — E78.5 HYPERLIPIDEMIA ASSOCIATED WITH TYPE 2 DIABETES MELLITUS: ICD-10-CM

## 2022-01-19 DIAGNOSIS — I10 ELEVATED BLOOD PRESSURE READING WITH DIAGNOSIS OF HYPERTENSION: ICD-10-CM

## 2022-01-19 DIAGNOSIS — E11.69 HYPERLIPIDEMIA ASSOCIATED WITH TYPE 2 DIABETES MELLITUS: ICD-10-CM

## 2022-01-19 DIAGNOSIS — E11.65 TYPE 2 DIABETES MELLITUS WITH HYPERGLYCEMIA, WITHOUT LONG-TERM CURRENT USE OF INSULIN: Primary | ICD-10-CM

## 2022-01-19 DIAGNOSIS — I48.0 PAF (PAROXYSMAL ATRIAL FIBRILLATION): ICD-10-CM

## 2022-01-19 DIAGNOSIS — E66.9 OBESITY, CLASS I, BMI 30-34.9: ICD-10-CM

## 2022-01-19 PROCEDURE — 3008F BODY MASS INDEX DOCD: CPT | Mod: CPTII,,, | Performed by: NURSE PRACTITIONER

## 2022-01-19 PROCEDURE — 99214 OFFICE O/P EST MOD 30 MIN: CPT | Mod: S$PBB,,, | Performed by: NURSE PRACTITIONER

## 2022-01-19 PROCEDURE — 3008F PR BODY MASS INDEX (BMI) DOCUMENTED: ICD-10-PCS | Mod: CPTII,,, | Performed by: NURSE PRACTITIONER

## 2022-01-19 PROCEDURE — 1159F MED LIST DOCD IN RCRD: CPT | Mod: CPTII,,, | Performed by: NURSE PRACTITIONER

## 2022-01-19 PROCEDURE — 3066F NEPHROPATHY DOC TX: CPT | Mod: CPTII,,, | Performed by: NURSE PRACTITIONER

## 2022-01-19 PROCEDURE — 3060F PR POS MICROALBUMINURIA RESULT DOCUMENTED/REVIEW: ICD-10-PCS | Mod: CPTII,,, | Performed by: NURSE PRACTITIONER

## 2022-01-19 PROCEDURE — 3074F SYST BP LT 130 MM HG: CPT | Mod: CPTII,,, | Performed by: NURSE PRACTITIONER

## 2022-01-19 PROCEDURE — 3060F POS MICROALBUMINURIA REV: CPT | Mod: CPTII,,, | Performed by: NURSE PRACTITIONER

## 2022-01-19 PROCEDURE — 3052F HG A1C>EQUAL 8.0%<EQUAL 9.0%: CPT | Mod: CPTII,,, | Performed by: NURSE PRACTITIONER

## 2022-01-19 PROCEDURE — 3052F PR MOST RECENT HEMOGLOBIN A1C LEVEL 8.0 - < 9.0%: ICD-10-PCS | Mod: CPTII,,, | Performed by: NURSE PRACTITIONER

## 2022-01-19 PROCEDURE — 1160F RVW MEDS BY RX/DR IN RCRD: CPT | Mod: CPTII,,, | Performed by: NURSE PRACTITIONER

## 2022-01-19 PROCEDURE — 99215 OFFICE O/P EST HI 40 MIN: CPT | Mod: PBBFAC,PO | Performed by: NURSE PRACTITIONER

## 2022-01-19 PROCEDURE — 3079F PR MOST RECENT DIASTOLIC BLOOD PRESSURE 80-89 MM HG: ICD-10-PCS | Mod: CPTII,,, | Performed by: NURSE PRACTITIONER

## 2022-01-19 PROCEDURE — 99999 PR PBB SHADOW E&M-EST. PATIENT-LVL V: CPT | Mod: PBBFAC,,, | Performed by: NURSE PRACTITIONER

## 2022-01-19 PROCEDURE — 1160F PR REVIEW ALL MEDS BY PRESCRIBER/CLIN PHARMACIST DOCUMENTED: ICD-10-PCS | Mod: CPTII,,, | Performed by: NURSE PRACTITIONER

## 2022-01-19 PROCEDURE — 3074F PR MOST RECENT SYSTOLIC BLOOD PRESSURE < 130 MM HG: ICD-10-PCS | Mod: CPTII,,, | Performed by: NURSE PRACTITIONER

## 2022-01-19 PROCEDURE — 1159F PR MEDICATION LIST DOCUMENTED IN MEDICAL RECORD: ICD-10-PCS | Mod: CPTII,,, | Performed by: NURSE PRACTITIONER

## 2022-01-19 PROCEDURE — 3079F DIAST BP 80-89 MM HG: CPT | Mod: CPTII,,, | Performed by: NURSE PRACTITIONER

## 2022-01-19 PROCEDURE — 99214 PR OFFICE/OUTPT VISIT, EST, LEVL IV, 30-39 MIN: ICD-10-PCS | Mod: S$PBB,,, | Performed by: NURSE PRACTITIONER

## 2022-01-19 PROCEDURE — 99999 PR PBB SHADOW E&M-EST. PATIENT-LVL V: ICD-10-PCS | Mod: PBBFAC,,, | Performed by: NURSE PRACTITIONER

## 2022-01-19 PROCEDURE — 3066F PR DOCUMENTATION OF TREATMENT FOR NEPHROPATHY: ICD-10-PCS | Mod: CPTII,,, | Performed by: NURSE PRACTITIONER

## 2022-01-19 RX ORDER — DULAGLUTIDE 3 MG/.5ML
3 INJECTION, SOLUTION SUBCUTANEOUS
Qty: 4 PEN | Refills: 11 | Status: SHIPPED | OUTPATIENT
Start: 2022-01-19 | End: 2022-11-17

## 2022-01-19 RX ORDER — DAPAGLIFLOZIN AND METFORMIN HYDROCHLORIDE 5; 1000 MG/1; MG/1
1 TABLET, FILM COATED, EXTENDED RELEASE ORAL 2 TIMES DAILY WITH MEALS
Qty: 60 TABLET | Refills: 6 | Status: SHIPPED | OUTPATIENT
Start: 2022-01-19 | End: 2022-08-04

## 2022-01-19 RX ORDER — FENOFIBRATE 160 MG/1
160 TABLET ORAL DAILY
Qty: 90 TABLET | Refills: 3 | Status: SHIPPED | OUTPATIENT
Start: 2022-01-19 | End: 2022-11-07

## 2022-01-19 NOTE — TELEPHONE ENCOUNTER
----- Message from Erika Ordonez sent at 1/19/2022  8:20 AM CST -----  Contact: YANDY STEVENS [[id number]] @[phone]  Patient has an appointment on tomorrow but wanted to know if she can be seen today after 10:00 am or one day next week.

## 2022-01-19 NOTE — PROGRESS NOTES
66 y.o. female, here for for 4 month follow up visit for management of T2dm -   Last seen September 2021 -     a1c remains about the same @ 8%.   Taking xigduo 5/1000mg tablets - 2 per day (was just on metformin before at last visit - I changed her).    Also on victoza 1.8mg SC every morning. She's been on for about 6 months now. Taking highest dose for 4 months now.   Not really helping bring her sugars down further.   No longer on glipizide. (I took her off this when I began the glp1).     She is checking blood sugars daily - ranging usually 150 - 180's,   Now improving over the past few days - 113 - 120's.   She is feeling ok -   Had covid 19 a few weeks ago - recovered and treated at home.   Had been living in [place] and admits had not been eating well over the holidays.   She has just started walking again -   Trying to eat healthier, however admits has a weakness for eating sweets.   Lipids stable except for high trigs - on statin daily.   Mild urine mac noted, but improving - 50's.       Last visit notes as follows from September 2021:   65 y.o. female, here for 3 month follow up for management of Type 2 diabetes.   Last seen in June 2021 - those notes are below.     a1c stable at 8% - still is not to goal, but has improved from previous visit from 9.5% to 8%.   She has continued on metformin 1000mg twice per day.   No longer on glipizide.   She is taking victoza still every morning - has increase from 1.2 to 1.8mg sc every morning.   Has kept weight off - but admits hard for her to carb count and always follow proper diet.   Her job changes so that has been stressful.   She ran out of test strips so has not been checking her sugars.   No other complaints today's visit.       Last visit notes as follows from June 2021:   65 y.o. female, here for 2 mo follow up visit for management of T2sm -   Last seen April 2021 - those notes below.     No new labs for today's visit. Her a1c was last @ 9.5% with fasting bg's  in the 250's.   Lost 10 lbs since last visit in just 2 months time.   Patient very motivated and following ADA diet now - really trying and putting forth effort.   Newly on victoza, has gone up to 1.2mg every morning and is taking.   No side effects, some appetite suppresion - no n/v/d.   Continues on metformin 1000mg bid.   Also on glipizide 10mg tablet once daily in am (was on bid).     Checking blood sugars - 1 - 2 times per day.   See list below. Running 130 - 140's on average.                     Last visit notes as follows from 4/9/2021:   HPI: Fely Wetzel is a 66 y.o.  female c/I for visit to address Diabetes Type 2  This is the first time I am seeing them for care, follows with Dr. Hien Sparks MD for primary care needs.   Has not seen endocrinology or diabetes specialist in the past.     was diagnosed with T2DM about 20 years ago.   Mother has type 2 diabetes.   Has never been hospitalized r/t DM.  Denies missing doses of DM medication.     a1c is up from 8% to 9.5% currently.   On metformin 1000 bid.   Glipizide 10 bid.   Was on tradjenta 5mg tablet daily, but states pharmacy didn't have any refills left and just stopped taking it 3 months ago.   So now that she thinks about it, this is likely why her a1c/sugasr went up.   Checks sugars on occasion. Last checked 6 months ago.   Doesn't like to fingerstick, doesn't like current meter.   She is very motivated to lose weight and start eating healthier, here to discuss her options for care.     Complications from diabetes include:   Negative for DKA.   Has never taken insulin in life.   -negative for diabetic neuropathy.   -negative for nephropathy, but + microalbuminuria.   Eyes - negative for Diabetic retinopathy   Denies CV disease.   Denies known CAD. + for afib. On Eliquis.  HTN - controlled on current meds.   HLD - controlled on statin 40mg every Hs.     Past medical History:   Past Medical History:   Diagnosis Date    Colon polyp 11/2014     Diabetes mellitus type II     Hyperlipidemia     Hypertension     Obesity     Osteopenia       Family hx:   Family History   Problem Relation Age of Onset    Diabetes Mother     Glaucoma Mother     Hyperlipidemia Mother     Hypertension Mother     Heart disease Father     Cancer Father         prostate cancer    Heart attack Father     Heart disease Brother     Cataracts Brother     Heart failure Neg Hx     Stroke Neg Hx       Current meds:   Current Outpatient Medications:     ACCU-CHEK JUAN Misc, CHECK FASTING GLUCOSE AND CHECK GLUCOSE TWO HOURS AFTER LUNCH OR DINNER, Disp: 1 each, Rfl: 0    amLODIPine (NORVASC) 10 MG tablet, Take 1 tablet (10 mg total) by mouth once daily., Disp: 90 tablet, Rfl: 3    apixaban (ELIQUIS) 5 mg Tab, Take 1 tablet (5 mg total) by mouth 2 (two) times daily., Disp: 60 tablet, Rfl: 11    atorvastatin (LIPITOR) 40 MG tablet, TAKE 1 TABLET BY MOUTH EVERY DAY IN THE EVENING, Disp: 30 tablet, Rfl: 5    blood-glucose meter Misc, Check fasting glucose and check glucose two hours after lunch or dinner, Disp: 1 each, Rfl: 0    cholecalciferol, vitamin D3, 1,250 mcg (50,000 unit) Tab, Take 1,250 mcg by mouth every 7 days., Disp: 12 tablet, Rfl: 1    cyanocobalamin 500 MCG tablet, Take 500 mcg by mouth once daily., Disp: , Rfl:     diclofenac sodium (VOLTAREN) 1 % Gel, Apply 2 g topically 4 (four) times daily., Disp: 100 g, Rfl: 2    flecainide (TAMBOCOR) 50 MG Tab, TAKE 1 TABLET (50 MG TOTAL) BY MOUTH EVERY 12 (TWELVE) HOURS., Disp: 60 tablet, Rfl: 11    hydroCHLOROthiazide (HYDRODIURIL) 25 MG tablet, TAKE 1 TABLET (25 MG TOTAL) BY MOUTH ONCE DAILY. WITH BREAKFAST, Disp: 30 tablet, Rfl: 11    lancets Misc, Check glucose twice daily, Disp: 100 each, Rfl: 3    lancing device with lancets (ONETOUCH DELICA LANC DEVICE) Kit, 1 each by Misc.(Non-Drug; Combo Route) route 2 (two) times daily before meals., Disp: 100 each, Rfl: 5    losartan (COZAAR) 100 MG tablet, TAKE 1  "TABLET BY MOUTH EVERY DAY, Disp: 30 tablet, Rfl: 11    metoprolol succinate (TOPROL-XL) 50 MG 24 hr tablet, TAKE 1 TABLET BY MOUTH EVERY DAY, Disp: 90 tablet, Rfl: 3    mupirocin (BACTROBAN) 2 % ointment, Apply topically 3 (three) times daily., Disp: 30 g, Rfl: 0    nitrofurantoin, macrocrystal-monohydrate, (MACROBID) 100 MG capsule, Take 1 capsule (100 mg total) by mouth 2 (two) times daily., Disp: 10 capsule, Rfl: 0    pen needle, diabetic 32 gauge x 5/32" Ndle, 1 each by Misc.(Non-Drug; Combo Route) route once daily. Use daily with victoza., Disp: 90 each, Rfl: 3    dapagliflozin-metformin (XIGDUO XR) 5-1,000 mg TBph, Take 1 tablet by mouth 2 (two) times daily with meals., Disp: 60 tablet, Rfl: 6    dulaglutide (TRULICITY) 3 mg/0.5 mL pen injector, Inject 3 mg into the skin every 7 days., Disp: 4 pen, Rfl: 11    fenofibrate 160 MG Tab, Take 1 tablet (160 mg total) by mouth once daily., Disp: 90 tablet, Rfl: 3     Current Diabetes medications:   Metformin 1000 bid. --> switched to xigduo 5/1000mg 2 tablets daily/    victoza 1.8 mg every morning.     Medications Tried and Failed:   tradjenta 5mg daily - just stopped taking 3 months ago - no s/e's, just ran out of Rx.   Glipizide 10 daily in morning.    Review of Pertinent co-morbidities/risk factors:   CV: Denies history of MI nor stroke.   CAD: Denies.  Does not take aspirin 81mg tablet daily  BP: has history of HTN  Statin: Taking  ACE/ARB: Taking    Social History     Tobacco Use   Smoking Status Never Smoker   Smokeless Tobacco Never Used        Social:   Lives at home by herself.    No current Life changes/stressors currently.  Diet: no following ADA diet   Meals: 3 per day and snacks.        Breakfast - eggs, tortillas, meats.         Lunch - salads and veggies.        Dinner - fish, veggies.        Snacks - veggies. Bananas.         Drinks - green veggie juices, water. Coffee    Exercise: none. Walking on occasion. Quit last year.   Activities: " "working full time - private sitter as CNA.     Glucose Monitoring:    checking blood sugars fingerstick 1 - 2 times per day.     Standards of care:   Eyes: .: 05/28/2021  Foot exam: : 05/19/2021   Diabetes education: None.    Vital Signs  /80 (BP Location: Right arm, Patient Position: Sitting, BP Method: Medium (Manual))   Pulse 94   Temp 97.7 °F (36.5 °C) (Temporal)   Resp 13   Ht 5' 2" (1.575 m)   Wt 77.3 kg (170 lb 6.7 oz)   SpO2 98%   BMI 31.17 kg/m²     Pertinent Labs:   Hgba1c   Lab Results   Component Value Date    HGBA1C 8.0 (H) 01/14/2022    HGBA1C 8.0 (H) 08/23/2021    HGBA1C 9.5 (H) 02/22/2021     Lipid panel   Lab Results   Component Value Date    CHOL 159 01/14/2022    CHOL 196 08/23/2021    CHOL 176 02/22/2021     Lab Results   Component Value Date    HDL 45 01/14/2022    HDL 40 08/23/2021    HDL 44 02/22/2021     Lab Results   Component Value Date    LDLCALC 73.4 01/14/2022    LDLCALC 90.6 08/23/2021    LDLCALC 92.4 02/22/2021     Lab Results   Component Value Date    TRIG 203 (H) 01/14/2022    TRIG 327 (H) 08/23/2021    TRIG 198 (H) 02/22/2021     Lab Results   Component Value Date    CHOLHDL 28.3 01/14/2022    CHOLHDL 20.4 08/23/2021    CHOLHDL 25.0 02/22/2021      CMP  Glucose   Date Value Ref Range Status   01/14/2022 137 (H) 70 - 110 mg/dL Final     BUN   Date Value Ref Range Status   01/14/2022 14 8 - 23 mg/dL Final     Creatinine   Date Value Ref Range Status   01/14/2022 0.7 0.5 - 1.4 mg/dL Final     eGFR if    Date Value Ref Range Status   01/14/2022 >60.0 >60 mL/min/1.73 m^2 Final     eGFR if non    Date Value Ref Range Status   01/14/2022 >60.0 >60 mL/min/1.73 m^2 Final     Comment:     Calculation used to obtain the estimated glomerular filtration  rate (eGFR) is the CKD-EPI equation.        AST   Date Value Ref Range Status   01/14/2022 17 10 - 40 U/L Final     ALT   Date Value Ref Range Status   01/14/2022 18 10 - 44 U/L Final     Microalbumin " creatinine ratio:   Lab Results   Component Value Date    JULIETLBCREAT 53.5 (H) 01/14/2022       Review Of Systems:   Gen: Appetite good, 10 - 15+ lbs total of weight loss, denies fatigue and weakness. Denies polydipsia.  Skin: Skin is intact and heals well, denies any rashes or hair changes.   EENT: Denies any acute visual disturbances, nor blurred vision.    Resp: Denies SOB or Dyspnea on exertion, denies cough.   Cardiac: Denies chest pain, palpitations, or swelling.   GI: Denies abdominal pain, nausea or vomiting, diarrhea, or constipation.   /GYN: Denies nocturia, nor burning, frequency or pain on urination.  MS/Neuro: Denies numbness/ tingling in BLE; Gait steady, speech clear  Psych: Denies drug/ETOH abuse, no hx of depression.  Feet: - bunion on right foot - following with podiatry.   Other systems: negative.    Physical Exam:   GENERAL: Well developed, well nourished in appearance.   PSYCH: AAOx3, appropriate mood and affect, pleasant expression, conversant, appears relaxed, well groomed.   EYES: PERRL, Conjunctiva and corneas clear  NECK: Soft and Supple, trachea midline  ABDOMEN: Soft, non-tender, non-distended.  VASCULAR: pedal pulses palpable bilaterally, no edema.  NEURO:  cranial nerves II - XII intact   MUSCULOSKELETAL: Good ROM, steady gait.   SKIN: Skin warm, dry, and intact     Assessment and Plan of Care:     Fely was seen today for diabetes and follow-up.    Diagnoses and all orders for this visit:    Type 2 diabetes mellitus with hyperglycemia, without long-term current use of insulin  -     Hemoglobin A1C; Future  -     Microalbumin/Creatinine Ratio, Urine; Future  -     Comprehensive Metabolic Panel; Future    Hyperlipidemia associated with type 2 diabetes mellitus  -     Lipid Panel; Future  -     fenofibrate 160 MG Tab; Take 1 tablet (160 mg total) by mouth once daily.    Elevated blood pressure reading with diagnosis of hypertension    Obesity, Class I, BMI 30-34.9    PAF (paroxysmal  atrial fibrillation)    Osteopenia, unspecified location    Other orders  -     dulaglutide (TRULICITY) 3 mg/0.5 mL pen injector; Inject 3 mg into the skin every 7 days.  -     dapagliflozin-metformin (XIGDUO XR) 5-1,000 mg TBph; Take 1 tablet by mouth 2 (two) times daily with meals.       1. T2DM with hyperglycemia- Hgba1c goal is 7.5% or less without hypoglycemia - 8.5%---> 8%---> 9.5%--> 8%--> 8% current.   Improving -   discussed DM, progression of disease, long term complications, CV risk factors and tx options.   Advise compliance with ADA diet and encourage exercise- gave list.   Continue xigduo 5/1000mg tablet 2 times per day with meals.   Will try switching the victoza 1.8mg daily to Trulicity 3mg every week -   Discussed MOA, possible side effects including yeast infection, UTI, dehydration and ketoacidosis, importance of maintaining hydration and avoiding No carb diets. Good use hygiene. Notify my office if any side effects. Will monitor renal function closely. Stable at present.   glp1 - for cardiac protection, and weight loss also - which will then reverse her diabetes and treat the actual problem of insulin resistance.   No known history of pancreatitis or medullary thyroid cancer.   If you experience severe abdominal pain, nausea, or diarrhea - please call me or notify my office immediately.   Advised small, frequent meals -   Try to eliminate/cut down on sweets.   Keep walking.    Instructed to monitor Blood glucose 2x/day before meals and bring meter/ log to every clinic visit.   Sent in refills for strips.   Eyes - Dr. Sweeney 2021 -     2. HTN - controlled, continue meds as previously prescribed and monitor.   Urine mac + 170's--> 70's--. 50's -->  Improving. Starting on sglt2i   Losartan, norvasc.     3. HLd - LDL goal < 100. Minimally meets goal. Trigs are still high - add on triglycerides.   Currently on statin therapy- lipitor 40mg every HS   On eliquis -     4. Weight - BMI Body mass index is  31.17 kg/m².   Encourage Ada diet and exercise.   Continue glp1 and sglt2i.      5. Renal Function - stable. No nephropathy but + urine mac.   On losartan. Will continue to monitor.     6. Osteopenia  - on ergo weekly. Sent in refill.   Advised to start walking     7. Afib - eliquis and metoprolol.          Labs and follow up in 3 - 4 months.

## 2022-01-19 NOTE — PATIENT INSTRUCTIONS
"Continue xigduo 2 tablets daily.   Drink plenty of water.     Stop victoza every morning and switch to trulicity 3mg injection every week.   Rotate injection sites.   Eat small frequent meals.   Follow diabetic diet.     Exercise if able.     Check you sugar daily in morning time -   Normal fasting glucose should range 80 - 120's.       Low Carb Snacks & Diabetes friendly foods   Aim for 30 - 40 grams of carbs for 3 meals per day (or 2 meals and  1 - 2 snacks - however you prefer)  Snacks can be 15 - 20 grams of carbs.     Eating small, frequent meals will help you to feel more satisfied, and more full so that you don't over eat or eat the wrong foods later.   Also, geraldine meals/snacks allow you to spread carbohydrates evenly, which may stabilize blood sugars.   Below you will find a list of ideas of foods that I like/prefer.   Feel free to give me your ideas and tips if you find good ones too!   Overall - please remember to limit refined sugars such as soft drinks, juices, rices, pastas, breads, cakes.   Look at the back of the label - look at the amount of "carbohydrates", then look at the amount of "fiber" - subtract the amount of fiber from the amount of carbs - this is your "net carb intake".  The more fiber a food has, the better generally, as you get to subtract this from the net carbs.     0-5 gm carb   Crystal Light flavoring (I like fruit punch flavor!)   Vitamin Water Zero   Angelita antioxidant drinks.    Sparkling ice (long skinny flavored water bottles for $1 that are sugar free).    Fely water, WaterLoo - carbonated flavored duncan, various flavors.   Diet coke, diet barqs, sprite zero.   Diet sunkist (orange drink)   Sugar free powerade   Herbal tea, unsweetened   2 tsp peanut butter on celery or carrots   Analy's original Candies - (the Sugar Free ones!)   1/2 cup sugar-free jell-o   1 sugar-free popsicle   ¼ cup blueberries or strawberries   8oz Blue Tatiana unsweetened almond milk " "(or there is sugar free vanilla flavored as well).   5 baby carrots & celery sticks, cucumbers, bell peppers dipped in ¼ cup salsa, 2Tbsp light ranch dressing or 2Tbsp plain Greek yogurt   10 Goldfish crackers   ½ oz low-fat cheese or string cheese   1 closed handful of nuts, unsalted (example-->almonds, pistachios, cashews, peanuts, etc).   1 Tbsp of sunflower seeds, unsalted   1 cup Smart Pop popcorn   1 whole grain brown rice cake    "Think Thin" protein bars - my personal favorite is Creamy Peanut butter, chocolate brownie, or oreo flavors.   "Courtney" bars  - can be found at [employer] grocery store - they have 5 grams of net carbohydrates.   Quest bars (my favorite is birthday cake, and also cinnamon roll is good melted for 10 seconds in the microwave - please remove the wrapper first!)   Premier protein shakes - sold at [employer], or other brand alternatives - usually 1 - 2 grams of carbs (strawberry, vanilla, chocolate flavors) Coffee flavor is my new morning favorite!    Scrambled eggs! Or a fried egg or boiled eggs - add sliced tomatoes, cilantro or some chopped green onions.    Eggs are good with any and all veggies! You can even eat them for dinner or in a low carb tortilla, or served with your favorite grilled meat/sausage or weston is my favorite.    Check out pre-made egg scrambles in the egg grocery store section - Ore Lois "just crack an egg" is premixed cheese, weston and small amount of pototes, small cup size portions for your breakfast - very convenient!    Veggie spirals - zucchini - can buy in the frozen foods section. Birds eye brand is usually cheap. Tip - saute with oil/onions or italian seasoning and use this as a pasta substitution.   Milk - is usually high in carbs/sugar - use [employer] milk brand instead - it is "filtered" milk - with half the amount of carbs of regular milk. (next to the milk in milk aisle).    Smuckers sugar free Breakfast syrup (or 1 tablespoon of low sugar " "breakfast syrup) instead of regular syrup.        15 gm carb   1 small piece of fruit or ½ banana or 1/2 cup lite canned fruit   3 brad cracker squares   3 cups Smart Pop popcorn, top spray butter, Milton lite salt or cinnamon and Truvia   5 Vanilla Wafers   ½ cup low fat, no added sugar ice cream or frozen yogurt (Blue bell, Blue Bunny, Weight Watchers, Skinny Cow)   1/2 - 1 cup Light n' fit Vanilla yogurt (has added protein in it to make you feel full).    ½ turkey, ham, or chicken sandwich   ½ c fruit with ½ c Cottage cheese   4-6 unsalted wheat crackers with 1 oz low fat cheese or 1 tbsp peanut butter    30-45 goldfish crackers (depending on flavor)    7-8 Religious mini brown rice cakes (caramel, apple cinnamon, chocolate)    12 Religious mini brown rice cakes (cheddar, bbq, ranch)    1/3 cup hummus dip with raw veg   1/2 whole wheat julián, 1Tbsp hummus   Mini Pizza (1/2 whole wheat English muffin, low-fat  cheese, tomato sauce)   100 calorie snack pack (Oreo, Chips Ahoy, Ritz Mix, Baked Cheetos)   4-6 oz. light or Greek Style yogurt (Chobani, Yoplait, Okios, [place]field)   ½ cup sugar-free pudding     6 in. wheat tortilla or julián oven toasted chips (topped with spray butter flavoring, cinnamon, Truvia OR spray butter, garlic powder, chili powder)    18 BBQ Popchips (available at "[employer] Whole Foods, Fresh Market)   Mini bagel (small size) toasted - add fat free cream cheese or avocado and sliced tomato on top - yum!    1/2 cup Halo top icecream - birthday cake is my go-to flavor.    Truth Bars - can be found at Fresh market - Net carbs is 11 - 12 grams depending on the flavor.    Kind bars = mostly nuts and dried berries - find at most local groceries stores, drug stores, whole foods, fresh market. Net carbs is around 10 grams.     Smoothie Jc - I'm often asked "what smoothie is healthy for me". Get the 20 oz. Size.   Do not be decieved to think that all smoothies are "healthy". In fact, " "most are loaded with hidden sugars.   Here are some from the menu that you are allowed to have being diabetic:   The gladiator - any flavor. This is the lowest in carbs (3 - 5 grams only)  Keto champ (berry, chocolate or coffee - all have 14 - 19 grams of carbs in 20 oz size).   The Lean 1 smoothies - vanilla, strawberry and chocolate have under 30 grams of carbs, so this is slightly more. But would be ok for a meal substitute or snack.   The Shredder in Vanilla or chocolate only.  17 grams of carbs - (the strawberry has more sugar considerably)    Tips and Tricks:     Eat an extra vegetable once per day - green veggies (such as broccoli, cauliflower, green beans) - make you feel full and are low in sugar.     My favorite "secret" seasoning to make things extra yummy is cinnamon for sweet taste   For an added secret "salty" taste - add "everything but the bagel" seasoning (you can get on amazon.com or at  joes. I have seen at local groceries such as [employer] too!).     Dark colored fruits are your friend -- blueberries, strawberries, raspberries, blackberries. They have a lower sugar content. So will be the best choice for you.   Light colored fruits are NOT your friend - they tend to be higher in sugar and are not the best options for an ADA diet - examples are oranges, bananas, peaches, watermelon, -- you can eat these, but carefully and in moderation.     WATER. I cannot emphasize drinking water enough - it will hydrate you, make you full. Your body needs it - it's a natural appetite suppressant.   Sometimes hunger and thirst can feel the same. Try drinking some water first, then eat if you are still hungry.     Other snack choices    Celery with peanut butter   Celery with tuna salad   Dill pickles and cheddar cheese (no kidding, it's a great combo)   Nuts (keep raw ones in the freezer if you think you'll overeat them)   Sunflower seeds (get them in the shell so it will take longer to eat them)   Other " "seeds (How to Toast Pumpkin or Squash Seeds)    Pistachios or almonds - will fill you up and are tasty!    Low-Carb Trail Mix   Jerky (beef or turkey -- try to find low-sugar varieties)   Salami slices (you can find in the deli section)   Cheese sticks, such as string cheese   Sugar-free Jello, alone or with cottage cheese and a sprinkling of nuts. Make sugar-free lime Jello with part coconut milk -- For a large package, dissolve the powder in a cup of boiling water, add a can of coconut milk, and then add the rest of the water. Stir well.   Pepperoni "chips" -- Zap the slices in the microwave   Cheese with a few apple slices   4-ounce plain or sugar-free yogurt with berries and flax seed meal   Smoked salmon and cream cheese on cucumber slices   Lettuce Roll-ups -- Roll luncheon meat, egg salad, tuna or other filling and veggies in lettuce leaves   Lunch Meat Roll-ups -- Roll cheese or veggies in lunch meat (read the labels for carbs on the lunch meat)   Spread bean dip, spinach dip, or other low-carb dip or spread on the lunch meat or lettuce and then roll it up   Raw veggies and spinach dip, or other low-carb dip   Pork rinds (Chicharrón), with or without dip   Ricotta cheese with fruit and/or nuts and/or flax seed meal   Mushrooms with cheese spread inside (or other spreads or dips)   Low-carb snack bars (watch out for sugar alcohols, especially maltitol)   Product Review: Atkins Advantage Bars   Pepperoni Chips -- Microwave pepperoni slices until crisp. Great with cheeses and dips   Garlic Parmesan Flax Seed Crackers   Parmesan Crisps -- Good when you want a crunchy snack.   Peanut Butter Protein Balls      "

## 2022-01-21 ENCOUNTER — PATIENT MESSAGE (OUTPATIENT)
Dept: INTERNAL MEDICINE | Facility: CLINIC | Age: 67
End: 2022-01-21
Payer: MEDICAID

## 2022-01-21 DIAGNOSIS — Z12.12 SCREENING FOR COLORECTAL CANCER: Primary | ICD-10-CM

## 2022-01-21 DIAGNOSIS — E11.65 TYPE 2 DIABETES MELLITUS WITH HYPERGLYCEMIA, WITHOUT LONG-TERM CURRENT USE OF INSULIN: Primary | ICD-10-CM

## 2022-01-21 DIAGNOSIS — K21.9 GASTROESOPHAGEAL REFLUX DISEASE, UNSPECIFIED WHETHER ESOPHAGITIS PRESENT: ICD-10-CM

## 2022-01-21 DIAGNOSIS — Z12.11 SCREENING FOR COLORECTAL CANCER: Primary | ICD-10-CM

## 2022-01-21 NOTE — TELEPHONE ENCOUNTER
The patient is stating that you mention she can have another test at the same time as her Colonoscopy.  I believe she is referring to a EGD. If so please order.

## 2022-01-23 NOTE — TELEPHONE ENCOUNTER
Refill Routing Note   Medication(s) are not appropriate for processing by [facility] for the following reason(s):      - Medication not previously prescribed by PCP    [facility] action(s):  Defer          --->Care Gap information included in message below if applicable.   Medication reconciliation completed: No   Automatic Epic Generated Protocol Data:        Requested Prescriptions   Pending Prescriptions Disp Refills    hydroCHLOROthiazide (HYDRODIURIL) 25 MG tablet [Pharmacy Med Name: HYDROCHLOROTHIAZIDE 25 MG TAB] 90 tablet 2     Sig: TAKE 1 TABLET (25 MG TOTAL) BY MOUTH ONCE DAILY. WITH BREAKFAST       Cardiovascular: Diuretics - Thiazide Passed - 1/13/2022 12:10 AM        Passed - Patient is at least 18 years old        Passed - Last BP in normal range within 360 days     BP Readings from Last 1 Encounters:   01/19/22 120/80               Passed - Valid encounter within last 15 months     Recent Visits  Date Type Provider Dept   10/07/21 Office Visit Hien Sparks MD [facility] Internal Medicine   02/24/20 Office Visit Paula Barkley DO [facility] Medicine   Showing recent visits within past 720 days and meeting all other requirements  Future Appointments  No visits were found meeting these conditions.  Showing future appointments within next 150 days and meeting all other requirements      Future Appointments              In 1 month EKG, APPT [facility], [facility]    In 1 month Beck Mathias MD [facility] Cardiologys[facility]-[id number], [facility]    In 2 months Hien Sparks MD [facility], [place]    In 2 months LAB, [place] [facility] [place]    In 2 months SPECIMEN, [place] [facility] [place]    In 3 months Noelle Dinh NP [place] [facility], [place]                Passed - Cr is 1.39 or below and within 360 days     Lab Results   Component Value Date    CREATININE 0.7 01/14/2022    CREATININE  0.9 08/23/2021    CREATININE 0.8 02/22/2021              Passed - K in normal range and within 360 days     Potassium   Date Value Ref Range Status   01/14/2022 4.0 3.5 - 5.1 mmol/L Final   08/23/2021 3.5 3.5 - 5.1 mmol/L Final   02/22/2021 3.9 3.5 - 5.1 mmol/L Final              Passed - Na is between 130 and 148 and within 360 days     Sodium   Date Value Ref Range Status   01/14/2022 135 (L) 136 - 145 mmol/L Final   08/23/2021 135 (L) 136 - 145 mmol/L Final   02/22/2021 141 136 - 145 mmol/L Final              Passed - eGFR within 360 days     Lab Results   Component Value Date    EGFRNONAA >60.0 01/14/2022    EGFRNONAA >60.0 08/23/2021    EGFRNONAA >60.0 02/22/2021                  losartan (COZAAR) 100 MG tablet [Pharmacy Med Name: LOSARTAN POTASSIUM 100 MG TAB] 90 tablet 2     Sig: TAKE 1 TABLET BY MOUTH EVERY DAY       Cardiovascular:  Angiotensin Receptor Blockers Passed - 1/13/2022 12:10 AM        Passed - Patient is at least 18 years old        Passed - Last BP in normal range within 360 days     BP Readings from Last 1 Encounters:   01/19/22 120/80               Passed - Valid encounter within last 15 months     Recent Visits  Date Type Provider Dept   10/07/21 Office Visit Hien Sparks MD [facility] Internal Medicine   02/24/20 Office Visit Paula Barkley DO [facility] Internal Medicine   Showing recent visits within past 720 days and meeting all other requirements  Future Appointments  No visits were found meeting these conditions.  Showing future appointments within next 150 days and meeting all other requirements      Future Appointments              In 1 month EKG, APPT [facility], [facility]    In 1 month MD Jenna Muniz Cardiologysvcs-[id number], [facility]    In 2 months Hien Sparks MD [facility] - [facility], [place]    In 2 months LAB, [facility], [place]    In 2 months SPECIMEN, [facility], [place]     In 3 months Noelle Dinh, [facility], [place]                Passed - Cr is 1.39 or below and within 360 days     Lab Results   Component Value Date    CREATININE 0.7 01/14/2022    CREATININE 0.9 08/23/2021    CREATININE 0.8 02/22/2021              Passed - K is 5.2 or below and within 360 days     Potassium   Date Value Ref Range Status   01/14/2022 4.0 3.5 - 5.1 mmol/L Final   08/23/2021 3.5 3.5 - 5.1 mmol/L Final   02/22/2021 3.9 3.5 - 5.1 mmol/L Final              Passed - eGFR within 360 days     Lab Results   Component Value Date    EGFRNONAA >60.0 01/14/2022    EGFRNONAA >60.0 08/23/2021    EGFRNONAA >60.0 02/22/2021                      Appointments  past 12m or future 3m with PCP    Date Provider   Last Visit   10/7/2021 Hien Sparks MD   Next Visit   4/6/2022 Hien Sparks MD   ED visits in past 90 days: 0        Note composed:7:55 AM 01/23/2022

## 2022-01-24 ENCOUNTER — PATIENT MESSAGE (OUTPATIENT)
Dept: INTERNAL MEDICINE | Facility: CLINIC | Age: 67
End: 2022-01-24
Payer: MEDICAID

## 2022-01-24 RX ORDER — LOSARTAN POTASSIUM 100 MG/1
TABLET ORAL
Qty: 30 TABLET | Refills: 11 | Status: SHIPPED | OUTPATIENT
Start: 2022-01-24 | End: 2023-01-30

## 2022-01-24 RX ORDER — HYDROCHLOROTHIAZIDE 25 MG/1
25 TABLET ORAL DAILY
Qty: 30 TABLET | Refills: 11 | Status: SHIPPED | OUTPATIENT
Start: 2022-01-24 | End: 2023-01-30

## 2022-01-24 NOTE — TELEPHONE ENCOUNTER
The patient is requesting a note of clearance to present to her employer.          Please advise. Thank you.

## 2022-01-25 ENCOUNTER — PATIENT MESSAGE (OUTPATIENT)
Dept: INTERNAL MEDICINE | Facility: CLINIC | Age: 67
End: 2022-01-25
Payer: MEDICAID

## 2022-01-25 DIAGNOSIS — Z01.818 PRE-OP TESTING: Primary | ICD-10-CM

## 2022-01-25 NOTE — TELEPHONE ENCOUNTER
Patient positive on 1/5/22. She went to retest on 1/12/22 and it came back negative. She denies having any Covid symptoms at this time.  Can you write a work note?

## 2022-01-31 ENCOUNTER — LAB VISIT (OUTPATIENT)
Dept: PRIMARY CARE CLINIC | Facility: CLINIC | Age: 67
End: 2022-01-31
Payer: MEDICAID

## 2022-01-31 DIAGNOSIS — Z01.818 PRE-OP TESTING: ICD-10-CM

## 2022-01-31 DIAGNOSIS — U07.1 COVID-19 VIRUS DETECTED: ICD-10-CM

## 2022-01-31 LAB
SARS-COV-2 RNA RESP QL NAA+PROBE: DETECTED
SARS-COV-2- CYCLE NUMBER: 36

## 2022-01-31 PROCEDURE — U0005 INFEC AGEN DETEC AMPLI PROBE: HCPCS | Performed by: CLINICAL NURSE SPECIALIST

## 2022-01-31 PROCEDURE — U0003 INFECTIOUS AGENT DETECTION BY NUCLEIC ACID (DNA OR RNA); SEVERE ACUTE RESPIRATORY SYNDROME CORONAVIRUS 2 (SARS-COV-2) (CORONAVIRUS DISEASE [COVID-19]), AMPLIFIED PROBE TECHNIQUE, MAKING USE OF HIGH THROUGHPUT TECHNOLOGIES AS DESCRIBED BY CMS-2020-01-R: HCPCS | Performed by: CLINICAL NURSE SPECIALIST

## 2022-02-01 ENCOUNTER — TELEPHONE (OUTPATIENT)
Dept: SURGERY | Facility: CLINIC | Age: 67
End: 2022-02-01
Payer: MEDICAID

## 2022-02-01 NOTE — TELEPHONE ENCOUNTER
Called pt to review covid + results. No answer. Voicemail left instructing of + results and to not start prep for colonoscopy. Provided with schedulers phone number for any questions. Pt has seen results via [employer].

## 2022-02-04 ENCOUNTER — PATIENT MESSAGE (OUTPATIENT)
Dept: INTERNAL MEDICINE | Facility: CLINIC | Age: 67
End: 2022-02-04
Payer: MEDICAID

## 2022-02-04 NOTE — TELEPHONE ENCOUNTER
Pt colonoscopy was cancelled due to having Covid in January. Pt state had a negative test but took a PCR test that resulted positive. Pt state doesn't have major symptoms except sore throat. Would like to know if there's a prescription that can be sent?

## 2022-02-04 NOTE — TELEPHONE ENCOUNTER
The patient informed that both test have been ordered. The number to the  Colonoscopy department given to her.

## 2022-02-21 ENCOUNTER — TELEPHONE (OUTPATIENT)
Dept: ELECTROPHYSIOLOGY | Facility: CLINIC | Age: 67
End: 2022-02-21
Payer: MEDICAID

## 2022-02-22 ENCOUNTER — OFFICE VISIT (OUTPATIENT)
Dept: ELECTROPHYSIOLOGY | Facility: CLINIC | Age: 67
End: 2022-02-22
Payer: MEDICAID

## 2022-02-22 ENCOUNTER — HOSPITAL ENCOUNTER (OUTPATIENT)
Dept: CARDIOLOGY | Facility: CLINIC | Age: 67
Discharge: HOME | End: 2022-02-22
Payer: MEDICAID

## 2022-02-22 VITALS
DIASTOLIC BLOOD PRESSURE: 60 MMHG | HEIGHT: 62 IN | SYSTOLIC BLOOD PRESSURE: 127 MMHG | BODY MASS INDEX: 30.95 KG/M2 | WEIGHT: 168.19 LBS | HEART RATE: 73 BPM

## 2022-02-22 DIAGNOSIS — E11.65 TYPE 2 DIABETES MELLITUS WITH HYPERGLYCEMIA, WITHOUT LONG-TERM CURRENT USE OF INSULIN: ICD-10-CM

## 2022-02-22 DIAGNOSIS — I48.0 PAF (PAROXYSMAL ATRIAL FIBRILLATION): Primary | ICD-10-CM

## 2022-02-22 DIAGNOSIS — I10 PRIMARY HYPERTENSION: ICD-10-CM

## 2022-02-22 DIAGNOSIS — I49.8 OTHER SPECIFIED CARDIAC ARRHYTHMIAS: ICD-10-CM

## 2022-02-22 DIAGNOSIS — E66.9 OBESITY, CLASS I, BMI 30-34.9: ICD-10-CM

## 2022-02-22 PROCEDURE — 99999 PR PBB SHADOW E&M-EST. PATIENT-LVL V: ICD-10-PCS | Mod: PBBFAC,,, | Performed by: INTERNAL MEDICINE

## 2022-02-22 PROCEDURE — 3052F PR MOST RECENT HEMOGLOBIN A1C LEVEL 8.0 - < 9.0%: ICD-10-PCS | Mod: CPTII,,, | Performed by: INTERNAL MEDICINE

## 2022-02-22 PROCEDURE — 3060F POS MICROALBUMINURIA REV: CPT | Mod: CPTII,,, | Performed by: INTERNAL MEDICINE

## 2022-02-22 PROCEDURE — 99999 PR PBB SHADOW E&M-EST. PATIENT-LVL V: CPT | Mod: PBBFAC,,, | Performed by: INTERNAL MEDICINE

## 2022-02-22 PROCEDURE — 3074F SYST BP LT 130 MM HG: CPT | Mod: CPTII,,, | Performed by: INTERNAL MEDICINE

## 2022-02-22 PROCEDURE — 4010F ACE/ARB THERAPY RXD/TAKEN: CPT | Mod: CPTII,,, | Performed by: INTERNAL MEDICINE

## 2022-02-22 PROCEDURE — 93010 ELECTROCARDIOGRAM REPORT: CPT | Mod: S$PBB,,, | Performed by: INTERNAL MEDICINE

## 2022-02-22 PROCEDURE — 3078F PR MOST RECENT DIASTOLIC BLOOD PRESSURE < 80 MM HG: ICD-10-PCS | Mod: CPTII,,, | Performed by: INTERNAL MEDICINE

## 2022-02-22 PROCEDURE — 1159F PR MEDICATION LIST DOCUMENTED IN MEDICAL RECORD: ICD-10-PCS | Mod: CPTII,,, | Performed by: INTERNAL MEDICINE

## 2022-02-22 PROCEDURE — 1160F PR REVIEW ALL MEDS BY PRESCRIBER/CLIN PHARMACIST DOCUMENTED: ICD-10-PCS | Mod: CPTII,,, | Performed by: INTERNAL MEDICINE

## 2022-02-22 PROCEDURE — 3052F HG A1C>EQUAL 8.0%<EQUAL 9.0%: CPT | Mod: CPTII,,, | Performed by: INTERNAL MEDICINE

## 2022-02-22 PROCEDURE — 99215 OFFICE O/P EST HI 40 MIN: CPT | Mod: PBBFAC | Performed by: INTERNAL MEDICINE

## 2022-02-22 PROCEDURE — 3008F BODY MASS INDEX DOCD: CPT | Mod: CPTII,,, | Performed by: INTERNAL MEDICINE

## 2022-02-22 PROCEDURE — 1159F MED LIST DOCD IN RCRD: CPT | Mod: CPTII,,, | Performed by: INTERNAL MEDICINE

## 2022-02-22 PROCEDURE — 3074F PR MOST RECENT SYSTOLIC BLOOD PRESSURE < 130 MM HG: ICD-10-PCS | Mod: CPTII,,, | Performed by: INTERNAL MEDICINE

## 2022-02-22 PROCEDURE — 99214 PR OFFICE/OUTPT VISIT, EST, LEVL IV, 30-39 MIN: ICD-10-PCS | Mod: S$PBB,,, | Performed by: INTERNAL MEDICINE

## 2022-02-22 PROCEDURE — 99214 OFFICE O/P EST MOD 30 MIN: CPT | Mod: S$PBB,,, | Performed by: INTERNAL MEDICINE

## 2022-02-22 PROCEDURE — 93005 ELECTROCARDIOGRAM TRACING: CPT | Mod: PBBFAC | Performed by: INTERNAL MEDICINE

## 2022-02-22 PROCEDURE — 3008F PR BODY MASS INDEX (BMI) DOCUMENTED: ICD-10-PCS | Mod: CPTII,,, | Performed by: INTERNAL MEDICINE

## 2022-02-22 PROCEDURE — 93010 RHYTHM STRIP: ICD-10-PCS | Mod: S$PBB,,, | Performed by: INTERNAL MEDICINE

## 2022-02-22 PROCEDURE — 3066F PR DOCUMENTATION OF TREATMENT FOR NEPHROPATHY: ICD-10-PCS | Mod: CPTII,,, | Performed by: INTERNAL MEDICINE

## 2022-02-22 PROCEDURE — 3078F DIAST BP <80 MM HG: CPT | Mod: CPTII,,, | Performed by: INTERNAL MEDICINE

## 2022-02-22 PROCEDURE — 3066F NEPHROPATHY DOC TX: CPT | Mod: CPTII,,, | Performed by: INTERNAL MEDICINE

## 2022-02-22 PROCEDURE — 3060F PR POS MICROALBUMINURIA RESULT DOCUMENTED/REVIEW: ICD-10-PCS | Mod: CPTII,,, | Performed by: INTERNAL MEDICINE

## 2022-02-22 PROCEDURE — 4010F PR ACE/ARB THEARPY RXD/TAKEN: ICD-10-PCS | Mod: CPTII,,, | Performed by: INTERNAL MEDICINE

## 2022-02-22 PROCEDURE — 1160F RVW MEDS BY RX/DR IN RCRD: CPT | Mod: CPTII,,, | Performed by: INTERNAL MEDICINE

## 2022-02-22 NOTE — PROGRESS NOTES
Subjective:    Patient ID:  Fely Wetzel is a 66 y.o. female who presents for evaluation of symptomatic atrial fibrillation.     Referring Cardiologist: Marie Dominguez MD  Primary Care Physician: Paula Barkley DO    Prior Hx:  I had the pleasure of seeing Mrs. Wetzel today in our electrophysiology clinic in follow-up for her atrial fibrillation. As you are aware she is a pleasant 66 year-old woman with hypertension, diabetes mellitus type 2, and obesity. She was in her usual state of health until June 2019 when she developed sudden palpitations. She called EMS and reportedly was in AF with ventricular rates up to 170s-220s. She was given IV cardizem and her ventricular rate lowered to 110s-170s. Per ER physician she was in atrial fibrillation however no ECGs document her arrhythmia. She was given 20mg more of cardizem and spontaneously converted to sinus rhythm. TSH was normal. She was discharged on eliquis and metoprolol. She was referred to Dr. Dominguez and discussed with her if there was possibility at some point of stopping anticoagulation. She reports for the past few weeks she has been having episodes of palpitations with radiation into her neck. These are different than when she went to the ER.     An exercise stress echocardiogram noted normal LV function and no ischemic changes. An echocardiogram performed on 10/15/2019 showed normal LV function, normal valves, and moderate LA enlargement ([id number] [id number]).    Our plan was for a 30 day monitor, sleep study and possibly starting flecainide.    Mrs. Wetzel presented 5/2020 for follow-up. A 30 day monitor done in March of 2020 which showed no atrial fibrillation. She presented to the ER in November of 2019 with recurrent symptomatic AF with RVR that was rate controlled with cardizem and spontaneously converted. She notes occasional palpitations at night. She has not yet had a sleep study.    Mrs. Wetzel returned for follow-up 12/2020. She is on  flecainide. She continues on metoprolol/eliquis. She notes occasional episodes of chest discomfort at rest, not with activity. She drinks water and it goes away. She does have indigestion. No symptomatic AF. Recent CBC noted stable H/H.        Update 2/22/2022  Mrs. Wetzel was last seen  7/2021 during which she was doing well from atrial fibrillation standpoint  without any reports of symptomatic AF. She reported chest discomfort that occurs after eating in the evening and was suspected to be due to dyspepsia. Protonix was prescribed at that time. Mr. Wetzel is here her follow up and she once again reports that she is doing well. She denies any sustained periods of atrial fibrillation. Her non cardiac chest discomfort has persistent in spite of protonix and she is planned for  coloscopy/EGD.  She is still employed and has no limitations with her usual activities. She also watches her diet.     My interpretation of today's in clinic ECG is sinus rhythm at 75 bpm with a narrow QRS.      Review of Systems   Constitutional: Negative for fever and malaise/fatigue.   HENT: Negative for congestion and sore throat.    Eyes: Negative for blurred vision and visual disturbance.   Cardiovascular: Negative for chest pain, dyspnea on exertion, irregular heartbeat and palpitations.   Respiratory: Positive for sleep disturbances due to breathing and snoring. Negative for cough and shortness of breath.    Hematologic/Lymphatic: Negative for bleeding problem. Does not bruise/bleed easily.   Skin: Negative.    Musculoskeletal: Negative.    Gastrointestinal: Positive for heartburn. Negative for bloating and abdominal pain.   Neurological: Positive for excessive daytime sleepiness. Negative for dizziness and focal weakness.        Objective:    Physical Exam  Vitals reviewed.   Constitutional:       General: She is not in acute distress.     Appearance: She is well-developed. She is not diaphoretic.   HENT:      Head: Normocephalic  and atraumatic.   Eyes:      General:         Right eye: No discharge.         Left eye: No discharge.      Conjunctiva/sclera: Conjunctivae normal.   Neck:      Vascular: No JVD.   Cardiovascular:      Rate and Rhythm: Normal rate and regular rhythm.      Heart sounds: No murmur heard.    No friction rub. No gallop.   Pulmonary:      Effort: Pulmonary effort is normal. No respiratory distress.      Breath sounds: Normal breath sounds. No wheezing or rales.   Abdominal:      General: Bowel sounds are normal. There is no distension.      Palpations: Abdomen is soft.      Tenderness: There is no abdominal tenderness. There is no rebound.   Musculoskeletal:      Cervical back: Neck supple.   Skin:     General: Skin is warm and dry.   Neurological:      Mental Status: She is alert and oriented to person, place, and time.   Psychiatric:         Behavior: Behavior normal.         Thought Content: Thought content normal.         Judgment: Judgment normal.           Assessment:       1. PAF (paroxysmal atrial fibrillation)         Plan:       In summary, Mrs. Wetzel is a pleasant 66 year-old woman with hypertension, diabetes mellitus type 2, and obesity presenting for follow-up for recurrent symptomatic paroxysmal atrial fibrillation with RVR. I had a long discussion with the patient about the pathophysiology and risks of atrial fibrillation and its basic pathophysiology, including its health implications and treatment options. Specifically, I addressed the need for CVA (stroke) prophylaxis with aspirin versus oral anticoagulation (warfarin vs DOACs, discussed bleeding risks, and need to come to the ER for any head trauma for CT scanning even if asymptomatic). Her [id number] score is 3 and indefinite anticoagulation is recommended. She remains on eliquis. She has had symptomatic benefit with flecainide/metoprolol. She has occasional non cardiac chest discomfort that is consistent with indigestion and is following up for  EGD/colonoscopy. All her questions regarding afib were addressed.         Continue on eliquis as well as  flecainide/metoprolol   RTC in 6 months, sooner if needed      Thank you for allowing me to participate in the care of this patient. Please do not hesitate to call me with any questions or concerns.    Beck Mathias MD, PhD  Cardiac Electrophysiology

## 2022-03-16 ENCOUNTER — TELEPHONE (OUTPATIENT)
Dept: ENDOSCOPY | Facility: HOSPITAL | Age: 67
End: 2022-03-16
Payer: MEDICAID

## 2022-03-17 ENCOUNTER — HOSPITAL ENCOUNTER (OUTPATIENT)
Facility: HOSPITAL | Age: 67
Discharge: HOME | End: 2022-03-17
Attending: STUDENT IN AN ORGANIZED HEALTH CARE EDUCATION/TRAINING PROGRAM | Admitting: STUDENT IN AN ORGANIZED HEALTH CARE EDUCATION/TRAINING PROGRAM
Payer: MEDICAID

## 2022-03-17 ENCOUNTER — ANESTHESIA EVENT (OUTPATIENT)
Dept: ENDOSCOPY | Facility: HOSPITAL | Age: 67
End: 2022-03-17
Payer: MEDICAID

## 2022-03-17 ENCOUNTER — ANESTHESIA (OUTPATIENT)
Dept: ENDOSCOPY | Facility: HOSPITAL | Age: 67
End: 2022-03-17
Payer: MEDICAID

## 2022-03-17 VITALS
DIASTOLIC BLOOD PRESSURE: 66 MMHG | RESPIRATION RATE: 14 BRPM | HEART RATE: 70 BPM | HEIGHT: 65 IN | OXYGEN SATURATION: 99 % | SYSTOLIC BLOOD PRESSURE: 128 MMHG | TEMPERATURE: 98 F | BODY MASS INDEX: 28.32 KG/M2 | WEIGHT: 170 LBS

## 2022-03-17 DIAGNOSIS — Z12.11 COLON CANCER SCREENING: Primary | ICD-10-CM

## 2022-03-17 LAB — POCT GLUCOSE: 170 MG/DL (ref 70–110)

## 2022-03-17 PROCEDURE — 88342 IMHCHEM/IMCYTCHM 1ST ANTB: CPT | Performed by: PATHOLOGY

## 2022-03-17 PROCEDURE — 88342 IMHCHEM/IMCYTCHM 1ST ANTB: CPT | Mod: 26,,, | Performed by: PATHOLOGY

## 2022-03-17 PROCEDURE — 63600175 PHARM REV CODE 636 W HCPCS: Performed by: NURSE ANESTHETIST, CERTIFIED REGISTERED

## 2022-03-17 PROCEDURE — 88342 CHG IMMUNOCYTOCHEMISTRY: ICD-10-PCS | Mod: 26,,, | Performed by: PATHOLOGY

## 2022-03-17 PROCEDURE — 37000009 HC ANESTHESIA EA ADD 15 MINS: Performed by: STUDENT IN AN ORGANIZED HEALTH CARE EDUCATION/TRAINING PROGRAM

## 2022-03-17 PROCEDURE — 25000003 PHARM REV CODE 250: Performed by: NURSE ANESTHETIST, CERTIFIED REGISTERED

## 2022-03-17 PROCEDURE — 43239 EGD BIOPSY SINGLE/MULTIPLE: CPT | Mod: 51,,, | Performed by: STUDENT IN AN ORGANIZED HEALTH CARE EDUCATION/TRAINING PROGRAM

## 2022-03-17 PROCEDURE — 43239 EGD BIOPSY SINGLE/MULTIPLE: CPT | Performed by: STUDENT IN AN ORGANIZED HEALTH CARE EDUCATION/TRAINING PROGRAM

## 2022-03-17 PROCEDURE — 37000008 HC ANESTHESIA 1ST 15 MINUTES: Performed by: STUDENT IN AN ORGANIZED HEALTH CARE EDUCATION/TRAINING PROGRAM

## 2022-03-17 PROCEDURE — 88305 TISSUE EXAM BY PATHOLOGIST: ICD-10-PCS | Mod: 26,,, | Performed by: PATHOLOGY

## 2022-03-17 PROCEDURE — 88305 TISSUE EXAM BY PATHOLOGIST: CPT | Performed by: PATHOLOGY

## 2022-03-17 PROCEDURE — G0121 COLON CA SCRN NOT HI RSK IND: HCPCS | Performed by: STUDENT IN AN ORGANIZED HEALTH CARE EDUCATION/TRAINING PROGRAM

## 2022-03-17 PROCEDURE — 00813 ANES UPR LWR GI NDSC PX: CPT | Performed by: STUDENT IN AN ORGANIZED HEALTH CARE EDUCATION/TRAINING PROGRAM

## 2022-03-17 PROCEDURE — 88305 TISSUE EXAM BY PATHOLOGIST: CPT | Mod: 26,,, | Performed by: PATHOLOGY

## 2022-03-17 PROCEDURE — 43239 PR EGD, FLEX, W/BIOPSY, SGL/MULTI: ICD-10-PCS | Mod: 51,,, | Performed by: STUDENT IN AN ORGANIZED HEALTH CARE EDUCATION/TRAINING PROGRAM

## 2022-03-17 PROCEDURE — E9220 PRA ENDO ANESTHESIA: ICD-10-PCS | Mod: ,,, | Performed by: NURSE ANESTHETIST, CERTIFIED REGISTERED

## 2022-03-17 PROCEDURE — E9220 PRA ENDO ANESTHESIA: HCPCS | Mod: ,,, | Performed by: NURSE ANESTHETIST, CERTIFIED REGISTERED

## 2022-03-17 PROCEDURE — 25000003 PHARM REV CODE 250: Performed by: STUDENT IN AN ORGANIZED HEALTH CARE EDUCATION/TRAINING PROGRAM

## 2022-03-17 PROCEDURE — 45378 PR COLONOSCOPY,DIAGNOSTIC: ICD-10-PCS | Mod: ,,, | Performed by: STUDENT IN AN ORGANIZED HEALTH CARE EDUCATION/TRAINING PROGRAM

## 2022-03-17 PROCEDURE — 45378 DIAGNOSTIC COLONOSCOPY: CPT | Mod: ,,, | Performed by: STUDENT IN AN ORGANIZED HEALTH CARE EDUCATION/TRAINING PROGRAM

## 2022-03-17 PROCEDURE — 27201012 HC FORCEPS, HOT/COLD, DISP: Performed by: STUDENT IN AN ORGANIZED HEALTH CARE EDUCATION/TRAINING PROGRAM

## 2022-03-17 RX ORDER — PROPOFOL 10 MG/ML
VIAL (ML) INTRAVENOUS
Status: DISCONTINUED | OUTPATIENT
Start: 2022-03-17 | End: 2022-03-17

## 2022-03-17 RX ORDER — SODIUM CHLORIDE 9 MG/ML
INJECTION, SOLUTION INTRAVENOUS CONTINUOUS
Status: DISCONTINUED | OUTPATIENT
Start: 2022-03-17 | End: 2022-03-17 | Stop reason: HOSPADM

## 2022-03-17 RX ORDER — LIDOCAINE HYDROCHLORIDE 20 MG/ML
INJECTION INTRAVENOUS
Status: DISCONTINUED | OUTPATIENT
Start: 2022-03-17 | End: 2022-03-17

## 2022-03-17 RX ORDER — PROPOFOL 10 MG/ML
VIAL (ML) INTRAVENOUS CONTINUOUS PRN
Status: DISCONTINUED | OUTPATIENT
Start: 2022-03-17 | End: 2022-03-17

## 2022-03-17 RX ADMIN — SODIUM CHLORIDE: 0.9 INJECTION, SOLUTION INTRAVENOUS at 10:03

## 2022-03-17 RX ADMIN — PROPOFOL 100 MG: 10 INJECTION, EMULSION INTRAVENOUS at 11:03

## 2022-03-17 RX ADMIN — GLYCOPYRROLATE 0.2 MG: 0.2 INJECTION, SOLUTION INTRAMUSCULAR; INTRAVITREAL at 11:03

## 2022-03-17 RX ADMIN — LIDOCAINE HYDROCHLORIDE 50 MG: 20 INJECTION, SOLUTION INTRAVENOUS at 11:03

## 2022-03-17 RX ADMIN — Medication 200 MCG/KG/MIN: at 11:03

## 2022-03-17 NOTE — PLAN OF CARE
Dr Middleton at bedside, patient verbalizes understanding of report and d/c instructions. No questions

## 2022-03-17 NOTE — PROVATION PATIENT INSTRUCTIONS
Discharge Summary/Instructions after an Endoscopic Procedure  Patient Name: Fely Wetzel  Patient MRN: [id number]  Patient YOB: 1955  Thursday, March 17, 2022  Дмитрий Middleton MD  Dear patient,  As a result of recent federal legislation (The Federal Cures Act), you may   receive lab or pathology results from your procedure in your [facility]   account before your physician is able to contact you. Your physician or   their representative will relay the results to you with their   recommendations at their soonest availability.  Thank you,  RESTRICTIONS:  During your procedure today, you received medications for sedation.  These   medications may affect your judgment, balance and coordination.  Therefore,   for 24 hours, you have the following restrictions:   - DO NOT drive a car, operate machinery, make legal/financial decisions,   sign important papers or drink alcohol.    ACTIVITY:  Today: no heavy lifting, straining or running due to procedural   sedation/anesthesia.  The following day: return to full activity including work.  DIET:  Eat and drink normally unless instructed otherwise.     TREATMENT FOR COMMON SIDE EFFECTS:  - Mild abdominal pain, nausea, belching, bloating or excessive gas:  rest,   eat lightly and use a heating pad.  - Sore Throat: treat with throat lozenges and/or gargle with warm salt   water.  - Because air was used during the procedure, expelling large amounts of air   from your rectum or belching is normal.  - If a bowel prep was taken, you may not have a bowel movement for 1-3 days.    This is normal.  SYMPTOMS TO WATCH FOR AND REPORT TO YOUR PHYSICIAN:  1. Abdominal pain or bloating, other than gas cramps.  2. Chest pain.  3. Back pain.  4. Signs of infection such as: chills or fever occurring within 24 hours   after the procedure.  5. Rectal bleeding, which would show as bright red, maroon, or black stools.   (A tablespoon of blood from the rectum is not serious,  especially if   hemorrhoids are present.)  6. Vomiting.  7. Weakness or dizziness.  GO DIRECTLY TO THE NEAREST EMERGENCY ROOM IF YOU HAVE ANY OF THE FOLLOWING:      Difficulty breathing              Chills and/or fever over 101 F   Persistent vomiting and/or vomiting blood   Severe abdominal pain   Severe chest pain   Black, tarry stools   Bleeding- more than one tablespoon   Any other symptom or condition that you feel may need urgent attention  Your doctor recommends these additional instructions:  If any biopsies were taken, your doctors clinic will contact you in 1 to 2   weeks with any results.  - Discharge patient to home.   - Repeat colonoscopy in 10 years for screening purposes.   - Patient has a contact number available for emergencies.  The signs and   symptoms of potential delayed complications were discussed with the   patient.  Return to normal activities tomorrow.  Written discharge   instructions were provided to the patient.   - The findings and recommendations were discussed with the patient.  For questions, problems or results please call your physician - Дмитрий Middleton MD at Work:  [phone].  [facility], EMERGENCY ROOM PHONE NUMBER: [phone]  IF A COMPLICATION OR EMERGENCY SITUATION ARISES AND YOU ARE UNABLE TO REACH   YOUR PHYSICIAN - GO DIRECTLY TO THE EMERGENCY ROOM.  Дмитрий Middleton MD  3/17/2022 11:32:51 AM  This report has been verified and signed electronically.  Dear patient,  As a result of recent federal legislation (The Federal Cures Act), you may   receive lab or pathology results from your procedure in your [facility]   account before your physician is able to contact you. Your physician or   their representative will relay the results to you with their   recommendations at their soonest availability.  Thank you,  PROVATION

## 2022-03-17 NOTE — ANESTHESIA POSTPROCEDURE EVALUATION
Anesthesia Post Evaluation    Patient: Fely Wetzel    Procedure(s) Performed: Procedure(s) (LRB):  EGD (ESOPHAGOGASTRODUODENOSCOPY) (N/A)  COLONOSCOPY (N/A)    Final Anesthesia Type: general      Patient location during evaluation: PACU  Patient participation: Yes- Able to Participate  Level of consciousness: awake and alert and oriented  Post-procedure vital signs: reviewed and stable  Pain management: adequate  Airway patency: patent    PONV status at discharge: No PONV  Anesthetic complications: no      Cardiovascular status: stable  Respiratory status: unassisted, spontaneous ventilation and room air  Hydration status: euvolemic  Follow-up not needed.          Vitals Value Taken Time   /67 03/17/22 1151   Temp 36.4 °C (97.5 °F) 03/17/22 1135   Pulse 70 03/17/22 1151   Resp 18 03/17/22 1151   SpO2 100 % 03/17/22 1151         No case tracking events are documented in the log.      Pain/Jeff Score: Jeff Score: 10 (3/17/2022 11:35 AM)

## 2022-03-17 NOTE — H&P
Short Stay Endoscopy History and Physical    PCP - Hien Sparks MD  Referring Physician - Hien Sparks MD  [address] [facility]  [place] [address]    Procedure - EGD and Colonoscopy  ASA - per anesthesia  Mallampati - per anesthesia  History of Anesthesia problems - no  Family history Anesthesia problems -  no   Plan of anesthesia - General    HPI  66 y.o. female    Reason for procedure:   Screening for colorectal cancer [Z12.11, Z12.12]  Gastroesophageal reflux disease, unspecified whether esophagitis present [K21.9]      ROS:  Constitutional: No fevers, chills, No weight loss  CV: No chest pain  Pulm: No cough, No shortness of breath  GI: see HPI    Medical History:  has a past medical history of Colon polyp (11/2014), Diabetes mellitus type II, Hyperlipidemia, Hypertension, Obesity, and Osteopenia.    Surgical History:  has a past surgical history that includes Hysterectomy.    Family History: family history includes Cancer in her father; Cataracts in her brother; Diabetes in her mother; Glaucoma in her mother; Heart attack in her father; Heart disease in her brother and father; Hyperlipidemia in her mother; Hypertension in her mother.    Social History:  reports that she has never smoked. She has never used smokeless tobacco. She reports previous alcohol use. She reports that she does not use drugs.    Review of patient's allergies indicates:   Allergen Reactions    Corticosteroids (glucocorticoids)     Latex, natural rubber     Shellfish containing products Other (See Comments)     Other reaction(s): Unknown  Other reaction(s): Anaphylaxis    Vicodin [hydrocodone-acetaminophen]     Vytorin 10-10  [ezetimibe-simvastatin]      Other reaction(s): Joint pain  Other reaction(s): Joint pain       Medications:   Medications Prior to Admission   Medication Sig Dispense Refill Last Dose    ACCU-CHEK JUAN Misc CHECK FASTING GLUCOSE AND CHECK GLUCOSE TWO HOURS AFTER LUNCH OR DINNER 1 each 0 3/17/2022 at  Unknown time    amLODIPine (NORVASC) 10 MG tablet Take 1 tablet (10 mg total) by mouth once daily. 90 tablet 3 3/16/2022 at Unknown time    atorvastatin (LIPITOR) 40 MG tablet TAKE 1 TABLET BY MOUTH EVERY DAY IN THE EVENING 30 tablet 11 Past Week at Unknown time    blood-glucose meter Misc Check fasting glucose and check glucose two hours after lunch or dinner 1 each 0 3/17/2022 at Unknown time    cholecalciferol, vitamin D3, 1,250 mcg (50,000 unit) Tab Take 1,250 mcg by mouth every 7 days. 12 tablet 1 Past Week at Unknown time    cyanocobalamin 500 MCG tablet Take 500 mcg by mouth once daily.   Past Week at Unknown time    dapagliflozin-metformin (XIGDUO XR) 5-1,000 mg TBph Take 1 tablet by mouth 2 (two) times daily with meals. 60 tablet 6 Past Week at Unknown time    diclofenac sodium (VOLTAREN) 1 % Gel Apply 2 g topically 4 (four) times daily. 100 g 2 Past Week at Unknown time    dulaglutide (TRULICITY) 3 mg/0.5 mL pen injector Inject 3 mg into the skin every 7 days. 4 pen 11 Past Week at Unknown time    fenofibrate 160 MG Tab Take 1 tablet (160 mg total) by mouth once daily. 90 tablet 3 Past Week at Unknown time    flecainide (TAMBOCOR) 50 MG Tab TAKE 1 TABLET (50 MG TOTAL) BY MOUTH EVERY 12 (TWELVE) HOURS. 60 tablet 11 Past Week at Unknown time    hydroCHLOROthiazide (HYDRODIURIL) 25 MG tablet TAKE 1 TABLET (25 MG TOTAL) BY MOUTH ONCE DAILY. WITH BREAKFAST 30 tablet 11 Past Week at Unknown time    lancets Misc Check glucose twice daily 100 each 3 Past Week at Unknown time    lancing device with lancets (ONETOUCH DELICA LANC DEVICE) Kit 1 each by Misc.(Non-Drug; Combo Route) route 2 (two) times daily before meals. 100 each 5 Past Week at Unknown time    losartan (COZAAR) 100 MG tablet TAKE 1 TABLET BY MOUTH EVERY DAY 30 tablet 11 Past Week at Unknown time    metoprolol succinate (TOPROL-XL) 50 MG 24 hr tablet TAKE 1 TABLET BY MOUTH EVERY DAY 90 tablet 3 Past Week at Unknown time    mupirocin  "(BACTROBAN) 2 % ointment Apply topically 3 (three) times daily. 30 g 0 Past Week at Unknown time    nitrofurantoin, macrocrystal-monohydrate, (MACROBID) 100 MG capsule Take 1 capsule (100 mg total) by mouth 2 (two) times daily. 10 capsule 0 Past Week at Unknown time    pen needle, diabetic 32 gauge x 5/32" Ndle 1 each by Misc.(Non-Drug; Combo Route) route once daily. Use daily with victoza. 90 each 3 Past Week at Unknown time    apixaban (ELIQUIS) 5 mg Tab Take 1 tablet (5 mg total) by mouth 2 (two) times daily. 60 tablet 11 3/14/2022       Physical Exam:    Vital Signs:   Vitals:    03/17/22 1013   BP: (!) 140/65   Pulse: 80   Resp: 15   Temp: 97.7 °F (36.5 °C)       General Appearance: Well appearing in no acute distress  Abdomen: Soft, non tender, non distended with normal bowel sounds, no masses    Labs:  Lab Results   Component Value Date    WBC 7.93 02/22/2021    HGB 12.2 02/22/2021    HCT 38.5 02/22/2021     02/22/2021    CHOL 159 01/14/2022    TRIG 203 (H) 01/14/2022    HDL 45 01/14/2022    ALT 18 01/14/2022    AST 17 01/14/2022     (L) 01/14/2022    K 4.0 01/14/2022     01/14/2022    CREATININE 0.7 01/14/2022    BUN 14 01/14/2022    CO2 23 01/14/2022    TSH 2.177 02/22/2021    INR 1.0 06/27/2019    GLUF 126 (H) 07/07/2010    HGBA1C 8.0 (H) 01/14/2022       I have explained the risks and benefits of this endoscopic procedure to the patient including but not limited to bleeding, inflammation, infection, perforation, and death.      Дмитрий Middleton MD  "

## 2022-03-17 NOTE — PROVATION PATIENT INSTRUCTIONS
Discharge Summary/Instructions after an Endoscopic Procedure  Patient Name: Fely Wetzel  Patient MRN: [id number]  Patient YOB: 1955  Thursday, March 17, 2022  Дмитрий Middleton MD  Dear patient,  As a result of recent federal legislation (The Federal Cures Act), you may   receive lab or pathology results from your procedure in your [facility]   account before your physician is able to contact you. Your physician or   their representative will relay the results to you with their   recommendations at their soonest availability.  Thank you,  RESTRICTIONS:  During your procedure today, you received medications for sedation.  These   medications may affect your judgment, balance and coordination.  Therefore,   for 24 hours, you have the following restrictions:   - DO NOT drive a car, operate machinery, make legal/financial decisions,   sign important papers or drink alcohol.    ACTIVITY:  Today: no heavy lifting, straining or running due to procedural   sedation/anesthesia.  The following day: return to full activity including work.  DIET:  Eat and drink normally unless instructed otherwise.     TREATMENT FOR COMMON SIDE EFFECTS:  - Mild abdominal pain, nausea, belching, bloating or excessive gas:  rest,   eat lightly and use a heating pad.  - Sore Throat: treat with throat lozenges and/or gargle with warm salt   water.  - Because air was used during the procedure, expelling large amounts of air   from your rectum or belching is normal.  - If a bowel prep was taken, you may not have a bowel movement for 1-3 days.    This is normal.  SYMPTOMS TO WATCH FOR AND REPORT TO YOUR PHYSICIAN:  1. Abdominal pain or bloating, other than gas cramps.  2. Chest pain.  3. Back pain.  4. Signs of infection such as: chills or fever occurring within 24 hours   after the procedure.  5. Rectal bleeding, which would show as bright red, maroon, or black stools.   (A tablespoon of blood from the rectum is not serious,  especially if   hemorrhoids are present.)  6. Vomiting.  7. Weakness or dizziness.  GO DIRECTLY TO THE NEAREST EMERGENCY ROOM IF YOU HAVE ANY OF THE FOLLOWING:      Difficulty breathing              Chills and/or fever over 101 F   Persistent vomiting and/or vomiting blood   Severe abdominal pain   Severe chest pain   Black, tarry stools   Bleeding- more than one tablespoon   Any other symptom or condition that you feel may need urgent attention  Your doctor recommends these additional instructions:  If any biopsies were taken, your doctors clinic will contact you in 1 to 2   weeks with any results.  - Discharge patient to home.   - The findings and recommendations were discussed with the patient.   - Await pathology results.   - Patient has a contact number available for emergencies.  The signs and   symptoms of potential delayed complications were discussed with the   patient.  Return to normal activities tomorrow.  Written discharge   instructions were provided to the patient.   - Return to referring physician as previously scheduled.  For questions, problems or results please call your physician - Дмитрий Middleton MD at Work:  [phone].  [facility], EMERGENCY ROOM PHONE NUMBER: [phone]  IF A COMPLICATION OR EMERGENCY SITUATION ARISES AND YOU ARE UNABLE TO REACH   YOUR PHYSICIAN - GO DIRECTLY TO THE EMERGENCY ROOM.  Дмитрий Middleton MD  3/17/2022 11:17:19 AM  This report has been verified and signed electronically.  Dear patient,  As a result of recent federal legislation (The Federal Cures Act), you may   receive lab or pathology results from your procedure in your [facility]   account before your physician is able to contact you. Your physician or   their representative will relay the results to you with their   recommendations at their soonest availability.  Thank you,  PROVATION

## 2022-03-17 NOTE — ANESTHESIA PREPROCEDURE EVALUATION
03/17/2022  Fely Wetzel is a 66 y.o., female.  Past Medical History:   Diagnosis Date    Colon polyp 11/2014    Diabetes mellitus type II     Hyperlipidemia     Hypertension     Obesity     Osteopenia      Past Surgical History:   Procedure Laterality Date    HYSTERECTOMY           Pre-op Assessment    I have reviewed the Patient Summary Reports.     I have reviewed the Nursing Notes. I have reviewed the NPO Status.   I have reviewed the Medications.     Review of Systems  Anesthesia Hx:  No problems with previous Anesthesia    Social:  Non-Smoker, No Alcohol Use    Hematology/Oncology:  Hematology Normal   Oncology Normal     EENT/Dental:EENT/Dental Normal   Cardiovascular:   Hypertension, well controlled    Pulmonary:  Pulmonary Normal    Renal/:  Renal/ Normal     Hepatic/GI:  Hepatic/GI Normal    Musculoskeletal:  Musculoskeletal Normal    Neurological:  Neurology Normal    Endocrine:   Diabetes, well controlled, type 2    Dermatological:  Skin Normal    Psych:  Psychiatric Normal           Physical Exam  General: Well nourished, Cooperative, Alert and Oriented    Airway:  Mallampati: II / II  Mouth Opening: Normal  TM Distance: Normal  Tongue: Normal  Neck ROM: Normal ROM    Dental:    Chest/Lungs:  Clear to auscultation    Heart:  Rate: Normal    Abdomen:  Normal        Anesthesia Plan  Type of Anesthesia, risks & benefits discussed:    Anesthesia Type: Gen Natural Airway  Intra-op Monitoring Plan: Standard ASA Monitors  Induction:  IV  Informed Consent: Informed consent signed with the Patient and all parties understand the risks and agree with anesthesia plan.  All questions answered.   ASA Score: 3  Day of Surgery Review of History & Physical: H&P Update referred to the surgeon/provider.    Ready For Surgery From Anesthesia Perspective.     .

## 2022-03-17 NOTE — TRANSFER OF CARE
"Anesthesia Transfer of Care Note    Patient: Fely Wetzel    Procedure(s) Performed: Procedure(s) (LRB):  EGD (ESOPHAGOGASTRODUODENOSCOPY) (N/A)  COLONOSCOPY (N/A)    Patient location: PACU    Anesthesia Type: general    Transport from OR: Transported from OR on room air with adequate spontaneous ventilation    Post pain: adequate analgesia    Post assessment: no apparent anesthetic complications and tolerated procedure well    Post vital signs: stable    Level of consciousness: sedated and responds to stimulation    Nausea/Vomiting: no nausea/vomiting    Complications: none    Transfer of care protocol was followed      Last vitals:   Visit Vitals  BP (!) 140/65   Pulse 80   Temp 36.5 °C (97.7 °F)   Resp 15   Ht 5' 5" (1.651 m)   Wt 77.1 kg (170 lb)   SpO2 99%   BMI 28.29 kg/m²     "

## 2022-03-24 LAB
FINAL PATHOLOGIC DIAGNOSIS: NORMAL
Lab: NORMAL

## 2022-03-25 ENCOUNTER — PATIENT MESSAGE (OUTPATIENT)
Dept: GASTROENTEROLOGY | Facility: CLINIC | Age: 67
End: 2022-03-25
Payer: MEDICAID

## 2022-03-25 RX ORDER — DOXYCYCLINE 100 MG/1
100 CAPSULE ORAL EVERY 12 HOURS
Qty: 28 CAPSULE | Refills: 0 | Status: SHIPPED | OUTPATIENT
Start: 2022-03-25 | End: 2022-04-08

## 2022-03-25 RX ORDER — OMEPRAZOLE 40 MG/1
40 CAPSULE, DELAYED RELEASE ORAL 2 TIMES DAILY
Qty: 28 CAPSULE | Refills: 0 | Status: SHIPPED | OUTPATIENT
Start: 2022-03-25 | End: 2022-05-06

## 2022-03-25 RX ORDER — BISMUTH SUBSALICYLATE 262 MG/1
1 TABLET ORAL 4 TIMES DAILY
Qty: 56 TABLET | Refills: 0 | Status: SHIPPED | OUTPATIENT
Start: 2022-03-25 | End: 2022-04-08

## 2022-03-25 RX ORDER — METRONIDAZOLE 500 MG/1
500 TABLET ORAL 3 TIMES DAILY
Qty: 42 TABLET | Refills: 0 | Status: SHIPPED | OUTPATIENT
Start: 2022-03-25 | End: 2022-04-08

## 2022-03-30 ENCOUNTER — PATIENT MESSAGE (OUTPATIENT)
Dept: INTERNAL MEDICINE | Facility: CLINIC | Age: 67
End: 2022-03-30
Payer: MEDICAID

## 2022-03-30 ENCOUNTER — PATIENT MESSAGE (OUTPATIENT)
Dept: GASTROENTEROLOGY | Facility: CLINIC | Age: 67
End: 2022-03-30
Payer: MEDICAID

## 2022-03-30 ENCOUNTER — PATIENT MESSAGE (OUTPATIENT)
Dept: ELECTROPHYSIOLOGY | Facility: CLINIC | Age: 67
End: 2022-03-30
Payer: MEDICAID

## 2022-04-06 ENCOUNTER — OFFICE VISIT (OUTPATIENT)
Dept: INTERNAL MEDICINE | Facility: CLINIC | Age: 67
End: 2022-04-06
Payer: MEDICAID

## 2022-04-06 VITALS
RESPIRATION RATE: 18 BRPM | TEMPERATURE: 98 F | HEART RATE: 80 BPM | WEIGHT: 167.56 LBS | OXYGEN SATURATION: 98 % | HEIGHT: 62 IN | SYSTOLIC BLOOD PRESSURE: 128 MMHG | BODY MASS INDEX: 30.83 KG/M2 | DIASTOLIC BLOOD PRESSURE: 86 MMHG

## 2022-04-06 DIAGNOSIS — I48.0 PAF (PAROXYSMAL ATRIAL FIBRILLATION): ICD-10-CM

## 2022-04-06 DIAGNOSIS — M85.80 OSTEOPENIA, UNSPECIFIED LOCATION: ICD-10-CM

## 2022-04-06 DIAGNOSIS — I15.2 HYPERTENSION ASSOCIATED WITH DIABETES: ICD-10-CM

## 2022-04-06 DIAGNOSIS — L65.9 HAIR LOSS: ICD-10-CM

## 2022-04-06 DIAGNOSIS — A04.8 H. PYLORI INFECTION: ICD-10-CM

## 2022-04-06 DIAGNOSIS — E11.65 TYPE 2 DIABETES MELLITUS WITH HYPERGLYCEMIA, WITHOUT LONG-TERM CURRENT USE OF INSULIN: ICD-10-CM

## 2022-04-06 DIAGNOSIS — E78.5 HYPERLIPIDEMIA ASSOCIATED WITH TYPE 2 DIABETES MELLITUS: ICD-10-CM

## 2022-04-06 DIAGNOSIS — E11.69 HYPERLIPIDEMIA ASSOCIATED WITH TYPE 2 DIABETES MELLITUS: ICD-10-CM

## 2022-04-06 DIAGNOSIS — I83.893 VARICOSE VEINS OF LEG WITH SWELLING, BILATERAL: ICD-10-CM

## 2022-04-06 DIAGNOSIS — L40.9 PSORIASIS: ICD-10-CM

## 2022-04-06 DIAGNOSIS — Z00.00 ANNUAL PHYSICAL EXAM: Primary | ICD-10-CM

## 2022-04-06 DIAGNOSIS — E11.59 HYPERTENSION ASSOCIATED WITH DIABETES: ICD-10-CM

## 2022-04-06 PROCEDURE — 99396 PREV VISIT EST AGE 40-64: CPT | Mod: S$PBB,,, | Performed by: HOSPITALIST

## 2022-04-06 PROCEDURE — 3008F BODY MASS INDEX DOCD: CPT | Mod: CPTII,,, | Performed by: HOSPITALIST

## 2022-04-06 PROCEDURE — 1160F PR REVIEW ALL MEDS BY PRESCRIBER/CLIN PHARMACIST DOCUMENTED: ICD-10-PCS | Mod: CPTII,,, | Performed by: HOSPITALIST

## 2022-04-06 PROCEDURE — 4010F PR ACE/ARB THEARPY RXD/TAKEN: ICD-10-PCS | Mod: CPTII,,, | Performed by: HOSPITALIST

## 2022-04-06 PROCEDURE — 3066F PR DOCUMENTATION OF TREATMENT FOR NEPHROPATHY: ICD-10-PCS | Mod: CPTII,,, | Performed by: HOSPITALIST

## 2022-04-06 PROCEDURE — 1159F MED LIST DOCD IN RCRD: CPT | Mod: CPTII,,, | Performed by: HOSPITALIST

## 2022-04-06 PROCEDURE — 3074F SYST BP LT 130 MM HG: CPT | Mod: CPTII,,, | Performed by: HOSPITALIST

## 2022-04-06 PROCEDURE — 3079F DIAST BP 80-89 MM HG: CPT | Mod: CPTII,,, | Performed by: HOSPITALIST

## 2022-04-06 PROCEDURE — 1160F RVW MEDS BY RX/DR IN RCRD: CPT | Mod: CPTII,,, | Performed by: HOSPITALIST

## 2022-04-06 PROCEDURE — 99396 PR PREVENTIVE VISIT,EST,40-64: ICD-10-PCS | Mod: S$PBB,,, | Performed by: HOSPITALIST

## 2022-04-06 PROCEDURE — 3060F POS MICROALBUMINURIA REV: CPT | Mod: CPTII,,, | Performed by: HOSPITALIST

## 2022-04-06 PROCEDURE — 4010F ACE/ARB THERAPY RXD/TAKEN: CPT | Mod: CPTII,,, | Performed by: HOSPITALIST

## 2022-04-06 PROCEDURE — 99999 PR PBB SHADOW E&M-EST. PATIENT-LVL V: CPT | Mod: PBBFAC,,, | Performed by: HOSPITALIST

## 2022-04-06 PROCEDURE — 3060F PR POS MICROALBUMINURIA RESULT DOCUMENTED/REVIEW: ICD-10-PCS | Mod: CPTII,,, | Performed by: HOSPITALIST

## 2022-04-06 PROCEDURE — 1159F PR MEDICATION LIST DOCUMENTED IN MEDICAL RECORD: ICD-10-PCS | Mod: CPTII,,, | Performed by: HOSPITALIST

## 2022-04-06 PROCEDURE — 3052F PR MOST RECENT HEMOGLOBIN A1C LEVEL 8.0 - < 9.0%: ICD-10-PCS | Mod: CPTII,,, | Performed by: HOSPITALIST

## 2022-04-06 PROCEDURE — 3079F PR MOST RECENT DIASTOLIC BLOOD PRESSURE 80-89 MM HG: ICD-10-PCS | Mod: CPTII,,, | Performed by: HOSPITALIST

## 2022-04-06 PROCEDURE — 3052F HG A1C>EQUAL 8.0%<EQUAL 9.0%: CPT | Mod: CPTII,,, | Performed by: HOSPITALIST

## 2022-04-06 PROCEDURE — 3008F PR BODY MASS INDEX (BMI) DOCUMENTED: ICD-10-PCS | Mod: CPTII,,, | Performed by: HOSPITALIST

## 2022-04-06 PROCEDURE — 99999 PR PBB SHADOW E&M-EST. PATIENT-LVL V: ICD-10-PCS | Mod: PBBFAC,,, | Performed by: HOSPITALIST

## 2022-04-06 PROCEDURE — 3074F PR MOST RECENT SYSTOLIC BLOOD PRESSURE < 130 MM HG: ICD-10-PCS | Mod: CPTII,,, | Performed by: HOSPITALIST

## 2022-04-06 PROCEDURE — 99215 OFFICE O/P EST HI 40 MIN: CPT | Mod: PBBFAC,PO | Performed by: HOSPITALIST

## 2022-04-06 PROCEDURE — 3066F NEPHROPATHY DOC TX: CPT | Mod: CPTII,,, | Performed by: HOSPITALIST

## 2022-04-06 RX ORDER — ZOSTER VACCINE RECOMBINANT, ADJUVANTED 50 MCG/0.5
0.5 KIT INTRAMUSCULAR ONCE
Qty: 1 EACH | Refills: 1 | Status: SHIPPED | OUTPATIENT
Start: 2022-04-06 | End: 2022-04-06

## 2022-04-06 NOTE — PROGRESS NOTES
Subjective:     @Patient ID: Fely Wetzel is a 66 y.o. female.    Chief Complaint: Follow-up (6 mo)    HPI    66 y.o. female with pmhx of dm2, afib, hld, osteopenia, psoriasis presents  here for annual exam.     1. Dm2: last a1c 8.0.  Due soon for foot exam. Reports had eye exam this year. Follows with diabetic NP. On dapagliflozin-metformin  5-1000 mg bid, trulicity 3 mg qweek,   2. HTN: on toprol-xl 50 mg qday, amlodipine 10 mg qday, losartan 100 mg qday, hctz 25 mg qday  3. HLD: on lipitor 40 mg qday and fenofibrate 160 mg qday  4. Paroxysmal Afib: follows with cardiology. On eliquis 5 mg bid, metoprolol xl 50 mg qday and flecanide 50 mg bid   5. H pylori: currently on treatment for hpylori. Due for stool test in around 5/10/22  6. Osteopenia: due for bone density  7. Varicose veins: reports sometimes has leg swelling. Is walking for exercise and has lost some weight. Reports sx have improved   8. Hair loss: she reports that she has noticed it lately. Not sure if related to covid infection in [place]      Lipid disorders/ASCVD risk (ages >/= 45 or >/= 20 if increased risk ): ordered  DM (>45y yearly or if obese, HTN): A1c ordered  Eye exam: pt reports done 2022. Eye doctor Dr Burciaga   Breast Cancer (40-50y discretion of pt, 50-74y every 1-2 years): Mammogram done 7/2/21  Colorectal Cancer (normal risk 50-75yr): Colonoscopy utd 3/17/22   DEXA (F>66 yo, M >69yo, M&F 50-70 yo with risk factors (smoking, previous fx, wt <70kg; etoh abuse, chronic steroids, RA)): Due        Vaccines:   Influenza (yearly):  utd 10/4/21   Tetanus (every 10 yrs - 1st tdap): utd 2014   PPSV23(>64yo or <65 w/ lung dz, smoking, DM): utd  PCV13 (> 65 or <65 w/ immunocompromised): utd  Zoster (>61yo): Due   Covid19: due for 4th dose. Pt will schedule     Exercise: walking  Diet:  reg                 Review of Systems   Constitutional: Negative for chills and fever.   HENT: Negative for congestion and sore throat.    Eyes: Negative for  "pain and visual disturbance.   Respiratory: Negative for cough and shortness of breath.    Cardiovascular: Negative for chest pain and leg swelling.   Gastrointestinal: Negative for abdominal pain, nausea and vomiting.   Endocrine: Negative for polydipsia and polyuria.   Genitourinary: Negative for difficulty urinating and dysuria.   Musculoskeletal: Negative for arthralgias and back pain.   Skin: Negative for rash and wound.   Neurological: Negative for dizziness, weakness and headaches.   Psychiatric/Behavioral: Negative for agitation and confusion.     Past medical history, surgical history, and family medical history reviewed and updated as appropriate.    Medications and allergies reviewed.     Objective:     Vitals:    04/06/22 0855   BP: 128/86   BP Location: Left arm   Patient Position: Sitting   BP Method: Medium (Manual)   Pulse: 80   Resp: 18   Temp: 97.7 °F (36.5 °C)   TempSrc: Temporal   SpO2: 98%   Weight: 76 kg (167 lb 8.8 oz)   Height: 5' 2" (1.575 m)     Body mass index is 30.65 kg/m².  Physical Exam  Vitals reviewed.   Constitutional:       General: She is not in acute distress.     Appearance: She is well-developed.   HENT:      Head: Normocephalic and atraumatic.      Right Ear: Tympanic membrane normal.      Left Ear: Tympanic membrane normal.      Mouth/Throat:      Mouth: Mucous membranes are moist.      Pharynx: No oropharyngeal exudate.   Eyes:      General:         Right eye: No discharge.         Left eye: No discharge.      Conjunctiva/sclera: Conjunctivae normal.   Cardiovascular:      Rate and Rhythm: Normal rate and regular rhythm.      Heart sounds: No murmur heard.    No friction rub.   Pulmonary:      Effort: Pulmonary effort is normal.      Breath sounds: Normal breath sounds.   Abdominal:      General: Bowel sounds are normal. There is no distension.      Palpations: Abdomen is soft.      Tenderness: There is no abdominal tenderness. There is no guarding.   Musculoskeletal:        "  General: Normal range of motion.      Cervical back: Normal range of motion and neck supple.      Right lower leg: No edema.      Left lower leg: No edema.   Lymphadenopathy:      Cervical: No cervical adenopathy.   Skin:     General: Skin is warm and dry.      Comments: +varicose veins b/l    Neurological:      Mental Status: She is alert and oriented to person, place, and time.   Psychiatric:         Mood and Affect: Mood normal.         Behavior: Behavior normal.         Lab Results   Component Value Date    WBC 7.93 02/22/2021    HGB 12.2 02/22/2021    HCT 38.5 02/22/2021     02/22/2021    CHOL 159 01/14/2022    TRIG 203 (H) 01/14/2022    HDL 45 01/14/2022    ALT 18 01/14/2022    AST 17 01/14/2022     (L) 01/14/2022    K 4.0 01/14/2022     01/14/2022    CREATININE 0.7 01/14/2022    BUN 14 01/14/2022    CO2 23 01/14/2022    TSH 2.177 02/22/2021    INR 1.0 06/27/2019    GLUF 126 (H) 07/07/2010    HGBA1C 8.0 (H) 01/14/2022       Assessment:     1. Annual physical exam    2. Type 2 diabetes mellitus with hyperglycemia, without long-term current use of insulin    3. Hypertension associated with diabetes    4. Hyperlipidemia associated with type 2 diabetes mellitus    5. Osteopenia, unspecified location    6. PAF (paroxysmal atrial fibrillation)    7. H. pylori infection    8. Varicose veins of leg with swelling, bilateral    9. Hair loss    10. Psoriasis      Plan:   Fely was seen today for follow-up.    Diagnoses and all orders for this visit:    Annual physical exam  -     CBC Auto Differential; Future  -     TSH; Future  -     Urinalysis; Future  -     Iron and TIBC; Future  -     Ferritin; Future  -     Transferrin; Future  -     Vitamin B12; Future  -     Folate; Future    Type 2 diabetes mellitus with hyperglycemia, without long-term current use of insulin  -     Ambulatory referral/consult to Podiatry; Future    Hypertension associated with diabetes  - BP controlled. Continue home meds      Hyperlipidemia associated with type 2 diabetes mellitus  - Stable. Continue home meds     Osteopenia, unspecified location  -     DXA Bone Density Spine And Hip; Future    PAF (paroxysmal atrial fibrillation)  - Stable. Continue home meds     H. pylori infection  - check stool in 1 month for clearance     Varicose veins of leg with swelling, bilateral  -     COMPRESSION STOCKINGS    Hair loss  -     Iron and TIBC; Future  -     Ferritin; Future  -     Transferrin; Future  -     Vitamin B12; Future  -     Folate; Future  -     Ambulatory referral/consult to Dermatology; Future    Psoriasis  -     Ambulatory referral/consult to Dermatology; Future    Other orders  -     varicella-zoster [id number], PF, (SHINGRIX, PF,) 50 mcg/0.5 mL injection; Inject 0.5 mLs into the muscle once. for 1 dose      RTC 6 months and prn       Hien Sparks MD  Internal Medicine    4/6/2022

## 2022-04-07 ENCOUNTER — PATIENT OUTREACH (OUTPATIENT)
Dept: ADMINISTRATIVE | Facility: HOSPITAL | Age: 67
End: 2022-04-07
Payer: MEDICAID

## 2022-04-07 NOTE — LETTER
AUTHORIZATION FOR RELEASE OF   CONFIDENTIAL INFORMATION        We are seeing Fely Wetzel, date of birth 1955, in the clinic at [facility]. Hien Sparks MD is the patient's PCP. Fely Wetzel has an outstanding lab/procedure at the time we reviewed her chart. In order to help keep her health information updated, she has authorized us to request the following medical record(s):        (  )  MAMMOGRAM                                      (  )  COLONOSCOPY      (  )  PAP SMEAR                                          (  )  OUTSIDE LAB RESULTS     (  )  DEXA SCAN                                          ( x )  EYE EXAM            (  )  FOOT EXAM                                          (  )  ENTIRE RECORD     (  )  OUTSIDE IMMUNIZATIONS                 (  )  _______________         Please fax records to [facility], Miriam C Azuoru, MD, [phone]     If you have any questions, please contact Rosalind at [phone]          Patient Name: Fely Wetzel  : 1955  Patient Phone #: [phone]

## 2022-04-19 ENCOUNTER — PATIENT MESSAGE (OUTPATIENT)
Dept: INTERNAL MEDICINE | Facility: CLINIC | Age: 67
End: 2022-04-19
Payer: MEDICAID

## 2022-04-19 ENCOUNTER — APPOINTMENT (OUTPATIENT)
Dept: RADIOLOGY | Facility: CLINIC | Age: 67
End: 2022-04-19
Attending: HOSPITALIST
Payer: MEDICAID

## 2022-04-19 DIAGNOSIS — Z00.00 ANNUAL PHYSICAL EXAM: Primary | ICD-10-CM

## 2022-04-19 DIAGNOSIS — A04.8 H. PYLORI INFECTION: ICD-10-CM

## 2022-04-19 DIAGNOSIS — M85.80 OSTEOPENIA, UNSPECIFIED LOCATION: ICD-10-CM

## 2022-04-19 PROCEDURE — 77080 DXA BONE DENSITY AXIAL: CPT | Mod: 26,,, | Performed by: INTERNAL MEDICINE

## 2022-04-19 PROCEDURE — 77080 DEXA BONE DENSITY SPINE HIP: ICD-10-PCS | Mod: 26,,, | Performed by: INTERNAL MEDICINE

## 2022-04-19 PROCEDURE — 77080 DXA BONE DENSITY AXIAL: CPT | Mod: TC,PO

## 2022-04-20 ENCOUNTER — LAB VISIT (OUTPATIENT)
Dept: LAB | Facility: HOSPITAL | Age: 67
End: 2022-04-20
Attending: NURSE PRACTITIONER
Payer: MEDICAID

## 2022-04-20 DIAGNOSIS — Z00.00 ANNUAL PHYSICAL EXAM: ICD-10-CM

## 2022-04-20 DIAGNOSIS — E78.5 HYPERLIPIDEMIA ASSOCIATED WITH TYPE 2 DIABETES MELLITUS: ICD-10-CM

## 2022-04-20 DIAGNOSIS — E11.65 TYPE 2 DIABETES MELLITUS WITH HYPERGLYCEMIA, WITHOUT LONG-TERM CURRENT USE OF INSULIN: ICD-10-CM

## 2022-04-20 DIAGNOSIS — L65.9 HAIR LOSS: ICD-10-CM

## 2022-04-20 DIAGNOSIS — E11.69 HYPERLIPIDEMIA ASSOCIATED WITH TYPE 2 DIABETES MELLITUS: ICD-10-CM

## 2022-04-20 DIAGNOSIS — E11.65 UNCONTROLLED TYPE 2 DIABETES MELLITUS WITH HYPERGLYCEMIA: Primary | ICD-10-CM

## 2022-04-20 LAB
ALBUMIN SERPL BCP-MCNC: 4 G/DL (ref 3.5–5.2)
ALP SERPL-CCNC: 88 U/L (ref 55–135)
ALT SERPL W/O P-5'-P-CCNC: 15 U/L (ref 10–44)
ANION GAP SERPL CALC-SCNC: 12 MMOL/L (ref 8–16)
AST SERPL-CCNC: 17 U/L (ref 10–40)
BASOPHILS # BLD AUTO: 0.02 K/UL (ref 0–0.2)
BASOPHILS NFR BLD: 0.2 % (ref 0–1.9)
BILIRUB SERPL-MCNC: 0.6 MG/DL (ref 0.1–1)
BUN SERPL-MCNC: 18 MG/DL (ref 8–23)
CALCIUM SERPL-MCNC: 10.6 MG/DL (ref 8.7–10.5)
CHLORIDE SERPL-SCNC: 103 MMOL/L (ref 95–110)
CHOLEST SERPL-MCNC: 188 MG/DL (ref 120–199)
CHOLEST/HDLC SERPL: 3.7 {RATIO} (ref 2–5)
CO2 SERPL-SCNC: 25 MMOL/L (ref 23–29)
CREAT SERPL-MCNC: 0.8 MG/DL (ref 0.5–1.4)
DIFFERENTIAL METHOD: ABNORMAL
EOSINOPHIL # BLD AUTO: 0.2 K/UL (ref 0–0.5)
EOSINOPHIL NFR BLD: 2.4 % (ref 0–8)
ERYTHROCYTE [DISTWIDTH] IN BLOOD BY AUTOMATED COUNT: 15 % (ref 11.5–14.5)
EST. GFR  (AFRICAN AMERICAN): >60 ML/MIN/1.73 M^2
EST. GFR  (NON AFRICAN AMERICAN): >60 ML/MIN/1.73 M^2
ESTIMATED AVG GLUCOSE: 157 MG/DL (ref 68–131)
FERRITIN SERPL-MCNC: 29 NG/ML (ref 20–300)
FOLATE SERPL-MCNC: 12.6 NG/ML (ref 4–24)
GLUCOSE SERPL-MCNC: 129 MG/DL (ref 70–110)
HBA1C MFR BLD: 7.1 % (ref 4–5.6)
HCT VFR BLD AUTO: 43.3 % (ref 37–48.5)
HDLC SERPL-MCNC: 51 MG/DL (ref 40–75)
HDLC SERPL: 27.1 % (ref 20–50)
HGB BLD-MCNC: 13.9 G/DL (ref 12–16)
IMM GRANULOCYTES # BLD AUTO: 0.02 K/UL (ref 0–0.04)
IMM GRANULOCYTES NFR BLD AUTO: 0.2 % (ref 0–0.5)
IRON SERPL-MCNC: 73 UG/DL (ref 30–160)
LDLC SERPL CALC-MCNC: 98.6 MG/DL (ref 63–159)
LYMPHOCYTES # BLD AUTO: 2 K/UL (ref 1–4.8)
LYMPHOCYTES NFR BLD: 23.9 % (ref 18–48)
MCH RBC QN AUTO: 28.1 PG (ref 27–31)
MCHC RBC AUTO-ENTMCNC: 32.1 G/DL (ref 32–36)
MCV RBC AUTO: 88 FL (ref 82–98)
MONOCYTES # BLD AUTO: 0.5 K/UL (ref 0.3–1)
MONOCYTES NFR BLD: 5.9 % (ref 4–15)
NEUTROPHILS # BLD AUTO: 5.6 K/UL (ref 1.8–7.7)
NEUTROPHILS NFR BLD: 67.4 % (ref 38–73)
NONHDLC SERPL-MCNC: 137 MG/DL
NRBC BLD-RTO: 0 /100 WBC
PLATELET # BLD AUTO: 291 K/UL (ref 150–450)
PMV BLD AUTO: 11.8 FL (ref 9.2–12.9)
POTASSIUM SERPL-SCNC: 4 MMOL/L (ref 3.5–5.1)
PROT SERPL-MCNC: 8.2 G/DL (ref 6–8.4)
RBC # BLD AUTO: 4.95 M/UL (ref 4–5.4)
SATURATED IRON: 17 % (ref 20–50)
SODIUM SERPL-SCNC: 140 MMOL/L (ref 136–145)
TOTAL IRON BINDING CAPACITY: 422 UG/DL (ref 250–450)
TRANSFERRIN SERPL-MCNC: 285 MG/DL (ref 200–375)
TRANSFERRIN SERPL-MCNC: 285 MG/DL (ref 200–375)
TRIGL SERPL-MCNC: 192 MG/DL (ref 30–150)
TSH SERPL DL<=0.005 MIU/L-ACNC: 1.99 UIU/ML (ref 0.4–4)
VIT B12 SERPL-MCNC: 402 PG/ML (ref 210–950)
WBC # BLD AUTO: 8.33 K/UL (ref 3.9–12.7)

## 2022-04-20 PROCEDURE — 80061 LIPID PANEL: CPT | Performed by: NURSE PRACTITIONER

## 2022-04-20 PROCEDURE — 82746 ASSAY OF FOLIC ACID SERUM: CPT | Performed by: HOSPITALIST

## 2022-04-20 PROCEDURE — 82607 VITAMIN B-12: CPT | Performed by: HOSPITALIST

## 2022-04-20 PROCEDURE — 80053 COMPREHEN METABOLIC PANEL: CPT | Performed by: NURSE PRACTITIONER

## 2022-04-20 PROCEDURE — 84443 ASSAY THYROID STIM HORMONE: CPT | Performed by: HOSPITALIST

## 2022-04-20 PROCEDURE — 82728 ASSAY OF FERRITIN: CPT | Performed by: HOSPITALIST

## 2022-04-20 PROCEDURE — 83036 HEMOGLOBIN GLYCOSYLATED A1C: CPT | Performed by: NURSE PRACTITIONER

## 2022-04-20 PROCEDURE — 84466 ASSAY OF TRANSFERRIN: CPT | Performed by: HOSPITALIST

## 2022-04-20 PROCEDURE — 36415 COLL VENOUS BLD VENIPUNCTURE: CPT | Mod: PO | Performed by: HOSPITALIST

## 2022-04-20 PROCEDURE — 85025 COMPLETE CBC W/AUTO DIFF WBC: CPT | Performed by: HOSPITALIST

## 2022-04-26 ENCOUNTER — LAB VISIT (OUTPATIENT)
Dept: LAB | Facility: HOSPITAL | Age: 67
End: 2022-04-26
Attending: HOSPITALIST
Payer: MEDICAID

## 2022-04-26 ENCOUNTER — PATIENT OUTREACH (OUTPATIENT)
Dept: ADMINISTRATIVE | Facility: OTHER | Age: 67
End: 2022-04-26
Payer: MEDICAID

## 2022-04-26 DIAGNOSIS — A04.8 H. PYLORI INFECTION: ICD-10-CM

## 2022-04-26 DIAGNOSIS — Z00.00 ANNUAL PHYSICAL EXAM: ICD-10-CM

## 2022-04-26 PROCEDURE — 87338 HPYLORI STOOL AG IA: CPT | Performed by: HOSPITALIST

## 2022-04-27 ENCOUNTER — PATIENT MESSAGE (OUTPATIENT)
Dept: DIABETES | Facility: CLINIC | Age: 67
End: 2022-04-27

## 2022-04-27 ENCOUNTER — PATIENT OUTREACH (OUTPATIENT)
Dept: DIABETES | Facility: CLINIC | Age: 67
End: 2022-04-27
Payer: MEDICAID

## 2022-04-27 ENCOUNTER — CLINICAL SUPPORT (OUTPATIENT)
Dept: DIABETES | Facility: CLINIC | Age: 67
End: 2022-04-27
Payer: MEDICAID

## 2022-04-27 ENCOUNTER — OFFICE VISIT (OUTPATIENT)
Dept: INTERNAL MEDICINE | Facility: CLINIC | Age: 67
End: 2022-04-27
Payer: MEDICAID

## 2022-04-27 VITALS
BODY MASS INDEX: 30.39 KG/M2 | TEMPERATURE: 98 F | WEIGHT: 165.13 LBS | SYSTOLIC BLOOD PRESSURE: 120 MMHG | RESPIRATION RATE: 14 BRPM | HEIGHT: 62 IN | DIASTOLIC BLOOD PRESSURE: 70 MMHG | OXYGEN SATURATION: 100 % | HEART RATE: 88 BPM

## 2022-04-27 DIAGNOSIS — E11.59 HYPERTENSION ASSOCIATED WITH DIABETES: ICD-10-CM

## 2022-04-27 DIAGNOSIS — E11.65 TYPE 2 DIABETES MELLITUS WITH HYPERGLYCEMIA, WITHOUT LONG-TERM CURRENT USE OF INSULIN: Primary | ICD-10-CM

## 2022-04-27 DIAGNOSIS — E78.5 HYPERLIPIDEMIA ASSOCIATED WITH TYPE 2 DIABETES MELLITUS: ICD-10-CM

## 2022-04-27 DIAGNOSIS — E11.69 HYPERLIPIDEMIA ASSOCIATED WITH TYPE 2 DIABETES MELLITUS: ICD-10-CM

## 2022-04-27 DIAGNOSIS — E66.9 OBESITY, CLASS I, BMI 30-34.9: ICD-10-CM

## 2022-04-27 DIAGNOSIS — I15.2 HYPERTENSION ASSOCIATED WITH DIABETES: ICD-10-CM

## 2022-04-27 DIAGNOSIS — I48.0 PAF (PAROXYSMAL ATRIAL FIBRILLATION): ICD-10-CM

## 2022-04-27 DIAGNOSIS — E11.65 UNCONTROLLED TYPE 2 DIABETES MELLITUS WITH HYPERGLYCEMIA: ICD-10-CM

## 2022-04-27 PROCEDURE — 3051F PR MOST RECENT HEMOGLOBIN A1C LEVEL 7.0 - < 8.0%: ICD-10-PCS | Mod: CPTII,,, | Performed by: NURSE PRACTITIONER

## 2022-04-27 PROCEDURE — 3066F NEPHROPATHY DOC TX: CPT | Mod: CPTII,,, | Performed by: NURSE PRACTITIONER

## 2022-04-27 PROCEDURE — 4010F ACE/ARB THERAPY RXD/TAKEN: CPT | Mod: CPTII,,, | Performed by: NURSE PRACTITIONER

## 2022-04-27 PROCEDURE — 1159F PR MEDICATION LIST DOCUMENTED IN MEDICAL RECORD: ICD-10-PCS | Mod: CPTII,,, | Performed by: NURSE PRACTITIONER

## 2022-04-27 PROCEDURE — 3066F PR DOCUMENTATION OF TREATMENT FOR NEPHROPATHY: ICD-10-PCS | Mod: CPTII,,, | Performed by: NURSE PRACTITIONER

## 2022-04-27 PROCEDURE — 1160F RVW MEDS BY RX/DR IN RCRD: CPT | Mod: CPTII,,, | Performed by: NURSE PRACTITIONER

## 2022-04-27 PROCEDURE — 4010F PR ACE/ARB THEARPY RXD/TAKEN: ICD-10-PCS | Mod: CPTII,,, | Performed by: NURSE PRACTITIONER

## 2022-04-27 PROCEDURE — 99214 OFFICE O/P EST MOD 30 MIN: CPT | Mod: S$PBB,,, | Performed by: NURSE PRACTITIONER

## 2022-04-27 PROCEDURE — 3078F DIAST BP <80 MM HG: CPT | Mod: CPTII,,, | Performed by: NURSE PRACTITIONER

## 2022-04-27 PROCEDURE — 1159F MED LIST DOCD IN RCRD: CPT | Mod: CPTII,,, | Performed by: NURSE PRACTITIONER

## 2022-04-27 PROCEDURE — 99215 OFFICE O/P EST HI 40 MIN: CPT | Mod: PBBFAC,PO | Performed by: NURSE PRACTITIONER

## 2022-04-27 PROCEDURE — 3051F HG A1C>EQUAL 7.0%<8.0%: CPT | Mod: CPTII,,, | Performed by: NURSE PRACTITIONER

## 2022-04-27 PROCEDURE — 3060F POS MICROALBUMINURIA REV: CPT | Mod: CPTII,,, | Performed by: NURSE PRACTITIONER

## 2022-04-27 PROCEDURE — 3074F SYST BP LT 130 MM HG: CPT | Mod: CPTII,,, | Performed by: NURSE PRACTITIONER

## 2022-04-27 PROCEDURE — 99999 PR PBB SHADOW E&M-EST. PATIENT-LVL V: ICD-10-PCS | Mod: PBBFAC,,, | Performed by: NURSE PRACTITIONER

## 2022-04-27 PROCEDURE — 3074F PR MOST RECENT SYSTOLIC BLOOD PRESSURE < 130 MM HG: ICD-10-PCS | Mod: CPTII,,, | Performed by: NURSE PRACTITIONER

## 2022-04-27 PROCEDURE — 3008F PR BODY MASS INDEX (BMI) DOCUMENTED: ICD-10-PCS | Mod: CPTII,,, | Performed by: NURSE PRACTITIONER

## 2022-04-27 PROCEDURE — 3078F PR MOST RECENT DIASTOLIC BLOOD PRESSURE < 80 MM HG: ICD-10-PCS | Mod: CPTII,,, | Performed by: NURSE PRACTITIONER

## 2022-04-27 PROCEDURE — 99214 PR OFFICE/OUTPT VISIT, EST, LEVL IV, 30-39 MIN: ICD-10-PCS | Mod: S$PBB,,, | Performed by: NURSE PRACTITIONER

## 2022-04-27 PROCEDURE — 1160F PR REVIEW ALL MEDS BY PRESCRIBER/CLIN PHARMACIST DOCUMENTED: ICD-10-PCS | Mod: CPTII,,, | Performed by: NURSE PRACTITIONER

## 2022-04-27 PROCEDURE — 99999 PR PBB SHADOW E&M-EST. PATIENT-LVL V: CPT | Mod: PBBFAC,,, | Performed by: NURSE PRACTITIONER

## 2022-04-27 PROCEDURE — 3008F BODY MASS INDEX DOCD: CPT | Mod: CPTII,,, | Performed by: NURSE PRACTITIONER

## 2022-04-27 PROCEDURE — 3060F PR POS MICROALBUMINURIA RESULT DOCUMENTED/REVIEW: ICD-10-PCS | Mod: CPTII,,, | Performed by: NURSE PRACTITIONER

## 2022-04-27 RX ORDER — DEXTROSE 4 G
TABLET,CHEWABLE ORAL
Qty: 1 EACH | Refills: 0 | Status: SHIPPED | OUTPATIENT
Start: 2022-04-27 | End: 2023-11-16 | Stop reason: SDUPTHER

## 2022-04-27 RX ORDER — LANCETS 26 GAUGE
1 EACH MISCELLANEOUS
Qty: 100 EACH | Refills: 5 | Status: SHIPPED | OUTPATIENT
Start: 2022-04-27 | End: 2023-09-25 | Stop reason: SDUPTHER

## 2022-04-27 NOTE — PROGRESS NOTES
66 y.o. female here for follow up for T2dm -   a1c reduced from 8% to 7.1%.   She has also lost 10 lbs since her last visit.   On xigduo 5/1000mg tablet bid.   Also newly on trulicity 3mg every week - prior she was on victoza daily.   Has lost weight since last visit.   Checking sugars daily and they range 90 - 115 on average.   She has history of afib - so, she remains on xarelto.             Last visit notes as follows  66 y.o. female, here for for 4 month follow up visit for management of T2dm -   Last seen September 2021 -     a1c remains about the same @ 8%.   Taking xigduo 5/1000mg tablets - 2 per day (was just on metformin before at last visit - I changed her).    Also on victoza 1.8mg SC every morning. She's been on for about 6 months now. Taking highest dose for 4 months now.   Not really helping bring her sugars down further.   No longer on glipizide. (I took her off this when I began the glp1).     She is checking blood sugars daily - ranging usually 150 - 180's,   Now improving over the past few days - 113 - 120's.   She is feeling ok -   Had covid 19 a few weeks ago - recovered and treated at home.   Had been living in [place] and admits had not been eating well over the holidays.   She has just started walking again -   Trying to eat healthier, however admits has a weakness for eating sweets.   Lipids stable except for high trigs - on statin daily.   Mild urine mac noted, but improving - 50's.       Last visit notes as follows from September 2021:   65 y.o. female, here for 3 month follow up for management of Type 2 diabetes.   Last seen in June 2021 - those notes are below.     a1c stable at 8% - still is not to goal, but has improved from previous visit from 9.5% to 8%.   She has continued on metformin 1000mg twice per day.   No longer on glipizide.   She is taking victoza still every morning - has increase from 1.2 to 1.8mg sc every morning.   Has kept weight off - but admits hard for her to carb  count and always follow proper diet.   Her job changes so that has been stressful.   She ran out of test strips so has not been checking her sugars.   No other complaints today's visit.       Last visit notes as follows from June 2021:   65 y.o. female, here for 2 mo follow up visit for management of T2sm -   Last seen April 2021 - those notes below.     No new labs for today's visit. Her a1c was last @ 9.5% with fasting bg's in the 250's.   Lost 10 lbs since last visit in just 2 months time.   Patient very motivated and following ADA diet now - really trying and putting forth effort.   Newly on victoza, has gone up to 1.2mg every morning and is taking.   No side effects, some appetite suppresion - no n/v/d.   Continues on metformin 1000mg bid.   Also on glipizide 10mg tablet once daily in am (was on bid).     Checking blood sugars - 1 - 2 times per day.   See list below. Running 130 - 140's on average.                     Last visit notes as follows from 4/9/2021:   HPI: Fely Wetzel is a 66 y.o.  female c/I for visit to address Diabetes Type 2  This is the first time I am seeing them for care, follows with Dr. Hien Sparks MD for primary care needs.   Has not seen endocrinology or diabetes specialist in the past.     was diagnosed with T2DM about 20 years ago.   Mother has type 2 diabetes.   Has never been hospitalized r/t DM.  Denies missing doses of DM medication.     a1c is up from 8% to 9.5% currently.   On metformin 1000 bid.   Glipizide 10 bid.   Was on tradjenta 5mg tablet daily, but states pharmacy didn't have any refills left and just stopped taking it 3 months ago.   So now that she thinks about it, this is likely why her a1c/sugasr went up.   Checks sugars on occasion. Last checked 6 months ago.   Doesn't like to fingerstick, doesn't like current meter.   She is very motivated to lose weight and start eating healthier, here to discuss her options for care.     Complications from diabetes  include:   Negative for DKA.   Has never taken insulin in life.   -negative for diabetic neuropathy.   -negative for nephropathy, but + microalbuminuria.   Eyes - negative for Diabetic retinopathy   Denies CV disease.   Denies known CAD. + for afib. On Eliquis.  HTN - controlled on current meds.   HLD - controlled on statin 40mg every Hs.     Past medical History:   Past Medical History:   Diagnosis Date    Colon polyp 11/2014    Diabetes mellitus type II     Hyperlipidemia     Hypertension     Obesity     Osteopenia       Family hx:   Family History   Problem Relation Age of Onset    Diabetes Mother     Glaucoma Mother     Hyperlipidemia Mother     Hypertension Mother     Heart disease Father     Cancer Father         prostate cancer    Heart attack Father     Heart disease Brother     Cataracts Brother     Heart failure Neg Hx     Stroke Neg Hx       Current meds:   Current Outpatient Medications:     ACCU-CHEK JUAN Misc, CHECK FASTING GLUCOSE AND CHECK GLUCOSE TWO HOURS AFTER LUNCH OR DINNER, Disp: 1 each, Rfl: 0    amLODIPine (NORVASC) 10 MG tablet, TAKE 1 TABLET BY MOUTH EVERY DAY, Disp: 90 tablet, Rfl: 3    apixaban (ELIQUIS) 5 mg Tab, Take 1 tablet (5 mg total) by mouth 2 (two) times daily., Disp: 60 tablet, Rfl: 11    atorvastatin (LIPITOR) 40 MG tablet, TAKE 1 TABLET BY MOUTH EVERY DAY IN THE EVENING, Disp: 30 tablet, Rfl: 11    cholecalciferol, vitamin D3, 1,250 mcg (50,000 unit) capsule, TAKE 1 CAPSULE BY MOUTH EVERY 7 DAYS, Disp: 4 capsule, Rfl: 5    cyanocobalamin 500 MCG tablet, Take 500 mcg by mouth once daily., Disp: , Rfl:     dapagliflozin-metformin (XIGDUO XR) 5-1,000 mg TBph, Take 1 tablet by mouth 2 (two) times daily with meals., Disp: 60 tablet, Rfl: 6    dulaglutide (TRULICITY) 3 mg/0.5 mL pen injector, Inject 3 mg into the skin every 7 days., Disp: 4 pen, Rfl: 11    fenofibrate 160 MG Tab, Take 1 tablet (160 mg total) by mouth once daily., Disp: 90 tablet, Rfl: 3     flecainide (TAMBOCOR) 50 MG Tab, TAKE 1 TABLET (50 MG TOTAL) BY MOUTH EVERY 12 (TWELVE) HOURS., Disp: 60 tablet, Rfl: 11    hydroCHLOROthiazide (HYDRODIURIL) 25 MG tablet, TAKE 1 TABLET (25 MG TOTAL) BY MOUTH ONCE DAILY. WITH BREAKFAST, Disp: 30 tablet, Rfl: 11    losartan (COZAAR) 100 MG tablet, TAKE 1 TABLET BY MOUTH EVERY DAY, Disp: 30 tablet, Rfl: 11    metoprolol succinate (TOPROL-XL) 50 MG 24 hr tablet, TAKE 1 TABLET BY MOUTH EVERY DAY, Disp: 90 tablet, Rfl: 3    blood sugar diagnostic Strp, Inject 1 strip into the skin 2 (two) times a day., Disp: 100 strip, Rfl: 3    blood-glucose meter Misc, Check fasting glucose and check glucose two hours after lunch or dinner, Disp: 1 each, Rfl: 0    lancing device with lancets (ONETOUCH DELICA LANC DEVICE) Kit, 1 each by Misc.(Non-Drug; Combo Route) route 2 (two) times daily before meals., Disp: 100 each, Rfl: 5    omeprazole (PRILOSEC) 40 MG capsule, Take 1 capsule (40 mg total) by mouth 2 (two) times a day. for 14 days (Patient not taking: Reported on 4/6/2022), Disp: 28 capsule, Rfl: 0     Current Diabetes medications:   Metformin 1000 bid. --> switched to xigduo 5/1000mg 2 tablets daily/    victoza 1.8 mg every morning.     Medications Tried and Failed:   tradjenta 5mg daily - just stopped taking 3 months ago - no s/e's, just ran out of Rx.   Glipizide 10 daily in morning.    Review of Pertinent co-morbidities/risk factors:   CV: Denies history of MI nor stroke.   CAD: Denies.  Does not take aspirin 81mg tablet daily  BP: has history of HTN  Statin: Taking  ACE/ARB: Taking    Social History     Tobacco Use   Smoking Status Never Smoker   Smokeless Tobacco Never Used        Social:   Lives at home by herself.    No current Life changes/stressors currently.  Diet: no following ADA diet   Meals: 3 per day and snacks.        Breakfast - eggs, tortillas, meats.         Lunch - salads and veggies.        Dinner - fish, veggies.        Snacks - veggies. Bananas.       "   Drinks - green veggie juices, water. Coffee    Exercise: none. Walking on occasion. Quit last year.   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     Glucose Monitoring:    checking blood sugars fingerstick 1 - 2 times per day.     Standards of care:   Eyes: .: 05/28/2021  Foot exam: : 05/19/2021   Diabetes education: None.    Vital Signs  /70 (BP Location: Right arm, Patient Position: Sitting, BP Method: Medium (Manual))   Pulse 88   Temp 97.5 °F (36.4 °C) (Temporal)   Resp 14   Ht 5' 2" (1.575 m)   Wt 74.9 kg (165 lb 2 oz)   SpO2 100%   BMI 30.20 kg/m²     Pertinent Labs:   Hgba1c   Lab Results   Component Value Date    HGBA1C 7.1 (H) 04/20/2022    HGBA1C 8.0 (H) 01/14/2022    HGBA1C 8.0 (H) 08/23/2021     Lipid panel   Lab Results   Component Value Date    CHOL 188 04/20/2022    CHOL 159 01/14/2022    CHOL 196 08/23/2021     Lab Results   Component Value Date    HDL 51 04/20/2022    HDL 45 01/14/2022    HDL 40 08/23/2021     Lab Results   Component Value Date    LDLCALC 98.6 04/20/2022    LDLCALC 73.4 01/14/2022    LDLCALC 90.6 08/23/2021     Lab Results   Component Value Date    TRIG 192 (H) 04/20/2022    TRIG 203 (H) 01/14/2022    TRIG 327 (H) 08/23/2021     Lab Results   Component Value Date    CHOLHDL 27.1 04/20/2022    CHOLHDL 28.3 01/14/2022    CHOLHDL 20.4 08/23/2021      CMP  Glucose   Date Value Ref Range Status   04/20/2022 129 (H) 70 - 110 mg/dL Final     BUN   Date Value Ref Range Status   04/20/2022 18 8 - 23 mg/dL Final     Creatinine   Date Value Ref Range Status   04/20/2022 0.8 0.5 - 1.4 mg/dL Final     eGFR if    Date Value Ref Range Status   04/20/2022 >60.0 >60 mL/min/1.73 m^2 Final     eGFR if non    Date Value Ref Range Status   04/20/2022 >60.0 >60 mL/min/1.73 m^2 Final     Comment:     Calculation used to obtain the estimated glomerular filtration  rate (eGFR) is the CKD-EPI equation.        AST   Date Value Ref Range Status "   04/20/2022 17 10 - 40 U/L Final     ALT   Date Value Ref Range Status   04/20/2022 15 10 - 44 U/L Final     Microalbumin creatinine ratio:   Lab Results   Component Value Date    MICALBCREAT 67.6 (H) 04/20/2022       Review Of Systems:   Gen: Appetite good, 10 - 15+ lbs total of weight loss, denies fatigue and weakness. Denies polydipsia.  Skin: Skin is intact and heals well, denies any rashes or hair changes.   EENT: Denies any acute visual disturbances, nor blurred vision.    Resp: Denies SOB or Dyspnea on exertion, denies cough.   Cardiac: Denies chest pain, palpitations, or swelling.   GI: Denies abdominal pain, nausea or vomiting, diarrhea, or constipation.   /GYN: Denies nocturia, nor burning, frequency or pain on urination.  MS/Neuro: Denies numbness/ tingling in BLE; Gait steady, speech clear  Psych: Denies drug/ETOH abuse, no hx of depression.  Feet: - bunion on right foot - following with podiatry.   Other systems: negative.    Physical Exam:   GENERAL: Well developed, well nourished in appearance.   PSYCH: AAOx3, appropriate mood and affect, pleasant expression, conversant, appears relaxed, well groomed.   EYES: PERRL, Conjunctiva and corneas clear  NECK: Soft and Supple, trachea midline  ABDOMEN: Soft, non-tender, non-distended.  VASCULAR: pedal pulses palpable bilaterally, no edema.  NEURO:  cranial nerves II - XII intact   MUSCULOSKELETAL: Good ROM, steady gait.   SKIN: Skin warm, dry, and intact     Assessment and Plan of Care:     Fely was seen today for diabetes and follow-up.    Diagnoses and all orders for this visit:    Type 2 diabetes mellitus with hyperglycemia, without long-term current use of insulin  -     Hemoglobin A1C; Future  -     Comprehensive Metabolic Panel; Future  -     lancing device with lancets (ONETOUCH DELICA LANC DEVICE) Kit; 1 each by Misc.(Non-Drug; Combo Route) route 2 (two) times daily before meals.  -     blood sugar diagnostic Strp; Inject 1 strip into the skin 2  (two) times a day.    Hyperlipidemia associated with type 2 diabetes mellitus    Hypertension associated with diabetes    Obesity, Class I, BMI 30-34.9    Diabetes type 2, uncontrolled  -     blood-glucose meter Misc; Check fasting glucose and check glucose two hours after lunch or dinner    PAF (paroxysmal atrial fibrillation)       1. T2DM with hyperglycemia- Hgba1c goal is 7.5% or less without hypoglycemia - 8.5%---> 8%---> 9.5%--> 8%--> 8% --> 7.1%  current.   Improving -   discussed DM, progression of disease, long term complications, CV risk factors and tx options.   Advise compliance with ADA diet and encourage exercise- gave list.   Continue xigduo 5/1000mg tablet 2 times per day with meals.   Continue trulicity 3 mg every week.   Discussed MOA, possible side effects including yeast infection, UTI, dehydration and ketoacidosis, importance of maintaining hydration and avoiding No carb diets. Good use hygiene. Notify my office if any side effects. Will monitor renal function closely. Stable at present.   glp1 - for cardiac protection, and weight loss also - which will then reverse her diabetes and treat the actual problem of insulin resistance.   No known history of pancreatitis or medullary thyroid cancer.   If you experience severe abdominal pain, nausea, or diarrhea - please call me or notify my office immediately.   Advised small, frequent meals -   Try to eliminate/cut down on sweets.   Keep walking.    Instructed to monitor Blood glucose 2x/day before meals and bring meter/ log to every clinic visit.   Sent in refills for strips, new lancets, and new meter - true metrix requested.   Eyes - Dr. Sweeney 2021 -     2. HTN - controlled, continue meds as previously prescribed and monitor.   Urine mac + 170's--> 70's--. 50's --> 67 - Improving. Continuing on sglt2i.   Losartan, norvasc.     3. HLd - LDL goal < 100. Minimally meets goal. Trigs are still high - add on triglycerides.   Currently on statin therapy-  lipitor 40mg every HS   On eliquis -     4. Weight - BMI Body mass index is 30.2 kg/m².   Encourage Ada diet and exercise.   Continue glp1 and sglt2i.      5. Renal Function - stable. No nephropathy but + urine mac.   On losartan. Will continue to monitor.     6. Osteopenia  - on ergo weekly. Sent in refill.   Advised to start walking     7. Afib - eliquis and metoprolol.            Labs and follow up in 6 months

## 2022-04-27 NOTE — PATIENT INSTRUCTIONS
Continue xigduo 5/1000mg XR tablet twice per day.   Continue Trulicity 3mg every week - switch/rotate the injection sites.   Eat small frequent meals.   Continue to eat fresh foods, home cooked meals - lean meats, vegetables, berries, nuts are best     Continue to check sugar once daily before meals.

## 2022-04-27 NOTE — PROGRESS NOTES
Patient was scheduled for diabetes education today prior to her appointment with Noelle Dinh. Patient walked out of appointment. She did not appear to want to be here from the very beginning of the appointment. She refused to answer questions - just kept saying everything should be in her chart. She states she does not need this appointment and she was done. Appointment cancelled.

## 2022-04-29 NOTE — PROGRESS NOTES
This appointment was cancelled. Patient did not wish to complete the visit after she checked in.

## 2022-05-04 ENCOUNTER — TELEPHONE (OUTPATIENT)
Dept: INTERNAL MEDICINE | Facility: CLINIC | Age: 67
End: 2022-05-04
Payer: MEDICAID

## 2022-05-04 DIAGNOSIS — E11.65 TYPE 2 DIABETES MELLITUS WITH HYPERGLYCEMIA, WITHOUT LONG-TERM CURRENT USE OF INSULIN: Primary | ICD-10-CM

## 2022-05-04 NOTE — TELEPHONE ENCOUNTER
----- Message from Cheyenne Seay sent at 5/3/2022  2:13 PM CDT -----  Contact: pt @ [phone]  No blue slot available to schedule an appointment for the patient.  Patient is established with which PCP: Clare  Reason for the visit: F/U  Would the patient like a call back, or a response through their [facility] portal?:  portal    Patient needs a 6 month F/U 10/22

## 2022-05-05 ENCOUNTER — PATIENT MESSAGE (OUTPATIENT)
Dept: INTERNAL MEDICINE | Facility: CLINIC | Age: 67
End: 2022-05-05
Payer: MEDICAID

## 2022-05-05 DIAGNOSIS — A04.8 H. PYLORI INFECTION: Primary | ICD-10-CM

## 2022-05-05 LAB
H PYLORI AG STL QL IA: DETECTED
SPECIMEN SOURCE: ABNORMAL

## 2022-05-06 RX ORDER — OMEPRAZOLE 40 MG/1
40 CAPSULE, DELAYED RELEASE ORAL 2 TIMES DAILY
Qty: 28 CAPSULE | Refills: 0 | Status: SHIPPED | OUTPATIENT
Start: 2022-05-06 | End: 2022-10-06 | Stop reason: ALTCHOICE

## 2022-05-06 RX ORDER — AMOXICILLIN 500 MG/1
1000 TABLET, FILM COATED ORAL 3 TIMES DAILY
Qty: 84 TABLET | Refills: 0 | Status: SHIPPED | OUTPATIENT
Start: 2022-05-06 | End: 2022-05-20

## 2022-05-07 ENCOUNTER — PATIENT MESSAGE (OUTPATIENT)
Dept: INTERNAL MEDICINE | Facility: CLINIC | Age: 67
End: 2022-05-07
Payer: MEDICAID

## 2022-05-12 ENCOUNTER — IMMUNIZATION (OUTPATIENT)
Dept: INTERNAL MEDICINE | Facility: CLINIC | Age: 67
End: 2022-05-12
Payer: MEDICAID

## 2022-05-12 DIAGNOSIS — Z23 NEED FOR VACCINATION: Primary | ICD-10-CM

## 2022-05-12 PROCEDURE — 91300 COVID-19, MRNA, LNP-S, PF, 30 MCG/0.3 ML DOSE VACCINE: CPT | Mod: PBBFAC

## 2022-07-01 ENCOUNTER — TELEPHONE (OUTPATIENT)
Dept: INTERNAL MEDICINE | Facility: CLINIC | Age: 67
End: 2022-07-01
Payer: MEDICAID

## 2022-07-01 DIAGNOSIS — Z12.31 ENCOUNTER FOR SCREENING MAMMOGRAM FOR BREAST CANCER: ICD-10-CM

## 2022-07-01 NOTE — TELEPHONE ENCOUNTER
----- Message from Nanda Rogers MA sent at 7/1/2022 11:02 AM CDT -----  Contact: [phone]  Patient needs an order for mammogram. Please call and advise the patient.

## 2022-07-25 ENCOUNTER — HOSPITAL ENCOUNTER (OUTPATIENT)
Dept: RADIOLOGY | Facility: HOSPITAL | Age: 67
Discharge: HOME | End: 2022-07-25
Attending: HOSPITALIST
Payer: MEDICAID

## 2022-07-25 VITALS — BODY MASS INDEX: 29.62 KG/M2 | WEIGHT: 160.94 LBS | HEIGHT: 62 IN

## 2022-07-25 DIAGNOSIS — Z12.31 ENCOUNTER FOR SCREENING MAMMOGRAM FOR BREAST CANCER: ICD-10-CM

## 2022-07-25 PROCEDURE — 77063 MAMMO DIGITAL SCREENING BILAT WITH TOMO: ICD-10-PCS | Mod: 26,,, | Performed by: RADIOLOGY

## 2022-07-25 PROCEDURE — 77067 SCR MAMMO BI INCL CAD: CPT | Mod: 26,,, | Performed by: RADIOLOGY

## 2022-07-25 PROCEDURE — 77067 MAMMO DIGITAL SCREENING BILAT WITH TOMO: ICD-10-PCS | Mod: 26,,, | Performed by: RADIOLOGY

## 2022-07-25 PROCEDURE — 77063 BREAST TOMOSYNTHESIS BI: CPT | Mod: 26,,, | Performed by: RADIOLOGY

## 2022-07-25 PROCEDURE — 77063 BREAST TOMOSYNTHESIS BI: CPT | Mod: TC,PO

## 2022-07-25 PROCEDURE — 77067 SCR MAMMO BI INCL CAD: CPT | Mod: TC,PO

## 2022-07-26 ENCOUNTER — PATIENT MESSAGE (OUTPATIENT)
Dept: ADMINISTRATIVE | Facility: HOSPITAL | Age: 67
End: 2022-07-26
Payer: MEDICAID

## 2022-07-26 ENCOUNTER — PATIENT OUTREACH (OUTPATIENT)
Dept: ADMINISTRATIVE | Facility: HOSPITAL | Age: 67
End: 2022-07-26
Payer: MEDICAID

## 2022-07-26 NOTE — PROGRESS NOTES
Chart reviewed  Immunizations reconciled   Portal message sent regarding eye exam   85yo/M with PMH Parkinson's, HTN, BPH presented after syncopal episode. Chronic lacunar infarcts in the bilateral CR and right thalamocapsular junction.

## 2022-08-01 RX ORDER — ASPIRIN 325 MG
TABLET, DELAYED RELEASE (ENTERIC COATED) ORAL
Qty: 12 CAPSULE | Refills: 1 | Status: SHIPPED | OUTPATIENT
Start: 2022-08-01 | End: 2022-10-06

## 2022-08-09 ENCOUNTER — PATIENT OUTREACH (OUTPATIENT)
Dept: ADMINISTRATIVE | Facility: HOSPITAL | Age: 67
End: 2022-08-09
Payer: MEDICAID

## 2022-08-09 ENCOUNTER — PATIENT MESSAGE (OUTPATIENT)
Dept: ADMINISTRATIVE | Facility: HOSPITAL | Age: 67
End: 2022-08-09
Payer: MEDICAID

## 2022-08-10 ENCOUNTER — PATIENT OUTREACH (OUTPATIENT)
Dept: ADMINISTRATIVE | Facility: HOSPITAL | Age: 67
End: 2022-08-10
Payer: MEDICAID

## 2022-08-10 NOTE — LETTER
AUTHORIZATION FOR RELEASE OF   CONFIDENTIAL INFORMATION      We are seeing Fely Wetzel, date of birth 1955, in the clinic at [facility]. Hien Sparks MD is the patient's PCP. Fely Wetzel has an outstanding lab/procedure at the time we reviewed her chart. In order to help keep her health information updated, she has authorized us to request the following medical record(s):        (  )  MAMMOGRAM                                      (  )  COLONOSCOPY      (  )  PAP SMEAR                                          (  )  OUTSIDE LAB RESULTS     (  )  DEXA SCAN                                          ( x )  EYE EXAM            (  )  FOOT EXAM                                          (  )  ENTIRE RECORD     (  )  OUTSIDE IMMUNIZATIONS                 (  )  _______________         Please fax records to [facility], Miriam C Azuoru, MD, [phone]     If you have any questions, please contact Rosalind at [phone]          Patient Name: Fely Wetzel  : 1955  Patient Phone #: [phone]

## 2022-08-15 NOTE — PROGRESS NOTES
Subjective:    Patient ID:  Fely Wetzel is a 66 y.o. female who presents for evaluation of symptomatic atrial fibrillation.     Referring Cardiologist: Marie Dominguez MD  Primary Care Physician: Paula Barkley DO    Prior Hx:  I had the pleasure of seeing Mrs. Wetzel today in our electrophysiology clinic in follow-up for her atrial fibrillation. As you are aware she is a pleasant 66 year-old woman with hypertension, diabetes mellitus type 2, and obesity. She was in her usual state of health until June 2019 when she developed sudden palpitations. She called EMS and reportedly was in AF with ventricular rates up to 170s-220s. She was given IV cardizem and her ventricular rate lowered to 110s-170s. Per ER physician she was in atrial fibrillation however no ECGs document her arrhythmia. She was given 20mg more of cardizem and spontaneously converted to sinus rhythm. TSH was normal. She was discharged on eliquis and metoprolol. She was referred to Dr. Dominguez and discussed with her if there was possibility at some point of stopping anticoagulation. She reports for the past few weeks she has been having episodes of palpitations with radiation into her neck. These are different than when she went to the ER.     An exercise stress echocardiogram noted normal LV function and no ischemic changes. An echocardiogram performed on 10/15/2019 showed normal LV function, normal valves, and moderate LA enlargement ([id number]).    Our plan was for a 30 day monitor, sleep study and possibly starting flecainide.    Mrs. Wetzel presented 5/2020 for follow-up. A 30 day monitor done in March of 2020 which showed no atrial fibrillation. She presented to the ER in November of 2019 with recurrent symptomatic AF with RVR that was rate controlled with cardizem and spontaneously converted. She notes occasional palpitations at night. She has not yet had a sleep study.    Mrs. Wetzel returned for follow-up 12/2020. She is on  flecainide. She continues on metoprolol/eliquis. She notes occasional episodes of chest discomfort at rest, not with activity. She drinks water and it goes away. She does have indigestion. No symptomatic AF. Recent CBC noted stable H/H.    Mrs. Wetzel returned for follow-up 7/2021. She was doing well from atrial fibrillation standpoint  without any reports of symptomatic AF. She reported chest discomfort that occurs after eating in the evening and was suspected to be due to dyspepsia. Protonix was prescribed at that time.     Mrs. Wetzel presented for follow up 2/2022 and she once again reported that she was doing well. She denied any sustained periods of atrial fibrillation. Her non cardiac chest discomfort has persistent in spite of protonix and she is planned for  coloscopy/EGD.  She was still employed and has no limitations with her usual activities.     Interim Hx:   Mrs. Wetzel returns for follow-up. She reports 1 month ago she had a few days of palpitations. Her main complaint is regarding varicose veins in her legs.    My interpretation of today's in clinic ECG is sinus rhythm at 72 bpm with a narrow QRS.      Review of Systems   Constitutional: Negative for fever and malaise/fatigue.   HENT: Negative for congestion and sore throat.    Eyes: Negative for blurred vision and visual disturbance.   Cardiovascular: Positive for palpitations. Negative for chest pain, dyspnea on exertion and irregular heartbeat.   Respiratory: Positive for sleep disturbances due to breathing and snoring. Negative for cough and shortness of breath.    Hematologic/Lymphatic: Negative for bleeding problem. Does not bruise/bleed easily.   Skin: Negative.    Musculoskeletal: Negative.    Gastrointestinal: Positive for heartburn. Negative for bloating and abdominal pain.   Neurological: Positive for excessive daytime sleepiness. Negative for dizziness and focal weakness.        Objective:    Physical Exam  Vitals reviewed.    Constitutional:       General: She is not in acute distress.     Appearance: She is well-developed. She is not diaphoretic.   HENT:      Head: Normocephalic and atraumatic.   Eyes:      General:         Right eye: No discharge.         Left eye: No discharge.      Conjunctiva/sclera: Conjunctivae normal.   Neck:      Vascular: No JVD.   Cardiovascular:      Rate and Rhythm: Normal rate and regular rhythm.      Heart sounds: No murmur heard.    No friction rub. No gallop.   Pulmonary:      Effort: Pulmonary effort is normal. No respiratory distress.      Breath sounds: Normal breath sounds. No wheezing or rales.   Abdominal:      General: Bowel sounds are normal. There is no distension.      Palpations: Abdomen is soft.      Tenderness: There is no abdominal tenderness. There is no rebound.   Musculoskeletal:      Cervical back: Neck supple.   Skin:     General: Skin is warm and dry.   Neurological:      Mental Status: She is alert and oriented to person, place, and time.   Psychiatric:         Behavior: Behavior normal.         Thought Content: Thought content normal.         Judgment: Judgment normal.           Assessment:       1. PAF (paroxysmal atrial fibrillation)    2. Hypertension associated with diabetes    3. Type 2 diabetes mellitus with hyperglycemia, without long-term current use of insulin         Plan:       In summary, Mrs. Wetzel is a pleasant 66 year-old woman with hypertension, diabetes mellitus type 2, and obesity presenting for follow-up for recurrent symptomatic paroxysmal atrial fibrillation with RVR. I had a long discussion with the patient about the pathophysiology and risks of atrial fibrillation and its basic pathophysiology, including its health implications and treatment options. Specifically, I addressed the need for CVA (stroke) prophylaxis with aspirin versus oral anticoagulation (warfarin vs DOACs, discussed bleeding risks, and need to come to the ER for any head trauma for CT  scanning even if asymptomatic). Her [id number] score is 3 and indefinite anticoagulation is recommended. She remains on eliquis. She has had symptomatic benefit with flecainide/metoprolol.  Will order a 14 day holter. Will call with results. Discussed vascular surgery referral for painful varicose vein treatment. She will think about it.      Continue on eliquis as well as  flecainide/metoprolol   RTC in 6 months, sooner if needed      Thank you for allowing me to participate in the care of this patient. Please do not hesitate to call me with any questions or concerns.    Beck Mathias MD, PhD  Cardiac Electrophysiology                    HPI

## 2022-08-16 ENCOUNTER — OFFICE VISIT (OUTPATIENT)
Dept: ELECTROPHYSIOLOGY | Facility: CLINIC | Age: 67
End: 2022-08-16
Payer: MEDICAID

## 2022-08-16 ENCOUNTER — HOSPITAL ENCOUNTER (OUTPATIENT)
Dept: CARDIOLOGY | Facility: CLINIC | Age: 67
Discharge: HOME | End: 2022-08-16
Payer: MEDICAID

## 2022-08-16 VITALS
WEIGHT: 163.81 LBS | HEART RATE: 72 BPM | SYSTOLIC BLOOD PRESSURE: 133 MMHG | DIASTOLIC BLOOD PRESSURE: 64 MMHG | HEIGHT: 62 IN | BODY MASS INDEX: 30.14 KG/M2

## 2022-08-16 DIAGNOSIS — I15.2 HYPERTENSION ASSOCIATED WITH DIABETES: ICD-10-CM

## 2022-08-16 DIAGNOSIS — E11.59 HYPERTENSION ASSOCIATED WITH DIABETES: ICD-10-CM

## 2022-08-16 DIAGNOSIS — I48.0 PAF (PAROXYSMAL ATRIAL FIBRILLATION): Primary | ICD-10-CM

## 2022-08-16 DIAGNOSIS — E11.65 TYPE 2 DIABETES MELLITUS WITH HYPERGLYCEMIA, WITHOUT LONG-TERM CURRENT USE OF INSULIN: ICD-10-CM

## 2022-08-16 DIAGNOSIS — I49.8 OTHER SPECIFIED CARDIAC ARRHYTHMIAS: ICD-10-CM

## 2022-08-16 PROCEDURE — 99999 PR PBB SHADOW E&M-EST. PATIENT-LVL III: ICD-10-PCS | Mod: PBBFAC,,, | Performed by: INTERNAL MEDICINE

## 2022-08-16 PROCEDURE — 99999 PR PBB SHADOW E&M-EST. PATIENT-LVL III: CPT | Mod: PBBFAC,,, | Performed by: INTERNAL MEDICINE

## 2022-08-16 PROCEDURE — 3078F DIAST BP <80 MM HG: CPT | Mod: CPTII,,, | Performed by: INTERNAL MEDICINE

## 2022-08-16 PROCEDURE — 99214 OFFICE O/P EST MOD 30 MIN: CPT | Mod: S$PBB,,, | Performed by: INTERNAL MEDICINE

## 2022-08-16 PROCEDURE — 3060F POS MICROALBUMINURIA REV: CPT | Mod: CPTII,,, | Performed by: INTERNAL MEDICINE

## 2022-08-16 PROCEDURE — 99213 OFFICE O/P EST LOW 20 MIN: CPT | Mod: PBBFAC | Performed by: INTERNAL MEDICINE

## 2022-08-16 PROCEDURE — 3008F BODY MASS INDEX DOCD: CPT | Mod: CPTII,,, | Performed by: INTERNAL MEDICINE

## 2022-08-16 PROCEDURE — 3075F SYST BP GE 130 - 139MM HG: CPT | Mod: CPTII,,, | Performed by: INTERNAL MEDICINE

## 2022-08-16 PROCEDURE — 3075F PR MOST RECENT SYSTOLIC BLOOD PRESS GE 130-139MM HG: ICD-10-PCS | Mod: CPTII,,, | Performed by: INTERNAL MEDICINE

## 2022-08-16 PROCEDURE — 1159F MED LIST DOCD IN RCRD: CPT | Mod: CPTII,,, | Performed by: INTERNAL MEDICINE

## 2022-08-16 PROCEDURE — 3066F NEPHROPATHY DOC TX: CPT | Mod: CPTII,,, | Performed by: INTERNAL MEDICINE

## 2022-08-16 PROCEDURE — 3008F PR BODY MASS INDEX (BMI) DOCUMENTED: ICD-10-PCS | Mod: CPTII,,, | Performed by: INTERNAL MEDICINE

## 2022-08-16 PROCEDURE — 4010F PR ACE/ARB THEARPY RXD/TAKEN: ICD-10-PCS | Mod: CPTII,,, | Performed by: INTERNAL MEDICINE

## 2022-08-16 PROCEDURE — 3051F PR MOST RECENT HEMOGLOBIN A1C LEVEL 7.0 - < 8.0%: ICD-10-PCS | Mod: CPTII,,, | Performed by: INTERNAL MEDICINE

## 2022-08-16 PROCEDURE — 93005 ELECTROCARDIOGRAM TRACING: CPT | Mod: PBBFAC | Performed by: INTERNAL MEDICINE

## 2022-08-16 PROCEDURE — 93010 RHYTHM STRIP: ICD-10-PCS | Mod: S$PBB,,, | Performed by: INTERNAL MEDICINE

## 2022-08-16 PROCEDURE — 3060F PR POS MICROALBUMINURIA RESULT DOCUMENTED/REVIEW: ICD-10-PCS | Mod: CPTII,,, | Performed by: INTERNAL MEDICINE

## 2022-08-16 PROCEDURE — 1159F PR MEDICATION LIST DOCUMENTED IN MEDICAL RECORD: ICD-10-PCS | Mod: CPTII,,, | Performed by: INTERNAL MEDICINE

## 2022-08-16 PROCEDURE — 1160F PR REVIEW ALL MEDS BY PRESCRIBER/CLIN PHARMACIST DOCUMENTED: ICD-10-PCS | Mod: CPTII,,, | Performed by: INTERNAL MEDICINE

## 2022-08-16 PROCEDURE — 1160F RVW MEDS BY RX/DR IN RCRD: CPT | Mod: CPTII,,, | Performed by: INTERNAL MEDICINE

## 2022-08-16 PROCEDURE — 93010 ELECTROCARDIOGRAM REPORT: CPT | Mod: S$PBB,,, | Performed by: INTERNAL MEDICINE

## 2022-08-16 PROCEDURE — 3078F PR MOST RECENT DIASTOLIC BLOOD PRESSURE < 80 MM HG: ICD-10-PCS | Mod: CPTII,,, | Performed by: INTERNAL MEDICINE

## 2022-08-16 PROCEDURE — 3066F PR DOCUMENTATION OF TREATMENT FOR NEPHROPATHY: ICD-10-PCS | Mod: CPTII,,, | Performed by: INTERNAL MEDICINE

## 2022-08-16 PROCEDURE — 99214 PR OFFICE/OUTPT VISIT, EST, LEVL IV, 30-39 MIN: ICD-10-PCS | Mod: S$PBB,,, | Performed by: INTERNAL MEDICINE

## 2022-08-16 PROCEDURE — 4010F ACE/ARB THERAPY RXD/TAKEN: CPT | Mod: CPTII,,, | Performed by: INTERNAL MEDICINE

## 2022-08-16 PROCEDURE — 3051F HG A1C>EQUAL 7.0%<8.0%: CPT | Mod: CPTII,,, | Performed by: INTERNAL MEDICINE

## 2022-08-29 ENCOUNTER — CLINICAL SUPPORT (OUTPATIENT)
Dept: CARDIOLOGY | Facility: HOSPITAL | Age: 67
End: 2022-08-29
Attending: INTERNAL MEDICINE
Payer: MEDICAID

## 2022-08-29 ENCOUNTER — PATIENT MESSAGE (OUTPATIENT)
Dept: ELECTROPHYSIOLOGY | Facility: CLINIC | Age: 67
End: 2022-08-29
Payer: MEDICAID

## 2022-08-29 DIAGNOSIS — I48.0 PAF (PAROXYSMAL ATRIAL FIBRILLATION): ICD-10-CM

## 2022-08-29 PROCEDURE — 93248 EXT ECG>7D<15D REV&INTERPJ: CPT | Mod: ,,, | Performed by: INTERNAL MEDICINE

## 2022-08-29 PROCEDURE — 93248 CV CARDIAC MONITOR - 3-15 DAY ADULT (CUPID ONLY): ICD-10-PCS | Mod: ,,, | Performed by: INTERNAL MEDICINE

## 2022-09-02 ENCOUNTER — PATIENT OUTREACH (OUTPATIENT)
Dept: ADMINISTRATIVE | Facility: HOSPITAL | Age: 67
End: 2022-09-02
Payer: MEDICAID

## 2022-09-21 ENCOUNTER — TELEPHONE (OUTPATIENT)
Dept: ELECTROPHYSIOLOGY | Facility: CLINIC | Age: 67
End: 2022-09-21
Payer: MEDICAID

## 2022-09-21 NOTE — TELEPHONE ENCOUNTER
Patient returned phone call. I informed her of the monitor results. She stated that she sometimes still feels a fast heart beat but if MD isn't concerned and her monitor results are good she can live with that.

## 2022-09-21 NOTE — TELEPHONE ENCOUNTER
----- Message from Beck Mathias MD sent at 9/21/2022  8:45 AM CDT -----  Please notify the patient that her heart monitor showed she remains in normal rhythm. All looks good.

## 2022-09-21 NOTE — PROGRESS NOTES
Please notify the patient that her heart monitor showed she remains in normal rhythm. All looks good.

## 2022-09-21 NOTE — TELEPHONE ENCOUNTER
Called patient to let her know that her monitor results were good. She remains in a regular rhythm. Patient did not answer. I left a message with my call back number.

## 2022-09-29 ENCOUNTER — LAB VISIT (OUTPATIENT)
Dept: LAB | Facility: HOSPITAL | Age: 67
End: 2022-09-29
Attending: HOSPITALIST
Payer: MEDICAID

## 2022-09-29 DIAGNOSIS — E11.65 TYPE 2 DIABETES MELLITUS WITH HYPERGLYCEMIA, WITHOUT LONG-TERM CURRENT USE OF INSULIN: ICD-10-CM

## 2022-09-29 LAB
ALBUMIN SERPL BCP-MCNC: 4.1 G/DL (ref 3.5–5.2)
ALP SERPL-CCNC: 50 U/L (ref 55–135)
ALT SERPL W/O P-5'-P-CCNC: 18 U/L (ref 10–44)
ANION GAP SERPL CALC-SCNC: 10 MMOL/L (ref 8–16)
AST SERPL-CCNC: 19 U/L (ref 10–40)
BILIRUB SERPL-MCNC: 0.4 MG/DL (ref 0.1–1)
BUN SERPL-MCNC: 25 MG/DL (ref 8–23)
CALCIUM SERPL-MCNC: 10.2 MG/DL (ref 8.7–10.5)
CHLORIDE SERPL-SCNC: 103 MMOL/L (ref 95–110)
CHOLEST SERPL-MCNC: 183 MG/DL (ref 120–199)
CHOLEST/HDLC SERPL: 3.3 {RATIO} (ref 2–5)
CO2 SERPL-SCNC: 23 MMOL/L (ref 23–29)
CREAT SERPL-MCNC: 0.9 MG/DL (ref 0.5–1.4)
EST. GFR  (NO RACE VARIABLE): >60 ML/MIN/1.73 M^2
ESTIMATED AVG GLUCOSE: 166 MG/DL (ref 68–131)
GLUCOSE SERPL-MCNC: 122 MG/DL (ref 70–110)
HBA1C MFR BLD: 7.4 % (ref 4–5.6)
HDLC SERPL-MCNC: 56 MG/DL (ref 40–75)
HDLC SERPL: 30.6 % (ref 20–50)
LDLC SERPL CALC-MCNC: 95.4 MG/DL (ref 63–159)
NONHDLC SERPL-MCNC: 127 MG/DL
POTASSIUM SERPL-SCNC: 3.5 MMOL/L (ref 3.5–5.1)
PROT SERPL-MCNC: 7.6 G/DL (ref 6–8.4)
SODIUM SERPL-SCNC: 136 MMOL/L (ref 136–145)
TRIGL SERPL-MCNC: 158 MG/DL (ref 30–150)

## 2022-09-29 PROCEDURE — 36415 COLL VENOUS BLD VENIPUNCTURE: CPT | Mod: PO | Performed by: HOSPITALIST

## 2022-09-29 PROCEDURE — 83036 HEMOGLOBIN GLYCOSYLATED A1C: CPT | Performed by: HOSPITALIST

## 2022-09-29 PROCEDURE — 80053 COMPREHEN METABOLIC PANEL: CPT | Performed by: HOSPITALIST

## 2022-09-29 PROCEDURE — 80061 LIPID PANEL: CPT | Performed by: HOSPITALIST

## 2022-10-06 ENCOUNTER — OFFICE VISIT (OUTPATIENT)
Dept: INTERNAL MEDICINE | Facility: CLINIC | Age: 67
End: 2022-10-06
Payer: MEDICAID

## 2022-10-06 VITALS
SYSTOLIC BLOOD PRESSURE: 112 MMHG | HEART RATE: 72 BPM | DIASTOLIC BLOOD PRESSURE: 70 MMHG | RESPIRATION RATE: 16 BRPM | TEMPERATURE: 98 F | HEIGHT: 62 IN | OXYGEN SATURATION: 98 % | WEIGHT: 162.69 LBS | BODY MASS INDEX: 29.94 KG/M2

## 2022-10-06 DIAGNOSIS — E55.9 VITAMIN D DEFICIENCY: ICD-10-CM

## 2022-10-06 DIAGNOSIS — E11.65 TYPE 2 DIABETES MELLITUS WITH HYPERGLYCEMIA, WITHOUT LONG-TERM CURRENT USE OF INSULIN: ICD-10-CM

## 2022-10-06 DIAGNOSIS — E66.9 OBESITY, CLASS I, BMI 30-34.9: ICD-10-CM

## 2022-10-06 DIAGNOSIS — E11.59 HYPERTENSION ASSOCIATED WITH DIABETES: ICD-10-CM

## 2022-10-06 DIAGNOSIS — I15.2 HYPERTENSION ASSOCIATED WITH DIABETES: ICD-10-CM

## 2022-10-06 DIAGNOSIS — E11.69 HYPERLIPIDEMIA ASSOCIATED WITH TYPE 2 DIABETES MELLITUS: ICD-10-CM

## 2022-10-06 DIAGNOSIS — E78.5 HYPERLIPIDEMIA ASSOCIATED WITH TYPE 2 DIABETES MELLITUS: ICD-10-CM

## 2022-10-06 DIAGNOSIS — I48.0 PAF (PAROXYSMAL ATRIAL FIBRILLATION): ICD-10-CM

## 2022-10-06 DIAGNOSIS — Z00.00 ANNUAL PHYSICAL EXAM: Primary | ICD-10-CM

## 2022-10-06 DIAGNOSIS — K21.9 GASTROESOPHAGEAL REFLUX DISEASE, UNSPECIFIED WHETHER ESOPHAGITIS PRESENT: Primary | ICD-10-CM

## 2022-10-06 PROCEDURE — 99499 UNLISTED E&M SERVICE: CPT | Mod: S$PBB,,, | Performed by: HOSPITALIST

## 2022-10-06 PROCEDURE — 3008F PR BODY MASS INDEX (BMI) DOCUMENTED: ICD-10-PCS | Mod: CPTII,,, | Performed by: HOSPITALIST

## 2022-10-06 PROCEDURE — 3074F PR MOST RECENT SYSTOLIC BLOOD PRESSURE < 130 MM HG: ICD-10-PCS | Mod: CPTII,,, | Performed by: HOSPITALIST

## 2022-10-06 PROCEDURE — 3074F SYST BP LT 130 MM HG: CPT | Mod: CPTII,,, | Performed by: HOSPITALIST

## 2022-10-06 PROCEDURE — 1159F MED LIST DOCD IN RCRD: CPT | Mod: CPTII,,, | Performed by: HOSPITALIST

## 2022-10-06 PROCEDURE — 4010F PR ACE/ARB THEARPY RXD/TAKEN: ICD-10-PCS | Mod: CPTII,,, | Performed by: HOSPITALIST

## 2022-10-06 PROCEDURE — 1160F RVW MEDS BY RX/DR IN RCRD: CPT | Mod: CPTII,,, | Performed by: HOSPITALIST

## 2022-10-06 PROCEDURE — 99499 RISK ADDL DX/OHS AUDIT: ICD-10-PCS | Mod: S$PBB,,, | Performed by: HOSPITALIST

## 2022-10-06 PROCEDURE — 99214 OFFICE O/P EST MOD 30 MIN: CPT | Mod: S$PBB,,, | Performed by: HOSPITALIST

## 2022-10-06 PROCEDURE — 3066F PR DOCUMENTATION OF TREATMENT FOR NEPHROPATHY: ICD-10-PCS | Mod: CPTII,,, | Performed by: HOSPITALIST

## 2022-10-06 PROCEDURE — 4010F ACE/ARB THERAPY RXD/TAKEN: CPT | Mod: CPTII,,, | Performed by: HOSPITALIST

## 2022-10-06 PROCEDURE — 3051F HG A1C>EQUAL 7.0%<8.0%: CPT | Mod: CPTII,,, | Performed by: HOSPITALIST

## 2022-10-06 PROCEDURE — 99999 PR PBB SHADOW E&M-EST. PATIENT-LVL V: ICD-10-PCS | Mod: PBBFAC,,, | Performed by: HOSPITALIST

## 2022-10-06 PROCEDURE — 3008F BODY MASS INDEX DOCD: CPT | Mod: CPTII,,, | Performed by: HOSPITALIST

## 2022-10-06 PROCEDURE — 3066F NEPHROPATHY DOC TX: CPT | Mod: CPTII,,, | Performed by: HOSPITALIST

## 2022-10-06 PROCEDURE — 3078F PR MOST RECENT DIASTOLIC BLOOD PRESSURE < 80 MM HG: ICD-10-PCS | Mod: CPTII,,, | Performed by: HOSPITALIST

## 2022-10-06 PROCEDURE — 1159F PR MEDICATION LIST DOCUMENTED IN MEDICAL RECORD: ICD-10-PCS | Mod: CPTII,,, | Performed by: HOSPITALIST

## 2022-10-06 PROCEDURE — 3060F PR POS MICROALBUMINURIA RESULT DOCUMENTED/REVIEW: ICD-10-PCS | Mod: CPTII,,, | Performed by: HOSPITALIST

## 2022-10-06 PROCEDURE — 99214 PR OFFICE/OUTPT VISIT, EST, LEVL IV, 30-39 MIN: ICD-10-PCS | Mod: S$PBB,,, | Performed by: HOSPITALIST

## 2022-10-06 PROCEDURE — 99999 PR PBB SHADOW E&M-EST. PATIENT-LVL V: CPT | Mod: PBBFAC,,, | Performed by: HOSPITALIST

## 2022-10-06 PROCEDURE — 1160F PR REVIEW ALL MEDS BY PRESCRIBER/CLIN PHARMACIST DOCUMENTED: ICD-10-PCS | Mod: CPTII,,, | Performed by: HOSPITALIST

## 2022-10-06 PROCEDURE — 3051F PR MOST RECENT HEMOGLOBIN A1C LEVEL 7.0 - < 8.0%: ICD-10-PCS | Mod: CPTII,,, | Performed by: HOSPITALIST

## 2022-10-06 PROCEDURE — 90686 IIV4 VACC NO PRSV 0.5 ML IM: CPT | Mod: PBBFAC,PO

## 2022-10-06 PROCEDURE — 99215 OFFICE O/P EST HI 40 MIN: CPT | Mod: PBBFAC,PO | Performed by: HOSPITALIST

## 2022-10-06 PROCEDURE — 3078F DIAST BP <80 MM HG: CPT | Mod: CPTII,,, | Performed by: HOSPITALIST

## 2022-10-06 PROCEDURE — 3060F POS MICROALBUMINURIA REV: CPT | Mod: CPTII,,, | Performed by: HOSPITALIST

## 2022-10-06 RX ORDER — OMEPRAZOLE 20 MG/1
20 CAPSULE, DELAYED RELEASE ORAL DAILY
Qty: 90 CAPSULE | Refills: 3 | Status: ON HOLD | OUTPATIENT
Start: 2022-10-06 | End: 2023-04-19 | Stop reason: HOSPADM

## 2022-10-06 RX ORDER — ERGOCALCIFEROL 1.25 MG/1
50000 CAPSULE ORAL
Qty: 12 CAPSULE | Refills: 3 | Status: SHIPPED | OUTPATIENT
Start: 2022-10-06 | End: 2023-04-14 | Stop reason: SDUPTHER

## 2022-10-06 NOTE — PROGRESS NOTES
Subjective:     @Patient ID: Fely Wetzel is a 66 y.o. female.    Chief Complaint: Follow-up (6 month)    HPI    66 y.o. female with pmhx of DM2, HTN, Afib, HLD, osteopenia, psoriasis, varicose veins presents  here for f/u:      1. Dm2: last a1c 7.4.  Due soon for foot exam. Reports had eye exam this year with Dr muller (will get records). Follows with diabetic NP. On dapagliflozin-metformin  5-1000 mg bid, trulicity 3 mg qweek. Continues to walk for exercise . Fasting BG 90-110s on average   2. HTN: on toprol-xl 50 mg qday, amlodipine 10 mg qday, losartan 100 mg qday, hctz 25 mg qday  3. HLD: on lipitor 40 mg qday and fenofibrate 160 mg qday  4. Paroxysmal Afib: follows with cardiology. On eliquis 5 mg bid, metoprolol xl 50 mg qday and flecanide 50 mg bid   5. H pylori: completed treatment for hpylori. States still has heart burn   6. Osteopenia: utd bone density  7. Varicose veins:           Review of Systems   Constitutional:  Negative for chills and fever.   HENT:  Negative for congestion and sore throat.    Eyes:  Negative for pain and visual disturbance.   Respiratory:  Negative for cough and shortness of breath.    Cardiovascular:  Negative for chest pain and leg swelling.   Gastrointestinal:  Negative for abdominal pain, nausea and vomiting.   Endocrine: Negative for polydipsia and polyuria.   Genitourinary:  Negative for difficulty urinating and dysuria.   Musculoskeletal:  Negative for arthralgias and back pain.   Skin:  Negative for rash and wound.   Neurological:  Negative for dizziness, weakness and headaches.   Psychiatric/Behavioral:  Negative for agitation and confusion.    Past medical history, surgical history, and family medical history reviewed and updated as appropriate.    Medications and allergies reviewed.     Objective:     There were no vitals filed for this visit.  There is no height or weight on file to calculate BMI.  Physical Exam  Constitutional:       Appearance: Normal  appearance.   HENT:      Head: Normocephalic and atraumatic.   Eyes:      General:         Right eye: No discharge.         Left eye: No discharge.      Conjunctiva/sclera: Conjunctivae normal.   Cardiovascular:      Rate and Rhythm: Normal rate and regular rhythm.      Heart sounds: No murmur heard.  Pulmonary:      Effort: Pulmonary effort is normal.      Breath sounds: Normal breath sounds.   Abdominal:      General: Bowel sounds are normal. There is no distension.      Palpations: Abdomen is soft.      Tenderness: There is no abdominal tenderness.   Musculoskeletal:         General: Normal range of motion.      Cervical back: Normal range of motion and neck supple.      Right lower leg: No edema.      Left lower leg: No edema.   Skin:     General: Skin is warm and dry.   Neurological:      Mental Status: She is alert and oriented to person, place, and time.   Psychiatric:         Mood and Affect: Mood normal.         Behavior: Behavior normal.     Lab Results   Component Value Date    WBC 8.33 04/20/2022    HGB 13.9 04/20/2022    HCT 43.3 04/20/2022     04/20/2022    CHOL 183 09/29/2022    TRIG 158 (H) 09/29/2022    HDL 56 09/29/2022    ALT 18 09/29/2022    AST 19 09/29/2022     09/29/2022    K 3.5 09/29/2022     09/29/2022    CREATININE 0.9 09/29/2022    BUN 25 (H) 09/29/2022    CO2 23 09/29/2022    TSH 1.986 04/20/2022    INR 1.0 06/27/2019    GLUF 126 (H) 07/07/2010    HGBA1C 7.4 (H) 09/29/2022       Assessment:     1. Type 2 diabetes mellitus with hyperglycemia, without long-term current use of insulin    2. Hyperlipidemia associated with type 2 diabetes mellitus    3. Hypertension associated with diabetes    4. PAF (paroxysmal atrial fibrillation)    5. Obesity, Class I, BMI 30-34.9      Plan:   Fely was seen today for follow-up.    Diagnoses and all orders for this visit:    Gastroesophageal reflux disease, unspecified whether esophagitis present  - start PPI daily  - counseled on  dietary lifestyle changes     Type 2 diabetes mellitus with hyperglycemia, without long-term current use of insulin  - a1c 7.4. diabetes stable. Will have upcoming appointment with diabetic NP  - flu shot today. Pt counseled to get covid booster as well     Hyperlipidemia associated with type 2 diabetes mellitus  - stable. Continue statin and monitor     Hypertension associated with diabetes  - Stable. Continue home meds and monitor     PAF (paroxysmal atrial fibrillation)  - Stable. Continue home meds and f/u with cardiology    Obesity, Class I, BMI 30-34.9  - improving. Encouraged healthy diet/exercise     Other orders  -     omeprazole (PRILOSEC) 20 MG capsule; Take 1 capsule (20 mg total) by mouth once daily.  -     ergocalciferol (ERGOCALCIFEROL) 50,000 unit Cap; Take 1 capsule (50,000 Units total) by mouth every 7 days.  -     Influenza - Quadrivalent (PF)    Pt traveling to [facility] in December. Will notify MD if needs early refill of her trulicity     Rtc 6 months     Hien Sparks MD  Internal Medicine    10/6/2022

## 2022-10-06 NOTE — PROGRESS NOTES
"Chief Complaint  Chief Complaint   Patient presents with    Follow-up     6 month       HPI  Fely Wetzel is a 66 y.o. female with multiple medical diagnoses as listed in the medical history and problem list that presents for 6 month follow up.  Their last appointment with primary care was 4/27/2022.      Patient reports feeling well overall. She states she still feels intermittent palpitations but that her cardiology appointment was reassuring. She states she feels the palpitations almost every day, last time yesterday, and they only last a "couple of seconds." She reports seeing an optometrist outside of [facility] in March. She also states that she has not had a DM foot exam recently because of insurance issues but states she may have found a podiatrist at [facility] who will accept her insurance. She states she feels constant numbness in her left heel for the past several months but denies any foot lesions. No other complaints or symptoms at this time.      PAST MEDICAL HISTORY:  Past Medical History:   Diagnosis Date    Colon polyp 11/2014    Diabetes mellitus type II     Hyperlipidemia     Hypertension     Obesity     Osteopenia        PAST SURGICAL HISTORY:  Past Surgical History:   Procedure Laterality Date    COLONOSCOPY N/A 3/17/2022    Procedure: COLONOSCOPY;  Surgeon: Дмитрий Middleton MD;  Location: [facility] ([address] [place] [address]);  Service: Endoscopy;  Laterality: N/A;  Covid Positive on 1/31. no further testing needed.Holding Eliquis for 2 days prior to .EC  3/8 new instructions mailed-tb    ESOPHAGOGASTRODUODENOSCOPY N/A 3/17/2022    Procedure: EGD (ESOPHAGOGASTRODUODENOSCOPY);  Surgeon: Дмитрий Middleton MD;  Location: [facility] ([address] [place] [address]);  Service: Endoscopy;  Laterality: N/A;    HYSTERECTOMY         SOCIAL HISTORY:  Social History     Socioeconomic History    Marital status: Single   Occupational History    Occupation: works at a Religion     Employer: [employer]    Occupation: " CNA     Employer: [employer]   Tobacco Use    Smoking status: Never    Smokeless tobacco: Never   Substance and Sexual Activity    Alcohol use: Not Currently    Drug use: No    Sexual activity: Never     Partners: Male   Social History Narrative    She does not exercise regularly.       FAMILY HISTORY:  Family History   Problem Relation Age of Onset    Diabetes Mother     Glaucoma Mother     Hyperlipidemia Mother     Hypertension Mother     Heart disease Father     Cancer Father         prostate cancer    Heart attack Father     Heart disease Brother     Cataracts Brother     Heart failure Neg Hx     Stroke Neg Hx        ALLERGIES AND MEDICATIONS: updated and reviewed.  Review of patient's allergies indicates:   Allergen Reactions    Corticosteroids (glucocorticoids)     Latex, natural rubber     Shellfish containing products Other (See Comments)     Other reaction(s): Unknown  Other reaction(s): Anaphylaxis    Vicodin [hydrocodone-acetaminophen]     Vytorin 10-10  [ezetimibe-simvastatin]      Other reaction(s): Joint pain  Other reaction(s): Joint pain     Current Outpatient Medications   Medication Sig Dispense Refill    ACCU-CHEK JUAN Misc CHECK FASTING GLUCOSE AND CHECK GLUCOSE TWO HOURS AFTER LUNCH OR DINNER 1 each 0    amLODIPine (NORVASC) 10 MG tablet TAKE 1 TABLET BY MOUTH EVERY DAY 90 tablet 3    apixaban (ELIQUIS) 5 mg Tab Take 1 tablet (5 mg total) by mouth 2 (two) times daily. 60 tablet 11    atorvastatin (LIPITOR) 40 MG tablet TAKE 1 TABLET BY MOUTH EVERY DAY IN THE EVENING 30 tablet 11    blood sugar diagnostic Strp Inject 1 strip into the skin 2 (two) times a day. 100 strip 3    blood-glucose meter Misc Check fasting glucose and check glucose two hours after lunch or dinner 1 each 0    cholecalciferol, vitamin D3, 1,250 mcg (50,000 unit) capsule TAKE 1 CAPSULE BY MOUTH ONE TIME PER WEEK 12 capsule 1    cyanocobalamin 500 MCG tablet Take 500 mcg by mouth once daily.      dulaglutide  (TRULICITY) 3 mg/0.5 mL pen injector Inject 3 mg into the skin every 7 days. 4 pen 11    fenofibrate 160 MG Tab Take 1 tablet (160 mg total) by mouth once daily. 90 tablet 3    flecainide (TAMBOCOR) 50 MG Tab TAKE 1 TABLET (50 MG TOTAL) BY MOUTH EVERY 12 (TWELVE) HOURS. 60 tablet 11    hydroCHLOROthiazide (HYDRODIURIL) 25 MG tablet TAKE 1 TABLET (25 MG TOTAL) BY MOUTH ONCE DAILY. WITH BREAKFAST 30 tablet 11    lancing device with lancets (ONETOUCH DELICA LANC DEVICE) Kit 1 each by Misc.(Non-Drug; Combo Route) route 2 (two) times daily before meals. 100 each 5    losartan (COZAAR) 100 MG tablet TAKE 1 TABLET BY MOUTH EVERY DAY 30 tablet 11    metoprolol succinate (TOPROL-XL) 50 MG 24 hr tablet TAKE 1 TABLET BY MOUTH EVERY DAY 90 tablet 3    XIGDUO XR 5-1,000 mg TBph TAKE 1 TABLET BY MOUTH TWICE A DAY WITH MEALS 60 tablet 3    omeprazole (PRILOSEC) 40 MG capsule Take 1 capsule (40 mg total) by mouth 2 (two) times a day. for 14 days 28 capsule 0     No current facility-administered medications for this visit.         ROS  Review of Systems   Constitutional:  Negative for chills and fever.   HENT:  Negative for congestion, postnasal drip, rhinorrhea, sore throat and trouble swallowing.    Eyes:  Negative for photophobia and visual disturbance.   Respiratory:  Negative for cough, chest tightness and shortness of breath.    Cardiovascular:  Positive for palpitations. Negative for chest pain and leg swelling.   Gastrointestinal:  Negative for abdominal pain, blood in stool, constipation, diarrhea, nausea and vomiting.   Endocrine: Negative for polydipsia and polyuria.   Genitourinary:  Negative for dysuria, frequency and hematuria.   Musculoskeletal:  Negative for arthralgias and myalgias.   Skin:  Negative for rash and wound.   Allergic/Immunologic: Negative for environmental allergies and food allergies.   Neurological:  Positive for numbness (L heel x months). Negative for dizziness, weakness, light-headedness and  "headaches.   Hematological:  Negative for adenopathy. Does not bruise/bleed easily.   Psychiatric/Behavioral:  Negative for sleep disturbance. The patient is not nervous/anxious.    All other systems reviewed and are negative.        Physical Exam  Vitals:    10/06/22 0934   BP: 112/70   BP Location: Right arm   Patient Position: Sitting   BP Method: Medium (Manual)   Pulse: 72   Resp: 16   Temp: 97.5 °F (36.4 °C)   TempSrc: Skin   SpO2: 98%   Weight: 73.8 kg (162 lb 11.2 oz)   Height: 5' 2" (1.575 m)    Body mass index is 29.76 kg/m².  Weight: 73.8 kg (162 lb 11.2 oz)   Height: 5' 2" (157.5 cm)   Physical Exam  Vitals and nursing note reviewed.   Constitutional:       General: She is not in acute distress.     Appearance: Normal appearance. She is not ill-appearing or toxic-appearing.   HENT:      Head: Normocephalic and atraumatic.      Nose: Nose normal. No congestion or rhinorrhea.      Mouth/Throat:      Mouth: Mucous membranes are moist.      Pharynx: Oropharynx is clear. No oropharyngeal exudate.   Eyes:      General: No scleral icterus.     Extraocular Movements: Extraocular movements intact.      Conjunctiva/sclera: Conjunctivae normal.      Pupils: Pupils are equal, round, and reactive to light.   Neck:      Vascular: No carotid bruit.   Cardiovascular:      Rate and Rhythm: Normal rate.      Pulses: Normal pulses.      Heart sounds: Normal heart sounds. No murmur heard.    No friction rub. No gallop.   Pulmonary:      Effort: Pulmonary effort is normal. No respiratory distress.      Breath sounds: No stridor. No wheezing, rhonchi or rales.   Chest:      Chest wall: No tenderness.   Abdominal:      General: Abdomen is flat. There is no distension.      Palpations: Abdomen is soft. There is no mass.      Tenderness: There is no abdominal tenderness. There is no guarding or rebound.      Hernia: No hernia is present.   Musculoskeletal:         General: No deformity or signs of injury.      Cervical back: " Neck supple.      Right lower leg: No edema.      Left lower leg: No edema.   Lymphadenopathy:      Cervical: No cervical adenopathy.   Skin:     General: Skin is warm and dry.      Capillary Refill: Capillary refill takes less than 2 seconds.      Coloration: Skin is not jaundiced.      Findings: No lesion.   Neurological:      General: No focal deficit present.      Mental Status: She is alert and oriented to person, place, and time. Mental status is at baseline.      Cranial Nerves: No cranial nerve deficit.      Sensory: Sensory deficit (L heel diminished sensation) present.      Coordination: Coordination normal.   Psychiatric:         Mood and Affect: Mood normal.         Behavior: Behavior normal.         Thought Content: Thought content normal.         Judgment: Judgment normal.         Health Maintenance         Date Due Completion Date    Shingles Vaccine (1 of 2) Never done ---    Foot Exam 05/19/2022 5/19/2021    Eye Exam 05/28/2022 5/28/2021    Override on 2/15/2018: Done    Override on 1/26/2016: Done    Override on 5/7/2014: Done    Override on 4/1/2013: Done    COVID-19 Vaccine (5 - Booster for Pfizer series) 07/07/2022 5/12/2022    Influenza Vaccine (1) 09/01/2022 10/4/2021    Hemoglobin A1c 03/29/2023 9/29/2022    Mammogram 07/25/2023 7/25/2022    Override on 5/22/2018: Done    Diabetes Urine Screening 09/29/2023 9/29/2022    Lipid Panel 09/29/2023 9/29/2022    Low Dose Statin 10/06/2023 10/6/2022    DEXA Scan 04/19/2024 4/19/2022    Override on 2/3/2017: Declined    TETANUS VACCINE 08/05/2024 8/5/2014 (Done)    Override on 8/5/2014: Done    Colorectal Cancer Screening 03/17/2032 3/17/2022              Assessment and Plan:    Fely Wetzel is a 66 y.o. female with multiple medical diagnoses as listed in the medical history and problem list that presents for 6 month follow up. She reports feeling well overall. She states she still feels intermittent palpitations but that her cardiology  "appointment was reassuring. She states she feels the palpitations almost every day, last time yesterday, and they only last a "couple of seconds." She reports seeing an optometrist outside of [facility] in March. She also states that she has not had a DM foot exam recently because of insurance issues but states she may have found a podiatrist at [facility] who will accept her insurance. She states she feels constant numbness in her left heel for the past several months but denies any foot lesions.    Type 2 diabetes mellitus with hyperglycemia, without long-term current use of insulin  Currently on xigduo XR 5-1000mg PO BID, trulicity 3mg qweek. Followed by DM NP. HbA1C stable at 7.4. Microalbumin:creatinine improving. Continue exercising. Continue home meds per DM NP. Patient with upcoming trip to [facility] this coming December; will try to get her full month supply of trulicity for her trip.    Hyperlipidemia associated with type 2 diabetes mellitus  Stable and improving. Triglycerides 158, down from 327 1 year ago. Tchol 183, HDL 56, LDL 95. Continue taking atorvastatin 40mg qd, fenofibrate 160mg qd.    Hypertension associated with diabetes  Stable. Continue taking norvasc 10 mg qd, HCTZ 25mg qd, losartan 100mg qd, toprol xr 50 qd.      PAF (paroxysmal atrial fibrillation)  Stable, followed by cardiology. Continue taking apixaban 5mg BID, toprol xr 50 qd, and flecainide 50mg BID.    Obesity, Class I, BMI 30-34.9  Weight improved at this visit. Provided further education on healthy diet and exercise. Patient exercising daily, walking at least 30 min per day. Continue exercising.    GERD  Patient still endorses heartburn. Prescribed omeprazole 20 PO qd, and decrease to prn when resolved.    Vaccinations  Flu shot discussed and administered at today's visit.   Shingles - patient still interested and has prescription. Patient to call pharmacy to schedule.  COVID-19 bivalent booster  - Patient interested and she will " call pharmacy to schedule.    Return to clinic in 6 months for annual visit and prn.    Ryan Hyman, MS4, MPH  Medical Student  [employer]

## 2022-10-07 ENCOUNTER — PATIENT OUTREACH (OUTPATIENT)
Dept: ADMINISTRATIVE | Facility: HOSPITAL | Age: 67
End: 2022-10-07
Payer: MEDICAID

## 2022-10-10 ENCOUNTER — TELEPHONE (OUTPATIENT)
Dept: INTERNAL MEDICINE | Facility: CLINIC | Age: 67
End: 2022-10-10
Payer: MEDICAID

## 2022-10-10 ENCOUNTER — IMMUNIZATION (OUTPATIENT)
Dept: INTERNAL MEDICINE | Facility: CLINIC | Age: 67
End: 2022-10-10
Payer: MEDICAID

## 2022-10-10 DIAGNOSIS — R20.0 NUMBNESS OF FOOT: Primary | ICD-10-CM

## 2022-10-10 DIAGNOSIS — Z23 NEED FOR VACCINATION: Primary | ICD-10-CM

## 2022-10-10 PROCEDURE — 91312 COVID-19, MRNA, LNP-S, BIVALENT BOOSTER, PF, 30 MCG/0.3 ML DOSE: CPT | Mod: PBBFAC

## 2022-10-10 PROCEDURE — 0124A COVID-19, MRNA, LNP-S, BIVALENT BOOSTER, PF, 30 MCG/0.3 ML DOSE: CPT | Mod: PBBFAC,CV19

## 2022-10-10 NOTE — TELEPHONE ENCOUNTER
----- Message from Melinda Cao sent at 10/10/2022  9:48 AM CDT -----  Type:  Needs Medical Advice    Who Called: PT   Symptoms (please be specific):    How long has patient had these symptoms:  NUMBNESS ON FOOT - PT IS DIABETIC  Pharmacy name and phone #:    Would the patient rather a call back or a response via [facility]? CALL  Best Call Back Number:   Additional Information: PT CALLED TO SCHED APPT W/PODIATRIST- PLEASE PUT IN REFERRAL W/MEDICAID -DR CROSS IN [place] TAKES MEDICAID

## 2022-10-11 ENCOUNTER — PATIENT MESSAGE (OUTPATIENT)
Dept: INTERNAL MEDICINE | Facility: CLINIC | Age: 67
End: 2022-10-11
Payer: MEDICAID

## 2022-10-20 ENCOUNTER — PATIENT OUTREACH (OUTPATIENT)
Dept: ADMINISTRATIVE | Facility: HOSPITAL | Age: 67
End: 2022-10-20
Payer: MEDICAID

## 2022-10-20 NOTE — LETTER
AUTHORIZATION FOR RELEASE OF   CONFIDENTIAL INFORMATION    Dear Provider,    We are seeing Fely Wetzel, date of birth 1955, in the clinic at [facility]. Hien Sparks MD is the patient's PCP. Fely Wetzel has an outstanding lab/procedure at the time we reviewed her chart. In order to help keep her health information updated, she has authorized us to request the following medical record(s):        (  )  MAMMOGRAM                                      (  )  COLONOSCOPY      (  )  PAP SMEAR                                          (  )  OUTSIDE LAB RESULTS     (  )  DEXA SCAN                                          (  x)  EYE EXAM            (  )  FOOT EXAM                                          (  )  ENTIRE RECORD     (  )  OUTSIDE IMMUNIZATIONS                 (  )  _______________         Please fax records to [facility], Miriam C Azuoru, MD, [phone]                Patient Name: Fely Wetzel  : 1955  Patient Phone #: [phone]

## 2022-10-21 ENCOUNTER — PATIENT OUTREACH (OUTPATIENT)
Dept: ADMINISTRATIVE | Facility: HOSPITAL | Age: 67
End: 2022-10-21
Payer: MEDICAID

## 2022-11-09 ENCOUNTER — TELEPHONE (OUTPATIENT)
Dept: INTERNAL MEDICINE | Facility: CLINIC | Age: 67
End: 2022-11-09
Payer: MEDICARE

## 2022-11-09 ENCOUNTER — PATIENT MESSAGE (OUTPATIENT)
Dept: INTERNAL MEDICINE | Facility: CLINIC | Age: 67
End: 2022-11-09
Payer: MEDICARE

## 2022-11-09 DIAGNOSIS — E11.65 TYPE 2 DIABETES MELLITUS WITH HYPERGLYCEMIA, WITHOUT LONG-TERM CURRENT USE OF INSULIN: Primary | ICD-10-CM

## 2022-11-09 RX ORDER — DULAGLUTIDE 3 MG/.5ML
3 INJECTION, SOLUTION SUBCUTANEOUS
Qty: 4 PEN | Refills: 6 | Status: SHIPPED | OUTPATIENT
Start: 2022-11-09 | End: 2023-07-03 | Stop reason: SDUPTHER

## 2022-11-09 NOTE — TELEPHONE ENCOUNTER
Called and s/w patient.   She is ok with staying on the trulicity - I sent to [facility] to see if they have the 3mg available.   Thanks,  Noelle

## 2022-11-09 NOTE — TELEPHONE ENCOUNTER
----- Message from Colleen Tate sent at 11/9/2022  9:48 AM CST -----  Contact: Patient @ [phone]  Good Morning  Patient state she is out and Rx dulaglutide (TRULICITY) 3 mg/0.5 mL pen injector is out in all Pharmacy . Patient would like to know if she can use the Victoza® (liraglutide) - Injection for 2 days     Please call and advise

## 2022-11-14 ENCOUNTER — PATIENT MESSAGE (OUTPATIENT)
Dept: INTERNAL MEDICINE | Facility: CLINIC | Age: 67
End: 2022-11-14
Payer: MEDICARE

## 2022-11-15 ENCOUNTER — PATIENT MESSAGE (OUTPATIENT)
Dept: INTERNAL MEDICINE | Facility: CLINIC | Age: 67
End: 2022-11-15
Payer: MEDICARE

## 2022-11-15 DIAGNOSIS — E11.65 TYPE 2 DIABETES MELLITUS WITH HYPERGLYCEMIA, WITHOUT LONG-TERM CURRENT USE OF INSULIN: ICD-10-CM

## 2022-11-17 ENCOUNTER — PATIENT MESSAGE (OUTPATIENT)
Dept: INTERNAL MEDICINE | Facility: CLINIC | Age: 67
End: 2022-11-17
Payer: MEDICARE

## 2022-11-17 RX ORDER — DULAGLUTIDE 3 MG/.5ML
3 INJECTION, SOLUTION SUBCUTANEOUS
Qty: 8 PEN | Refills: 0 | Status: SHIPPED | OUTPATIENT
Start: 2022-11-17 | End: 2023-05-18

## 2022-12-06 RX ORDER — DAPAGLIFLOZIN AND METFORMIN HYDROCHLORIDE 5; 1000 MG/1; MG/1
TABLET, FILM COATED, EXTENDED RELEASE ORAL
Qty: 60 TABLET | Refills: 3 | Status: SHIPPED | OUTPATIENT
Start: 2022-12-06 | End: 2023-04-21

## 2022-12-08 ENCOUNTER — TELEPHONE (OUTPATIENT)
Dept: INTERNAL MEDICINE | Facility: CLINIC | Age: 67
End: 2022-12-08
Payer: MEDICARE

## 2022-12-08 NOTE — TELEPHONE ENCOUNTER
----- Message from Maira Lloyd sent at 12/8/2022  9:48 AM CST -----  Contact: pt [phone]  Patient needs to schedule an appt     Please call and schedule.    Thank You

## 2022-12-21 DIAGNOSIS — I48.0 PAF (PAROXYSMAL ATRIAL FIBRILLATION): Primary | ICD-10-CM

## 2023-01-10 RX ORDER — ASPIRIN 325 MG
TABLET, DELAYED RELEASE (ENTERIC COATED) ORAL
Qty: 12 CAPSULE | Refills: 1 | OUTPATIENT
Start: 2023-01-10

## 2023-01-11 ENCOUNTER — PATIENT MESSAGE (OUTPATIENT)
Dept: INTERNAL MEDICINE | Facility: CLINIC | Age: 68
End: 2023-01-11
Payer: MEDICAID

## 2023-01-12 ENCOUNTER — PATIENT MESSAGE (OUTPATIENT)
Dept: INTERNAL MEDICINE | Facility: CLINIC | Age: 68
End: 2023-01-12
Payer: MEDICAID

## 2023-01-12 ENCOUNTER — TELEPHONE (OUTPATIENT)
Dept: ELECTROPHYSIOLOGY | Facility: CLINIC | Age: 68
End: 2023-01-12
Payer: MEDICAID

## 2023-01-12 DIAGNOSIS — I48.0 PAF (PAROXYSMAL ATRIAL FIBRILLATION): Primary | ICD-10-CM

## 2023-01-12 NOTE — TELEPHONE ENCOUNTER
----- Message from Romana Jacinto MA sent at 1/12/2023 10:42 AM CST -----  Regarding: FW: please call    ----- Message -----  From: Lilo Crandall  Sent: 1/12/2023  10:21 AM CST  To: [facility] DENNISON Staff  Subject: please call                                      The pt is calling to report that she is not feeling good, dizzy. Please call her back @ [phone]. Thanks, Lilo

## 2023-01-12 NOTE — TELEPHONE ENCOUNTER
Attempted to return call, however call went straight to voicemail. Voicemail left instructing patient to return call if she still has questions or concerns.

## 2023-01-13 ENCOUNTER — HOSPITAL ENCOUNTER (EMERGENCY)
Facility: HOSPITAL | Age: 68
Discharge: HOME | End: 2023-01-13
Attending: EMERGENCY MEDICINE
Payer: MEDICARE

## 2023-01-13 ENCOUNTER — PATIENT MESSAGE (OUTPATIENT)
Dept: INTERNAL MEDICINE | Facility: CLINIC | Age: 68
End: 2023-01-13
Payer: MEDICAID

## 2023-01-13 ENCOUNTER — HOSPITAL ENCOUNTER (OUTPATIENT)
Dept: CARDIOLOGY | Facility: CLINIC | Age: 68
Discharge: HOME | End: 2023-01-13
Payer: MEDICARE

## 2023-01-13 ENCOUNTER — TELEPHONE (OUTPATIENT)
Dept: INTERNAL MEDICINE | Facility: CLINIC | Age: 68
End: 2023-01-13
Payer: MEDICAID

## 2023-01-13 VITALS
BODY MASS INDEX: 29.63 KG/M2 | HEART RATE: 67 BPM | DIASTOLIC BLOOD PRESSURE: 68 MMHG | WEIGHT: 162 LBS | SYSTOLIC BLOOD PRESSURE: 131 MMHG | OXYGEN SATURATION: 97 % | RESPIRATION RATE: 13 BRPM | TEMPERATURE: 98 F

## 2023-01-13 DIAGNOSIS — I48.0 PAF (PAROXYSMAL ATRIAL FIBRILLATION): ICD-10-CM

## 2023-01-13 DIAGNOSIS — R42 DIZZINESS: ICD-10-CM

## 2023-01-13 DIAGNOSIS — I48.0 PAROXYSMAL ATRIAL FIBRILLATION: Primary | ICD-10-CM

## 2023-01-13 DIAGNOSIS — R42 DIZZY: ICD-10-CM

## 2023-01-13 LAB
ALBUMIN SERPL BCP-MCNC: 3.7 G/DL (ref 3.5–5.2)
ALP SERPL-CCNC: 59 U/L (ref 55–135)
ALT SERPL W/O P-5'-P-CCNC: 19 U/L (ref 10–44)
ANION GAP SERPL CALC-SCNC: 11 MMOL/L (ref 8–16)
AST SERPL-CCNC: 22 U/L (ref 10–40)
BASOPHILS # BLD AUTO: 0.02 K/UL (ref 0–0.2)
BASOPHILS NFR BLD: 0.3 % (ref 0–1.9)
BILIRUB SERPL-MCNC: 0.3 MG/DL (ref 0.1–1)
BUN SERPL-MCNC: 31 MG/DL (ref 8–23)
CALCIUM SERPL-MCNC: 10.6 MG/DL (ref 8.7–10.5)
CHLORIDE SERPL-SCNC: 105 MMOL/L (ref 95–110)
CO2 SERPL-SCNC: 19 MMOL/L (ref 23–29)
CREAT SERPL-MCNC: 0.8 MG/DL (ref 0.5–1.4)
DIFFERENTIAL METHOD: NORMAL
EOSINOPHIL # BLD AUTO: 0.1 K/UL (ref 0–0.5)
EOSINOPHIL NFR BLD: 1.7 % (ref 0–8)
ERYTHROCYTE [DISTWIDTH] IN BLOOD BY AUTOMATED COUNT: 13.6 % (ref 11.5–14.5)
EST. GFR  (NO RACE VARIABLE): >60 ML/MIN/1.73 M^2
GLUCOSE SERPL-MCNC: 143 MG/DL (ref 70–110)
HCT VFR BLD AUTO: 41.4 % (ref 37–48.5)
HGB BLD-MCNC: 13.5 G/DL (ref 12–16)
IMM GRANULOCYTES # BLD AUTO: 0.02 K/UL (ref 0–0.04)
IMM GRANULOCYTES NFR BLD AUTO: 0.3 % (ref 0–0.5)
LYMPHOCYTES # BLD AUTO: 2.8 K/UL (ref 1–4.8)
LYMPHOCYTES NFR BLD: 36.5 % (ref 18–48)
MAGNESIUM SERPL-MCNC: 1.7 MG/DL (ref 1.6–2.6)
MCH RBC QN AUTO: 29.5 PG (ref 27–31)
MCHC RBC AUTO-ENTMCNC: 32.6 G/DL (ref 32–36)
MCV RBC AUTO: 91 FL (ref 82–98)
MONOCYTES # BLD AUTO: 0.4 K/UL (ref 0.3–1)
MONOCYTES NFR BLD: 5.3 % (ref 4–15)
NEUTROPHILS # BLD AUTO: 4.2 K/UL (ref 1.8–7.7)
NEUTROPHILS NFR BLD: 55.9 % (ref 38–73)
NRBC BLD-RTO: 0 /100 WBC
PLATELET # BLD AUTO: 281 K/UL (ref 150–450)
PMV BLD AUTO: 10.7 FL (ref 9.2–12.9)
POTASSIUM SERPL-SCNC: 3.8 MMOL/L (ref 3.5–5.1)
PROT SERPL-MCNC: 7.2 G/DL (ref 6–8.4)
RBC # BLD AUTO: 4.57 M/UL (ref 4–5.4)
SODIUM SERPL-SCNC: 135 MMOL/L (ref 136–145)
TROPONIN I SERPL DL<=0.01 NG/ML-MCNC: <0.006 NG/ML (ref 0–0.03)
TSH SERPL DL<=0.005 MIU/L-ACNC: 0.77 UIU/ML (ref 0.4–4)
WBC # BLD AUTO: 7.58 K/UL (ref 3.9–12.7)

## 2023-01-13 PROCEDURE — 99285 EMERGENCY DEPT VISIT HI MDM: CPT | Mod: 25

## 2023-01-13 PROCEDURE — 85025 COMPLETE CBC W/AUTO DIFF WBC: CPT

## 2023-01-13 PROCEDURE — 25000003 PHARM REV CODE 250

## 2023-01-13 PROCEDURE — 84443 ASSAY THYROID STIM HORMONE: CPT

## 2023-01-13 PROCEDURE — 83735 ASSAY OF MAGNESIUM: CPT

## 2023-01-13 PROCEDURE — 93005 ELECTROCARDIOGRAM TRACING: CPT | Mod: PBBFAC | Performed by: INTERNAL MEDICINE

## 2023-01-13 PROCEDURE — 96374 THER/PROPH/DIAG INJ IV PUSH: CPT

## 2023-01-13 PROCEDURE — 93010 EKG 12-LEAD: ICD-10-PCS | Mod: S$PBB,,, | Performed by: INTERNAL MEDICINE

## 2023-01-13 PROCEDURE — 99284 PR EMERGENCY DEPT VISIT,LEVEL IV: ICD-10-PCS | Mod: ,,, | Performed by: EMERGENCY MEDICINE

## 2023-01-13 PROCEDURE — 80053 COMPREHEN METABOLIC PANEL: CPT

## 2023-01-13 PROCEDURE — 93005 ELECTROCARDIOGRAM TRACING: CPT

## 2023-01-13 PROCEDURE — 84484 ASSAY OF TROPONIN QUANT: CPT

## 2023-01-13 PROCEDURE — 93010 ELECTROCARDIOGRAM REPORT: CPT | Mod: ,,, | Performed by: INTERNAL MEDICINE

## 2023-01-13 PROCEDURE — 93010 ELECTROCARDIOGRAM REPORT: CPT | Mod: S$PBB,,, | Performed by: INTERNAL MEDICINE

## 2023-01-13 PROCEDURE — 93010 EKG 12-LEAD: ICD-10-PCS | Mod: ,,, | Performed by: INTERNAL MEDICINE

## 2023-01-13 PROCEDURE — 99284 EMERGENCY DEPT VISIT MOD MDM: CPT | Mod: ,,, | Performed by: EMERGENCY MEDICINE

## 2023-01-13 RX ORDER — FLECAINIDE ACETATE 100 MG/1
100 TABLET ORAL EVERY 12 HOURS
Qty: 60 TABLET | Refills: 0 | Status: SHIPPED | OUTPATIENT
Start: 2023-01-13 | End: 2023-02-13

## 2023-01-13 RX ORDER — METOPROLOL TARTRATE 1 MG/ML
5 INJECTION, SOLUTION INTRAVENOUS
Status: COMPLETED | OUTPATIENT
Start: 2023-01-13 | End: 2023-01-13

## 2023-01-13 RX ORDER — METOPROLOL SUCCINATE 25 MG/1
75 TABLET, EXTENDED RELEASE ORAL DAILY
Qty: 90 TABLET | Refills: 0 | Status: SHIPPED | OUTPATIENT
Start: 2023-01-13 | End: 2023-01-19 | Stop reason: SDUPTHER

## 2023-01-13 RX ADMIN — METOROPROLOL TARTRATE 5 MG: 5 INJECTION, SOLUTION INTRAVENOUS at 05:01

## 2023-01-13 NOTE — ED PROVIDER NOTES
Encounter Date: 1/13/2023       History     Chief Complaint   Patient presents with    Dizziness     Sent by clinic; hx afib; denies chest pain or SOB; having palpitations     Fely Wetzel is a 67-year-old female with a past medical history of atrial fibrillation on Eliquis and metoprolol, type 2 diabetes, hyperlipidemia, and hypertension who presents to the emergency department with dizziness x 3 days that is intermittent, lasts around 1 minute, and resolves on its own. She denies any active symptoms. She said she feels like the room is spinning when it happens. She has noticed that it is worse with walking around. She denies any worsening with head movements.     She denies fever, chills, chest pain, dyspnea, cough, congestion, nausea, vomiting, photophobia, headache, abdominal pain, diarrhea, dysuria, rashes, leg pain, or leg swelling.  She said she flew back from [facility] (with 2 connecting flights) Tuesday and her symptoms began Wednesday night.    Denies history of blood clots, MI, or TIA/CVA.  Reports compliance with her metoprolol, flecainide, and Eliquis.  States she is been monitoring her blood sugar at home and it has been around 120 the last few days and has not been low.    The history is provided by the patient. No  was used.   Review of patient's allergies indicates:   Allergen Reactions    Corticosteroids (glucocorticoids)     Latex, natural rubber     Shellfish containing products Other (See Comments)     Other reaction(s): Unknown  Other reaction(s): Anaphylaxis    Vicodin [hydrocodone-acetaminophen]     Vytorin 10-10  [ezetimibe-simvastatin]      Other reaction(s): Joint pain  Other reaction(s): Joint pain     Past Medical History:   Diagnosis Date    Colon polyp 11/2014    Diabetes mellitus type II     Hyperlipidemia     Hypertension     Obesity     Osteopenia      Past Surgical History:   Procedure Laterality Date    COLONOSCOPY N/A 3/17/2022    Procedure:  COLONOSCOPY;  Surgeon: Дмитрий Middleton MD;  [facility]  Service: Endoscopy;  Laterality: N/A;  Covid Positive on 1/31. no further testing needed.Holding Eliquis for 2 days prior to .EC  3/8 new instructions mailed-tb    ESOPHAGOGASTRODUODENOSCOPY N/A 3/17/2022    Procedure: EGD (ESOPHAGOGASTRODUODENOSCOPY);  Surgeon: Дмитрий Middleton MD;  [facility]  Service: Endoscopy;  Laterality: N/A;    HYSTERECTOMY       Family History   Problem Relation Age of Onset    Diabetes Mother     Glaucoma Mother     Hyperlipidemia Mother     Hypertension Mother     Heart disease Father     Cancer Father         prostate cancer    Heart attack Father     Heart disease Brother     Cataracts Brother     Heart failure Neg Hx     Stroke Neg Hx      Social History     Tobacco Use    Smoking status: Never    Smokeless tobacco: Never   Substance Use Topics    Alcohol use: Not Currently    Drug use: No     Review of Systems  Constitutional: Denies fever  HENT: Denies sore throat  Eyes: Denies eye pain  Respiratory: Denies shortness of breath  CV: Denies chest pain  GI: Denies abdominal pain, N/V/D  MSK: Denies joint pain  Neuro: Denies headache    Physical Exam     Initial Vitals [01/13/23 1443]   BP Pulse Resp Temp SpO2   130/64 68 20 98.8 °F (37.1 °C) 100 %      MAP       --         Physical Exam    Nursing note and vitals reviewed.  Constitutional: She appears well-developed and well-nourished. She is not diaphoretic. No distress.   HENT:   Head: Normocephalic.   Right Ear: External ear normal.   Left Ear: External ear normal.   Mouth/Throat: Oropharynx is clear and moist.   Eyes: Conjunctivae and EOM are normal. Pupils are equal, round, and reactive to light. No scleral icterus.   Neck: Neck supple.   Cardiovascular:  Intact distal pulses.           No murmur heard.  Irregularly irregular rhythm, intermittently tachycardic alternating with normal rate and normal rhythm   Pulmonary/Chest:  Breath sounds normal. No stridor. No respiratory distress. She has no wheezes. She has no rhonchi. She has no rales.   Abdominal: Abdomen is soft. There is no abdominal tenderness. There is no rebound and no guarding.   Musculoskeletal:         General: No edema.      Cervical back: Neck supple.     Neurological: She is alert and oriented to person, place, and time. She has normal strength. No cranial nerve deficit or sensory deficit. GCS score is 15. GCS eye subscore is 4. GCS verbal subscore is 5. GCS motor subscore is 6.   Normal gait, no ataxia, few beats of horizontal nystagmus when looking to the right, normal finger-to-nose, normal heel-to-shin.  Unable to reproduce dizziness with head movement, Hali-Hallpike maneuver, or walking.   Skin: Skin is warm and dry. Capillary refill takes less than 2 seconds. No rash noted.   Psychiatric: She has a normal mood and affect. Thought content normal.       ED Course   Procedures  Labs Reviewed   COMPREHENSIVE METABOLIC PANEL - Abnormal; Notable for the following components:       Result Value    Sodium 135 (*)     CO2 19 (*)     Glucose 143 (*)     BUN 31 (*)     Calcium 10.6 (*)     All other components within normal limits   TSH   MAGNESIUM   CBC W/ AUTO DIFFERENTIAL   TROPONIN I     EKG Readings: (Independently Interpreted)   Normal rate, sinus rhythm, left axis, normal intervals, PAC present.  No significant ST segment abnormalities to suggest ischemia.     Other EKG Interpretations: Repeat EKG:  Two different atrial pacemakers noted, goes into afib with RVR at the end of the strip.  Leftward axis, no significant ST segment changes to suggest acute ischemia, no STEMI.   ECG Results              EKG 12-lead (Final result)  Result time 01/13/23 16:38:17      Final result by Interface, Lab In [facility] (01/13/23 16:38:17)                   Narrative:    Test Reason : R42,    Vent. Rate : 097 BPM     Atrial Rate : 000 BPM     P-R Int : 000 ms          QRS Dur : 106 ms       QT Int : 342 ms       P-R-T Axes : 000 -46 038 degrees     QTc Int : 434 ms    NSR with PACs and short SVT  Left axis deviation  Low anterior forces and R wave progression- Possible Anterior infarct ,age  undetermined  Abnormal ECG  When compared with ECG of 13-JAN-2023 13:05,  Ectopy has increased  Confirmed by Socrates MONTELONGO MD ([id number]) on 1/13/2023 4:38:06 PM    Referred By:             Confirmed By:Socrates MONTELONGO MD                                  Imaging Results              X-Ray Chest AP Portable (Final result)  Result time 01/13/23 16:53:23      Final result by Sohail Pino MD (01/13/23 16:53:23)                   Impression:      No detrimental change or radiographic acute intrathoracic process seen on this single view.      Electronically signed by: Sohail Pino MD  Date:    01/13/2023  Time:    16:53               Narrative:    EXAMINATION:  XR CHEST AP PORTABLE    CLINICAL HISTORY:  Dizziness and giddiness    TECHNIQUE:  Single frontal view of the chest was performed.    COMPARISON:  Chest radiograph 11/25/2019    FINDINGS:  Monitoring leads overlie the chest.  No detrimental change.    Similar nonspecific elevation of the right hemidiaphragm.  Trachea is midline and patent.The lungs are well expanded and clear, with normal appearance of pulmonary vasculature and no pleural effusion or pneumothorax.    The cardiac silhouette is normal in size. The hilar and mediastinal contours are within normal limits for age.    No acute osseous process seen.  PA and lateral views can be obtained.                                       Medications   metoprolol injection 5 mg (5 mg Intravenous Given 1/13/23 1729)     Medical Decision Making:   History:   Old Medical Records: I decided to obtain old medical records.  Old Records Summarized: records from clinic visits, records from previous admission(s) and records from another hospital.  Initial Assessment:   Patient is well-appearing in no acute distress and no active symptoms  and a benign exam other than a heart rate ranging between normal sinus rhythm and AFib with RVR up to a rate of 175.  Differential Diagnosis:   Differential diagnoses considered include, but not limited to:  Atrial fibrillation with RVR  Dehydration  Electrolyte abnormalities  Orthostatic hypotension    Doubt BPPV as patient has no change in symptoms with head movement, no nausea or vomiting, no nystagmus.   Doubt posterior circulation stroke as patient does not have symptoms currently, and symptoms have been brief and intermittent x 3 days without any other symptoms.       Clinical Tests:   Lab Tests: Ordered and Reviewed  Radiological Study: Ordered and Reviewed  Medical Tests: Ordered and Reviewed  ED Management:  Patient's symptoms most likely are a result of her paroxysmal afib with RVR, as her heart rate tends to increase to 170-180 and come back down within a minute. 5 mg metoprolol was given and the patient reported she did not have any more dizzy spells in the ED, and her heart rate remained lower during this time.     I discussed the patient with cardiology who recommended increasing her flecainide to 100 mg BID and increasing her metoprolol to 75 qd, and to give her a cardiology referral for follow up. I discussed this plan and strict return precautions, including any new symptoms, with the patient and she expressed understanding and agreement.   Other:   I have discussed this case with another health care provider.          Attending Attestation:   Physician Attestation Statement for Resident:  As the supervising MD   Physician Attestation Statement: I have personally seen and examined this patient.   I agree with the above history.  -:   As the supervising MD I agree with the above PE.     As the supervising MD I agree with the above treatment, course, plan, and disposition.    I have reviewed and agree with the residents interpretation of the following: EKG and lab data.                             Clinical Impression:   Final diagnoses:  [R42] Dizziness  [R42] Dizzy  [I48.0] Paroxysmal atrial fibrillation (Primary)        ED Disposition Condition    Discharge Stable          ED Prescriptions       Medication Sig Dispense Start Date End Date Auth. Provider    metoprolol succinate (TOPROL-XL) 25 MG 24 hr tablet Take 3 tablets (75 mg total) by mouth once daily. 90 tablet 1/13/2023 2/12/2023 Clementine Willingham MD    flecainide (TAMBOCOR) 100 MG Tab Take 1 tablet (100 mg total) by mouth every 12 (twelve) hours. 60 tablet 1/13/2023 2/12/2023 Clementine Willingham MD          Follow-up Information       Follow up With Specialties Details Why Contact Info Additional Information    Hien Sparks MD Internal Medicine   [address] [place] [address]  [phone]       [facility] - Cardiology - [facility]   [address] [address] [phone] Cardiology Services Clinics - 3rd floor             Clementine Willingham MD  Resident  01/14/23 0004       Lefty Bonner MD  01/14/23 1238

## 2023-01-13 NOTE — PROGRESS NOTES
"Spoke with patient who states that she is feeling  dizzy and "pounding heart beats" . Patient states that she has an Apple Watch which shows that her heart rate is ranging between 70 - 90's.  Patient checked her BP while on the phone and it was noted to be 170/91. Patient is on several BP meds, and reports compliance with medications as prescribed, all taken in the morning.  Patient advised that I can schedule her for an EKG tomorrow to evaluate her heart rhythm, and instructed that she needs to contact her PCP first thing in the morning regarding her elevated blood pressure, and if it continues to increase she should have someone take her to the ER for further evaluation or call 911.   Patient states that she is alone with no assistance. Patient abruptly stopped talking, and took and deep breath taking she was off balance and really dizzy. Patient instructed to call 911 now to have EMS evaluate her. Patient understood and agreed to call.     "

## 2023-01-13 NOTE — ED TRIAGE NOTES
Fely Monique Rashard, a 67 y.o. female presents to the ED w/ complaint of dizziness the past three day. Pt denies chest pain, SOB, headaches, and palpitations. Pt has hx of afib and takes medications regularly.     Triage note:  Chief Complaint   Patient presents with    Dizziness     Sent by clinic; hx afib; denies chest pain or SOB; having palpitations     Review of patient's allergies indicates:   Allergen Reactions    Corticosteroids (glucocorticoids)     Latex, natural rubber     Shellfish containing products Other (See Comments)     Other reaction(s): Unknown  Other reaction(s): Anaphylaxis    Vicodin [hydrocodone-acetaminophen]     Vytorin 10-10  [ezetimibe-simvastatin]      Other reaction(s): Joint pain  Other reaction(s): Joint pain     Past Medical History:   Diagnosis Date    Colon polyp 11/2014    Diabetes mellitus type II     Hyperlipidemia     Hypertension     Obesity     Osteopenia

## 2023-01-13 NOTE — TELEPHONE ENCOUNTER
----- Message from Kamala Hernandez sent at 1/13/2023  8:12 AM CST -----  Contact: [phone]  No blue slot available to schedule an appointment for the patient.  Patient is established with which PCP: Dr Sparks  Reason for the visit: dizziness past 3days and blood pressure check   Would the patient like a call back, or a response through their [facility] portal?:callback  Comments: pt is requesting to be seen today. Please call.              Thank you

## 2023-01-13 NOTE — TELEPHONE ENCOUNTER
"   Orders Only  1/12/2023  [facility] - Electrophysiology 3rd Fl  Jessie Garg RN PAF (paroxysmal atrial fibrillation)  Dx     Conversation  (Oldest Message First)  January 12, 2023       MR    6:02 PM  You routed this conversation to Beck Mathias MD   Me     MR     6:20 PM  Note    Spoke with patient who states that she is feeling  dizzy and "pounding heart beats" . Patient states that she has an Apple Watch which shows that her heart rate is ranging between 70 - 90's.  Patient checked her BP while on the phone and it was noted to be 170/91. Patient is on several BP meds, and reports compliance with medications as prescribed, all taken in the morning.  Patient advised that I can schedule her for an EKG tomorrow to evaluate her heart rhythm, and instructed that she needs to contact her PCP first thing in the morning regarding her elevated blood pressure, and if it continues to increase she should have someone take her to the ER for further evaluation or call 911.   Patient states that she is alone with no assistance. Patient abruptly stopped talking, and took and deep breath taking she was off balance and really dizzy. Patient instructed to call 911 now to have EMS evaluate her. Patient understood and agreed to call.            "

## 2023-01-14 NOTE — DISCHARGE INSTRUCTIONS
Diagnosis: atrial fibrillation    Tests today showed:   Labs Reviewed   COMPREHENSIVE METABOLIC PANEL - Abnormal; Notable for the following components:       Result Value    Sodium 135 (*)     CO2 19 (*)     Glucose 143 (*)     BUN 31 (*)     Calcium 10.6 (*)     All other components within normal limits   TSH   MAGNESIUM   CBC W/ AUTO DIFFERENTIAL   TROPONIN I     X-Ray Chest AP Portable   Final Result      No detrimental change or radiographic acute intrathoracic process seen on this single view.         Electronically signed by: Sohail Pino MD   Date:    01/13/2023   Time:    16:53          Treatments you had today:   Medications   metoprolol injection 5 mg (5 mg Intravenous Given 1/13/23 1729)       Follow-Up Plan:  - I have given you a referral for cardiology. Please follow up with them this week  - Follow-up with primary care doctor within 3 - 5 days  - Additional testing and/or evaluation as directed by your primary doctor    Return to the Emergency Department for symptoms including but not limited to: worsening symptoms, shortness of breath or chest pain, vomiting with inability to hold down fluids, fevers greater than 100.4°F, passing out/fainting/unconsciousness, or other concerning symptoms.

## 2023-01-19 ENCOUNTER — TELEPHONE (OUTPATIENT)
Dept: ELECTROPHYSIOLOGY | Facility: CLINIC | Age: 68
End: 2023-01-19
Payer: MEDICAID

## 2023-01-19 RX ORDER — METOPROLOL SUCCINATE 50 MG/1
50 TABLET, EXTENDED RELEASE ORAL 2 TIMES DAILY
Qty: 60 TABLET | Refills: 11 | Status: SHIPPED | OUTPATIENT
Start: 2023-01-19 | End: 2023-02-13

## 2023-01-19 NOTE — TELEPHONE ENCOUNTER
Spoke with patient and advised that Dr Mathias recommends that she change her Metoprolol from 75 mg daily to 50 mg BID. Patient verbalized understanding , and states that she has felt much better the past couple of days . Advised that  a new prescription for the increased dose of Metoprolol will be sent to her pharmacy [facility].

## 2023-01-19 NOTE — TELEPHONE ENCOUNTER
----- Message from Beck Mathias MD sent at 1/18/2023  7:38 AM CST -----  Thanks    I know her well. We should increase her metoprolol to 50mg bid rather than 75 mg daily. If I have a spot in clinic for her to come in to see me earlier than that is fine. I would talk to her about ablation.    Beck  ----- Message -----  From: Jessie Garg RN  Sent: 1/17/2023   1:25 PM CST  To: Beck Mathias MD    Patient contacted that office on Thursday afternoon with c/o dizziness and pounding heartbeats.  Patient was initially scheduled for an EKG the following morning, but patient was ultimately instructed to call 911 as she was also feeling dizzy and progressed to patient feeling off balance  with no one with her to assist. Patient did not go to the ER, and instead came to the office for her EKG the following morning and was sent to the ER due to complaints of being symptomatic. In the ER patient's  flecainide was increased to 100 mg BID and metoprolol to 75 qd. Patient has a f/u appointment with you scheduled on 2/28/2023. Please advise if patient needs to be seen sooner or any further recs.  ----- Message -----  From: Interface, Lab In [facility]  Sent: 1/13/2023   5:51 PM CST  To: Jessie Garg RN

## 2023-02-06 ENCOUNTER — TELEPHONE (OUTPATIENT)
Dept: ELECTROPHYSIOLOGY | Facility: CLINIC | Age: 68
End: 2023-02-06
Payer: MEDICAID

## 2023-02-06 ENCOUNTER — OFFICE VISIT (OUTPATIENT)
Dept: INTERNAL MEDICINE | Facility: CLINIC | Age: 68
End: 2023-02-06
Payer: MEDICARE

## 2023-02-06 ENCOUNTER — PATIENT MESSAGE (OUTPATIENT)
Dept: INTERNAL MEDICINE | Facility: CLINIC | Age: 68
End: 2023-02-06
Payer: MEDICAID

## 2023-02-06 VITALS
WEIGHT: 162.5 LBS | TEMPERATURE: 98 F | HEIGHT: 62 IN | SYSTOLIC BLOOD PRESSURE: 110 MMHG | OXYGEN SATURATION: 97 % | BODY MASS INDEX: 29.9 KG/M2 | DIASTOLIC BLOOD PRESSURE: 70 MMHG | HEART RATE: 68 BPM

## 2023-02-06 DIAGNOSIS — I15.2 HYPERTENSION DUE TO ENDOCRINE DISORDER: Chronic | ICD-10-CM

## 2023-02-06 PROCEDURE — 99215 OFFICE O/P EST HI 40 MIN: CPT | Mod: PBBFAC,PO | Performed by: FAMILY MEDICINE

## 2023-02-06 PROCEDURE — 99999 PR PBB SHADOW E&M-EST. PATIENT-LVL V: ICD-10-PCS | Mod: PBBFAC,,, | Performed by: FAMILY MEDICINE

## 2023-02-06 PROCEDURE — 99214 OFFICE O/P EST MOD 30 MIN: CPT | Mod: S$PBB,,, | Performed by: FAMILY MEDICINE

## 2023-02-06 PROCEDURE — 99999 PR PBB SHADOW E&M-EST. PATIENT-LVL V: CPT | Mod: PBBFAC,,, | Performed by: FAMILY MEDICINE

## 2023-02-06 PROCEDURE — 99214 PR OFFICE/OUTPT VISIT, EST, LEVL IV, 30-39 MIN: ICD-10-PCS | Mod: S$PBB,,, | Performed by: FAMILY MEDICINE

## 2023-02-06 RX ORDER — AMLODIPINE BESYLATE 5 MG/1
5 TABLET ORAL DAILY
Qty: 90 TABLET | Refills: 3 | Status: SHIPPED | OUTPATIENT
Start: 2023-02-06 | End: 2023-02-06

## 2023-02-06 RX ORDER — AMLODIPINE BESYLATE 5 MG/1
5 TABLET ORAL DAILY
Qty: 90 TABLET | Refills: 3 | Status: SHIPPED | OUTPATIENT
Start: 2023-02-06

## 2023-02-06 RX ORDER — ASPIRIN 325 MG
50000 TABLET, DELAYED RELEASE (ENTERIC COATED) ORAL
COMMUNITY
Start: 2022-11-21 | End: 2023-04-14

## 2023-02-06 NOTE — PROGRESS NOTES
Subjective:       Patient ID: Fely Wetzel is a 67 y.o. female.    Chief Complaint: Hypertension  67-year-old female patient of Dr. Sparks presents to clinic today secondary to concerns of hypertension.  She reports frequent episodes of dizziness and had presented to the emergency room approximately 3 weeks ago secondary to dizziness.  Workup was overall negative but the patient is noted to have atrial fibrillation.  It was recommended that metoprolol be changed to 50 mg b.i.d. and flecainide was changed to 100 mg b.i.d..  She is also being treated with amlodipine 10 mg daily, losartan 100 mg daily, and hydrochlorothiazide 25 mg daily.  She reports frequent episodes of dizziness but states that over the past week she has been checking her blood pressure and has noted systolic blood pressure readings in the 140s to 170s.  Hypertension  Associated symptoms include palpitations. Pertinent negatives include no chest pain, headaches, neck pain or shortness of breath.   Review of Systems   Constitutional:  Negative for appetite change, chills, fatigue and fever.   HENT:  Negative for nasal congestion, ear pain, hearing loss, postnasal drip, rhinorrhea, sinus pressure/congestion, sore throat and tinnitus.    Eyes:  Negative for redness, itching and visual disturbance.   Respiratory:  Negative for cough, chest tightness and shortness of breath.    Cardiovascular:  Positive for palpitations. Negative for chest pain.   Gastrointestinal:  Negative for abdominal pain, constipation, diarrhea, nausea and vomiting.   Genitourinary:  Negative for decreased urine volume, difficulty urinating, dysuria, frequency, hematuria and urgency.   Musculoskeletal:  Negative for back pain, myalgias, neck pain and neck stiffness.   Integumentary:  Negative for rash.   Neurological:  Positive for light-headedness. Negative for dizziness and headaches.   Psychiatric/Behavioral: Negative.         Objective:      Physical Exam  Vitals and  nursing note reviewed.   Constitutional:       General: She is not in acute distress.     Appearance: She is well-developed. She is not diaphoretic.   HENT:      Head: Normocephalic and atraumatic.      Right Ear: External ear normal.      Left Ear: External ear normal.      Nose: Nose normal.      Mouth/Throat:      Pharynx: No oropharyngeal exudate.   Eyes:      General: No scleral icterus.        Right eye: No discharge.         Left eye: No discharge.      Conjunctiva/sclera: Conjunctivae normal.      Pupils: Pupils are equal, round, and reactive to light.   Neck:      Thyroid: No thyromegaly.      Vascular: No JVD.      Trachea: No tracheal deviation.   Cardiovascular:      Rate and Rhythm: Normal rate and regular rhythm.      Heart sounds: Normal heart sounds. No murmur heard.    No friction rub. No gallop.   Pulmonary:      Effort: Pulmonary effort is normal. No respiratory distress.      Breath sounds: Normal breath sounds. No stridor. No wheezing or rales.   Abdominal:      General: Bowel sounds are normal. There is no distension.      Palpations: Abdomen is soft. There is no mass.      Tenderness: There is no abdominal tenderness. There is no guarding or rebound.   Musculoskeletal:         General: No tenderness. Normal range of motion.      Cervical back: Normal range of motion and neck supple.   Lymphadenopathy:      Cervical: No cervical adenopathy.   Skin:     General: Skin is warm and dry.      Coloration: Skin is not pale.      Findings: No erythema or rash.   Neurological:      Mental Status: She is alert and oriented to person, place, and time.   Psychiatric:         Behavior: Behavior normal.         Thought Content: Thought content normal.         Judgment: Judgment normal.       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 The patient's repeat manual blood pressure is 100/70.  Dizziness is likely secondary to hypotension.  I recommend that you continue all medications as prescribed but I have recommended decreasing the dose of amlodipine to 5 mg daily.    2. Follow-up with Cardiology as scheduled for further evaluation of atrial fibrillation.    3. Return to clinic as needed or as previously scheduled with PCP for follow-up.

## 2023-02-06 NOTE — TELEPHONE ENCOUNTER
----- Message from Andreea Ordonez MA sent at 2/6/2023  8:15 AM CST -----  The patient would like to talk to the nurse about her a fib and her medication Metoprolol 50 mg please call [phone]. Thank you.

## 2023-02-07 ENCOUNTER — PATIENT MESSAGE (OUTPATIENT)
Dept: INTERNAL MEDICINE | Facility: CLINIC | Age: 68
End: 2023-02-07
Payer: MEDICAID

## 2023-02-07 RX ORDER — ASPIRIN 325 MG
TABLET, DELAYED RELEASE (ENTERIC COATED) ORAL
Qty: 12 CAPSULE | Refills: 1 | OUTPATIENT
Start: 2023-02-07

## 2023-02-09 ENCOUNTER — TELEPHONE (OUTPATIENT)
Dept: ELECTROPHYSIOLOGY | Facility: CLINIC | Age: 68
End: 2023-02-09
Payer: MEDICAID

## 2023-02-09 NOTE — TELEPHONE ENCOUNTER
LATE ENTRY 2/6/2023 2:59 pm - Spoke with patient who reports that she saw her PCP, and her blood pressure was found to be low so her PCP lowered her Amlodipine to see if that will help increase her BP and possibly help her symptoms. Patient  advised to continue to monitor her symptoms and to call the office if she has any questions or concerns. Understanding verbalized.

## 2023-02-10 ENCOUNTER — HOSPITAL ENCOUNTER (OUTPATIENT)
Dept: CARDIOLOGY | Facility: CLINIC | Age: 68
Discharge: HOME | End: 2023-02-10
Payer: MEDICARE

## 2023-02-10 ENCOUNTER — TELEPHONE (OUTPATIENT)
Dept: ELECTROPHYSIOLOGY | Facility: CLINIC | Age: 68
End: 2023-02-10
Payer: MEDICAID

## 2023-02-10 ENCOUNTER — TELEPHONE (OUTPATIENT)
Dept: INTERNAL MEDICINE | Facility: CLINIC | Age: 68
End: 2023-02-10
Payer: MEDICAID

## 2023-02-10 DIAGNOSIS — I48.0 PAF (PAROXYSMAL ATRIAL FIBRILLATION): ICD-10-CM

## 2023-02-10 PROCEDURE — 93010 ELECTROCARDIOGRAM REPORT: CPT | Mod: S$PBB,,, | Performed by: INTERNAL MEDICINE

## 2023-02-10 PROCEDURE — 93005 ELECTROCARDIOGRAM TRACING: CPT | Mod: PBBFAC | Performed by: INTERNAL MEDICINE

## 2023-02-10 PROCEDURE — 93010 RHYTHM STRIP: ICD-10-PCS | Mod: S$PBB,,, | Performed by: INTERNAL MEDICINE

## 2023-02-10 NOTE — TELEPHONE ENCOUNTER
Spoke with patient regarding variable heart rate and palpitations, sometimes getting to 150 bpm. She is taking 100 mg bid flecainide and 50mg of metoprolol bid. Will get an ECG tomorrow and then call her back and discuss PVI/EPS.

## 2023-02-10 NOTE — TELEPHONE ENCOUNTER
----- Message from Mallorie Pascual MA sent at 2/9/2023 12:54 PM CST -----  Contact: self/[phone]    ----- Message -----  From: Kavitha Silverio  Sent: 2/9/2023  10:14 AM CST  To: Elayne Morfin Staff    Pt called in regard to getting a 1 month f/u. Call back.     Please advise

## 2023-02-11 NOTE — TELEPHONE ENCOUNTER
Reached out to patient to discuss ablation. She is currently in sinus rhythm. No answer. Left voicemail. Will try again next week.

## 2023-02-13 ENCOUNTER — TELEPHONE (OUTPATIENT)
Dept: ELECTROPHYSIOLOGY | Facility: CLINIC | Age: 68
End: 2023-02-13
Payer: MEDICAID

## 2023-02-13 RX ORDER — FLECAINIDE ACETATE 150 MG/1
150 TABLET ORAL EVERY 12 HOURS
Qty: 60 TABLET | Refills: 11 | Status: SHIPPED | OUTPATIENT
Start: 2023-02-13 | End: 2023-02-13 | Stop reason: SDUPTHER

## 2023-02-13 RX ORDER — FLECAINIDE ACETATE 150 MG/1
150 TABLET ORAL EVERY 12 HOURS
Qty: 60 TABLET | Refills: 11 | Status: SHIPPED | OUTPATIENT
Start: 2023-02-13 | End: 2023-03-15

## 2023-02-13 RX ORDER — METOPROLOL SUCCINATE 100 MG/1
100 TABLET, EXTENDED RELEASE ORAL 2 TIMES DAILY
Qty: 60 TABLET | Refills: 11 | Status: SHIPPED | OUTPATIENT
Start: 2023-02-13 | End: 2023-03-09 | Stop reason: SDUPTHER

## 2023-02-13 NOTE — TELEPHONE ENCOUNTER
Spoke with patient regarding recurrence of AF and AT. Spoke with her about ablation as she is having recurrence despite anti-arrhythmic drug therapy.     I spent about a half hour discussing the nature of PVI including transseptal puncture. We discussed risks and benefits at length. Our discussion included, but was not limited to the risk of death, infection, bleeding, stroke, MI, cardiac perforation, embolism, cardiac tamponade, vascular injury, valvular injury, AE fistula, injury to phrenic nerve, pulmonary vein stenosis and other organic injury including the possibility for need for surgery or pacemaker implantation.  She understood and elects to proceed.  For now will increase flecainide to 150mg bid and metoprolol to 100mg bid.    Plan  RF-PVI  Carto  Anesthesia  Change omeprazole to protonix 40mg daily 2 weeks prior and continue for 1 month after then change back to omeprazole  Hold eliquis and losartan AM of procedure  Hold flecainide and metoprolol for 5 days prior to procedure  GABRIELLA day of, cancel if in sinus rhythm

## 2023-02-24 ENCOUNTER — PATIENT MESSAGE (OUTPATIENT)
Dept: ELECTROPHYSIOLOGY | Facility: CLINIC | Age: 68
End: 2023-02-24
Payer: MEDICAID

## 2023-02-27 ENCOUNTER — TELEPHONE (OUTPATIENT)
Dept: ELECTROPHYSIOLOGY | Facility: CLINIC | Age: 68
End: 2023-02-27
Payer: MEDICAID

## 2023-02-27 NOTE — TELEPHONE ENCOUNTER
Spoke with Patient scheduled for PVI  procedure on 4/19/2023 with Dr Mathias. Procedure details reviewed and advised that pre procedure patient instructions will be sent via patient portal as requested. Advised to call the office for any questions or concerns prior to scheduled procedure. Understanding verbalized.

## 2023-03-06 ENCOUNTER — PATIENT MESSAGE (OUTPATIENT)
Dept: OPTOMETRY | Facility: CLINIC | Age: 68
End: 2023-03-06
Payer: MEDICARE

## 2023-03-10 DIAGNOSIS — Z79.01 CHRONIC ANTICOAGULATION: ICD-10-CM

## 2023-03-10 DIAGNOSIS — I10 PRIMARY HYPERTENSION: ICD-10-CM

## 2023-03-10 DIAGNOSIS — I48.0 PAF (PAROXYSMAL ATRIAL FIBRILLATION): Primary | ICD-10-CM

## 2023-03-10 DIAGNOSIS — Z01.818 PRE-OP TESTING: ICD-10-CM

## 2023-03-16 ENCOUNTER — LAB VISIT (OUTPATIENT)
Dept: LAB | Facility: HOSPITAL | Age: 68
End: 2023-03-16
Attending: HOSPITALIST
Payer: MEDICARE

## 2023-03-16 DIAGNOSIS — I15.2 HYPERTENSION ASSOCIATED WITH DIABETES: ICD-10-CM

## 2023-03-16 DIAGNOSIS — E11.59 HYPERTENSION ASSOCIATED WITH DIABETES: ICD-10-CM

## 2023-03-16 DIAGNOSIS — Z00.00 ANNUAL PHYSICAL EXAM: ICD-10-CM

## 2023-03-16 DIAGNOSIS — E55.9 VITAMIN D DEFICIENCY: ICD-10-CM

## 2023-03-16 DIAGNOSIS — E11.65 TYPE 2 DIABETES MELLITUS WITH HYPERGLYCEMIA, WITHOUT LONG-TERM CURRENT USE OF INSULIN: ICD-10-CM

## 2023-03-16 LAB
25(OH)D3+25(OH)D2 SERPL-MCNC: 68 NG/ML (ref 30–96)
ALBUMIN SERPL BCP-MCNC: 4 G/DL (ref 3.5–5.2)
ALP SERPL-CCNC: 66 U/L (ref 55–135)
ALT SERPL W/O P-5'-P-CCNC: 16 U/L (ref 10–44)
ANION GAP SERPL CALC-SCNC: 11 MMOL/L (ref 8–16)
AST SERPL-CCNC: 21 U/L (ref 10–40)
BASOPHILS # BLD AUTO: 0.02 K/UL (ref 0–0.2)
BASOPHILS NFR BLD: 0.3 % (ref 0–1.9)
BILIRUB SERPL-MCNC: 0.4 MG/DL (ref 0.1–1)
BUN SERPL-MCNC: 31 MG/DL (ref 8–23)
CALCIUM SERPL-MCNC: 10.7 MG/DL (ref 8.7–10.5)
CHLORIDE SERPL-SCNC: 101 MMOL/L (ref 95–110)
CHOLEST SERPL-MCNC: 177 MG/DL (ref 120–199)
CHOLEST/HDLC SERPL: 3.1 {RATIO} (ref 2–5)
CO2 SERPL-SCNC: 25 MMOL/L (ref 23–29)
CREAT SERPL-MCNC: 0.9 MG/DL (ref 0.5–1.4)
DIFFERENTIAL METHOD: ABNORMAL
EOSINOPHIL # BLD AUTO: 0.1 K/UL (ref 0–0.5)
EOSINOPHIL NFR BLD: 2 % (ref 0–8)
ERYTHROCYTE [DISTWIDTH] IN BLOOD BY AUTOMATED COUNT: 13.5 % (ref 11.5–14.5)
EST. GFR  (NO RACE VARIABLE): >60 ML/MIN/1.73 M^2
ESTIMATED AVG GLUCOSE: 174 MG/DL (ref 68–131)
GLUCOSE SERPL-MCNC: 135 MG/DL (ref 70–110)
HBA1C MFR BLD: 7.7 % (ref 4–5.6)
HCT VFR BLD AUTO: 44.5 % (ref 37–48.5)
HDLC SERPL-MCNC: 58 MG/DL (ref 40–75)
HDLC SERPL: 32.8 % (ref 20–50)
HGB BLD-MCNC: 13.7 G/DL (ref 12–16)
IMM GRANULOCYTES # BLD AUTO: 0.01 K/UL (ref 0–0.04)
IMM GRANULOCYTES NFR BLD AUTO: 0.1 % (ref 0–0.5)
LDLC SERPL CALC-MCNC: 95.8 MG/DL (ref 63–159)
LYMPHOCYTES # BLD AUTO: 2.3 K/UL (ref 1–4.8)
LYMPHOCYTES NFR BLD: 32.7 % (ref 18–48)
MCH RBC QN AUTO: 28.5 PG (ref 27–31)
MCHC RBC AUTO-ENTMCNC: 30.8 G/DL (ref 32–36)
MCV RBC AUTO: 93 FL (ref 82–98)
MONOCYTES # BLD AUTO: 0.4 K/UL (ref 0.3–1)
MONOCYTES NFR BLD: 6.1 % (ref 4–15)
NEUTROPHILS # BLD AUTO: 4.2 K/UL (ref 1.8–7.7)
NEUTROPHILS NFR BLD: 58.8 % (ref 38–73)
NONHDLC SERPL-MCNC: 119 MG/DL
NRBC BLD-RTO: 0 /100 WBC
PLATELET # BLD AUTO: 327 K/UL (ref 150–450)
PMV BLD AUTO: 11.7 FL (ref 9.2–12.9)
POTASSIUM SERPL-SCNC: 3.9 MMOL/L (ref 3.5–5.1)
PROT SERPL-MCNC: 7.7 G/DL (ref 6–8.4)
RBC # BLD AUTO: 4.8 M/UL (ref 4–5.4)
SODIUM SERPL-SCNC: 137 MMOL/L (ref 136–145)
TRIGL SERPL-MCNC: 116 MG/DL (ref 30–150)
TSH SERPL DL<=0.005 MIU/L-ACNC: 1.52 UIU/ML (ref 0.4–4)
WBC # BLD AUTO: 7.16 K/UL (ref 3.9–12.7)

## 2023-03-16 PROCEDURE — 84443 ASSAY THYROID STIM HORMONE: CPT | Mod: HCNC | Performed by: HOSPITALIST

## 2023-03-16 PROCEDURE — 80053 COMPREHEN METABOLIC PANEL: CPT | Mod: HCNC | Performed by: HOSPITALIST

## 2023-03-16 PROCEDURE — 83036 HEMOGLOBIN GLYCOSYLATED A1C: CPT | Mod: HCNC | Performed by: HOSPITALIST

## 2023-03-16 PROCEDURE — 80061 LIPID PANEL: CPT | Mod: HCNC | Performed by: HOSPITALIST

## 2023-03-16 PROCEDURE — 36415 COLL VENOUS BLD VENIPUNCTURE: CPT | Mod: HCNC,PO | Performed by: HOSPITALIST

## 2023-03-16 PROCEDURE — 85025 COMPLETE CBC W/AUTO DIFF WBC: CPT | Mod: HCNC | Performed by: HOSPITALIST

## 2023-03-16 PROCEDURE — 82306 VITAMIN D 25 HYDROXY: CPT | Mod: HCNC | Performed by: HOSPITALIST

## 2023-03-17 ENCOUNTER — PATIENT MESSAGE (OUTPATIENT)
Dept: ELECTROPHYSIOLOGY | Facility: CLINIC | Age: 68
End: 2023-03-17
Payer: MEDICARE

## 2023-03-22 ENCOUNTER — PATIENT MESSAGE (OUTPATIENT)
Dept: ELECTROPHYSIOLOGY | Facility: CLINIC | Age: 68
End: 2023-03-22
Payer: MEDICARE

## 2023-03-22 RX ORDER — PANTOPRAZOLE SODIUM 40 MG/1
40 TABLET, DELAYED RELEASE ORAL DAILY
Qty: 90 TABLET | Refills: 0 | Status: SHIPPED | OUTPATIENT
Start: 2023-03-22 | End: 2023-09-25 | Stop reason: SDUPTHER

## 2023-03-23 ENCOUNTER — TELEPHONE (OUTPATIENT)
Dept: INTERNAL MEDICINE | Facility: CLINIC | Age: 68
End: 2023-03-23

## 2023-03-23 ENCOUNTER — LAB VISIT (OUTPATIENT)
Dept: LAB | Facility: HOSPITAL | Age: 68
End: 2023-03-23
Attending: HOSPITALIST
Payer: MEDICARE

## 2023-03-23 ENCOUNTER — OFFICE VISIT (OUTPATIENT)
Dept: INTERNAL MEDICINE | Facility: CLINIC | Age: 68
End: 2023-03-23
Payer: MEDICARE

## 2023-03-23 VITALS
OXYGEN SATURATION: 99 % | HEIGHT: 62 IN | HEART RATE: 63 BPM | TEMPERATURE: 98 F | WEIGHT: 163.81 LBS | BODY MASS INDEX: 30.14 KG/M2 | SYSTOLIC BLOOD PRESSURE: 130 MMHG | DIASTOLIC BLOOD PRESSURE: 70 MMHG

## 2023-03-23 DIAGNOSIS — Z12.31 ENCOUNTER FOR SCREENING MAMMOGRAM FOR BREAST CANCER: ICD-10-CM

## 2023-03-23 DIAGNOSIS — E11.65 TYPE 2 DIABETES MELLITUS WITH HYPERGLYCEMIA, WITHOUT LONG-TERM CURRENT USE OF INSULIN: ICD-10-CM

## 2023-03-23 DIAGNOSIS — E11.59 HYPERTENSION ASSOCIATED WITH DIABETES: ICD-10-CM

## 2023-03-23 DIAGNOSIS — I83.893 VARICOSE VEINS OF LEG WITH SWELLING, BILATERAL: ICD-10-CM

## 2023-03-23 DIAGNOSIS — E83.52 HYPERCALCEMIA: ICD-10-CM

## 2023-03-23 DIAGNOSIS — E66.9 OBESITY, CLASS I, BMI 30-34.9: ICD-10-CM

## 2023-03-23 DIAGNOSIS — E11.69 HYPERLIPIDEMIA ASSOCIATED WITH TYPE 2 DIABETES MELLITUS: ICD-10-CM

## 2023-03-23 DIAGNOSIS — Z00.00 ENCOUNTER FOR PREVENTATIVE ADULT HEALTH CARE EXAMINATION: Primary | ICD-10-CM

## 2023-03-23 DIAGNOSIS — I48.0 PAF (PAROXYSMAL ATRIAL FIBRILLATION): ICD-10-CM

## 2023-03-23 DIAGNOSIS — K21.9 GASTROESOPHAGEAL REFLUX DISEASE, UNSPECIFIED WHETHER ESOPHAGITIS PRESENT: ICD-10-CM

## 2023-03-23 DIAGNOSIS — I15.2 HYPERTENSION ASSOCIATED WITH DIABETES: ICD-10-CM

## 2023-03-23 DIAGNOSIS — E78.5 HYPERLIPIDEMIA ASSOCIATED WITH TYPE 2 DIABETES MELLITUS: ICD-10-CM

## 2023-03-23 DIAGNOSIS — Z00.00 ANNUAL PHYSICAL EXAM: ICD-10-CM

## 2023-03-23 DIAGNOSIS — M85.80 OSTEOPENIA, UNSPECIFIED LOCATION: ICD-10-CM

## 2023-03-23 LAB
ALBUMIN/CREAT UR: 30.3 UG/MG (ref 0–30)
BACTERIA #/AREA URNS AUTO: NORMAL /HPF
BILIRUB UR QL STRIP: NEGATIVE
CLARITY UR REFRACT.AUTO: CLEAR
COLOR UR AUTO: COLORLESS
CREAT UR-MCNC: 33 MG/DL (ref 15–325)
GLUCOSE UR QL STRIP: ABNORMAL
HGB UR QL STRIP: NEGATIVE
KETONES UR QL STRIP: NEGATIVE
LEUKOCYTE ESTERASE UR QL STRIP: NEGATIVE
MICROALBUMIN UR DL<=1MG/L-MCNC: 10 UG/ML
MICROSCOPIC COMMENT: NORMAL
NITRITE UR QL STRIP: NEGATIVE
PH UR STRIP: 6 [PH] (ref 5–8)
PROT UR QL STRIP: NEGATIVE
SP GR UR STRIP: 1.02 (ref 1–1.03)
SQUAMOUS #/AREA URNS AUTO: 1 /HPF
URN SPEC COLLECT METH UR: ABNORMAL
WBC #/AREA URNS AUTO: 1 /HPF (ref 0–5)
YEAST UR QL AUTO: NORMAL

## 2023-03-23 PROCEDURE — 1160F RVW MEDS BY RX/DR IN RCRD: CPT | Mod: HCNC,CPTII,S$GLB, | Performed by: HOSPITALIST

## 2023-03-23 PROCEDURE — 3051F HG A1C>EQUAL 7.0%<8.0%: CPT | Mod: HCNC,CPTII,S$GLB, | Performed by: HOSPITALIST

## 2023-03-23 PROCEDURE — 3075F SYST BP GE 130 - 139MM HG: CPT | Mod: HCNC,CPTII,S$GLB, | Performed by: HOSPITALIST

## 2023-03-23 PROCEDURE — 1101F PT FALLS ASSESS-DOCD LE1/YR: CPT | Mod: HCNC,CPTII,S$GLB, | Performed by: HOSPITALIST

## 2023-03-23 PROCEDURE — 82570 ASSAY OF URINE CREATININE: CPT | Performed by: HOSPITALIST

## 2023-03-23 PROCEDURE — 3051F PR MOST RECENT HEMOGLOBIN A1C LEVEL 7.0 - < 8.0%: ICD-10-PCS | Mod: HCNC,CPTII,S$GLB, | Performed by: HOSPITALIST

## 2023-03-23 PROCEDURE — 1159F PR MEDICATION LIST DOCUMENTED IN MEDICAL RECORD: ICD-10-PCS | Mod: HCNC,CPTII,S$GLB, | Performed by: HOSPITALIST

## 2023-03-23 PROCEDURE — 99999 PR PBB SHADOW E&M-EST. PATIENT-LVL V: CPT | Mod: PBBFAC,HCNC,, | Performed by: HOSPITALIST

## 2023-03-23 PROCEDURE — 81001 URINALYSIS AUTO W/SCOPE: CPT | Performed by: HOSPITALIST

## 2023-03-23 PROCEDURE — 3288F FALL RISK ASSESSMENT DOCD: CPT | Mod: HCNC,CPTII,S$GLB, | Performed by: HOSPITALIST

## 2023-03-23 PROCEDURE — 99499 UNLISTED E&M SERVICE: CPT | Mod: HCNC,S$GLB,, | Performed by: HOSPITALIST

## 2023-03-23 PROCEDURE — 4010F PR ACE/ARB THEARPY RXD/TAKEN: ICD-10-PCS | Mod: HCNC,CPTII,S$GLB, | Performed by: HOSPITALIST

## 2023-03-23 PROCEDURE — 3078F PR MOST RECENT DIASTOLIC BLOOD PRESSURE < 80 MM HG: ICD-10-PCS | Mod: HCNC,CPTII,S$GLB, | Performed by: HOSPITALIST

## 2023-03-23 PROCEDURE — 1160F PR REVIEW ALL MEDS BY PRESCRIBER/CLIN PHARMACIST DOCUMENTED: ICD-10-PCS | Mod: HCNC,CPTII,S$GLB, | Performed by: HOSPITALIST

## 2023-03-23 PROCEDURE — 1126F AMNT PAIN NOTED NONE PRSNT: CPT | Mod: HCNC,CPTII,S$GLB, | Performed by: HOSPITALIST

## 2023-03-23 PROCEDURE — 99397 PR PREVENTIVE VISIT,EST,65 & OVER: ICD-10-PCS | Mod: HCNC,S$GLB,, | Performed by: HOSPITALIST

## 2023-03-23 PROCEDURE — 99397 PER PM REEVAL EST PAT 65+ YR: CPT | Mod: HCNC,S$GLB,, | Performed by: HOSPITALIST

## 2023-03-23 PROCEDURE — 3078F DIAST BP <80 MM HG: CPT | Mod: HCNC,CPTII,S$GLB, | Performed by: HOSPITALIST

## 2023-03-23 PROCEDURE — 1159F MED LIST DOCD IN RCRD: CPT | Mod: HCNC,CPTII,S$GLB, | Performed by: HOSPITALIST

## 2023-03-23 PROCEDURE — 1101F PR PT FALLS ASSESS DOC 0-1 FALLS W/OUT INJ PAST YR: ICD-10-PCS | Mod: HCNC,CPTII,S$GLB, | Performed by: HOSPITALIST

## 2023-03-23 PROCEDURE — 1126F PR PAIN SEVERITY QUANTIFIED, NO PAIN PRESENT: ICD-10-PCS | Mod: HCNC,CPTII,S$GLB, | Performed by: HOSPITALIST

## 2023-03-23 PROCEDURE — 4010F ACE/ARB THERAPY RXD/TAKEN: CPT | Mod: HCNC,CPTII,S$GLB, | Performed by: HOSPITALIST

## 2023-03-23 PROCEDURE — 3288F PR FALLS RISK ASSESSMENT DOCUMENTED: ICD-10-PCS | Mod: HCNC,CPTII,S$GLB, | Performed by: HOSPITALIST

## 2023-03-23 PROCEDURE — 3075F PR MOST RECENT SYSTOLIC BLOOD PRESS GE 130-139MM HG: ICD-10-PCS | Mod: HCNC,CPTII,S$GLB, | Performed by: HOSPITALIST

## 2023-03-23 PROCEDURE — 99999 PR PBB SHADOW E&M-EST. PATIENT-LVL V: ICD-10-PCS | Mod: PBBFAC,HCNC,, | Performed by: HOSPITALIST

## 2023-03-23 PROCEDURE — 3008F PR BODY MASS INDEX (BMI) DOCUMENTED: ICD-10-PCS | Mod: HCNC,CPTII,S$GLB, | Performed by: HOSPITALIST

## 2023-03-23 PROCEDURE — 3008F BODY MASS INDEX DOCD: CPT | Mod: HCNC,CPTII,S$GLB, | Performed by: HOSPITALIST

## 2023-03-23 PROCEDURE — 99499 RISK ADDL DX/OHS AUDIT: ICD-10-PCS | Mod: HCNC,S$GLB,, | Performed by: HOSPITALIST

## 2023-03-23 RX ORDER — PNEUMOCOCCAL 20-VALENT CONJUGATE VACCINE 2.2; 2.2; 2.2; 2.2; 2.2; 2.2; 2.2; 2.2; 2.2; 2.2; 2.2; 2.2; 2.2; 2.2; 2.2; 2.2; 4.4; 2.2; 2.2; 2.2 UG/.5ML; UG/.5ML; UG/.5ML; UG/.5ML; UG/.5ML; UG/.5ML; UG/.5ML; UG/.5ML; UG/.5ML; UG/.5ML; UG/.5ML; UG/.5ML; UG/.5ML; UG/.5ML; UG/.5ML; UG/.5ML; UG/.5ML; UG/.5ML; UG/.5ML; UG/.5ML
0.5 INJECTION, SUSPENSION INTRAMUSCULAR ONCE
Qty: 0.5 ML | Refills: 0 | Status: SHIPPED | OUTPATIENT
Start: 2023-03-23 | End: 2023-03-26

## 2023-03-23 RX ORDER — AMLODIPINE BESYLATE 10 MG/1
TABLET ORAL
COMMUNITY
Start: 2023-03-10 | End: 2023-03-23 | Stop reason: DRUGHIGH

## 2023-03-23 RX ORDER — ZOSTER VACCINE RECOMBINANT, ADJUVANTED 50 MCG/0.5
0.5 KIT INTRAMUSCULAR ONCE
Qty: 1 EACH | Refills: 1 | Status: CANCELLED | OUTPATIENT
Start: 2023-03-23 | End: 2023-03-23

## 2023-03-23 RX ORDER — FLECAINIDE ACETATE 50 MG/1
50 TABLET ORAL EVERY 12 HOURS
COMMUNITY
Start: 2023-03-11 | End: 2023-03-23 | Stop reason: SDUPTHER

## 2023-03-23 NOTE — TELEPHONE ENCOUNTER
----- Message from Angel Mercedes sent at 3/23/2023 12:42 PM CDT -----  Good afternoon,     Dr. Sparks placed a referral for pt to see Jing Dinh NP in Internal Medicine again. Please assist pt with scheduling.     Thank you,   Angel

## 2023-03-23 NOTE — PROGRESS NOTES
Subjective:     @Patient ID: Fely Wetzel is a 67 y.o. female.    Chief Complaint: Annual Exam    HPI    66 y.o. female with pmhx of dm2, afib, hld, osteopenia, psoriasis presents  here for annual exam.      1. Dm2: last a1c 7.7.  Due for foot exam.Utd on eye exam. Follows with diabetic NP. On dapagliflozin-metformin  5-1000 mg bid, trulicity 3 mg qweek. Reports has been eating more sweets over the holidays  2. HTN: on toprol-xl 50 mg qday, amlodipine 10 mg qday, losartan 100 mg qday, hctz 25 mg qday  3. HLD: on lipitor 40 mg qday and fenofibrate 160 mg qday  4. Paroxysmal Afib: follows with cardiology. On eliquis 5 mg bid, metoprolol xl 50 mg qday and flecanide 50 mg bid . Has upcoming ablation with EP   5. H pylori: completed treatment   6. Osteopenia: on vitamin d  7. Varicose veins: reports sometimes has leg swelling. Plans to resume her walking exercise          Lipid disorders/ASCVD risk (ages >/= 45 or >/= 20 if increased risk ): ordered  DM (>45y yearly or if obese, HTN): A1c ordered  Eye exam:  utd 2022. Eye doctor Dr Burciaga   Breast Cancer (40-50y discretion of pt, 50-74y every 1-2 years): Mammogram done 7/25/22  Colorectal Cancer (normal risk 50-75yr): Colonoscopy utd 3/17/22   DEXA (F>64 yo, M >71yo, M&F 50-70 yo with risk factors (smoking, previous fx, wt <70kg; etoh abuse, chronic steroids, RA)): utd 4/2022        Vaccines:   Influenza (yearly):  utd    Tetanus (every 10 yrs - 1st tdap): utd 2014   Pna: due   Zoster (>59yo): Due   Covid19: utd      Exercise: walking  Diet:  reg     Review of Systems   Constitutional:  Negative for chills and fever.   HENT:  Negative for congestion and sore throat.    Eyes:  Negative for pain and visual disturbance.   Respiratory:  Negative for cough and shortness of breath.    Cardiovascular:  Negative for chest pain and leg swelling.   Gastrointestinal:  Negative for abdominal pain, nausea and vomiting.   Endocrine: Negative for polydipsia and polyuria.    Genitourinary:  Negative for difficulty urinating and dysuria.   Musculoskeletal:  Negative for arthralgias and back pain.   Skin:  Negative for rash and wound.   Neurological:  Negative for dizziness, weakness and headaches.   Psychiatric/Behavioral:  Negative for agitation and confusion.    Past medical history, surgical history, and family medical history reviewed and updated as appropriate.    Medications and allergies reviewed.     Objective:     There were no vitals filed for this visit.  There is no height or weight on file to calculate BMI.  Physical Exam  Vitals reviewed.   Constitutional:       General: She is not in acute distress.     Appearance: She is well-developed.   HENT:      Head: Normocephalic and atraumatic.      Right Ear: Tympanic membrane normal.      Left Ear: Tympanic membrane normal.      Mouth/Throat:      Mouth: Mucous membranes are moist.      Pharynx: No oropharyngeal exudate.   Eyes:      General:         Right eye: No discharge.         Left eye: No discharge.      Conjunctiva/sclera: Conjunctivae normal.   Cardiovascular:      Rate and Rhythm: Normal rate and regular rhythm.      Heart sounds: No murmur heard.    No friction rub.   Pulmonary:      Effort: Pulmonary effort is normal.      Breath sounds: Normal breath sounds.   Abdominal:      General: Bowel sounds are normal. There is no distension.      Palpations: Abdomen is soft.      Tenderness: There is no abdominal tenderness. There is no guarding.   Musculoskeletal:         General: Normal range of motion.      Cervical back: Normal range of motion and neck supple.      Right lower leg: No edema.      Left lower leg: No edema.   Lymphadenopathy:      Cervical: No cervical adenopathy.   Skin:     General: Skin is warm and dry.   Neurological:      Mental Status: She is alert and oriented to person, place, and time.   Psychiatric:         Mood and Affect: Mood normal.         Behavior: Behavior normal.       Lab Results    Component Value Date    WBC 7.16 03/16/2023    HGB 13.7 03/16/2023    HCT 44.5 03/16/2023     03/16/2023    CHOL 177 03/16/2023    TRIG 116 03/16/2023    HDL 58 03/16/2023    ALT 16 03/16/2023    AST 21 03/16/2023     03/16/2023    K 3.9 03/16/2023     03/16/2023    CREATININE 0.9 03/16/2023    BUN 31 (H) 03/16/2023    CO2 25 03/16/2023    TSH 1.518 03/16/2023    INR 1.0 06/27/2019    GLUF 126 (H) 07/07/2010    HGBA1C 7.7 (H) 03/16/2023       Assessment:     1. Encounter for preventative adult health care examination    2. Type 2 diabetes mellitus with hyperglycemia, without long-term current use of insulin    3. Hypertension associated with diabetes    4. Hyperlipidemia associated with type 2 diabetes mellitus    5. Obesity, Class I, BMI 30-34.9    6. Osteopenia, unspecified location    7. PAF (paroxysmal atrial fibrillation)    8. Varicose veins of leg with swelling, bilateral    9. Gastroesophageal reflux disease, unspecified whether esophagitis present    10. Encounter for screening mammogram for breast cancer    11. Hypercalcemia      Plan:   Fely was seen today for annual exam.    Diagnoses and all orders for this visit:    Encounter for preventative adult health care examination  - reviewed lab results with pt in clinic     -     Microalbumin/Creatinine Ratio, Urine; Future  -     Urinalysis; Future    Type 2 diabetes mellitus with hyperglycemia, without long-term current use of insulin  - Stable. Continue home meds     -     Ambulatory referral/consult to Internal Medicine; Future  -     Microalbumin/Creatinine Ratio, Urine; Future  -     Urinalysis; Future    Hypertension associated with diabetes  - Stable. Continue home meds     Hyperlipidemia associated with type 2 diabetes mellitus  - Stable. Continue home meds       Obesity, Class I, BMI 30-34.9  - improving. Continue to monitor     Osteopenia, unspecified location  - Stable. Continue home meds     PAF (paroxysmal atrial  fibrillation)  - Stable. Continue home meds and f/u with cardiology-EP    Varicose veins of leg with swelling, bilateral  - stable. Recommend to wear compression stockings    Gastroesophageal reflux disease, unspecified whether esophagitis present  - Stable. Continue home meds     Encounter for screening mammogram for breast cancer  -     Mammo Digital Screening Bilat w/ Femi; Future    Hypercalcemia  - calcium level mildly high. Recommend hydration   - pt is not taking calcium supplements  - will f/u labs at 6 month visit     Other orders  -     pneumoc 20-sy conj-dip cr,PF, (PREVNAR 20, PF,) 0.5 mL Syrg injection; Inject 0.5 mLs into the muscle once. for 1 dose         Rtc 6 months     Hien Sparks MD  Internal Medicine    3/23/2023

## 2023-04-12 ENCOUNTER — LAB VISIT (OUTPATIENT)
Dept: LAB | Facility: HOSPITAL | Age: 68
End: 2023-04-12
Attending: INTERNAL MEDICINE
Payer: MEDICARE

## 2023-04-12 DIAGNOSIS — Z79.01 CHRONIC ANTICOAGULATION: ICD-10-CM

## 2023-04-12 DIAGNOSIS — I48.0 PAF (PAROXYSMAL ATRIAL FIBRILLATION): ICD-10-CM

## 2023-04-12 DIAGNOSIS — Z01.818 PRE-OP TESTING: ICD-10-CM

## 2023-04-12 DIAGNOSIS — I10 PRIMARY HYPERTENSION: ICD-10-CM

## 2023-04-12 LAB
ANION GAP SERPL CALC-SCNC: 12 MMOL/L (ref 8–16)
APTT PPP: 28.5 SEC (ref 21–32)
BASOPHILS # BLD AUTO: 0.02 K/UL (ref 0–0.2)
BASOPHILS NFR BLD: 0.3 % (ref 0–1.9)
BUN SERPL-MCNC: 25 MG/DL (ref 8–23)
CALCIUM SERPL-MCNC: 9.9 MG/DL (ref 8.7–10.5)
CHLORIDE SERPL-SCNC: 104 MMOL/L (ref 95–110)
CO2 SERPL-SCNC: 22 MMOL/L (ref 23–29)
CREAT SERPL-MCNC: 0.9 MG/DL (ref 0.5–1.4)
DIFFERENTIAL METHOD: ABNORMAL
EOSINOPHIL # BLD AUTO: 0.1 K/UL (ref 0–0.5)
EOSINOPHIL NFR BLD: 2.1 % (ref 0–8)
ERYTHROCYTE [DISTWIDTH] IN BLOOD BY AUTOMATED COUNT: 14.1 % (ref 11.5–14.5)
EST. GFR  (NO RACE VARIABLE): >60 ML/MIN/1.73 M^2
GLUCOSE SERPL-MCNC: 246 MG/DL (ref 70–110)
HCT VFR BLD AUTO: 42.7 % (ref 37–48.5)
HGB BLD-MCNC: 13.5 G/DL (ref 12–16)
IMM GRANULOCYTES # BLD AUTO: 0.02 K/UL (ref 0–0.04)
IMM GRANULOCYTES NFR BLD AUTO: 0.3 % (ref 0–0.5)
INR PPP: 1 (ref 0.8–1.2)
LYMPHOCYTES # BLD AUTO: 1.9 K/UL (ref 1–4.8)
LYMPHOCYTES NFR BLD: 30.8 % (ref 18–48)
MCH RBC QN AUTO: 28.9 PG (ref 27–31)
MCHC RBC AUTO-ENTMCNC: 31.6 G/DL (ref 32–36)
MCV RBC AUTO: 91 FL (ref 82–98)
MONOCYTES # BLD AUTO: 0.4 K/UL (ref 0.3–1)
MONOCYTES NFR BLD: 5.6 % (ref 4–15)
NEUTROPHILS # BLD AUTO: 3.8 K/UL (ref 1.8–7.7)
NEUTROPHILS NFR BLD: 60.9 % (ref 38–73)
NRBC BLD-RTO: 0 /100 WBC
PLATELET # BLD AUTO: 312 K/UL (ref 150–450)
PMV BLD AUTO: 12 FL (ref 9.2–12.9)
POTASSIUM SERPL-SCNC: 3.8 MMOL/L (ref 3.5–5.1)
PROTHROMBIN TIME: 10.9 SEC (ref 9–12.5)
RBC # BLD AUTO: 4.67 M/UL (ref 4–5.4)
SODIUM SERPL-SCNC: 138 MMOL/L (ref 136–145)
WBC # BLD AUTO: 6.29 K/UL (ref 3.9–12.7)

## 2023-04-12 PROCEDURE — 80048 BASIC METABOLIC PNL TOTAL CA: CPT | Mod: HCNC | Performed by: INTERNAL MEDICINE

## 2023-04-12 PROCEDURE — 85025 COMPLETE CBC W/AUTO DIFF WBC: CPT | Mod: HCNC | Performed by: INTERNAL MEDICINE

## 2023-04-12 PROCEDURE — 36415 COLL VENOUS BLD VENIPUNCTURE: CPT | Mod: HCNC,PO | Performed by: INTERNAL MEDICINE

## 2023-04-12 PROCEDURE — 85730 THROMBOPLASTIN TIME PARTIAL: CPT | Mod: HCNC | Performed by: INTERNAL MEDICINE

## 2023-04-12 PROCEDURE — 85610 PROTHROMBIN TIME: CPT | Mod: HCNC | Performed by: INTERNAL MEDICINE

## 2023-04-14 RX ORDER — FLECAINIDE ACETATE 150 MG/1
100 TABLET ORAL EVERY 12 HOURS
COMMUNITY
Start: 2023-04-09 | End: 2023-09-14 | Stop reason: SDUPTHER

## 2023-04-17 ENCOUNTER — TELEPHONE (OUTPATIENT)
Dept: ELECTROPHYSIOLOGY | Facility: CLINIC | Age: 68
End: 2023-04-17
Payer: MEDICARE

## 2023-04-17 NOTE — TELEPHONE ENCOUNTER
----- Message from Kathy Stephens sent at 4/17/2023  8:18 AM CDT -----  Regarding: procedure  Pt has questions about her procedure on Wed and can be reached at [phone].    Thank you

## 2023-04-17 NOTE — TELEPHONE ENCOUNTER
Spoke to Patient.     CONFIRMED procedure arrival time of 5/15/2023 on 4/19/2023 for PVI ablation with Dr Mathias.     Reiterated instructions including:  -Directions to check in desk.    -NPO after midnight night prior to procedure.    -High importance of HOLDING  FLECAINIDE and METOPROLOL 5  days prior to the procedure.      Confirmed last dose of both medications taken on 4/13/2023;  ELIQUIS and LOSARTAN to be held on the day of your procedure.     -Confirmed compliance of Eliquis as prescribed and Protonix as instructed ( started on 4/5/2023)    -Pre-procedure LABS reviewed and noted  Glucose elevated with non fasting labs after meal.     -Confirmed absence or presence of implanted device/stimulator N/A    -Confirmed no recent fever, cough, or shortness of breath.     -Confirmed no redness, rash, irritation, or yeast infection to groin area.     -Reviewed current visitor policy    Patient verbalized understanding of above ,denied any further questions and appreciated the call.

## 2023-04-19 ENCOUNTER — ANESTHESIA (OUTPATIENT)
Dept: MEDSURG UNIT | Facility: HOSPITAL | Age: 68
End: 2023-04-19
Payer: MEDICARE

## 2023-04-19 ENCOUNTER — HOSPITAL ENCOUNTER (OUTPATIENT)
Facility: HOSPITAL | Age: 68
Discharge: HOME | End: 2023-04-19
Attending: INTERNAL MEDICINE | Admitting: INTERNAL MEDICINE
Payer: MEDICARE

## 2023-04-19 ENCOUNTER — ANESTHESIA EVENT (OUTPATIENT)
Dept: MEDSURG UNIT | Facility: HOSPITAL | Age: 68
End: 2023-04-19
Payer: MEDICARE

## 2023-04-19 VITALS
HEART RATE: 78 BPM | BODY MASS INDEX: 29.44 KG/M2 | RESPIRATION RATE: 16 BRPM | SYSTOLIC BLOOD PRESSURE: 136 MMHG | HEIGHT: 62 IN | TEMPERATURE: 98 F | WEIGHT: 160 LBS | OXYGEN SATURATION: 97 % | DIASTOLIC BLOOD PRESSURE: 66 MMHG

## 2023-04-19 DIAGNOSIS — I10 PRIMARY HYPERTENSION: ICD-10-CM

## 2023-04-19 DIAGNOSIS — I48.91 ATRIAL FIBRILLATION: ICD-10-CM

## 2023-04-19 DIAGNOSIS — I48.0 PAF (PAROXYSMAL ATRIAL FIBRILLATION): ICD-10-CM

## 2023-04-19 DIAGNOSIS — I49.9 ARRHYTHMIA: ICD-10-CM

## 2023-04-19 LAB
POCT GLUCOSE: 115 MG/DL (ref 70–110)
POCT GLUCOSE: 147 MG/DL (ref 70–110)

## 2023-04-19 PROCEDURE — 93656 COMPRE EP EVAL ABLTJ ATR FIB: CPT | Mod: HCNC,,, | Performed by: INTERNAL MEDICINE

## 2023-04-19 PROCEDURE — 36620 ARTERIAL: ICD-10-PCS | Mod: HCNC,,, | Performed by: ANESTHESIOLOGY

## 2023-04-19 PROCEDURE — 82962 GLUCOSE BLOOD TEST: CPT | Mod: HCNC | Performed by: INTERNAL MEDICINE

## 2023-04-19 PROCEDURE — 63600175 PHARM REV CODE 636 W HCPCS: Mod: HCNC | Performed by: NURSE ANESTHETIST, CERTIFIED REGISTERED

## 2023-04-19 PROCEDURE — 93656 COMPRE EP EVAL ABLTJ ATR FIB: CPT | Mod: HCNC | Performed by: INTERNAL MEDICINE

## 2023-04-19 PROCEDURE — 93010 ELECTROCARDIOGRAM REPORT: CPT | Mod: HCNC,,, | Performed by: INTERNAL MEDICINE

## 2023-04-19 PROCEDURE — C1894 INTRO/SHEATH, NON-LASER: HCPCS | Mod: HCNC | Performed by: INTERNAL MEDICINE

## 2023-04-19 PROCEDURE — D9220A PRA ANESTHESIA: Mod: HCNC,ANES,, | Performed by: ANESTHESIOLOGY

## 2023-04-19 PROCEDURE — 93010 ELECTROCARDIOGRAM REPORT: CPT | Mod: HCNC,76,, | Performed by: INTERNAL MEDICINE

## 2023-04-19 PROCEDURE — 93005 ELECTROCARDIOGRAM TRACING: CPT | Mod: 59,HCNC

## 2023-04-19 PROCEDURE — 63600175 PHARM REV CODE 636 W HCPCS: Mod: HCNC | Performed by: INTERNAL MEDICINE

## 2023-04-19 PROCEDURE — C1731 CATH, EP, 20 OR MORE ELEC: HCPCS | Mod: HCNC | Performed by: INTERNAL MEDICINE

## 2023-04-19 PROCEDURE — 25000003 PHARM REV CODE 250: Mod: HCNC | Performed by: INTERNAL MEDICINE

## 2023-04-19 PROCEDURE — 93656 PR ELECTROPHYS EVAL, COMPREHEN, W/ABLATION OF ATRIAL FIBR: ICD-10-PCS | Mod: HCNC,,, | Performed by: INTERNAL MEDICINE

## 2023-04-19 PROCEDURE — 25000003 PHARM REV CODE 250: Mod: HCNC | Performed by: NURSE ANESTHETIST, CERTIFIED REGISTERED

## 2023-04-19 PROCEDURE — 93010 EKG 12-LEAD: ICD-10-PCS | Mod: HCNC,76,, | Performed by: INTERNAL MEDICINE

## 2023-04-19 PROCEDURE — C1730 CATH, EP, 19 OR FEW ELECT: HCPCS | Mod: HCNC | Performed by: INTERNAL MEDICINE

## 2023-04-19 PROCEDURE — C1753 CATH, INTRAVAS ULTRASOUND: HCPCS | Mod: HCNC | Performed by: INTERNAL MEDICINE

## 2023-04-19 PROCEDURE — 25000003 PHARM REV CODE 250: Mod: HCNC | Performed by: STUDENT IN AN ORGANIZED HEALTH CARE EDUCATION/TRAINING PROGRAM

## 2023-04-19 PROCEDURE — 37000009 HC ANESTHESIA EA ADD 15 MINS: Mod: HCNC | Performed by: INTERNAL MEDICINE

## 2023-04-19 PROCEDURE — D9220A PRA ANESTHESIA: ICD-10-PCS | Mod: HCNC,ANES,, | Performed by: ANESTHESIOLOGY

## 2023-04-19 PROCEDURE — D9220A PRA ANESTHESIA: ICD-10-PCS | Mod: HCNC,CRNA,, | Performed by: NURSE ANESTHETIST, CERTIFIED REGISTERED

## 2023-04-19 PROCEDURE — 63600175 PHARM REV CODE 636 W HCPCS: Mod: HCNC | Performed by: ANESTHESIOLOGY

## 2023-04-19 PROCEDURE — 36620 INSERTION CATHETER ARTERY: CPT | Mod: HCNC,,, | Performed by: ANESTHESIOLOGY

## 2023-04-19 PROCEDURE — 37000008 HC ANESTHESIA 1ST 15 MINUTES: Mod: HCNC | Performed by: INTERNAL MEDICINE

## 2023-04-19 PROCEDURE — 93005 ELECTROCARDIOGRAM TRACING: CPT | Mod: HCNC

## 2023-04-19 PROCEDURE — D9220A PRA ANESTHESIA: Mod: HCNC,CRNA,, | Performed by: NURSE ANESTHETIST, CERTIFIED REGISTERED

## 2023-04-19 PROCEDURE — C1766 INTRO/SHEATH,STRBLE,NON-PEEL: HCPCS | Mod: HCNC | Performed by: INTERNAL MEDICINE

## 2023-04-19 PROCEDURE — C1732 CATH, EP, DIAG/ABL, 3D/VECT: HCPCS | Mod: HCNC | Performed by: INTERNAL MEDICINE

## 2023-04-19 PROCEDURE — 27201423 OPTIME MED/SURG SUP & DEVICES STERILE SUPPLY: Mod: HCNC | Performed by: INTERNAL MEDICINE

## 2023-04-19 RX ORDER — FENTANYL CITRATE 50 UG/ML
25 INJECTION, SOLUTION INTRAMUSCULAR; INTRAVENOUS EVERY 5 MIN PRN
Status: DISCONTINUED | OUTPATIENT
Start: 2023-04-19 | End: 2023-04-19 | Stop reason: HOSPADM

## 2023-04-19 RX ORDER — LIDOCAINE HYDROCHLORIDE 20 MG/ML
INJECTION, SOLUTION INFILTRATION; PERINEURAL
Status: DISCONTINUED | OUTPATIENT
Start: 2023-04-19 | End: 2023-04-19 | Stop reason: HOSPADM

## 2023-04-19 RX ORDER — DEXMEDETOMIDINE HYDROCHLORIDE 100 UG/ML
INJECTION, SOLUTION INTRAVENOUS
Status: DISCONTINUED | OUTPATIENT
Start: 2023-04-19 | End: 2023-04-19

## 2023-04-19 RX ORDER — HEPARIN SOD,PORCINE/0.9 % NACL 1000/500ML
INTRAVENOUS SOLUTION INTRAVENOUS
Status: DISCONTINUED | OUTPATIENT
Start: 2023-04-19 | End: 2023-04-19 | Stop reason: HOSPADM

## 2023-04-19 RX ORDER — PROTAMINE SULFATE 10 MG/ML
INJECTION, SOLUTION INTRAVENOUS
Status: DISCONTINUED | OUTPATIENT
Start: 2023-04-19 | End: 2023-04-19

## 2023-04-19 RX ORDER — ACETAMINOPHEN 325 MG/1
650 TABLET ORAL EVERY 6 HOURS PRN
Status: DISCONTINUED | OUTPATIENT
Start: 2023-04-19 | End: 2023-04-19 | Stop reason: HOSPADM

## 2023-04-19 RX ORDER — ONDANSETRON 2 MG/ML
INJECTION INTRAMUSCULAR; INTRAVENOUS
Status: DISCONTINUED | OUTPATIENT
Start: 2023-04-19 | End: 2023-04-19

## 2023-04-19 RX ORDER — MIDAZOLAM HYDROCHLORIDE 1 MG/ML
INJECTION, SOLUTION INTRAMUSCULAR; INTRAVENOUS
Status: DISCONTINUED | OUTPATIENT
Start: 2023-04-19 | End: 2023-04-19

## 2023-04-19 RX ORDER — PROPOFOL 10 MG/ML
VIAL (ML) INTRAVENOUS
Status: DISCONTINUED | OUTPATIENT
Start: 2023-04-19 | End: 2023-04-19

## 2023-04-19 RX ORDER — FENTANYL CITRATE 50 UG/ML
INJECTION, SOLUTION INTRAMUSCULAR; INTRAVENOUS
Status: DISCONTINUED | OUTPATIENT
Start: 2023-04-19 | End: 2023-04-19

## 2023-04-19 RX ORDER — HALOPERIDOL 5 MG/ML
0.5 INJECTION INTRAMUSCULAR EVERY 10 MIN PRN
Status: DISCONTINUED | OUTPATIENT
Start: 2023-04-19 | End: 2023-04-19 | Stop reason: HOSPADM

## 2023-04-19 RX ORDER — PROCHLORPERAZINE EDISYLATE 5 MG/ML
5 INJECTION INTRAMUSCULAR; INTRAVENOUS EVERY 30 MIN PRN
Status: DISCONTINUED | OUTPATIENT
Start: 2023-04-19 | End: 2023-04-19 | Stop reason: HOSPADM

## 2023-04-19 RX ORDER — SODIUM CHLORIDE 0.9 % (FLUSH) 0.9 %
3 SYRINGE (ML) INJECTION
Status: DISCONTINUED | OUTPATIENT
Start: 2023-04-19 | End: 2023-04-19 | Stop reason: HOSPADM

## 2023-04-19 RX ORDER — FUROSEMIDE 20 MG/1
20 TABLET ORAL ONCE
Status: COMPLETED | OUTPATIENT
Start: 2023-04-19 | End: 2023-04-19

## 2023-04-19 RX ORDER — SUCRALFATE 1 G/1
1 TABLET ORAL 4 TIMES DAILY
Qty: 120 TABLET | Refills: 0 | Status: SHIPPED | OUTPATIENT
Start: 2023-04-19 | End: 2023-11-16

## 2023-04-19 RX ORDER — DIPHENHYDRAMINE HYDROCHLORIDE 50 MG/ML
25 INJECTION INTRAMUSCULAR; INTRAVENOUS EVERY 6 HOURS PRN
Status: DISCONTINUED | OUTPATIENT
Start: 2023-04-19 | End: 2023-04-19 | Stop reason: HOSPADM

## 2023-04-19 RX ORDER — SUCCINYLCHOLINE CHLORIDE 20 MG/ML
INJECTION INTRAMUSCULAR; INTRAVENOUS
Status: DISCONTINUED | OUTPATIENT
Start: 2023-04-19 | End: 2023-04-19

## 2023-04-19 RX ORDER — FUROSEMIDE 20 MG/1
20 TABLET ORAL
Qty: 10 TABLET | Refills: 0 | Status: SHIPPED | OUTPATIENT
Start: 2023-04-19 | End: 2024-04-18

## 2023-04-19 RX ORDER — HEPARIN SODIUM 10000 [USP'U]/100ML
INJECTION, SOLUTION INTRAVENOUS CONTINUOUS PRN
Status: DISCONTINUED | OUTPATIENT
Start: 2023-04-19 | End: 2023-04-19

## 2023-04-19 RX ORDER — HEPARIN SODIUM 1000 [USP'U]/ML
INJECTION, SOLUTION INTRAVENOUS; SUBCUTANEOUS
Status: DISCONTINUED | OUTPATIENT
Start: 2023-04-19 | End: 2023-04-19

## 2023-04-19 RX ORDER — LIDOCAINE HYDROCHLORIDE 20 MG/ML
INJECTION INTRAVENOUS
Status: DISCONTINUED | OUTPATIENT
Start: 2023-04-19 | End: 2023-04-19

## 2023-04-19 RX ORDER — METOPROLOL SUCCINATE 100 MG/1
100 TABLET, EXTENDED RELEASE ORAL 2 TIMES DAILY
Qty: 60 TABLET | Refills: 11 | Status: SHIPPED | OUTPATIENT
Start: 2023-04-19 | End: 2023-06-16

## 2023-04-19 RX ADMIN — DEXMEDETOMIDINE HYDROCHLORIDE 4 MCG: 100 INJECTION, SOLUTION INTRAVENOUS at 11:04

## 2023-04-19 RX ADMIN — HEPARIN SODIUM 2000 UNITS: 1000 INJECTION, SOLUTION INTRAVENOUS; SUBCUTANEOUS at 10:04

## 2023-04-19 RX ADMIN — FENTANYL CITRATE 25 MCG: 50 INJECTION, SOLUTION INTRAMUSCULAR; INTRAVENOUS at 12:04

## 2023-04-19 RX ADMIN — HEPARIN SODIUM 7000 UNITS: 1000 INJECTION, SOLUTION INTRAVENOUS; SUBCUTANEOUS at 09:04

## 2023-04-19 RX ADMIN — DEXMEDETOMIDINE HYDROCHLORIDE 8 MCG: 100 INJECTION, SOLUTION INTRAVENOUS at 11:04

## 2023-04-19 RX ADMIN — PROPOFOL 150 MG: 10 INJECTION, EMULSION INTRAVENOUS at 08:04

## 2023-04-19 RX ADMIN — HEPARIN SODIUM 12 UNITS/KG/HR: 10000 INJECTION, SOLUTION INTRAVENOUS at 09:04

## 2023-04-19 RX ADMIN — ONDANSETRON 4 MG: 2 INJECTION INTRAMUSCULAR; INTRAVENOUS at 10:04

## 2023-04-19 RX ADMIN — SUCCINYLCHOLINE CHLORIDE 160 MG: 20 INJECTION, SOLUTION INTRAMUSCULAR; INTRAVENOUS at 08:04

## 2023-04-19 RX ADMIN — FUROSEMIDE 20 MG: 20 TABLET ORAL at 05:04

## 2023-04-19 RX ADMIN — ACETAMINOPHEN 650 MG: 325 TABLET ORAL at 12:04

## 2023-04-19 RX ADMIN — HEPARIN SODIUM 11000 UNITS: 1000 INJECTION, SOLUTION INTRAVENOUS; SUBCUTANEOUS at 09:04

## 2023-04-19 RX ADMIN — SODIUM CHLORIDE: 0.9 INJECTION, SOLUTION INTRAVENOUS at 08:04

## 2023-04-19 RX ADMIN — FENTANYL CITRATE 100 MCG: 50 INJECTION, SOLUTION INTRAMUSCULAR; INTRAVENOUS at 08:04

## 2023-04-19 RX ADMIN — SODIUM CHLORIDE 0.4 MCG/KG/MIN: 9 INJECTION, SOLUTION INTRAVENOUS at 08:04

## 2023-04-19 RX ADMIN — LIDOCAINE HYDROCHLORIDE 100 MG: 20 INJECTION INTRAVENOUS at 08:04

## 2023-04-19 RX ADMIN — MIDAZOLAM HYDROCHLORIDE 2 MG: 1 INJECTION, SOLUTION INTRAMUSCULAR; INTRAVENOUS at 08:04

## 2023-04-19 RX ADMIN — PROTAMINE SULFATE 55 MG: 10 INJECTION, SOLUTION INTRAVENOUS at 11:04

## 2023-04-19 NOTE — NURSING
Report received from FRANCES Meredith. Patient s/p PVI, bilat groin sites cdi with sutures. Sites soft. + pulses. Post procedure protocol discussed with patient. Connected to cont tele monitoring. Vss. Call light in reach.

## 2023-04-19 NOTE — NURSING
Patient ambulated in [place] with RN. Bilat groin sites cdi, soft. No  complaints. Voiding without difficulty.

## 2023-04-19 NOTE — ASSESSMENT & PLAN NOTE
Pt presents for PVI with Dr. Mathias.  Last EF normal  - keep NPO  - to EP lab today with Dr. Mathias for PVI

## 2023-04-19 NOTE — ANESTHESIA POSTPROCEDURE EVALUATION
Anesthesia Post Evaluation    Patient: Fely Wetzel    Procedure(s) Performed: Procedure(s) (LRB):  Ablation atrial fibrillation (N/A)  Cardioversion or Defibrillation    Final Anesthesia Type: general      Patient location during evaluation: PACU  Patient participation: Yes- Able to Participate  Level of consciousness: awake and alert  Post-procedure vital signs: reviewed and stable  Pain management: adequate  Airway patency: patent    PONV status at discharge: No PONV  Anesthetic complications: no      Cardiovascular status: blood pressure returned to baseline  Respiratory status: unassisted  Follow-up not needed.          Vitals Value Taken Time   /63 04/19/23 1317   Temp 36.7 °C (98.1 °F) 04/19/23 1200   Pulse 81 04/19/23 1321   Resp 21 04/19/23 1320   SpO2 98 % 04/19/23 1321   Vitals shown include unvalidated device data.      No case tracking events are documented in the log.      Pain/Jeff Score: Pain Rating Prior to Med Admin: 4 (4/19/2023 12:30 PM)  Jeff Score: 9 (4/19/2023 12:30 PM)

## 2023-04-19 NOTE — DISCHARGE SUMMARY
[facility]  Cardiac Electrophysiology  Discharge Summary      Patient Name: Fely Wetzel  MRN: [id number]  Admission Date: 4/19/2023  Hospital Length of Stay: 0 days  Discharge Date and Time:  04/19/2023 11:35 AM  Attending Physician: Beck Mathias MD    Discharging Provider: Alek Cruz MD  Primary Care Physician: Hien Sparks MD    HPI:   Mrs. Wetzel today in our electrophysiology clinic in follow-up for her atrial fibrillation. As you are aware she is a pleasant 66 year-old woman with hypertension, diabetes mellitus type 2, and obesity. She was in her usual state of health until June 2019 when she developed sudden palpitations. She called EMS and reportedly was in AF with ventricular rates up to 170s-220s. She was given IV cardizem and her ventricular rate lowered to 110s-170s. Per ER physician she was in atrial fibrillation however no ECGs document her arrhythmia. She was given 20mg more of cardizem and spontaneously converted to sinus rhythm. TSH was normal. She was discharged on eliquis and metoprolol. She was referred to Dr. Dominguez and discussed with her if there was possibility at some point of stopping anticoagulation. She reports for the past few weeks she has been having episodes of palpitations with radiation into her neck. These are different than when she went to the ER.      An exercise stress echocardiogram noted normal LV function and no ischemic changes. An echocardiogram performed on 10/15/2019 showed normal LV function, normal valves, and moderate LA enlargement (MURIEL 47).     Our plan was for a 30 day monitor, sleep study and possibly starting flecainide.     Mrs. Wetzel presented 5/2020 for follow-up. A 30 day monitor done in March of 2020 which showed no atrial fibrillation. She presented to the ER in November of 2019 with recurrent symptomatic AF with RVR that was rate controlled with cardizem and spontaneously converted. She notes occasional palpitations  at night. She has not yet had a sleep study.    Mrs. Wetzel returned for follow-up 12/2020. She is on flecainide. She continues on metoprolol/eliquis. She notes occasional episodes of chest discomfort at rest, not with activity. She drinks water and it goes away. She does have indigestion. No symptomatic AF. Recent CBC noted stable H/H.     Mrs. Wetzel returned for follow-up 7/2021. She was doing well from atrial fibrillation standpoint  without any reports of symptomatic AF. She reported chest discomfort that occurs after eating in the evening and was suspected to be due to dyspepsia. Protonix was prescribed at that time.      Mrs. Wetzel presented for follow up 2/2022 and she once again reported that she was doing well. She denied any sustained periods of atrial fibrillation. Her non cardiac chest discomfort has persistent in spite of protonix and she is planned for  coloscopy/EGD.  She was still employed and has no limitations with her usual activities.     She presents for PVI with Dr. Mathias.  She has no complaints.  Held her metoprolol and flecainide for 5d, last dose of eliquis yesterday.  Has been NPO      Procedure(s) (LRB):  Ablation atrial fibrillation (N/A)  Cardioversion or Defibrillation     Indwelling Lines/Drains at time of discharge:  Lines/Drains/Airways       Airway  Duration                  Airway - Non-Surgical 04/19/23 0822 <1 day              Arterial Line  Duration             Arterial Line 04/19/23 0825 Left Radial <1 day                    Hospital Course:  Pt was brought to the EP lab.  GABRIELLA deferred as pt in NSR.  Bilateral CFV access obtained with U/S guidance.  Pt underwent successful PVI without any complications.  Pt tolerated procedure and recovered without any issues.  Groin sites re-evaluated and successful hemostasis achieved without hematoma.  She will continue on her flecainide, metoprolol, and eliquis.  Continue protonix 40mg qD for 30d along with carafate PO x 30d.  Once  completed, ok to transition back to omeprazole.  She also was given a Rx for lasix 20mg PRN weight gain/LE edema.  Pt subsequently discharged home.      Goals of Care Treatment Preferences:         Consults:     Significant Diagnostic Studies: N/A  Successful PVI    Pending Diagnostic Studies:       Procedure Component Value Units Date/Time    Transesophageal echo (GABRIELLA) [[id number]]     Order Status: Sent Lab Status: No result             Final Active Diagnoses:    Diagnosis Date Noted POA    PRINCIPAL PROBLEM:  PAF (paroxysmal atrial fibrillation) [I48.0] 06/28/2019 Yes      Problems Resolved During this Admission:     Cardiac/Vascular  * PAF (paroxysmal atrial fibrillation)  Pt presents for PVI with Dr. Mathias.  Last EF normal  - keep NPO  - to EP lab today with Dr. Mathias for PVI        Discharged Condition: good    Disposition:     Follow Up:    Patient Instructions:   No discharge procedures on file.  Medications:  Reconciled Home Medications:      Medication List        START taking these medications      furosemide 20 MG tablet  Commonly known as: LASIX  Take 1 tablet (20 mg total) by mouth as needed (3-5 pound weight gain or lower extremity edema, please take 1 pill by mouth for 3 days.).     sucralfate 1 gram tablet  Commonly known as: CARAFATE  Take 1 tablet (1 g total) by mouth 4 (four) times daily.            CONTINUE taking these medications      * ACCU-CHEK JUAN Misc  Generic drug: blood-glucose meter  CHECK FASTING GLUCOSE AND CHECK GLUCOSE TWO HOURS AFTER LUNCH OR DINNER     * blood-glucose meter Misc  Check fasting glucose and check glucose two hours after lunch or dinner     amLODIPine 5 MG tablet  Commonly known as: NORVASC  Take 1 tablet (5 mg total) by mouth once daily.     atorvastatin 40 MG tablet  Commonly known as: LIPITOR  TAKE 1 TABLET BY MOUTH EVERY DAY IN THE EVENING     cholecalciferol (vitamin D3) 1,250 mcg (50,000 unit) capsule  TAKE 1 CAPSULE BY MOUTH ONE TIME PER WEEK      cyanocobalamin 500 MCG tablet  Take 500 mcg by mouth once daily.     ELIQUIS 5 mg Tab  Generic drug: apixaban  TAKE 1 TABLET BY MOUTH TWICE A DAY     fenofibrate 160 MG Tab  TAKE 1 TABLET BY MOUTH ONCE DAILY.     flecainide 150 MG Tab  Commonly known as: TAMBOCOR  Take 150 mg by mouth every 12 (twelve) hours.     hydroCHLOROthiazide 25 MG tablet  Commonly known as: HYDRODIURIL  TAKE 1 TABLET (25 MG TOTAL) BY MOUTH ONCE DAILY. WITH BREAKFAST     lancing device with lancets Kit  Commonly known as: ONETOUCH DELICA LANC DEVICE  1 each by Misc.(Non-Drug; Combo Route) route 2 (two) times daily before meals.     losartan 100 MG tablet  Commonly known as: COZAAR  TAKE 1 TABLET BY MOUTH EVERY DAY     metoprolol succinate 100 MG 24 hr tablet  Commonly known as: TOPROL-XL  Take 1 tablet (100 mg total) by mouth 2 (two) times daily.     pantoprazole 40 MG tablet  Commonly known as: PROTONIX  Take 1 tablet (40 mg total) by mouth once daily. Start taking this medication on 4/5/2023.     TRUE METRIX GLUCOSE TEST STRIP Strp  Generic drug: blood sugar diagnostic  USE 1 TEST STRIP TWICE DAILY TO MEASURE GLUCOSE     * TRULICITY 3 mg/0.5 mL pen injector  Generic drug: dulaglutide  Inject 3 mg into the skin every 7 days.     * TRULICITY 3 mg/0.5 mL pen injector  Generic drug: dulaglutide  Inject 3 mg into the skin every 7 days.     XIGDUO XR 5-1,000 mg  Generic drug: dapagliflozin-metformin  TAKE 1 TABLET BY MOUTH TWICE A DAY WITH MEALS           * This list has 4 medication(s) that are the same as other medications prescribed for you. Read the directions carefully, and ask your doctor or other care provider to review them with you.                STOP taking these medications      omeprazole 20 MG capsule  Commonly known as: PRILOSEC            ASK your doctor about these medications      SYNJARDY XR 10-1,000 mg Tbph  Generic drug: empagliflozin-metformin  Take 1 tablet by mouth 2 (two) times a day.              Time spent on the  discharge of patient: 31 minutes    Alek Cruz MD  Cardiac Electrophysiology  [facility]

## 2023-04-19 NOTE — NURSING
Bilat groin sutures removed per order. No bleeding or hematoma noted. Dressings applied. Restrictions discussed with patient. Verbalized understanding.

## 2023-04-19 NOTE — TRANSFER OF CARE
"Anesthesia Transfer of Care Note    Patient: Fely Wetzel    Procedure(s) Performed: Procedure(s) (LRB):  Ablation atrial fibrillation (N/A)  Cardioversion or Defibrillation    Patient location: PACU    Transport from OR: Transported from OR on 6-10 L/min O2 by face mask with adequate spontaneous ventilation    Post pain: adequate analgesia    Post assessment: no apparent anesthetic complications    Post vital signs: stable    Level of consciousness: sedated    Nausea/Vomiting: no nausea/vomiting    Complications: none    Transfer of care protocol was followed      Last vitals:   Visit Vitals  BP (!) 182/77   Pulse 84   Temp 36.4 °C (97.5 °F) (Temporal)   Resp 16   Ht 5' 2" (1.575 m)   Wt 72.6 kg (160 lb)   SpO2 96%   Breastfeeding No   BMI 29.26 kg/m²     "

## 2023-04-19 NOTE — SUBJECTIVE & OBJECTIVE
Past Medical History:   Diagnosis Date    Colon polyp 11/2014    Diabetes mellitus type II     Hyperlipidemia     Hypertension     Obesity     Osteopenia        Past Surgical History:   Procedure Laterality Date    COLONOSCOPY N/A 3/17/2022    Procedure: COLONOSCOPY;  Surgeon: Дмитрий Middleton MD;  [facility]: [facility]  Service: Endoscopy;  Laterality: N/A;  Covid Positive on 1/31. no further testing needed.Holding Eliquis for 2 days prior to .EC  3/8 new instructions mailed-tb    ESOPHAGOGASTRODUODENOSCOPY N/A 3/17/2022    Procedure: EGD (ESOPHAGOGASTRODUODENOSCOPY);  Surgeon: Дмитрий Middleton MD;  Location: [facility] ([facility]  Service: Endoscopy;  Laterality: N/A;    HYSTERECTOMY         Review of patient's allergies indicates:   Allergen Reactions    Corticosteroids (glucocorticoids)     Latex, natural rubber     Shellfish containing products Other (See Comments)     Other reaction(s): Unknown  Other reaction(s): Anaphylaxis    Vicodin [hydrocodone-acetaminophen]     Vytorin 10-10  [ezetimibe-simvastatin]      Other reaction(s): Joint pain  Other reaction(s): Joint pain       No current facility-administered medications on file prior to encounter.     Current Outpatient Medications on File Prior to Encounter   Medication Sig    amLODIPine (NORVASC) 5 MG tablet Take 1 tablet (5 mg total) by mouth once daily.    atorvastatin (LIPITOR) 40 MG tablet TAKE 1 TABLET BY MOUTH EVERY DAY IN THE EVENING    cyanocobalamin 500 MCG tablet Take 500 mcg by mouth once daily.    ELIQUIS 5 mg Tab TAKE 1 TABLET BY MOUTH TWICE A DAY    fenofibrate 160 MG Tab TAKE 1 TABLET BY MOUTH ONCE DAILY.    hydroCHLOROthiazide (HYDRODIURIL) 25 MG tablet TAKE 1 TABLET (25 MG TOTAL) BY MOUTH ONCE DAILY. WITH BREAKFAST    losartan (COZAAR) 100 MG tablet TAKE 1 TABLET BY MOUTH EVERY DAY    XIGDUO XR 5-1,000 mg TAKE 1 TABLET BY MOUTH TWICE A DAY WITH MEALS    ACCU-CHEK JUAN Misc CHECK FASTING GLUCOSE AND CHECK GLUCOSE TWO HOURS  AFTER LUNCH OR DINNER    blood-glucose meter Misc Check fasting glucose and check glucose two hours after lunch or dinner    dulaglutide (TRULICITY) 3 mg/0.5 mL pen injector Inject 3 mg into the skin every 7 days.    dulaglutide (TRULICITY) 3 mg/0.5 mL pen injector Inject 3 mg into the skin every 7 days.    lancing device with lancets (ONETOUCH DELICA LANC DEVICE) Kit 1 each by Misc.(Non-Drug; Combo Route) route 2 (two) times daily before meals.    metoprolol succinate (TOPROL-XL) 100 MG 24 hr tablet Take 1 tablet (100 mg total) by mouth 2 (two) times daily.    omeprazole (PRILOSEC) 20 MG capsule Take 1 capsule (20 mg total) by mouth once daily.    [DISCONTINUED] diclofenac sodium (VOLTAREN) 1 % Gel Apply 2 g topically 4 (four) times daily.     Family History       Problem Relation (Age of Onset)    Cancer Father    Cataracts Brother    Diabetes Mother    Glaucoma Mother    Heart attack Father    Heart disease Father, Brother    Hyperlipidemia Mother    Hypertension Mother          Tobacco Use    Smoking status: Never     Passive exposure: Never    Smokeless tobacco: Never   Substance and Sexual Activity    Alcohol use: Not Currently    Drug use: Never    Sexual activity: Never     Partners: Male     Review of Systems   Constitutional: Negative for fever and malaise/fatigue.   HENT:  Negative for congestion and sore throat.    Eyes:  Negative for blurred vision, vision loss in left eye and vision loss in right eye.   Cardiovascular:  Negative for chest pain, dyspnea on exertion, irregular heartbeat, leg swelling, near-syncope, palpitations and syncope.   Respiratory:  Negative for shortness of breath and wheezing.    Endocrine: Negative.    Hematologic/Lymphatic: Negative.    Skin: Negative.    Musculoskeletal:  Negative for arthritis, back pain, joint pain and myalgias.   Gastrointestinal:  Negative for abdominal pain, hematemesis, hematochezia and melena.   Genitourinary: Negative.    Neurological:  Negative for  light-headedness and loss of balance.   Psychiatric/Behavioral: Negative.     Objective:     Vital Signs (Most Recent):  Temp: 97.5 °F (36.4 °C) (04/19/23 0545)  Pulse: 84 (04/19/23 0545)  Resp: 16 (04/19/23 0545)  BP: (!) 182/77 (04/19/23 0604)  SpO2: 96 % (04/19/23 0545)   Vital Signs (24h Range):  Temp:  [97.5 °F (36.4 °C)] 97.5 °F (36.4 °C)  Pulse:  [84] 84  Resp:  [16] 16  SpO2:  [96 %] 96 %  BP: (179-182)/(73-77) 182/77       Weight: 72.6 kg (160 lb)  Body mass index is 29.26 kg/m².    SpO2: 96 %       Physical Exam  Vitals and nursing note reviewed.   Constitutional:       Appearance: Normal appearance. She is normal weight.   HENT:      Head: Atraumatic.      Mouth/Throat:      Mouth: Mucous membranes are moist.   Eyes:      General: No scleral icterus.     Extraocular Movements: Extraocular movements intact.   Neck:      Vascular: No carotid bruit.   Cardiovascular:      Rate and Rhythm: Normal rate and regular rhythm.      Pulses: Normal pulses.      Heart sounds: Normal heart sounds. No murmur heard.    No gallop.   Pulmonary:      Effort: Pulmonary effort is normal. No respiratory distress.      Breath sounds: Normal breath sounds. No wheezing.   Abdominal:      General: Abdomen is flat. Bowel sounds are normal.      Palpations: Abdomen is soft.   Musculoskeletal:         General: Normal range of motion.      Right lower leg: No edema.      Left lower leg: No edema.   Skin:     General: Skin is warm and dry.      Capillary Refill: Capillary refill takes less than 2 seconds.   Neurological:      General: No focal deficit present.      Mental Status: She is alert and oriented to person, place, and time. Mental status is at baseline.       Significant Labs: All pertinent lab results from the last 24 hours have been reviewed.    Significant Imaging: Echocardiogram: Transthoracic echo (TTE) complete (C[facility] Only):   Results for orders placed or performed during the hospital encounter of 10/15/19   Echo Color  Flow Doppler? Yes   Result Value Ref Range    Ascending aorta 3.18 cm    STJ 2.78 cm    IVRT 0.09 msec    IVS 0.74 0.6 - 1.1 cm    LA size 4.20 cm    Left Atrium Major Axis 5.60 cm    Left Atrium Minor Axis 5.48 cm    LVIDd 5.14 3.5 - 6.0 cm    LVIDs 2.51 2.1 - 4.0 cm    LVOT diameter 2.05 cm    LVOT peak VTI 29.67 cm    Posterior Wall 0.72 0.6 - 1.1 cm    MV Peak A Gerardo 0.92 m/s    E wave deceleration time 175.54 msec    MV Peak E Gerardo 0.84 m/s    PV Peak D Gerardo 0.26 m/s    PV Peak S Gerardo 0.62 m/s    RA Major Axis 4.80 cm    RA Width 3.50 cm    RVDD 3.49 cm    Sinus 3.59 cm    TAPSE 3.46 cm    TR Max Gerardo 2.75 m/s    TDI LATERAL 0.09 m/s    TDI SEPTAL 0.07 m/s    LA WIDTH 4.44 cm    LV Diastolic Volume 126.24 mL    LV Systolic Volume 22.51 mL    RV S' 16.35 cm/s    LVOT peak gerardo 1.35 m/s    LV LATERAL E/E' RATIO 9.33 m/s    LV SEPTAL E/E' RATIO 12.00 m/s    FS 51 %    LA volume 87.80 cm3    LV mass 126.81 g    Left Ventricle Relative Wall Thickness 0.28 cm    E/A ratio 0.91     Mean e' 0.08 m/s    Pulm vein S/D ratio 2.38     LVOT area 3.3 cm2    LVOT stroke volume 97.88 cm3    E/E' ratio 10.50 m/s    Triscuspid Valve Regurgitation Peak Gradient 30 mmHg    BSA 1.92 m2    LV Systolic Volume Index 12.1 mL/m2    LV Diastolic Volume Index 68.11 mL/m2    LA Volume Index 47.4 mL/m2    LV Mass Index 68 g/m2    Right Atrial Pressure (from IVC) 3 mmHg    TV rest pulmonary artery pressure 33 mmHg    Narrative    · Normal left ventricular systolic function. The estimated ejection   fraction is 70%  · Normal right ventricular systolic function.  · Indeterminate left ventricular diastolic function.  · Moderate left atrial enlargement.  · The estimated PA systolic pressure is 33 mm Hg  · Normal central venous pressure (3 mm Hg).

## 2023-04-19 NOTE — ANESTHESIA PREPROCEDURE EVALUATION
04/19/2023  Pre-operative evaluation for Procedure(s) (LRB):  Ablation atrial fibrillation (N/A)  Transesophageal echo (GABRIELLA) intra-procedure log documentation (N/A)    Fely Wetzel is a 67 y.o. female with afib here for ablation with PVI.    Patient Active Problem List   Diagnosis    Osteopenia    Vitamin D deficiency    Inflammatory arthritis    Chronic left shoulder pain    Decreased range of motion of left shoulder    PAF (paroxysmal atrial fibrillation)    Type 2 diabetes mellitus, without long-term current use of insulin    Hyperlipidemia associated with type 2 diabetes mellitus    Obesity, Class I, BMI 30-34.9    Right foot pain    H. pylori infection    Hypertension associated with diabetes    Gastroesophageal reflux disease       Review of patient's allergies indicates:   Allergen Reactions    Corticosteroids (glucocorticoids)     Latex, natural rubber     Shellfish containing products Other (See Comments)     Other reaction(s): Unknown  Other reaction(s): Anaphylaxis    Vicodin [hydrocodone-acetaminophen]     Vytorin 10-10  [ezetimibe-simvastatin]      Other reaction(s): Joint pain  Other reaction(s): Joint pain       No current facility-administered medications on file prior to encounter.     Current Outpatient Medications on File Prior to Encounter   Medication Sig Dispense Refill    amLODIPine (NORVASC) 5 MG tablet Take 1 tablet (5 mg total) by mouth once daily. 90 tablet 3    atorvastatin (LIPITOR) 40 MG tablet TAKE 1 TABLET BY MOUTH EVERY DAY IN THE EVENING 30 tablet 11    cyanocobalamin 500 MCG tablet Take 500 mcg by mouth once daily.      ELIQUIS 5 mg Tab TAKE 1 TABLET BY MOUTH TWICE A DAY 60 tablet 11    fenofibrate 160 MG Tab TAKE 1 TABLET BY MOUTH ONCE DAILY. 90 tablet 3    hydroCHLOROthiazide (HYDRODIURIL) 25 MG tablet TAKE 1 TABLET (25 MG TOTAL) BY MOUTH  ONCE DAILY. WITH BREAKFAST 30 tablet 11    losartan (COZAAR) 100 MG tablet TAKE 1 TABLET BY MOUTH EVERY DAY 30 tablet 11    XIGDUO XR 5-1,000 mg TAKE 1 TABLET BY MOUTH TWICE A DAY WITH MEALS 60 tablet 3    ACC[facility]CHEK JUAN [facility] CHECK FASTING GLUCOSE AND CHECK GLUCOSE TWO HOURS AFTER LUNCH OR DINNER 1 each 0    blood-glucose meter Misc Check fasting glucose and check glucose two hours after lunch or dinner 1 each 0    dulaglutide (TRULICITY) 3 mg/0.5 mL pen injector Inject 3 mg into the skin every 7 days. 4 pen 6    dulaglutide (TRULICITY) 3 mg/0.5 mL pen injector Inject 3 mg into the skin every 7 days. 8 pen 0    lancing device with lancets (ONETOUCH DELICA LANC DEVICE) Kit 1 each by Misc.(Non-Drug; Combo Route) route 2 (two) times daily before meals. 100 each 5    metoprolol succinate (TOPROL-XL) 100 MG 24 hr tablet Take 1 tablet (100 mg total) by mouth 2 (two) times daily. 60 tablet 11    omeprazole (PRILOSEC) 20 MG capsule Take 1 capsule (20 mg total) by mouth once daily. 90 capsule 3    [DISCONTINUED] diclofenac sodium (VOLTAREN) 1 % Gel Apply 2 g topically 4 (four) times daily. 100 g 2       Past Surgical History:   Procedure Laterality Date    COLONOSCOPY N/A 3/17/2022    Procedure: COLONOSCOPY;  Surgeon: Дмитрий Middleton MD;  Location: [address]);  Service: Endoscopy;  Laterality: N/A;  Covid Positive on 1/31. no further testing needed.Holding Eliquis for 2 days prior to .EC  3/8 new instructions mailed-tb    ESOPHAGOGASTRODUODENOSCOPY N/A 3/17/2022    Procedure: EGD (ESOPHAGOGASTRODUODENOSCOPY);  Surgeon: Дмитрий Middleton MD;  Location: [facility] ([address] [place] [address]);  Service: Endoscopy;  Laterality: N/A;    HYSTERECTOMY         Social History     Socioeconomic History    Marital status: Single   Occupational History    Occupation: works at a [employer]     Employer: [employer]    Occupation: CNA     Employer: [employer]   Tobacco Use    Smoking status: Never      Passive exposure: Never    Smokeless tobacco: Never   Substance and Sexual Activity    Alcohol use: Not Currently    Drug use: Never    Sexual activity: Never     Partners: Male   Social History Narrative    She does not exercise regularly.               Pre-op Assessment    I have reviewed the Patient Summary Reports.     I have reviewed the Nursing Notes. I have reviewed the NPO Status.      Review of Systems  Anesthesia Hx:  No problems with previous Anesthesia    Cardiovascular:   Hypertension Dysrhythmias    Hepatic/GI:   GERD    Endocrine:   Diabetes        Physical Exam    Airway:  Mouth Opening: Normal  Tongue: Normal    Chest/Lungs:  Normal Respiratory Rate    Heart:  Rhythm: Regular Rhythm        Anesthesia Plan  Type of Anesthesia, risks & benefits discussed:    Anesthesia Type: Gen ETT  Intra-op Monitoring Plan: Standard ASA Monitors and Art Line  Induction:  IV  Informed Consent: Informed consent signed with the Patient and all parties understand the risks and agree with anesthesia plan.  All questions answered.   ASA Score: 3    Ready For Surgery From Anesthesia Perspective.     .

## 2023-04-19 NOTE — PLAN OF CARE
Vs stable, awake and alert,, states headache better, bilat groin dressings CDI.  Friend notified of transfer.

## 2023-04-19 NOTE — ANESTHESIA PROCEDURE NOTES
Intubation    Date/Time: 4/19/2023 8:22 AM  Performed by: Owen Mosqueda CRNA  Authorized by: Ag Mathews MD     Intubation:     Induction:  Intravenous    Intubated:  Postinduction    Mask Ventilation:  Easy mask    Attempts:  1    Attempted By:  CRNA    Method of Intubation:  Direct    Blade:  Sibley 3    Laryngeal View Grade: Grade I - full view of cords      Difficult Airway Encountered?: No      Complications:  None    Airway Device:  Oral endotracheal tube    Airway Device Size:  7.5    Style/Cuff Inflation:  Cuffed (inflated to minimal occlusive pressure)    Secured at:  The lips    Placement Verified By:  Capnometry    Complicating Factors:  None    Findings Post-Intubation:  BS equal bilateral and atraumatic/condition of teeth unchanged

## 2023-04-19 NOTE — NURSING TRANSFER
Nursing Transfer Note      4/19/2023     Reason patient is being transferred: pt released from anesth by amina    Transfer To: [facility]    Transfer via stretcher    Transfer with cardiac monitoring    Transported by LAY Dorado RN    Medicines sent: silvadene    Any special needs or follow-up needed:     Chart send with patient: Yes    Notified: friend    Patient reassessed at: 1300 4/19/2023  Upon arrival to floor: cardiac monitor applied, patient oriented to room, call bell in reach, and bed in lowest position

## 2023-04-19 NOTE — BRIEF OP NOTE
: Dr. Beck Mathias MD  Date of procedure: 4/19/2023  Post-operative Diagnosis: AF    Procedure Performed: Pulmonary vein isolation of all 4 pulmonary veins via radiofrequency ablation.     Description of Procedure: The patient was brought to the EP lab in the fasting state. Prepped and draped in sterile fashion. Safety timeout was performed. Sedation administered by anesthesia staff. Ultrasound guided venous access of the bilateral femoral veins was performed. ICE and CS catheters placed via left femoral vein access. Long sheaths via right femoral venous access. Heparin bolus and continuous infusion per protocol. Two transseptal punctures performed using combination of fluoroscopic and ICE guidance. Map created. RFA to isolate all pulmonary veins. ICE confirmed no significant pericardial effusion.     EBL: <10 mL    Specimens Removed: None  Complications: no immediate    Alek Nancy, PGY5  Cardiovascular Disease  [facility]

## 2023-04-19 NOTE — H&P
[facility] - Short Stay Cardiac Unit  Cardiac Electrophysiology  History and Physical     Admission Date: 4/19/2023  Code Status: No Order   Attending Provider: Beck Mathias MD   Principal Problem:PAF (paroxysmal atrial fibrillation)    Subjective:     Chief Complaint:  AFib     HPI:  Mrs. Wetzel today in our electrophysiology clinic in follow-up for her atrial fibrillation. As you are aware she is a pleasant 66 year-old woman with hypertension, diabetes mellitus type 2, and obesity. She was in her usual state of health until June 2019 when she developed sudden palpitations. She called EMS and reportedly was in AF with ventricular rates up to 170s-220s. She was given IV cardizem and her ventricular rate lowered to 110s-170s. Per ER physician she was in atrial fibrillation however no ECGs document her arrhythmia. She was given 20mg more of cardizem and spontaneously converted to sinus rhythm. TSH was normal. She was discharged on eliquis and metoprolol. She was referred to Dr. Dominguez and discussed with her if there was possibility at some point of stopping anticoagulation. She reports for the past few weeks she has been having episodes of palpitations with radiation into her neck. These are different than when she went to the ER.      An exercise stress echocardiogram noted normal LV function and no ischemic changes. An echocardiogram performed on 10/15/2019 showed normal LV function, normal valves, and moderate LA enlargement ([id number] [id number]).     Our plan was for a 30 day monitor, sleep study and possibly starting flecainide.     Mrs. Wetzel presented 5/2020 for follow-up. A 30 day monitor done in March of 2020 which showed no atrial fibrillation. She presented to the ER in November of 2019 with recurrent symptomatic AF with RVR that was rate controlled with cardizem and spontaneously converted. She notes occasional palpitations at night. She has not yet had a sleep study.    Mrs. Wetzel returned for follow-up  12/2020. She is on flecainide. She continues on metoprolol/eliquis. She notes occasional episodes of chest discomfort at rest, not with activity. She drinks water and it goes away. She does have indigestion. No symptomatic AF. Recent CBC noted stable H/H.     Mrs. Wetzel returned for follow-up 7/2021. She was doing well from atrial fibrillation standpoint  without any reports of symptomatic AF. She reported chest discomfort that occurs after eating in the evening and was suspected to be due to dyspepsia. Protonix was prescribed at that time.      Mrs. Wetzel presented for follow up 2/2022 and she once again reported that she was doing well. She denied any sustained periods of atrial fibrillation. Her non cardiac chest discomfort has persistent in spite of protonix and she is planned for  coloscopy/EGD.  She was still employed and has no limitations with her usual activities.     She presents for PVI with Dr. Mathias.  She has no complaints.  Held her metoprolol and flecainide for 5d, last dose of eliquis yesterday.  Has been NPO      No new subjective & objective note has been filed under this hospital service since the last note was generated.    Assessment and Plan:     * PAF (paroxysmal atrial fibrillation)  Pt presents for PVI with Dr. Mathias.  Last EF normal  - keep NPO  - to EP lab today with Dr. Mathias for PVI        Alek Cruz MD  Cardiac Electrophysiology  [facility] - Short Stay Cardiac Unit

## 2023-04-19 NOTE — HOSPITAL COURSE
Pt was brought to the EP lab.  GABRIELLA deferred as pt in NSR.  Bilateral CFV access obtained with U/S guidance.  Pt underwent successful PVI without any complications.  Pt tolerated procedure and recovered without any issues.  Groin sites re-evaluated and successful hemostasis achieved without hematoma.  She will continue on her flecainide, metoprolol, and eliquis.  Continue protonix 40mg qD for 30d along with carafate PO x 30d.  Once completed, ok to transition back to omeprazole.  She also was given a Rx for lasix 20mg PRN weight gain/LE edema.  Pt subsequently discharged home.

## 2023-04-19 NOTE — H&P
[facility] - Short Stay Cardiac Unit  Cardiac Electrophysiology  History and Physical     Admission Date: 4/19/2023  Code Status: No Order   Attending Provider: Beck Mathias MD   Principal Problem:PAF (paroxysmal atrial fibrillation)    Subjective:     Chief Complaint:  afib     HPI:  Mrs. Wetzel today in our electrophysiology clinic in follow-up for her atrial fibrillation. As you are aware she is a pleasant 66 year-old woman with hypertension, diabetes mellitus type 2, and obesity. She was in her usual state of health until June 2019 when she developed sudden palpitations. She called EMS and reportedly was in AF with ventricular rates up to 170s-220s. She was given IV cardizem and her ventricular rate lowered to 110s-170s. Per ER physician she was in atrial fibrillation however no ECGs document her arrhythmia. She was given 20mg more of cardizem and spontaneously converted to sinus rhythm. TSH was normal. She was discharged on eliquis and metoprolol. She was referred to Dr. Dominguez and discussed with her if there was possibility at some point of stopping anticoagulation. She reports for the past few weeks she has been having episodes of palpitations with radiation into her neck. These are different than when she went to the ER.      An exercise stress echocardiogram noted normal LV function and no ischemic changes. An echocardiogram performed on 10/15/2019 showed normal LV function, normal valves, and moderate LA enlargement (MURIEL 47).     Our plan was for a 30 day monitor, sleep study and possibly starting flecainide.     Mrs. Wetzel presented 5/2020 for follow-up. A 30 day monitor done in March of 2020 which showed no atrial fibrillation. She presented to the ER in November of 2019 with recurrent symptomatic AF with RVR that was rate controlled with cardizem and spontaneously converted. She notes occasional palpitations at night. She has not yet had a sleep study.    Mrs. Wetzel returned for follow-up  12/2020. She is on flecainide. She continues on metoprolol/eliquis. She notes occasional episodes of chest discomfort at rest, not with activity. She drinks water and it goes away. She does have indigestion. No symptomatic AF. Recent CBC noted stable H/H.     Mrs. Wetzel returned for follow-up 7/2021. She was doing well from atrial fibrillation standpoint  without any reports of symptomatic AF. She reported chest discomfort that occurs after eating in the evening and was suspected to be due to dyspepsia. Protonix was prescribed at that time.      Mrs. Wetzel presented for follow up 2/2022 and she once again reported that she was doing well. She denied any sustained periods of atrial fibrillation. Her non cardiac chest discomfort has persistent in spite of protonix and she is planned for  coloscopy/EGD.  She was still employed and has no limitations with her usual activities.     She presents for PVI with Dr. Mathias.  She has no complaints.  Held her metoprolol and flecainide for 5d, last dose of eliquis yesterday.  Has been NPO      Past Medical History:   Diagnosis Date    Colon polyp 11/2014    Diabetes mellitus type II     Hyperlipidemia     Hypertension     Obesity     Osteopenia        Past Surgical History:   Procedure Laterality Date    COLONOSCOPY N/A 3/17/2022    Procedure: COLONOSCOPY;  Surgeon: Дмитрий Middleton MD;  Location: [facility] ([address] [place] [address]);  Service: Endoscopy;  Laterality: N/A;  Covid Positive on 1/31. no further testing needed.Holding Eliquis for 2 days prior to .EC  3/8 new instructions mailed-tb    ESOPHAGOGASTRODUODENOSCOPY N/A 3/17/2022    Procedure: EGD (ESOPHAGOGASTRODUODENOSCOPY);  Surgeon: Дмитрий Middleton MD;  [facility]  Service: Endoscopy;  Laterality: N/A;    HYSTERECTOMY         Review of patient's allergies indicates:   Allergen Reactions    Corticosteroids (glucocorticoids)     Latex, natural rubber     Shellfish containing products Other  (See Comments)     Other reaction(s): Unknown  Other reaction(s): Anaphylaxis    Vicodin [hydrocodone-acetaminophen]     Vytorin 10-10  [ezetimibe-simvastatin]      Other reaction(s): Joint pain  Other reaction(s): Joint pain       No current facility-administered medications on file prior to encounter.     Current Outpatient Medications on File Prior to Encounter   Medication Sig    amLODIPine (NORVASC) 5 MG tablet Take 1 tablet (5 mg total) by mouth once daily.    atorvastatin (LIPITOR) 40 MG tablet TAKE 1 TABLET BY MOUTH EVERY DAY IN THE EVENING    cyanocobalamin 500 MCG tablet Take 500 mcg by mouth once daily.    ELIQUIS 5 mg Tab TAKE 1 TABLET BY MOUTH TWICE A DAY    fenofibrate 160 MG Tab TAKE 1 TABLET BY MOUTH ONCE DAILY.    hydroCHLOROthiazide (HYDRODIURIL) 25 MG tablet TAKE 1 TABLET (25 MG TOTAL) BY MOUTH ONCE DAILY. WITH BREAKFAST    losartan (COZAAR) 100 MG tablet TAKE 1 TABLET BY MOUTH EVERY DAY    XIGDUO XR 5-1,000 mg TAKE 1 TABLET BY MOUTH TWICE A DAY WITH MEALS    [facility] CHECK FASTING GLUCOSE AND CHECK GLUCOSE TWO HOURS AFTER LUNCH OR DINNER    blood-glucose meter Misc Check fasting glucose and check glucose two hours after lunch or dinner    dulaglutide (TRULICITY) 3 mg/0.5 mL pen injector Inject 3 mg into the skin every 7 days.    dulaglutide (TRULICITY) 3 mg/0.5 mL pen injector Inject 3 mg into the skin every 7 days.    lancing device with lancets (ONETOUCH DELICA LANC DEVICE) Kit 1 each by Misc.(Non-Drug; Combo Route) route 2 (two) times daily before meals.    metoprolol succinate (TOPROL-XL) 100 MG 24 hr tablet Take 1 tablet (100 mg total) by mouth 2 (two) times daily.    omeprazole (PRILOSEC) 20 MG capsule Take 1 capsule (20 mg total) by mouth once daily.    [DISCONTINUED] diclofenac sodium (VOLTAREN) 1 % Gel Apply 2 g topically 4 (four) times daily.     Family History       Problem Relation (Age of Onset)    Cancer Father    Cataracts Brother    Diabetes Mother     Glaucoma Mother    Heart attack Father    Heart disease Father, Brother    Hyperlipidemia Mother    Hypertension Mother          Tobacco Use    Smoking status: Never     Passive exposure: Never    Smokeless tobacco: Never   Substance and Sexual Activity    Alcohol use: Not Currently    Drug use: Never    Sexual activity: Never     Partners: Male     Review of Systems   Constitutional: Negative for fever and malaise/fatigue.   HENT:  Negative for congestion and sore throat.    Eyes:  Negative for blurred vision, vision loss in left eye and vision loss in right eye.   Cardiovascular:  Negative for chest pain, dyspnea on exertion, irregular heartbeat, leg swelling, near-syncope, palpitations and syncope.   Respiratory:  Negative for shortness of breath and wheezing.    Endocrine: Negative.    Hematologic/Lymphatic: Negative.    Skin: Negative.    Musculoskeletal:  Negative for arthritis, back pain, joint pain and myalgias.   Gastrointestinal:  Negative for abdominal pain, hematemesis, hematochezia and melena.   Genitourinary: Negative.    Neurological:  Negative for light-headedness and loss of balance.   Psychiatric/Behavioral: Negative.     Objective:     Vital Signs (Most Recent):  Temp: 97.5 °F (36.4 °C) (04/19/23 0545)  Pulse: 84 (04/19/23 0545)  Resp: 16 (04/19/23 0545)  BP: (!) 182/77 (04/19/23 0604)  SpO2: 96 % (04/19/23 0545)   Vital Signs (24h Range):  Temp:  [97.5 °F (36.4 °C)] 97.5 °F (36.4 °C)  Pulse:  [84] 84  Resp:  [16] 16  SpO2:  [96 %] 96 %  BP: (179-182)/(73-77) 182/77       Weight: 72.6 kg (160 lb)  Body mass index is 29.26 kg/m².    SpO2: 96 %       Physical Exam  Vitals and nursing note reviewed.   Constitutional:       Appearance: Normal appearance. She is normal weight.   HENT:      Head: Atraumatic.      Mouth/Throat:      Mouth: Mucous membranes are moist.   Eyes:      General: No scleral icterus.     Extraocular Movements: Extraocular movements intact.   Neck:      Vascular: No carotid  bruit.   Cardiovascular:      Rate and Rhythm: Normal rate and regular rhythm.      Pulses: Normal pulses.      Heart sounds: Normal heart sounds. No murmur heard.    No gallop.   Pulmonary:      Effort: Pulmonary effort is normal. No respiratory distress.      Breath sounds: Normal breath sounds. No wheezing.   Abdominal:      General: Abdomen is flat. Bowel sounds are normal.      Palpations: Abdomen is soft.   Musculoskeletal:         General: Normal range of motion.      Right lower leg: No edema.      Left lower leg: No edema.   Skin:     General: Skin is warm and dry.      Capillary Refill: Capillary refill takes less than 2 seconds.   Neurological:      General: No focal deficit present.      Mental Status: She is alert and oriented to person, place, and time. Mental status is at baseline.       Significant Labs: All pertinent lab results from the last 24 hours have been reviewed.    Significant Imaging: Echocardiogram: Transthoracic echo (TTE) complete (Cupid Only):   Results for orders placed or performed during the hospital encounter of 10/15/19   Echo Color Flow Doppler? Yes   Result Value Ref Range    Ascending aorta 3.18 cm    STJ 2.78 cm    IVRT 0.09 msec    IVS 0.74 0.6 - 1.1 cm    LA size 4.20 cm    Left Atrium Major Axis 5.60 cm    Left Atrium Minor Axis 5.48 cm    LVIDd 5.14 3.5 - 6.0 cm    LVIDs 2.51 2.1 - 4.0 cm    LVOT diameter 2.05 cm    LVOT peak VTI 29.67 cm    Posterior Wall 0.72 0.6 - 1.1 cm    MV Peak A Gerardo 0.92 m/s    E wave deceleration time 175.54 msec    MV Peak E Gerardo 0.84 m/s    PV Peak D Gerardo 0.26 m/s    PV Peak S Gerardo 0.62 m/s    RA Major Axis 4.80 cm    RA Width 3.50 cm    RVDD 3.49 cm    Sinus 3.59 cm    TAPSE 3.46 cm    TR Max Gerardo 2.75 m/s    TDI LATERAL 0.09 m/s    TDI SEPTAL 0.07 m/s    LA WIDTH 4.44 cm    LV Diastolic Volume 126.24 mL    LV Systolic Volume 22.51 mL    RV S' 16.35 cm/s    LVOT peak gerardo 1.35 m/s    LV LATERAL E/E' RATIO 9.33 m/s    LV SEPTAL E/E' RATIO 12.00 m/s     FS 51 %    LA volume 87.80 cm3    LV mass 126.81 g    Left Ventricle Relative Wall Thickness 0.28 cm    E/A ratio 0.91     Mean e' 0.08 m/s    Pulm vein S/D ratio 2.38     LVOT area 3.3 cm2    LVOT stroke volume 97.88 cm3    E/E' ratio 10.50 m/s    Triscuspid Valve Regurgitation Peak Gradient 30 mmHg    BSA 1.92 m2    LV Systolic Volume Index 12.1 mL/m2    LV Diastolic Volume Index 68.11 mL/m2    LA Volume Index 47.4 mL/m2    LV Mass Index 68 g/m2    Right Atrial Pressure (from IVC) 3 mmHg    TV rest pulmonary artery pressure 33 mmHg    Narrative    · Normal left ventricular systolic function. The estimated ejection   fraction is 70%  · Normal right ventricular systolic function.  · Indeterminate left ventricular diastolic function.  · Moderate left atrial enlargement.  · The estimated PA systolic pressure is 33 mm Hg  · Normal central venous pressure (3 mm Hg).        Assessment and Plan:     * PAF (paroxysmal atrial fibrillation)  Pt presents for PVI with Dr. Mathias.  Last EF normal  - keep NPO  - to EP lab today with Dr. Mathias for PVI        Alek Cruz MD  Cardiac Electrophysiology  [facility] - Short Stay Cardiac Unit

## 2023-04-19 NOTE — HPI
Mrs. Wetzel today in our electrophysiology clinic in follow-up for her atrial fibrillation. As you are aware she is a pleasant 66 year-old woman with hypertension, diabetes mellitus type 2, and obesity. She was in her usual state of health until June 2019 when she developed sudden palpitations. She called EMS and reportedly was in AF with ventricular rates up to 170s-220s. She was given IV cardizem and her ventricular rate lowered to 110s-170s. Per ER physician she was in atrial fibrillation however no ECGs document her arrhythmia. She was given 20mg more of cardizem and spontaneously converted to sinus rhythm. TSH was normal. She was discharged on eliquis and metoprolol. She was referred to Dr. Dominguez and discussed with her if there was possibility at some point of stopping anticoagulation. She reports for the past few weeks she has been having episodes of palpitations with radiation into her neck. These are different than when she went to the ER.      An exercise stress echocardiogram noted normal LV function and no ischemic changes. An echocardiogram performed on 10/15/2019 showed normal LV function, normal valves, and moderate LA enlargement (MURIEL 47).     Our plan was for a 30 day monitor, sleep study and possibly starting flecainide.     Mrs. Wetzel presented 5/2020 for follow-up. A 30 day monitor done in March of 2020 which showed no atrial fibrillation. She presented to the ER in November of 2019 with recurrent symptomatic AF with RVR that was rate controlled with cardizem and spontaneously converted. She notes occasional palpitations at night. She has not yet had a sleep study.    Mrs. Wetzel returned for follow-up 12/2020. She is on flecainide. She continues on metoprolol/eliquis. She notes occasional episodes of chest discomfort at rest, not with activity. She drinks water and it goes away. She does have indigestion. No symptomatic AF. Recent CBC noted stable H/H.     Mrs. Wetzel returned for  follow-up 7/2021. She was doing well from atrial fibrillation standpoint  without any reports of symptomatic AF. She reported chest discomfort that occurs after eating in the evening and was suspected to be due to dyspepsia. Protonix was prescribed at that time.      Mrs. Wetzel presented for follow up 2/2022 and she once again reported that she was doing well. She denied any sustained periods of atrial fibrillation. Her non cardiac chest discomfort has persistent in spite of protonix and she is planned for  coloscopy/EGD.  She was still employed and has no limitations with her usual activities.     She presents for PVI with Dr. Mathias.  She has no complaints.  Held her metoprolol and flecainide for 5d, last dose of eliquis yesterday.  Has been NPO

## 2023-04-19 NOTE — LETTER
March 22, 2023    Felyjules Amaya Rashard  [address] [place], [place] [id number]             Patient Instructions  PVI (Pulmonary Vein Isolation) Ablation   GABRIELLA (Transesophageal Echocardiogram)      Personal Assistance  The [facility] pre-services team will be submitting to your medical insurance carrier for authorization. However, sometimes there are financial obligations such as co-pays, out of pocket deductibles, and/or payments required.  It is the patient's responsibility to assure their procedure is financially cleared in advance.  Our expert financial counselors are available to provide patients with assistance for personalized cost estimates.  Please reach out to determine if you have a financial responsibility with regards to your procedures.   Call [phone] to speak with an [facility] financial counselor   Live Chat- accessed through [email or web address] or the Mobile Card patient portal   Email- request a personalized estimate through email at [email or web address]  MyChart messaging- accessed through the Mobile Card patient portal        Pre-Procedure Testing Needed for Ablation Procedure:       4/12/2023 at 8:30 am  - Blood Work  [facility] [place] [facility]  [address] [facility] [place] [address]    · You do NOT need to fast for this blood work.        **Prior to your procedure please check your skin and groin area thoroughly for any signs or symptoms of infection such as rashes, open sores, yeast infection, or heat rashes.  If you have any concerns with your skin you should be evaluated by your Primary Care Provider or an Urgent Care for treatment prior to your procedure. Your procedure will be cancelled if not treated prior to the day of your procedure.**          Important Medication Information for your ABLATION procedure:    · 4/5/2023 - (Two weeks prior to your procedure) -  Stop taking OMEPRAZOLE .  Start taking PROTONIX ( Pantoprazole) 40 mg daily. This medication should be  taken two weeks prior to your procedure and continued for 30 days after your procedure date. After 30 days post procedure , you can stop Protonix (pantoprozole ) and resume you Omeprazole as prescribed.  A prescription for Protonix ( Pantoprazole) has been sent to your preferred pharmacy [id number] - [facility], [place] - [address] AT [facility]    · HOLD (do NOT take)  FLECAINIDE and METOPROLOL 5  days prior to your procedure.  Your last dose should be taken on 4/13/2023.  ·   · HOLD (do NOT take) ELIQUIS and LOSARTAN on the day of your procedure.  Your last dose should be taken on 4/18/2023.    · You may take your other usual morning medications with a sip of water on the day of your procedure.         Day of Ablation Procedure:    4/19/2023  Arrival Time - 5:15 am  ( Arrival times are subject to change. You will be contacted 1-2 days prior to your procedure day to confirm arrival time)      [facility] [address] [place] [address]    Please report to Cardiology Waiting Room on the 3rd floor of the Hospital,    · Do not eat or drink anything after 12 midnight on the night before your procedure. You should be fasting upon arrival to the hospital on the day of your procedure.   · Please follow above important medication instructions.  · Please do not wear makeup, especially mascara, when arriving for your procedure.  · You may be spending the night in the hospital the night of your procedure. This will be determined by your physician on the day of your procedure.  · You will need someone to drive you home from the hospital due to anesthesia (you will be unable to drive for 24 hours).  · If you wear a CPAP or BIPAP machine for sleep apnea, please be sure to bring your machine with you.  · [facility] has moved to an optional mask policy in most areas. If you would like your care team to wear a mask, please don't hesitate to ask.  · All visitors must wait in a socially distant  "manner until a member of the medical team provides an update at the conclusion of the procedure/surgery.  · If you are spending the night, you are allowed to have one support person spend the night with you.   · Your support person should provide the staff with a valid cell phone number so that he or she can receive automated updates.      Directions to Cardiology Waiting Room  If you park in the Parking Garage:  Take elevators to the 2nd floor  Walk up ramp and turn right by Gold Elevators  Take elevator to the 3rd floor  Upon exiting the elevator, turn away from the clinic areas  Walk long kwon around to front of hospital to area with windows overlooking [facility] [address]  Check in at Reception Desk  OR  If family is dropping you off:  Have them drop you off at the front of the Hospital  (Near the ER, where all the flags are hung).  Take the 'E' elevators to the 3rd floor.  Check in at the Reception Desk in the waiting room.      Post Ablation Instructions:     No pushing, pulling, or lifting greater than 5 pounds for ONE week.   No long car rides (greater than 1 hour) for ONE week.  If a long car ride is required, we ask that you stop every hour and walk around for a few minutes.    No driving for 24 hours after procedure due to anesthesia.   Do not soak access site (groin) in water for ONE week (this includes baths, pools, and hot tubs).    Your groin site(s) may be tender and have some bruising.  This is normal.  A small lump less than the size of a quarter or a marble is normal and can last a couple of weeks.   If you notice any swelling, bleeding, or increase in the size of this lump please call us at [phone].    It is OK to go up and down home stairs as needed after ablation procedure.    It is not uncommon to have some breakthrough arrhythmias during the first 90 days after an ablation. We call this the "blanking period". During this time, the scar tissue is maturing and the " inflammation/irritability are settling down. This is not indicative of a failed or unsuccessful procedure. It can and does happen. If it is just short bursts, we will just monitor. If the arrhythmias become prolonged, sometimes we need to make adjustments during this period. This is a common part of the healing process.     Any need to reschedule or cancel procedures, or any questions regarding your procedures should be addressed directly with the Arrhythmia Department Nurses at the following phone number: [phone].

## 2023-04-19 NOTE — Clinical Note
A percutaneous stick to the left groin and right groin was performed. Ultrasound guidance was used to obtain access.

## 2023-04-19 NOTE — ANESTHESIA PROCEDURE NOTES
Arterial    Diagnosis: afib    Patient location during procedure: done in OR    Staffing  Authorizing Provider: Ag Mathews MD  Performing Provider: Ag Mathews MD    Anesthesiologist was present at the time of the procedure.    Preanesthetic Checklist  Completed: patient identified, IV checked, site marked, risks and benefits discussed, surgical consent, monitors and equipment checked, pre-op evaluation, timeout performed and anesthesia consent givenArterial  Skin Prep: chlorhexidine gluconate  Local Infiltration: none  Orientation: left  Location: radial    Catheter Size: 20 G  Catheter placement by Anatomical landmarks. Heme positive aspiration all ports. Insertion Attempts: 1  Assessment  Dressing: secured with tape and tegaderm  Patient: Tolerated well

## 2023-04-20 ENCOUNTER — PATIENT MESSAGE (OUTPATIENT)
Dept: ELECTROPHYSIOLOGY | Facility: CLINIC | Age: 68
End: 2023-04-20
Payer: MEDICARE

## 2023-04-20 ENCOUNTER — PATIENT MESSAGE (OUTPATIENT)
Dept: INTERNAL MEDICINE | Facility: CLINIC | Age: 68
End: 2023-04-20
Payer: MEDICARE

## 2023-04-21 LAB
POC ACTIVATED CLOTTING TIME K: 143 SEC (ref 74–137)
POC ACTIVATED CLOTTING TIME K: 173 SEC (ref 74–137)
POC ACTIVATED CLOTTING TIME K: 251 SEC (ref 74–137)
POC ACTIVATED CLOTTING TIME K: 305 SEC (ref 74–137)
POC ACTIVATED CLOTTING TIME K: 311 SEC (ref 74–137)
SAMPLE: ABNORMAL

## 2023-05-02 NOTE — Clinical Note
05/02/23                            Gracie Castellano  [address]  [place] [address]    To Whom It May Concern:    This is to certify Gracie Castellano was evaluated with Karen Dennison NP on 05/02/23 and can return to regular work on 5/2/23. Thank you for allowing Gracie to leave work to attend appointment today.              Electronically signed by:  Karen Dennison NP  [facility]  [address]  [address] [address]  Dept Phone: [phone]        All sheaths were removed.

## 2023-05-10 ENCOUNTER — PATIENT MESSAGE (OUTPATIENT)
Dept: ELECTROPHYSIOLOGY | Facility: CLINIC | Age: 68
End: 2023-05-10
Payer: MEDICARE

## 2023-05-15 ENCOUNTER — OFFICE VISIT (OUTPATIENT)
Dept: PODIATRY | Facility: CLINIC | Age: 68
End: 2023-05-15
Payer: MEDICARE

## 2023-05-15 VITALS
HEIGHT: 62 IN | BODY MASS INDEX: 30.07 KG/M2 | HEART RATE: 66 BPM | SYSTOLIC BLOOD PRESSURE: 146 MMHG | DIASTOLIC BLOOD PRESSURE: 75 MMHG | WEIGHT: 163.38 LBS

## 2023-05-15 DIAGNOSIS — E11.65 TYPE 2 DIABETES MELLITUS WITH HYPERGLYCEMIA, WITHOUT LONG-TERM CURRENT USE OF INSULIN: Primary | ICD-10-CM

## 2023-05-15 DIAGNOSIS — M21.611 BILATERAL BUNIONS: ICD-10-CM

## 2023-05-15 DIAGNOSIS — M21.612 BILATERAL BUNIONS: ICD-10-CM

## 2023-05-15 PROCEDURE — 3077F SYST BP >= 140 MM HG: CPT | Mod: CPTII,S$GLB,, | Performed by: PODIATRIST

## 2023-05-15 PROCEDURE — 3078F DIAST BP <80 MM HG: CPT | Mod: CPTII,S$GLB,, | Performed by: PODIATRIST

## 2023-05-15 PROCEDURE — 1159F PR MEDICATION LIST DOCUMENTED IN MEDICAL RECORD: ICD-10-PCS | Mod: CPTII,S$GLB,, | Performed by: PODIATRIST

## 2023-05-15 PROCEDURE — 4010F ACE/ARB THERAPY RXD/TAKEN: CPT | Mod: CPTII,S$GLB,, | Performed by: PODIATRIST

## 2023-05-15 PROCEDURE — 99999 PR PBB SHADOW E&M-EST. PATIENT-LVL V: ICD-10-PCS | Mod: PBBFAC,,, | Performed by: PODIATRIST

## 2023-05-15 PROCEDURE — 1159F MED LIST DOCD IN RCRD: CPT | Mod: CPTII,S$GLB,, | Performed by: PODIATRIST

## 2023-05-15 PROCEDURE — 3078F PR MOST RECENT DIASTOLIC BLOOD PRESSURE < 80 MM HG: ICD-10-PCS | Mod: CPTII,S$GLB,, | Performed by: PODIATRIST

## 2023-05-15 PROCEDURE — 3077F PR MOST RECENT SYSTOLIC BLOOD PRESSURE >= 140 MM HG: ICD-10-PCS | Mod: CPTII,S$GLB,, | Performed by: PODIATRIST

## 2023-05-15 PROCEDURE — 1126F AMNT PAIN NOTED NONE PRSNT: CPT | Mod: CPTII,S$GLB,, | Performed by: PODIATRIST

## 2023-05-15 PROCEDURE — 1160F RVW MEDS BY RX/DR IN RCRD: CPT | Mod: CPTII,S$GLB,, | Performed by: PODIATRIST

## 2023-05-15 PROCEDURE — 1126F PR PAIN SEVERITY QUANTIFIED, NO PAIN PRESENT: ICD-10-PCS | Mod: CPTII,S$GLB,, | Performed by: PODIATRIST

## 2023-05-15 PROCEDURE — 3008F PR BODY MASS INDEX (BMI) DOCUMENTED: ICD-10-PCS | Mod: CPTII,S$GLB,, | Performed by: PODIATRIST

## 2023-05-15 PROCEDURE — 99213 PR OFFICE/OUTPT VISIT, EST, LEVL III, 20-29 MIN: ICD-10-PCS | Mod: S$GLB,,, | Performed by: PODIATRIST

## 2023-05-15 PROCEDURE — 99213 OFFICE O/P EST LOW 20 MIN: CPT | Mod: S$GLB,,, | Performed by: PODIATRIST

## 2023-05-15 PROCEDURE — 99999 PR PBB SHADOW E&M-EST. PATIENT-LVL V: CPT | Mod: PBBFAC,,, | Performed by: PODIATRIST

## 2023-05-15 PROCEDURE — 1160F PR REVIEW ALL MEDS BY PRESCRIBER/CLIN PHARMACIST DOCUMENTED: ICD-10-PCS | Mod: CPTII,S$GLB,, | Performed by: PODIATRIST

## 2023-05-15 PROCEDURE — 3051F HG A1C>EQUAL 7.0%<8.0%: CPT | Mod: CPTII,S$GLB,, | Performed by: PODIATRIST

## 2023-05-15 PROCEDURE — 4010F PR ACE/ARB THEARPY RXD/TAKEN: ICD-10-PCS | Mod: CPTII,S$GLB,, | Performed by: PODIATRIST

## 2023-05-15 PROCEDURE — 3060F PR POS MICROALBUMINURIA RESULT DOCUMENTED/REVIEW: ICD-10-PCS | Mod: CPTII,S$GLB,, | Performed by: PODIATRIST

## 2023-05-15 PROCEDURE — 99499 RISK ADDL DX/OHS AUDIT: ICD-10-PCS | Mod: HCNC,S$GLB,, | Performed by: PODIATRIST

## 2023-05-15 PROCEDURE — 99499 UNLISTED E&M SERVICE: CPT | Mod: HCNC,S$GLB,, | Performed by: PODIATRIST

## 2023-05-15 PROCEDURE — 3008F BODY MASS INDEX DOCD: CPT | Mod: CPTII,S$GLB,, | Performed by: PODIATRIST

## 2023-05-15 PROCEDURE — 3060F POS MICROALBUMINURIA REV: CPT | Mod: CPTII,S$GLB,, | Performed by: PODIATRIST

## 2023-05-15 PROCEDURE — 3066F PR DOCUMENTATION OF TREATMENT FOR NEPHROPATHY: ICD-10-PCS | Mod: CPTII,S$GLB,, | Performed by: PODIATRIST

## 2023-05-15 PROCEDURE — 3066F NEPHROPATHY DOC TX: CPT | Mod: CPTII,S$GLB,, | Performed by: PODIATRIST

## 2023-05-15 PROCEDURE — 3051F PR MOST RECENT HEMOGLOBIN A1C LEVEL 7.0 - < 8.0%: ICD-10-PCS | Mod: CPTII,S$GLB,, | Performed by: PODIATRIST

## 2023-05-15 RX ORDER — DICLOFENAC SODIUM 10 MG/G
2 GEL TOPICAL 4 TIMES DAILY
Qty: 100 G | Refills: 2 | Status: SHIPPED | OUTPATIENT
Start: 2023-05-15 | End: 2024-01-08

## 2023-05-15 NOTE — PROGRESS NOTES
[facility]  [address] - PODIATRY [facility]  [address]  [place] [address]  Dept: [phone]  Dept Fax: [phone]    Hugo Koehler Jr., DPANUJA     Assessment:   MDM    Coding  1. Type 2 diabetes mellitus with hyperglycemia, without long-term current use of insulin  Ambulatory referral/consult to Diabetes Education    Foot Exam Performed      2. Bilateral bunions  Ambulatory referral/consult to Podiatry    diclofenac sodium (VOLTAREN) 1 % Gel    X-Ray Foot Complete Bilateral    Foot Exam Performed          Plan:     Procedures  1. Type 2 diabetes mellitus with hyperglycemia, without long-term current use of insulin  -     Ambulatory referral/consult to Diabetes Education; Future; Expected date: 05/22/2023  -     Foot Exam Performed    2. Bilateral bunions  -     Ambulatory referral/consult to Podiatry  -     diclofenac sodium (VOLTAREN) 1 % Gel; Apply 2 g topically 4 (four) times daily. for 10 days  Dispense: 100 g; Refill: 2  -     X-Ray Foot Complete Bilateral; Future; Expected date: 05/15/2023  -     Foot Exam Performed      Fely was seen today for diabetic foot exam.    Diagnoses and all orders for this visit:    Type 2 diabetes mellitus with hyperglycemia, without long-term current use of insulin  -     Ambulatory referral/consult to Diabetes Education; Future  -     Foot Exam Performed    Bilateral bunions  -     Ambulatory referral/consult to Podiatry  -     diclofenac sodium (VOLTAREN) 1 % Gel; Apply 2 g topically 4 (four) times daily. for 10 days  -     X-Ray Foot Complete Bilateral; Future  -     Foot Exam Performed      ADA Risk Classification: No LOPS,No PAD, No deformity - 0: rtc 12 months    -pt seen, evaluated, and managed  -dx discussed in detail. All questions/concerns addressed  -all tx options discussed. All alternatives, risks, benefits of all txs discussed  -comprehensive diabetic foot exam with risk assessment performed today  -the patient was educated about  the diagnosis and discussed reducing caloric intake, increase physical activity  -We discussed conservative care options possible including but not limited to shoe wear and/or padding, bracing/strapping, at home ROM, formal PT, medical therapy, injection therapy  -xr/imaging on way out--> will review at nxt visit  -labs reviewed by me: a1c of 7.7. recs as below  -implemented icing/stretching regimen  -offloading pads dispensed  -Shoe inspection. Diabetic Foot Education. Patient reminded of the importance of good nutrition and blood sugar control to help prevent podiatric complications of diabetes. Patient instructed on proper foot hygeine. We discussed wearing proper shoe gear, daily foot inspections, never walking without protective shoe gear, never putting sharp instruments to feet.  -rxs dispensed: vgel  -referrals: dm education  -WB: wbat    Follow up in about 1 year (around 5/15/2024), or if symptoms worsen or fail to improve.    Subjective:      Patient ID: Fely Wetzel is a 67 y.o. female.    Chief Complaint:   Chief Complaint   Patient presents with    Diabetic Foot Exam     DM last PCP visit on 03/23/2023 Hien Sparks MD       Fely Wetzel presents to the clinic upon referral from Dr. Sparks  for evaluation and treatment of diabetic feet. Patient relates no major problem with feet. Only complaints today consist of needing dm foot exam.      HPI    Last Podiatry Enc: Visit date not found  Last Enc w/ Me: Visit date not found    Outside reports reviewed: historical medical records.  Family hx: as below  Past Medical History:   Diagnosis Date    Colon polyp 11/2014    Diabetes mellitus type II     Hyperlipidemia     Hypertension     Obesity     Osteopenia      Past Surgical History:   Procedure Laterality Date    ABLATION OF ARRHYTHMOGENIC FOCUS FOR ATRIAL FIBRILLATION N/A 4/19/2023    Procedure: Ablation atrial fibrillation;  Surgeon: Beck Mathias MD;  Location: [facility] EP LAB;   Service: Cardiology;  Laterality: N/A;  AF, GABRIELLA (Cx if SR), PVI, RFA, Carto, Gen, NH, 3 Prep    COLONOSCOPY N/A 3/17/2022    Procedure: COLONOSCOPY;  Surgeon: Дмитрий Middleton MD;  Location: [facility] ENDO (4TH FLR);  Service: Endoscopy;  Laterality: N/A;  Covid Positive on 1/31. no further testing needed.Holding Eliquis for 2 days prior to .EC  3/8 new instructions mailed-tb    ESOPHAGOGASTRODUODENOSCOPY N/A 3/17/2022    Procedure: EGD (ESOPHAGOGASTRODUODENOSCOPY);  Surgeon: Дмитрий Middleton MD;  Location: [facility] ENDO (4TH FLR);  Service: Endoscopy;  Laterality: N/A;    HYSTERECTOMY      TREATMENT OF CARDIAC ARRHYTHMIA  4/19/2023    Procedure: Cardioversion or Defibrillation;  Surgeon: Beck Mathias MD;  Location: [facility] EP LAB;  Service: Cardiology;;     Family History   Problem Relation Age of Onset    Diabetes Mother     Glaucoma Mother     Hyperlipidemia Mother     Hypertension Mother     Heart disease Father     Cancer Father         prostate cancer    Heart attack Father     Heart disease Brother     Cataracts Brother     Heart failure Neg Hx     Stroke Neg Hx      Current Outpatient Medications   Medication Sig Dispense Refill    ACCU-CHEK JUAN Misc CHECK FASTING GLUCOSE AND CHECK GLUCOSE TWO HOURS AFTER LUNCH OR DINNER 1 each 0    amLODIPine (NORVASC) 5 MG tablet Take 1 tablet (5 mg total) by mouth once daily. 90 tablet 3    atorvastatin (LIPITOR) 40 MG tablet TAKE 1 TABLET BY MOUTH EVERY DAY IN THE EVENING 30 tablet 11    blood-glucose meter Misc Check fasting glucose and check glucose two hours after lunch or dinner 1 each 0    cholecalciferol, vitamin D3, 1,250 mcg (50,000 unit) capsule TAKE 1 CAPSULE BY MOUTH ONE TIME PER WEEK 12 capsule 1    cyanocobalamin 500 MCG tablet Take 500 mcg by mouth once daily.      dulaglutide (TRULICITY) 3 mg/0.5 mL pen injector Inject 3 mg into the skin every 7 days. 4 pen 6    dulaglutide (TRULICITY) 3 mg/0.5 mL pen injector Inject 3 mg into the skin every 7 days. 8 pen 0     ELIQUIS 5 mg Tab TAKE 1 TABLET BY MOUTH TWICE A DAY 60 tablet 11    empagliflozin-metformin (SYNJARDY XR) 10-1,000 mg TBph Take 1 tablet by mouth 2 (two) times a day. 60 tablet 3    fenofibrate 160 MG Tab TAKE 1 TABLET BY MOUTH ONCE DAILY. 90 tablet 3    flecainide (TAMBOCOR) 150 MG Tab Take 150 mg by mouth every 12 (twelve) hours.      hydroCHLOROthiazide (HYDRODIURIL) 25 MG tablet TAKE 1 TABLET (25 MG TOTAL) BY MOUTH ONCE DAILY. WITH BREAKFAST 30 tablet 11    losartan (COZAAR) 100 MG tablet TAKE 1 TABLET BY MOUTH EVERY DAY 30 tablet 11    metoprolol succinate (TOPROL-XL) 100 MG 24 hr tablet Take 1 tablet (100 mg total) by mouth 2 (two) times daily. 60 tablet 11    pantoprazole (PROTONIX) 40 MG tablet Take 1 tablet (40 mg total) by mouth once daily. Start taking this medication on 4/5/2023. 90 tablet 0    sucralfate (CARAFATE) 1 gram tablet Take 1 tablet (1 g total) by mouth 4 (four) times daily. 120 tablet 0    TRUE METRIX GLUCOSE TEST STRIP Strp USE 1 TEST STRIP TWICE DAILY TO MEASURE GLUCOSE 100 strip 3    diclofenac sodium (VOLTAREN) 1 % Gel Apply 2 g topically 4 (four) times daily. for 10 days 100 g 2    furosemide (LASIX) 20 MG tablet Take 1 tablet (20 mg total) by mouth as needed (3-5 pound weight gain or lower extremity edema, please take 1 pill by mouth for 3 days.). 10 tablet 0    lancing device with lancets (ONETOUCH DELICA LANC DEVICE) Kit 1 each by Misc.(Non-Drug; Combo Route) route 2 (two) times daily before meals. 100 each 5     No current facility-administered medications for this visit.     Review of patient's allergies indicates:   Allergen Reactions    Corticosteroids (glucocorticoids)     Latex, natural rubber     Shellfish containing products Other (See Comments)     Other reaction(s): Unknown  Other reaction(s): Anaphylaxis    Vicodin [hydrocodone-acetaminophen]     Vytorin 10-10  [ezetimibe-simvastatin]      Other reaction(s): Joint pain  Other reaction(s): Joint pain     Social History  "    Socioeconomic History    Marital status: Single   Occupational History    Occupation: works at [employer]     Employer: [employer]    Occupation: CNA     Employer: [employer]   Tobacco Use    Smoking status: Never     Passive exposure: Never    Smokeless tobacco: Never   Substance and Sexual Activity    Alcohol use: Not Currently    Drug use: Never    Sexual activity: Never     Partners: Male   Social History Narrative    She does not exercise regularly.       ROS    REVIEW OF SYSTEMS: Negative as documented below as well as positive findings in bold.       Constitutional  Respiratory  Gastrointestinal  Skin   - Fever - Cough - Heartburn - Rash   - Chills - Spit blood - Nausea - Itching   - Weight Loss - Shortness of breath - Vomiting - Nail pain   - Malaise/Fatigue - Wheezing - Abdominal Pain  Wound/Ulcer   - Weight Gain   - Blood in Stool  Poor wound healing       - Diarrhea          Cardiovascular  Genitourinary  Neurological  HEENT   - Chest Pain - Dysuria - Burning Sensation of feet - Headache   - Palpitations - Hematuria - Tingling / Paresthesia - Congestion   - Pain at night in legs - Flank Pain - Dizziness - Sore Throat   - Cramping   - Tremor - Blurred Vision   - Leg Swelling   - Sensory Change - Double Vision   - Dizzy when standing   - Speech Change - Eye Redness       - Focal Weakness - Dry Eyes       - Loss of Consciousness          Endocrine  Musculoskeletal  Psychiatric   - Cold intolerance - Muscle Pain - Depression   - Heat intolerance - Neck Pain - Insomnia   - Anemia - Joint Pain - Memory Loss   -  Easy bruising, bleeding - Heel pain - Anxiety      Toe Pain        Leg/Ankle/Foot Pain         Objective:     BP (!) 146/75 (BP Location: Left arm, Patient Position: Sitting)   Pulse 66   Ht 5' 2" (1.575 m)   Wt 74.1 kg (163 lb 5.8 oz)   BMI 29.88 kg/m²   Vitals:    05/15/23 1002   BP: (!) 146/75   Pulse: 66   Weight: 74.1 kg (163 lb 5.8 oz)   Height: 5' 2" (1.575 " m)   PainSc: 0-No pain       Physical Exam    General Appearance:   Patient appears well developed, well nourished  Patient appears stated age    Psychiatric:   Patient is oriented to time, place, and person.  Patient has appropriate mood and affect    Neck:  Trachea Midline  No visible masses    Respiratory/Ears:  No distress or labored breathing.  Able to differentiate between normal talking voice and whisper.  Able to follow commands    Eyes:  Visual Acuity intact  Lids and conjunctivae normal. No discoloration noted.    Physical Exam  Vitals and nursing note reviewed.   Musculoskeletal:      Right foot: Bunion present.      Left foot: Bunion present.   Feet:      Right foot:      Protective Sensation: 10 sites tested.  10 sites sensed.      Left foot:      Protective Sensation: 10 sites tested.  10 sites sensed.   Neurological:      Mental Status: She is alert.   Psychiatric:         Behavior: Behavior is cooperative.         Thought Content: Thought content normal.         Judgment: Judgment normal.     Ortho Exam  General    Nursing note and vitals reviewed.  Constitutional: She is cooperative.   Neurological: She is alert.   Psychiatric: Judgment and thought content normal.           Foot Exam    Right Foot/Ankle     Inspection and Palpation  Hallux valgus: yes      Left Foot/Ankle      Inspection and Palpation  Hallux valgus: yes      Foot/Ankle Musculoskeletal Exam    General    Neurological: alert    B/l LE exam con't:  V:  DP 2/4, PT 2/4   CRT< 3s to all digits tested   Tibial and popliteal lymph nodes are w/o abnormality    edema absent bilaterally, varicosities present bilaterally    N:  Patient displays normal ankle reflexes   SILT in SP/DP/T/Tomer/Saph distributions    Ortho: +Motor EHL/FHL/TA/GA   +TTP b/l 1st MTPJ   Compartments soft/compressible. No pain on passive stretch of big toe. No calf  Pain.   observed lateral deviation of the hallux with bunion deformity present b/l    Derm:  skin intact,  skin warm and dry, skin without ulcers or lesions, skin without induration, nails normal, no ecchymosis    Imaging / Labs:    Hemoglobin A1C   Date Value Ref Range Status   03/16/2023 7.7 (H) 4.0 - 5.6 % Final     Comment:     ADA Screening Guidelines:  5.7-6.4%  Consistent with prediabetes  >or=6.5%  Consistent with diabetes    High levels of fetal hemoglobin interfere with the HbA1C  assay. Heterozygous hemoglobin variants (HbS, HgC, etc)do  not significantly interfere with this assay.   However, presence of multiple variants may affect accuracy.     09/29/2022 7.4 (H) 4.0 - 5.6 % Final     Comment:     ADA Screening Guidelines:  5.7-6.4%  Consistent with prediabetes  >or=6.5%  Consistent with diabetes    High levels of fetal hemoglobin interfere with the HbA1C  assay. Heterozygous hemoglobin variants (HbS, HgC, etc)do  not significantly interfere with this assay.   However, presence of multiple variants may affect accuracy.     04/20/2022 7.1 (H) 4.0 - 5.6 % Final     Comment:     ADA Screening Guidelines:  5.7-6.4%  Consistent with prediabetes  >or=6.5%  Consistent with diabetes    High levels of fetal hemoglobin interfere with the HbA1C  assay. Heterozygous hemoglobin variants (HbS, HgC, etc)do  not significantly interfere with this assay.   However, presence of multiple variants may affect accuracy.             Note: This was dictated using a computer transcription program. Although proofread, it may contain computer transcription errors and phonetic errors. Other human proofreading errors may also exist. Corrections may be performed at a later time. Please contact us for any clarification if needed.    Hugo Koehler,  SNOW  [facility]

## 2023-05-15 NOTE — PATIENT INSTRUCTIONS
Diabetes: Inspecting Your Feet      Diabetes increases your chances of developing foot problems. So inspect your feet every day. This helps you find small skin irritations before they become serious ulcers or infections. If you have trouble seeing the bottoms of your feet, use a mirror or ask a family member or friend to help.  How to check your feet  Below are tips to help you look for foot problems. Try to check your feet at the same time each day, such as when you get out of bed in the morning:  Check the top of each foot. The tops of toes, back of the heel, and outer edge of the foot can get a lot of rubbing from poor-fitting shoes.  Check the bottom of each foot. Daily wear and tear often leads to problems at pressure spots.  Check the toes and nails. Fungal infections often occur between toes. Toenail problems can also be a sign of fungal infections or lead to breaks in the skin.  Check your shoes, too. Loose objects inside a shoe can injure the foot. Use your hand to feel inside your shoes for things like kamran, loose stitching, or rough areas that could irritate your skin.  Warning signs  Look for any color changes in the foot. Redness with streaks can signal a severe infection, which needs immediate medical attention. Tell your healthcare provider right away if you have any of these problems:  Swelling, sometimes with color changes, may be a sign of poor blood flow or infection. Symptoms include tenderness and an increase in the size of your foot.  Warm or hot areas on your feet may be signs of infection. A foot that is cold may not be getting enough blood.  Sensations such as burning, tingling, or pins and needles can be signs of a problem. Also check for areas that may be numb.  Hot spots are caused by friction or pressure. Look for hot spots in areas that get a lot of rubbing. Hot spots can turn into blisters, calluses, or sores.  Cracks and sores are caused by dry or irritated skin. They are a sign  that the skin is breaking down, which can lead to infection.  Toenail problems to watch for include nails growing into the skin (ingrown toenail) and causing redness or pain. Thick, yellow, or discolored nails can signal a fungal infection.  Drainage and odor can develop from untreated sores and ulcers. Call your healthcare provider right away if you notice white or yellow drainage, bleeding, or unpleasant odor.   Date Last Reviewed: 6/1/2016 © 2000-2017 [employer]. [address] [place] [id number]. All rights reserved. This information is not intended as a substitute for professional medical care. Always follow your healthcare professional's instructions.    Long-Term Complications of Diabetes    Diabetes can cause health problems over time. These are called complications. They are more likely to happen if your blood sugar is often too high. Over time, high blood sugar can damage blood vessels in your body. It is important to keep your blood sugar in your target range. This can help prevent or delay complications from diabetes.  Possible complications  Complications of diabetes include:    Eye problems, including damage to the blood vessels in the eyes (retinopathy), pressure in the eye (glaucoma), and clouding of the eye's lens (a cataract). Eye problems can eventually lead to irreversible blindness.   Tooth and gum problems (periodontal disease), causing loss of teeth and bone  Blood vessel (vascular) disease leading to circulation problems, heart attack or stroke, or a need for amputation of a limb   Problems with sexual function leading to erectile dysfunction in men and sexual discomfort in women   Kidney disease (nephropathy) can eventually lead to kidney failure, which may require dialysis or kidney transplant   Nerve problems (neuropathy), causing pain or loss of feeling in your feet and other parts of your body, potentially leading to an amputation of a limb   High blood pressure  (hypertension), putting strain on your heart and blood vessels  Serious infections, possibly leading to loss of toes, feet, or limbs    How to avoid complications  The serious consequences of these complications may be avoidable for most people with diabetes by managing your blood glucose, blood pressure, and cholesterol levels. This can help you feel better and stay healthy. You can manage diabetes by tracking your blood sugar. You can also eat healthy and exercise to avoid gaining weight. And you should take medicine if directed by your healthcare provider.     Obesity and Your Feet    Obesity is an ever-increasing problem in [place] society. Currently, up to one third of the [place] population is considered obese, defined as a body mass index greater than 30. Although it seems obvious, many studies have found a direct link between increased BMI and foot problems. Not only is there an increased risk of wear-and-tear problems (such as arthritis, tendonitis and heel pain), but also an increased risk of developing type II diabetes. As little as one pound above your ideal weight can increase pressure in your hips, knees and ankles by as much as eight pounds. Simply walking up a flight of stairs or up an incline can increase pressure on the ankle by four to six times. Weight control can be an essential component to alleviate foot pain.    Your foot and ankle surgeon can be your biggest advocate in losing weight. A surgical procedure, such as gastric banding or gastric bypass, may be desired to help obesity. Often, the surgeon will require a large amount of weight loss (40 to 100+ lbs) before surgery is performed. In addition to diet modification, exercise, such as walking, is encouraged. This can be difficult with foot pain. A foot and ankle surgeon can advise on shoe selection, stretching and even orthotics to keep you walking and help you reach your goals.    Eating the Right Number of Calories (6616-0635  Guidelines)  Calories are a measure of the energy you get from food. If you eat more calories than you use, you will gain weight. If you eat fewer calories than you use, you will lose weight. Below are tables that give the number of calories needed each day. Look for your gender, age, and activity level. If you stick to this number, you should neither gain nor lose weight. Note that this is an estimated number of calories.* Your exact number may differ.  Women  Age in years Low activity level (calories/day) Moderate activity level (calories/day) High activity level (calories/day)   19 to 30 1,800-2,000 2,000-2,200 2,400   31 to 50 1,800 2,000 2,200   51 and older 1,600 1,800 2,000-2,200      Men  Age in years Low activity level  (calories/day) Moderate activity level (calories/day) High activity level (calories/day)   19 to 30 2,400-2,600 2,600-2,800 3,000   31 to 50 2,200-2,400 2,400-2,600 2,800-3,000   51 and older 2,000-2,200 2,200-2,400 2,400-2,800   Activity levels defined  Low. Only light physical activity such as that done during typical daily life.  Moderate. Light physical activity done during typical daily life AND physical activity equal to walking about 1.5 to 3 miles a day at 3 to 4 miles per hour.  High. Light physical activity done during typical daily life AND physical activity equal to walking more than 3 miles a day at 3 to 4 miles per hour.   Diabetes Foot Care Guidelines  Diabetic foot care is essential as diabetes can be dangerous to your feet--even a small cut can produce serious consequences. Diabetes may cause nerve damage that takes away the feeling in your feet. Diabetes may also reduce blood flow to the feet, making it harder to heal an injury or resist infection. Because of these problems, you may not notice a foreign object in your shoe. As a result, you could develop a blister or a sore. This could lead to an infection or a nonhealing wound that could put you at risk for an  amputation.    To avoid serious foot problems that could result in losing a toe, foot or leg, follow these guidelines.    Inspect your feet daily. Check for cuts, blisters, redness, swelling or nail problems. Use a magnifying hand mirror to look at the bottom of your feet. Call your doctor if you notice anything.    Bathe feet in lukewarm, never hot, water. Keep your feet clean by washing them daily. Use only lukewarm water--the temperature you would use on a  baby.    Be gentle when bathing your feet. Wash them using a soft washcloth or sponge. Dry by blotting or patting and carefully dry between the toes.    Moisturize your feet but not between your toes. Use a moisturizer daily to keep dry skin from itching or cracking. But don't moisturize between the toes--that could encourage a fungal infection.    Cut nails carefully. Cut them straight across and file the edges. Dont cut nails too short, as this could lead to ingrown toenails. If you have concerns about your nails, consult your doctor.    Never treat corns or calluses yourself. No bathroom surgery or medicated pads. Visit your doctor for appropriate treatment.    Wear clean, dry socks. Change them daily.    Consider socks made specifically for patients living with diabetes. These socks have extra cushioning, do not have elastic tops, are higher than the ankle and are made from fibers that wick moisture away from the skin.    Wear socks to bed. If your feet get cold at night, wear socks. Never use a heating pad or a hot water bottle.    Shake out your shoes and feel the inside before wearing. Remember, your feet may not be able to feel a pebble or other foreign object, so always inspect your shoes before putting them on.    Keep your feet warm and dry. Dont let your feet get wet in snow or rain. Wear warm socks and shoes in winter.    Consider using an antiperspirant on the soles of your feet. This is helpful if you have excessive sweating of the  feet.    Never walk barefoot. Not even at home! Always wear shoes or slippers. You could step on something and get a scratch or cut.    Take care of your diabetes. Keep your blood sugar levels under control.    Do not smoke. Smoking restricts blood flow in your feet.    Get periodic foot exams. Seeing your foot and ankle surgeon on a regular basis can help prevent the foot complications of diabetes.

## 2023-05-16 ENCOUNTER — PATIENT MESSAGE (OUTPATIENT)
Dept: ADMINISTRATIVE | Facility: OTHER | Age: 68
End: 2023-05-16
Payer: MEDICARE

## 2023-05-17 ENCOUNTER — TELEPHONE (OUTPATIENT)
Dept: ADMINISTRATIVE | Facility: OTHER | Age: 68
End: 2023-05-17
Payer: MEDICARE

## 2023-05-17 ENCOUNTER — HOSPITAL ENCOUNTER (OUTPATIENT)
Dept: RADIOLOGY | Facility: HOSPITAL | Age: 68
Discharge: HOME | End: 2023-05-17
Attending: PODIATRIST
Payer: MEDICARE

## 2023-05-17 DIAGNOSIS — M21.612 BILATERAL BUNIONS: ICD-10-CM

## 2023-05-17 DIAGNOSIS — M21.611 BILATERAL BUNIONS: ICD-10-CM

## 2023-05-17 PROCEDURE — 73630 X-RAY EXAM OF FOOT: CPT | Mod: 26,LT,, | Performed by: RADIOLOGY

## 2023-05-17 PROCEDURE — 73630 X-RAY EXAM OF FOOT: CPT | Mod: TC,50

## 2023-05-17 PROCEDURE — 73630 X-RAY EXAM OF FOOT: CPT | Mod: 26,RT,, | Performed by: RADIOLOGY

## 2023-05-17 PROCEDURE — 73630 PR  X-RAY FOOT 3+ VW: ICD-10-PCS | Mod: 26,LT,, | Performed by: RADIOLOGY

## 2023-05-17 NOTE — TELEPHONE ENCOUNTER
Appointment cancelled of 6-5-23, states she will call when ready to be seen for Diabetes Education.

## 2023-05-18 ENCOUNTER — OFFICE VISIT (OUTPATIENT)
Dept: INTERNAL MEDICINE | Facility: CLINIC | Age: 68
End: 2023-05-18
Payer: MEDICARE

## 2023-05-18 VITALS
SYSTOLIC BLOOD PRESSURE: 124 MMHG | TEMPERATURE: 98 F | HEART RATE: 66 BPM | OXYGEN SATURATION: 97 % | RESPIRATION RATE: 15 BRPM | BODY MASS INDEX: 30.26 KG/M2 | WEIGHT: 164.44 LBS | HEIGHT: 62 IN | DIASTOLIC BLOOD PRESSURE: 76 MMHG

## 2023-05-18 DIAGNOSIS — E11.59 HYPERTENSION ASSOCIATED WITH DIABETES: ICD-10-CM

## 2023-05-18 DIAGNOSIS — I48.0 PAF (PAROXYSMAL ATRIAL FIBRILLATION): ICD-10-CM

## 2023-05-18 DIAGNOSIS — E11.69 HYPERLIPIDEMIA ASSOCIATED WITH TYPE 2 DIABETES MELLITUS: ICD-10-CM

## 2023-05-18 DIAGNOSIS — E78.5 HYPERLIPIDEMIA ASSOCIATED WITH TYPE 2 DIABETES MELLITUS: ICD-10-CM

## 2023-05-18 DIAGNOSIS — E11.65 TYPE 2 DIABETES MELLITUS WITH HYPERGLYCEMIA, WITHOUT LONG-TERM CURRENT USE OF INSULIN: Primary | ICD-10-CM

## 2023-05-18 DIAGNOSIS — E66.9 OBESITY, CLASS I, BMI 30-34.9: ICD-10-CM

## 2023-05-18 DIAGNOSIS — I15.2 HYPERTENSION ASSOCIATED WITH DIABETES: ICD-10-CM

## 2023-05-18 PROCEDURE — 3066F NEPHROPATHY DOC TX: CPT | Mod: CPTII,,, | Performed by: NURSE PRACTITIONER

## 2023-05-18 PROCEDURE — 3060F POS MICROALBUMINURIA REV: CPT | Mod: CPTII,,, | Performed by: NURSE PRACTITIONER

## 2023-05-18 PROCEDURE — 1159F MED LIST DOCD IN RCRD: CPT | Mod: CPTII,,, | Performed by: NURSE PRACTITIONER

## 2023-05-18 PROCEDURE — 99214 OFFICE O/P EST MOD 30 MIN: CPT | Mod: ,,, | Performed by: NURSE PRACTITIONER

## 2023-05-18 PROCEDURE — 1159F PR MEDICATION LIST DOCUMENTED IN MEDICAL RECORD: ICD-10-PCS | Mod: CPTII,,, | Performed by: NURSE PRACTITIONER

## 2023-05-18 PROCEDURE — 99999 PR PBB SHADOW E&M-EST. PATIENT-LVL V: ICD-10-PCS | Mod: PBBFAC,,, | Performed by: NURSE PRACTITIONER

## 2023-05-18 PROCEDURE — 4010F PR ACE/ARB THEARPY RXD/TAKEN: ICD-10-PCS | Mod: CPTII,,, | Performed by: NURSE PRACTITIONER

## 2023-05-18 PROCEDURE — 3066F PR DOCUMENTATION OF TREATMENT FOR NEPHROPATHY: ICD-10-PCS | Mod: CPTII,,, | Performed by: NURSE PRACTITIONER

## 2023-05-18 PROCEDURE — 1160F PR REVIEW ALL MEDS BY PRESCRIBER/CLIN PHARMACIST DOCUMENTED: ICD-10-PCS | Mod: CPTII,,, | Performed by: NURSE PRACTITIONER

## 2023-05-18 PROCEDURE — 3288F FALL RISK ASSESSMENT DOCD: CPT | Mod: CPTII,,, | Performed by: NURSE PRACTITIONER

## 2023-05-18 PROCEDURE — 1126F AMNT PAIN NOTED NONE PRSNT: CPT | Mod: CPTII,,, | Performed by: NURSE PRACTITIONER

## 2023-05-18 PROCEDURE — 3008F PR BODY MASS INDEX (BMI) DOCUMENTED: ICD-10-PCS | Mod: CPTII,,, | Performed by: NURSE PRACTITIONER

## 2023-05-18 PROCEDURE — 3051F PR MOST RECENT HEMOGLOBIN A1C LEVEL 7.0 - < 8.0%: ICD-10-PCS | Mod: CPTII,,, | Performed by: NURSE PRACTITIONER

## 2023-05-18 PROCEDURE — 3078F DIAST BP <80 MM HG: CPT | Mod: CPTII,,, | Performed by: NURSE PRACTITIONER

## 2023-05-18 PROCEDURE — 4010F ACE/ARB THERAPY RXD/TAKEN: CPT | Mod: CPTII,,, | Performed by: NURSE PRACTITIONER

## 2023-05-18 PROCEDURE — 3051F HG A1C>EQUAL 7.0%<8.0%: CPT | Mod: CPTII,,, | Performed by: NURSE PRACTITIONER

## 2023-05-18 PROCEDURE — 3060F PR POS MICROALBUMINURIA RESULT DOCUMENTED/REVIEW: ICD-10-PCS | Mod: CPTII,,, | Performed by: NURSE PRACTITIONER

## 2023-05-18 PROCEDURE — 3074F PR MOST RECENT SYSTOLIC BLOOD PRESSURE < 130 MM HG: ICD-10-PCS | Mod: CPTII,,, | Performed by: NURSE PRACTITIONER

## 2023-05-18 PROCEDURE — 3288F PR FALLS RISK ASSESSMENT DOCUMENTED: ICD-10-PCS | Mod: CPTII,,, | Performed by: NURSE PRACTITIONER

## 2023-05-18 PROCEDURE — 1126F PR PAIN SEVERITY QUANTIFIED, NO PAIN PRESENT: ICD-10-PCS | Mod: CPTII,,, | Performed by: NURSE PRACTITIONER

## 2023-05-18 PROCEDURE — 3074F SYST BP LT 130 MM HG: CPT | Mod: CPTII,,, | Performed by: NURSE PRACTITIONER

## 2023-05-18 PROCEDURE — 99215 OFFICE O/P EST HI 40 MIN: CPT | Mod: PO | Performed by: NURSE PRACTITIONER

## 2023-05-18 PROCEDURE — 99999 PR PBB SHADOW E&M-EST. PATIENT-LVL V: CPT | Mod: PBBFAC,,, | Performed by: NURSE PRACTITIONER

## 2023-05-18 PROCEDURE — 99214 PR OFFICE/OUTPT VISIT, EST, LEVL IV, 30-39 MIN: ICD-10-PCS | Mod: ,,, | Performed by: NURSE PRACTITIONER

## 2023-05-18 PROCEDURE — 3008F BODY MASS INDEX DOCD: CPT | Mod: CPTII,,, | Performed by: NURSE PRACTITIONER

## 2023-05-18 PROCEDURE — 1101F PR PT FALLS ASSESS DOC 0-1 FALLS W/OUT INJ PAST YR: ICD-10-PCS | Mod: CPTII,,, | Performed by: NURSE PRACTITIONER

## 2023-05-18 PROCEDURE — 3078F PR MOST RECENT DIASTOLIC BLOOD PRESSURE < 80 MM HG: ICD-10-PCS | Mod: CPTII,,, | Performed by: NURSE PRACTITIONER

## 2023-05-18 PROCEDURE — 1101F PT FALLS ASSESS-DOCD LE1/YR: CPT | Mod: CPTII,,, | Performed by: NURSE PRACTITIONER

## 2023-05-18 PROCEDURE — 1160F RVW MEDS BY RX/DR IN RCRD: CPT | Mod: CPTII,,, | Performed by: NURSE PRACTITIONER

## 2023-05-18 RX ORDER — DULAGLUTIDE 3 MG/.5ML
3 INJECTION, SOLUTION SUBCUTANEOUS
Qty: 4 PEN | Refills: 6 | Status: SHIPPED | OUTPATIENT
Start: 2023-05-18 | End: 2023-11-16

## 2023-05-18 RX ORDER — FLASH GLUCOSE SENSOR
1 KIT MISCELLANEOUS
Qty: 2 KIT | Refills: 11 | Status: SHIPPED | OUTPATIENT
Start: 2023-05-18 | End: 2023-11-16

## 2023-05-18 NOTE — PROGRESS NOTES
67 y.o. female here for routine follow up visit for management of T2dm -   Last seen April 2022 -     A1c is @ 7.7% (had gone up slightly from last visit) -   xigduo 5/1000mg tablet 2 times per day with meals. But ran out -   Insurance preferred synjardy. I did send it in - but she was nervous to take it - so has not.   Just didn't understand why the brand/formulary change.     Was on victoza daily previously, switched to trulicity instead - is doing well.   Like the once per week injection.  She is on trulicity 3 mg every week.   She is not exercising regularly b/c she's working so so much,.   Now is down to working 2 days per week so has more time to focus on her health.   Is trying now to walk 30 minutes after dinner time each day.   Traveling to [place] soon.   She is checking blood sugars on fingerstick and her glucoses have improved this month -   They are 110 - 130's on average.   Blood sugar logs - they have improved per her report - see photos below.           Last visit notes as follows from 4/27/2022:   66 y.o. female here for follow up for T2dm -   a1c reduced from 8% to 7.1%.   She has also lost 10 lbs since her last visit.   On xigduo 5/1000mg tablet bid.   Also newly on trulicity 3mg every week - prior she was on victoza daily.   Has lost weight since last visit.   Checking sugars daily and they range 90 - 115 on average.   She has history of afib - so, she remains on xarelto.             Last visit notes as follows  66 y.o. female, here for for 4 month follow up visit for management of T2dm -   Last seen September 2021 -     a1c remains about the same @ 8%.   Taking xigduo 5/1000mg tablets - 2 per day (was just on metformin before at last visit - I changed her).    Also on victoza 1.8mg SC every morning. She's been on for about 6 months now. Taking highest dose for 4 months now.   Not really helping bring her sugars down further.   No longer on glipizide. (I took her off this when I began the  glp1).     She is checking blood sugars daily - ranging usually 150 - 180's,   Now improving over the past few days - 113 - 120's.   She is feeling ok -   Had covid 19 a few weeks ago - recovered and treated at home.   Had been living in [place] and admits had not been eating well over the holidays.   She has just started walking again -   Trying to eat healthier, however admits has a weakness for eating sweets.   Lipids stable except for high trigs - on statin daily.   Mild urine mac noted, but improving - 50's.       Last visit notes as follows from September 2021:   65 y.o. female, here for 3 month follow up for management of Type 2 diabetes.   Last seen in June 2021 - those notes are below.     a1c stable at 8% - still is not to goal, but has improved from previous visit from 9.5% to 8%.   She has continued on metformin 1000mg twice per day.   No longer on glipizide.   She is taking victoza still every morning - has increase from 1.2 to 1.8mg sc every morning.   Has kept weight off - but admits hard for her to carb count and always follow proper diet.   Her job changes so that has been stressful.   She ran out of test strips so has not been checking her sugars.   No other complaints today's visit.       Last visit notes as follows from June 2021:   65 y.o. female, here for 2 mo follow up visit for management of T2sm -   Last seen April 2021 - those notes below.     No new labs for today's visit. Her a1c was last @ 9.5% with fasting bg's in the 250's.   Lost 10 lbs since last visit in just 2 months time.   Patient very motivated and following ADA diet now - really trying and putting forth effort.   Newly on victoza, has gone up to 1.2mg every morning and is taking.   No side effects, some appetite suppresion - no n/v/d.   Continues on metformin 1000mg bid.   Also on glipizide 10mg tablet once daily in am (was on bid).     Checking blood sugars - 1 - 2 times per day.   See list below. Running 130 - 140's on average.                      Last visit notes as follows from 4/9/2021:   HPI: Fely Wetzel is a 67 y.o.  female c/I for visit to address Diabetes Type 2  This is the first time I am seeing them for care, follows with Dr. Hien Sparks MD for primary care needs.   Has not seen endocrinology or diabetes specialist in the past.     was diagnosed with T2DM about 20 years ago.   Mother has type 2 diabetes.   Has never been hospitalized r/t DM.  Denies missing doses of DM medication.     a1c is up from 8% to 9.5% currently.   On metformin 1000 bid.   Glipizide 10 bid.   Was on tradjenta 5mg tablet daily, but states pharmacy didn't have any refills left and just stopped taking it 3 months ago.   So now that she thinks about it, this is likely why her a1c/sugasr went up.   Checks sugars on occasion. Last checked 6 months ago.   Doesn't like to fingerstick, doesn't like current meter.   She is very motivated to lose weight and start eating healthier, here to discuss her options for care.     Complications from diabetes include:   Negative for DKA.   Has never taken insulin in life.   -negative for diabetic neuropathy.   -negative for nephropathy, but + microalbuminuria.   Eyes - negative for Diabetic retinopathy   Denies CV disease.   Denies known CAD. + for afib. On Eliquis.  HTN - controlled on current meds.   HLD - controlled on statin 40mg every Hs.     Past medical History:   Past Medical History:   Diagnosis Date    Colon polyp 11/2014    Diabetes mellitus type II     Hyperlipidemia     Hypertension     Obesity     Osteopenia       Family hx:   Family History   Problem Relation Age of Onset    Diabetes Mother     Glaucoma Mother     Hyperlipidemia Mother     Hypertension Mother     Heart disease Father     Cancer Father         prostate cancer    Heart attack Father     Heart disease Brother     Cataracts Brother     Heart failure Neg Hx     Stroke Neg Hx       Current meds:   Current Outpatient Medications:      ACCU-CHEK JUAN Misc, CHECK FASTING GLUCOSE AND CHECK GLUCOSE TWO HOURS AFTER LUNCH OR DINNER, Disp: 1 each, Rfl: 0    amLODIPine (NORVASC) 5 MG tablet, Take 1 tablet (5 mg total) by mouth once daily., Disp: 90 tablet, Rfl: 3    atorvastatin (LIPITOR) 40 MG tablet, TAKE 1 TABLET BY MOUTH EVERY DAY IN THE EVENING, Disp: 30 tablet, Rfl: 11    blood-glucose meter Misc, Check fasting glucose and check glucose two hours after lunch or dinner, Disp: 1 each, Rfl: 0    cholecalciferol, vitamin D3, 1,250 mcg (50,000 unit) capsule, TAKE 1 CAPSULE BY MOUTH ONE TIME PER WEEK, Disp: 12 capsule, Rfl: 1    cyanocobalamin 500 MCG tablet, Take 500 mcg by mouth once daily., Disp: , Rfl:     diclofenac sodium (VOLTAREN) 1 % Gel, Apply 2 g topically 4 (four) times daily. for 10 days, Disp: 100 g, Rfl: 2    ELIQUIS 5 mg Tab, TAKE 1 TABLET BY MOUTH TWICE A DAY, Disp: 60 tablet, Rfl: 11    empagliflozin-metformin (SYNJARDY XR) 10-1,000 mg TBph, Take 1 tablet by mouth 2 (two) times a day., Disp: 60 tablet, Rfl: 3    fenofibrate 160 MG Tab, TAKE 1 TABLET BY MOUTH ONCE DAILY., Disp: 90 tablet, Rfl: 3    flecainide (TAMBOCOR) 150 MG Tab, Take 150 mg by mouth every 12 (twelve) hours., Disp: , Rfl:     hydroCHLOROthiazide (HYDRODIURIL) 25 MG tablet, TAKE 1 TABLET (25 MG TOTAL) BY MOUTH ONCE DAILY. WITH BREAKFAST, Disp: 30 tablet, Rfl: 11    losartan (COZAAR) 100 MG tablet, TAKE 1 TABLET BY MOUTH EVERY DAY, Disp: 30 tablet, Rfl: 11    metoprolol succinate (TOPROL-XL) 100 MG 24 hr tablet, Take 1 tablet (100 mg total) by mouth 2 (two) times daily., Disp: 60 tablet, Rfl: 11    pantoprazole (PROTONIX) 40 MG tablet, Take 1 tablet (40 mg total) by mouth once daily. Start taking this medication on 4/5/2023., Disp: 90 tablet, Rfl: 0    sucralfate (CARAFATE) 1 gram tablet, Take 1 tablet (1 g total) by mouth 4 (four) times daily., Disp: 120 tablet, Rfl: 0    TRUE METRIX GLUCOSE TEST STRIP Strp, USE 1 TEST STRIP TWICE DAILY TO MEASURE GLUCOSE, Disp: 100  strip, Rfl: 3    dulaglutide (TRULICITY) 3 mg/0.5 mL pen injector, Inject 3 mg into the skin every 7 days., Disp: 4 pen, Rfl: 6    flash glucose sensor (FREESTYLE PERLA 2 SENSOR) Kit, 1 each by Misc.(Non-Drug; Combo Route) route every 14 (fourteen) days., Disp: 2 kit, Rfl: 11    furosemide (LASIX) 20 MG tablet, Take 1 tablet (20 mg total) by mouth as needed (3-5 pound weight gain or lower extremity edema, please take 1 pill by mouth for 3 days.)., Disp: 10 tablet, Rfl: 0    lancing device with lancets (ONETOUCH DELICA LANC DEVICE) Kit, 1 each by Misc.(Non-Drug; Combo Route) route 2 (two) times daily before meals., Disp: 100 each, Rfl: 5     Current Diabetes medications:   Trulicity 3mg every week.   Synjardy 10/1000mg XR bid. (Was on xigduo twice daily but formulary change).      Medications Tried and Failed:   tradjenta 5mg daily - just stopped taking 3 months ago - no s/e's, just ran out of Rx.   Glipizide 10 daily in morning.  victoza 1.8 mg every morning.    switched to xigduo 5/1000mg 2 tablets daily/   Metformin 1000 bid. -->    Review of Pertinent co-morbidities/risk factors:   CV: Denies history of MI nor stroke.   CAD: Denies.  Does not take aspirin 81mg tablet daily  BP: has history of HTN  Statin: Taking  ACE/ARB: Taking    Social History     Tobacco Use   Smoking Status Never    Passive exposure: Never   Smokeless Tobacco Never        Social:   Lives at home by herself.    No current Life changes/stressors currently.  Diet: no following ADA diet   Meals: 3 per day and snacks.        Breakfast - eggs, tortillas, meats.         Lunch - salads and veggies.        Dinner - fish, veggies.        Snacks - veggies. Bananas.         Drinks - green veggie juices, water. Coffee    Exercise: none. Walking on occasion. Quit last year.   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     Glucose Monitoring:    checking blood sugars fingerstick 1 - 2 times per day.     Standards of care:   Eyes: .:  "09/09/2022  Foot exam: : 05/15/2023   Diabetes education: None.    Vital Signs  /76 (BP Location: Right arm, Patient Position: Sitting, BP Method: Medium (Manual))   Pulse 66   Temp 97.6 °F (36.4 °C) (Temporal)   Resp 15   Ht 5' 2" (1.575 m)   Wt 74.6 kg (164 lb 7.4 oz)   SpO2 97%   BMI 30.08 kg/m²     Pertinent Labs:   Hgba1c   Lab Results   Component Value Date    HGBA1C 7.7 (H) 03/16/2023    HGBA1C 7.4 (H) 09/29/2022    HGBA1C 7.1 (H) 04/20/2022     Lipid panel   Lab Results   Component Value Date    CHOL 177 03/16/2023    CHOL 183 09/29/2022    CHOL 188 04/20/2022     Lab Results   Component Value Date    HDL 58 03/16/2023    HDL 56 09/29/2022    HDL 51 04/20/2022     Lab Results   Component Value Date    LDLCALC 95.8 03/16/2023    LDLCALC 95.4 09/29/2022    LDLCALC 98.6 04/20/2022     Lab Results   Component Value Date    TRIG 116 03/16/2023    TRIG 158 (H) 09/29/2022    TRIG 192 (H) 04/20/2022     Lab Results   Component Value Date    CHOLHDL 32.8 03/16/2023    CHOLHDL 30.6 09/29/2022    CHOLHDL 27.1 04/20/2022      CMP  Glucose   Date Value Ref Range Status   04/12/2023 246 (H) 70 - 110 mg/dL Final     BUN   Date Value Ref Range Status   04/12/2023 25 (H) 8 - 23 mg/dL Final     Creatinine   Date Value Ref Range Status   04/12/2023 0.9 0.5 - 1.4 mg/dL Final     eGFR if    Date Value Ref Range Status   04/20/2022 >60.0 >60 mL/min/1.73 m^2 Final     eGFR if non    Date Value Ref Range Status   04/20/2022 >60.0 >60 mL/min/1.73 m^2 Final     Comment:     Calculation used to obtain the estimated glomerular filtration  rate (eGFR) is the CKD-EPI equation.        AST   Date Value Ref Range Status   03/16/2023 21 10 - 40 U/L Final     ALT   Date Value Ref Range Status   03/16/2023 16 10 - 44 U/L Final     Microalbumin creatinine ratio:   Lab Results   Component Value Date    MICALBCREAT 30.3 (H) 03/23/2023       Review Of Systems:   Gen: Appetite good, 10 - 15+ lbs total of " weight loss, denies fatigue and weakness. Denies polydipsia.  Skin: Skin is intact and heals well, denies any rashes or hair changes.   EENT: Denies any acute visual disturbances, nor blurred vision.    Resp: Denies SOB or Dyspnea on exertion, denies cough.   Cardiac: Denies chest pain, palpitations, or swelling.   GI: Denies abdominal pain, nausea or vomiting, diarrhea, or constipation.   /GYN: Denies nocturia, nor burning, frequency or pain on urination.  MS/Neuro: Denies numbness/ tingling in BLE; Gait steady, speech clear  Psych: Denies drug/ETOH abuse, no hx of depression.  Feet: - bunion on right foot - following with podiatry.   Other systems: negative.    Physical Exam:   GENERAL: Well developed, well nourished in appearance.   PSYCH: AAOx3, appropriate mood and affect, pleasant expression, conversant, appears relaxed, well groomed.   EYES: PERRL, Conjunctiva and corneas clear  NECK: Soft and Supple, trachea midline  ABDOMEN: Soft, non-tender, non-distended.  VASCULAR: pedal pulses palpable bilaterally, no edema.  NEURO:  cranial nerves II - XII intact   MUSCULOSKELETAL: Good ROM, steady gait.   SKIN: Skin warm, dry, and intact     Assessment and Plan of Care:     Fely was seen today for follow-up.    Diagnoses and all orders for this visit:    Type 2 diabetes mellitus with hyperglycemia, without long-term current use of insulin  -     Ambulatory referral/consult to Internal Medicine  -     dulaglutide (TRULICITY) 3 mg/0.5 mL pen injector; Inject 3 mg into the skin every 7 days.  -     Hemoglobin A1C; Future  -     Microalbumin/Creatinine Ratio, Urine; Future  -     Comprehensive Metabolic Panel; Future  -     flash glucose sensor (FREESTYLE PERLA 2 SENSOR) Kit; 1 each by Misc.(Non-Drug; Combo Route) route every 14 (fourteen) days.    Hypertension associated with diabetes    Hyperlipidemia associated with type 2 diabetes mellitus  -     Lipid Panel; Future    Obesity, Class I, BMI 30-34.9    PAF  (paroxysmal atrial fibrillation)       1. T2DM with hyperglycemia- Hgba1c goal is 7.5% or less without hypoglycemia - 8.5%---> 8%---> 9.5%--> 8%--> 8% --> 7.1%  --> 7/3%---> 7.7% current.   Improving -   discussed DM, progression of disease, long term complications, CV risk factors and tx options.   Advise compliance with ADA diet and encourage exercise- gave list.   Continue xigduo 5/1000mg tablet 2 times per day with meals. Switched to synjardy - formulary change   Continue trulicity 3 mg every week.   Ezercise more.   Discussed MOA, possible side effects including yeast infection, UTI, dehydration and ketoacidosis, importance of maintaining hydration and avoiding No carb diets. Good use hygiene. Notify my office if any side effects. Will monitor renal function closely. Stable at present.   glp1 - for cardiac protection, and weight loss also - which will then reverse her diabetes and treat the actual problem of insulin resistance.   No known history of pancreatitis or medullary thyroid cancer.   If you experience severe abdominal pain, nausea, or diarrhea - please call me or notify my office immediately.   Advised small, frequent meals -   Try to eliminate/cut down on sweets.   Keep walking.    Instructed to monitor Blood glucose 2x/day before meals and bring meter/ log to every clinic visit.   She requesting kathy 2 - send in  rx for cash price as she is not on insulin and now low/likely medicare will not approve.   Sent in refills for strips, new lancets, and new meter - true metrix requested.   Eyes - Dr. Sweeney 2021 -     2. HTN - controlled, continue meds as previously prescribed and monitor.   Urine mac + 170's--> 70's--. 50's --> 67 - Improving. Continuing on sglt2i.   Losartan, norvasc.     3. HLd - LDL goal < 100. Minimally meets goal. Trigs are still high - add on triglycerides.   Currently on statin therapy- lipitor 40mg every HS   On eliquis -     4. Weight - BMI Body mass index is 30.08 kg/m².    Encourage Ada diet and exercise.   Continue glp1 and sglt2i.      5. Renal Function - stable. No nephropathy but + urine mac.   On losartan. Will continue to monitor.     6. Osteopenia  - on ergo weekly. Sent in refill.   Advised to start walking     7. Afib - eliquis and metoprolol.   Having cardiac ablation soon -   Advised stay on sglt2i            Labs and follow up in 6 months

## 2023-05-29 ENCOUNTER — TELEPHONE (OUTPATIENT)
Dept: DIABETES | Facility: CLINIC | Age: 68
End: 2023-05-29
Payer: MEDICARE

## 2023-06-14 NOTE — PROGRESS NOTES
Ms. Wetzel is a patient of Dr. Mathias and was last seen in clinic 8/16/2022.      Subjective:   Patient ID:  Fely Wetzel is a 67 y.o. female who presents for follow up of Atrial Fibrillation  .     HPI:    Ms. Wetzel is a 67 y.o. female with AF, HTN, DM, obesity here for follow up after ablation.     Background:    Referring Cardiologist: Marie Dominguez MD  Primary Care Physician: Paula Barkley DO     Prior Hx:  I had the pleasure of seeing Mrs. Wetzel today in our electrophysiology clinic in follow-up for her atrial fibrillation. As you are aware she is a pleasant 66 year-old woman with hypertension, diabetes mellitus type 2, and obesity. She was in her usual state of health until June 2019 when she developed sudden palpitations. She called EMS and reportedly was in AF with ventricular rates up to 170s-220s. She was given IV cardizem and her ventricular rate lowered to 110s-170s. Per ER physician she was in atrial fibrillation however no ECGs document her arrhythmia. She was given 20mg more of cardizem and spontaneously converted to sinus rhythm. TSH was normal. She was discharged on eliquis and metoprolol. She was referred to Dr. Dominguez and discussed with her if there was possibility at some point of stopping anticoagulation. She reports for the past few weeks she has been having episodes of palpitations with radiation into her neck. These are different than when she went to the ER.      An exercise stress echocardiogram noted normal LV function and no ischemic changes. An echocardiogram performed on 10/15/2019 showed normal LV function, normal valves, and moderate LA enlargement (MURIEL 47).     Our plan was for a 30 day monitor, sleep study and possibly starting flecainide.     Mrs. Wetzel presented 5/2020 for follow-up. A 30 day monitor done in March of 2020 which showed no atrial fibrillation. She presented to the ER in November of 2019 with recurrent symptomatic AF with RVR that was  rate controlled with cardizem and spontaneously converted. She notes occasional palpitations at night. She has not yet had a sleep study.    Mrs. Wetzel returned for follow-up 12/2020. She is on flecainide. She continues on metoprolol/eliquis. She notes occasional episodes of chest discomfort at rest, not with activity. She drinks water and it goes away. She does have indigestion. No symptomatic AF. Recent CBC noted stable H/H.     Mrs. Wetzel returned for follow-up 7/2021. She was doing well from atrial fibrillation standpoint  without any reports of symptomatic AF. She reported chest discomfort that occurs after eating in the evening and was suspected to be due to dyspepsia. Protonix was prescribed at that time.      Mrs. Wetzel presented for follow up 2/2022 and she once again reported that she was doing well. She denied any sustained periods of atrial fibrillation. Her non cardiac chest discomfort has persistent in spite of protonix and she is planned for  coloscopy/EGD.  She was still employed and has no limitations with her usual activities.      Interim Hx:   Mrs. Wetzel returns for follow-up. She reports 1 month ago she had a few days of palpitations. Her main complaint is regarding varicose veins in her legs.     My interpretation of today's in clinic ECG is sinus rhythm at 72 bpm with a narrow QRS.      In summary, Mrs. Wetzel is a pleasant 66 year-old woman with hypertension, diabetes mellitus type 2, and obesity presenting for follow-up for recurrent symptomatic paroxysmal atrial fibrillation with RVR. I had a long discussion with the patient about the pathophysiology and risks of atrial fibrillation and its basic pathophysiology, including its health implications and treatment options. Specifically, I addressed the need for CVA (stroke) prophylaxis with aspirin versus oral anticoagulation (warfarin vs DOACs, discussed bleeding risks, and need to come to the ER for any head trauma for CT  scanning even if asymptomatic). Her [id number] score is 3 and indefinite anticoagulation is recommended. She remains on eliquis. She has had symptomatic benefit with flecainide/metoprolol.  Will order a 14 day holter. Will call with results. Discussed vascular surgery referral for painful varicose vein treatment. She will think about it.      Continue on eliquis as well as  flecainide/metoprolol   RTC in 6 months, sooner if needed    Update (06/16/2023):    1/19/2023: pt reported recurrence. Flec increased to 100mg BID    2/13/2023 Continued recurrence. Flec increased to 150mg BID and PVI planned.    4/19/2023: Successful pulmonary vein RF ablation. Continued on flecainide    Today she reports one episode of AF a couple of weeks ago lasting about 5min. No CP, CHAVES, LH, syncope reported. She does report low heart rates.    She is currently taking eliquis 5m BID for stroke prophylaxis and denies significant bleeding episodes. She is currently being treated with flecainide 150mg BID for rhythm control and toprol 100mg BID for HR control.  Kidney function is stable, with a creatinine of 0.9 on 4/12/2023.    I have personally reviewed the patient's EKG today, which shows sinus rhythm at 66bpm. VA interval is 198. QRS is 116. QTc is 475.    Relevant Cardiac Test Results:    2D Echo (10/15/2019):  Normal left ventricular systolic function. The estimated ejection fraction is 70%  Normal right ventricular systolic function.  Indeterminate left ventricular diastolic function.  Moderate left atrial enlargement.  The estimated PA systolic pressure is 33 mm Hg  Normal central venous pressure (3 mm Hg).    Current Outpatient Medications   Medication Sig    ACCU-CHEK JUAN Misc CHECK FASTING GLUCOSE AND CHECK GLUCOSE TWO HOURS AFTER LUNCH OR DINNER    amLODIPine (NORVASC) 5 MG tablet Take 1 tablet (5 mg total) by mouth once daily.    atorvastatin (LIPITOR) 40 MG tablet TAKE 1 TABLET BY MOUTH EVERY DAY IN THE EVENING    blood-glucose  meter Misc Check fasting glucose and check glucose two hours after lunch or dinner    cholecalciferol, vitamin D3, 1,250 mcg (50,000 unit) capsule TAKE 1 CAPSULE BY MOUTH ONE TIME PER WEEK    dulaglutide (TRULICITY) 3 mg/0.5 mL pen injector Inject 3 mg into the skin every 7 days.    dulaglutide (TRULICITY) 3 mg/0.5 mL pen injector Inject 3 mg into the skin every 7 days.    ELIQUIS 5 mg Tab TAKE 1 TABLET BY MOUTH TWICE A DAY    empagliflozin-metformin (SYNJARDY XR) 10-1,000 mg TBph Take 1 tablet by mouth 2 (two) times a day.    fenofibrate 160 MG Tab TAKE 1 TABLET BY MOUTH ONCE DAILY.    flecainide (TAMBOCOR) 150 MG Tab Take 150 mg by mouth every 12 (twelve) hours.    hydroCHLOROthiazide (HYDRODIURIL) 25 MG tablet TAKE 1 TABLET (25 MG TOTAL) BY MOUTH ONCE DAILY. WITH BREAKFAST    losartan (COZAAR) 100 MG tablet TAKE 1 TABLET BY MOUTH EVERY DAY    metoprolol succinate (TOPROL-XL) 100 MG 24 hr tablet Take 1 tablet (100 mg total) by mouth 2 (two) times daily.    TRUE METRIX GLUCOSE TEST STRIP Strp USE 1 TEST STRIP TWICE DAILY TO MEASURE GLUCOSE    cyanocobalamin 500 MCG tablet Take 500 mcg by mouth once daily.    diclofenac sodium (VOLTAREN) 1 % Gel Apply 2 g topically 4 (four) times daily. for 10 days (Patient not taking: Reported on 6/16/2023)    flash glucose sensor (FREESTYLE PERLA 2 SENSOR) Kit 1 each by Misc.(Non-Drug; Combo Route) route every 14 (fourteen) days. (Patient not taking: Reported on 6/16/2023)    furosemide (LASIX) 20 MG tablet Take 1 tablet (20 mg total) by mouth as needed (3-5 pound weight gain or lower extremity edema, please take 1 pill by mouth for 3 days.). (Patient not taking: Reported on 6/16/2023)    lancing device with lancets (ONETOUCH DELICA LANC DEVICE) Kit 1 each by Misc.(Non-Drug; Combo Route) route 2 (two) times daily before meals. (Patient not taking: Reported on 6/16/2023)    pantoprazole (PROTONIX) 40 MG tablet Take 1 tablet (40 mg total) by mouth once daily. Start taking this  "medication on 4/5/2023. (Patient not taking: Reported on 6/16/2023)    sucralfate (CARAFATE) 1 gram tablet Take 1 tablet (1 g total) by mouth 4 (four) times daily. (Patient not taking: Reported on 6/16/2023)     No current facility-administered medications for this visit.       Review of Systems   Constitutional: Negative for malaise/fatigue.   Cardiovascular:  Positive for palpitations. Negative for chest pain, dyspnea on exertion, irregular heartbeat and leg swelling.   Respiratory:  Negative for shortness of breath.    Hematologic/Lymphatic: Negative for bleeding problem.   Skin:  Negative for rash.   Musculoskeletal:  Negative for myalgias.   Gastrointestinal:  Negative for hematemesis, hematochezia and nausea.   Genitourinary:  Negative for hematuria.   Neurological:  Negative for light-headedness.   Psychiatric/Behavioral:  Negative for altered mental status.    Allergic/Immunologic: Negative for persistent infections.     Objective:          /85   Pulse 66   Ht 5' 2" (1.575 m)   Wt 74 kg (163 lb 2.3 oz)   BMI 29.84 kg/m²     Physical Exam  Vitals and nursing note reviewed.   Constitutional:       Appearance: Normal appearance. She is well-developed.   HENT:      Head: Normocephalic.      Nose: Nose normal.   Eyes:      Pupils: Pupils are equal, round, and reactive to light.   Cardiovascular:      Rate and Rhythm: Normal rate and regular rhythm.   Pulmonary:      Effort: No respiratory distress.      Breath sounds: Normal breath sounds.   Musculoskeletal:         General: Normal range of motion.   Skin:     General: Skin is warm and dry.      Findings: No erythema.   Neurological:      Mental Status: She is alert and oriented to person, place, and time.   Psychiatric:         Speech: Speech normal.         Behavior: Behavior normal.         Lab Results   Component Value Date     04/12/2023    K 3.8 04/12/2023    MG 1.7 01/13/2023    BUN 25 (H) 04/12/2023    CREATININE 0.9 04/12/2023    ALT 16 " 03/16/2023    AST 21 03/16/2023    HGB 13.5 04/12/2023    HCT 42.7 04/12/2023    TSH 1.518 03/16/2023    LDLCALC 95.8 03/16/2023       Recent Labs   Lab 04/12/23  1029   INR 1.0       Assessment:     1. PAF (paroxysmal atrial fibrillation)    2. Hypertension associated with diabetes    3. Obesity, Class I, BMI 30-34.9    4. History of radiofrequency ablation (RFA) procedure for cardiac arrhythmia        Plan:     In summary, Ms. Wetzel is a 67 y.o. female with AF, HTN, DM, obesity here for follow up after ablation.   She is 2 months s/p PVI. She reports one breakthrough AF episode lasting about 5 min 2 weeks ago.   Remains on flecainide 150mg BID. Will continue this through blanking period, then wean if possible. CHADSVASc 4 and she should remain on anticoagulation indefinitely. She is on eliquis.  She reports low heart rates that are concerning for her. Will reduce toprol.    Decrease metoprolol to 100mg daily.  Continue other meds  RTC 2 mo, sooner if needed    *A copy of this note has been sent to Dr. Mathias*    Follow up in about 2 months (around 8/16/2023).    ------------------------------------------------------------------    ANIL Lambert, NP-C  Cardiac Electrophysiology

## 2023-06-16 ENCOUNTER — OFFICE VISIT (OUTPATIENT)
Dept: ELECTROPHYSIOLOGY | Facility: CLINIC | Age: 68
End: 2023-06-16
Payer: MEDICARE

## 2023-06-16 ENCOUNTER — HOSPITAL ENCOUNTER (OUTPATIENT)
Dept: CARDIOLOGY | Facility: CLINIC | Age: 68
Discharge: HOME | End: 2023-06-16
Payer: MEDICARE

## 2023-06-16 VITALS
WEIGHT: 163.13 LBS | DIASTOLIC BLOOD PRESSURE: 85 MMHG | SYSTOLIC BLOOD PRESSURE: 125 MMHG | HEART RATE: 66 BPM | BODY MASS INDEX: 30.02 KG/M2 | HEIGHT: 62 IN

## 2023-06-16 DIAGNOSIS — I15.2 HYPERTENSION ASSOCIATED WITH DIABETES: ICD-10-CM

## 2023-06-16 DIAGNOSIS — Z98.890 HISTORY OF RADIOFREQUENCY ABLATION (RFA) PROCEDURE FOR CARDIAC ARRHYTHMIA: ICD-10-CM

## 2023-06-16 DIAGNOSIS — E66.9 OBESITY, CLASS I, BMI 30-34.9: ICD-10-CM

## 2023-06-16 DIAGNOSIS — I48.0 PAF (PAROXYSMAL ATRIAL FIBRILLATION): Primary | ICD-10-CM

## 2023-06-16 DIAGNOSIS — E11.59 HYPERTENSION ASSOCIATED WITH DIABETES: ICD-10-CM

## 2023-06-16 DIAGNOSIS — I48.0 PAF (PAROXYSMAL ATRIAL FIBRILLATION): ICD-10-CM

## 2023-06-16 PROCEDURE — 99214 OFFICE O/P EST MOD 30 MIN: CPT | Mod: S$GLB,,, | Performed by: NURSE PRACTITIONER

## 2023-06-16 PROCEDURE — 99999 PR PBB SHADOW E&M-EST. PATIENT-LVL IV: CPT | Mod: PBBFAC,,, | Performed by: NURSE PRACTITIONER

## 2023-06-16 PROCEDURE — 93005 RHYTHM STRIP: ICD-10-PCS | Mod: S$GLB,ICN,, | Performed by: INTERNAL MEDICINE

## 2023-06-16 PROCEDURE — 93005 ELECTROCARDIOGRAM TRACING: CPT | Mod: S$GLB,ICN,, | Performed by: INTERNAL MEDICINE

## 2023-06-16 PROCEDURE — 93005 ELECTROCARDIOGRAM TRACING: CPT | Mod: PBBFAC | Performed by: INTERNAL MEDICINE

## 2023-06-16 PROCEDURE — 93010 RHYTHM STRIP: ICD-10-PCS | Mod: S$GLB,,, | Performed by: INTERNAL MEDICINE

## 2023-06-16 PROCEDURE — 99214 OFFICE O/P EST MOD 30 MIN: CPT | Mod: PBBFAC | Performed by: NURSE PRACTITIONER

## 2023-06-16 PROCEDURE — 99999 PR PBB SHADOW E&M-EST. PATIENT-LVL IV: ICD-10-PCS | Mod: PBBFAC,,, | Performed by: NURSE PRACTITIONER

## 2023-06-16 PROCEDURE — 99214 PR OFFICE/OUTPT VISIT, EST, LEVL IV, 30-39 MIN: ICD-10-PCS | Mod: S$GLB,,, | Performed by: NURSE PRACTITIONER

## 2023-06-16 PROCEDURE — 93010 ELECTROCARDIOGRAM REPORT: CPT | Mod: S$GLB,,, | Performed by: INTERNAL MEDICINE

## 2023-06-16 RX ORDER — METOPROLOL SUCCINATE 100 MG/1
100 TABLET, EXTENDED RELEASE ORAL DAILY
Qty: 30 TABLET | Refills: 11 | Status: SHIPPED | OUTPATIENT
Start: 2023-06-16 | End: 2024-06-10

## 2023-06-19 ENCOUNTER — PATIENT MESSAGE (OUTPATIENT)
Dept: ADMINISTRATIVE | Facility: OTHER | Age: 68
End: 2023-06-19
Payer: MEDICARE

## 2023-06-19 ENCOUNTER — TELEPHONE (OUTPATIENT)
Dept: ADMINISTRATIVE | Facility: OTHER | Age: 68
End: 2023-06-19
Payer: MEDICARE

## 2023-06-19 NOTE — TELEPHONE ENCOUNTER
LM with rescheduled Diabetes Education appointment of 6-28-23. Contact number provided, and portal message will be sent.

## 2023-06-28 DIAGNOSIS — I48.0 PAF (PAROXYSMAL ATRIAL FIBRILLATION): ICD-10-CM

## 2023-06-28 RX ORDER — FLECAINIDE ACETATE 50 MG/1
TABLET ORAL
Qty: 180 TABLET | Refills: 3 | OUTPATIENT
Start: 2023-06-28

## 2023-06-29 ENCOUNTER — PES CALL (OUTPATIENT)
Dept: ADMINISTRATIVE | Facility: CLINIC | Age: 68
End: 2023-06-29
Payer: MEDICARE

## 2023-07-01 ENCOUNTER — PATIENT MESSAGE (OUTPATIENT)
Dept: INTERNAL MEDICINE | Facility: CLINIC | Age: 68
End: 2023-07-01
Payer: MEDICARE

## 2023-07-01 DIAGNOSIS — E11.65 TYPE 2 DIABETES MELLITUS WITH HYPERGLYCEMIA, WITHOUT LONG-TERM CURRENT USE OF INSULIN: Primary | ICD-10-CM

## 2023-07-03 ENCOUNTER — PATIENT MESSAGE (OUTPATIENT)
Dept: INTERNAL MEDICINE | Facility: CLINIC | Age: 68
End: 2023-07-03
Payer: MEDICARE

## 2023-07-03 RX ORDER — EMPAGLIFLOZIN, METFORMIN HYDROCHLORIDE 10; 1000 MG/1; MG/1
1 TABLET, EXTENDED RELEASE ORAL 2 TIMES DAILY
Qty: 60 TABLET | Refills: 3 | Status: SHIPPED | OUTPATIENT
Start: 2023-07-03 | End: 2023-10-23 | Stop reason: SDUPTHER

## 2023-07-03 RX ORDER — EMPAGLIFLOZIN, METFORMIN HYDROCHLORIDE 10; 1000 MG/1; MG/1
1 TABLET, EXTENDED RELEASE ORAL 2 TIMES DAILY
Qty: 60 TABLET | Refills: 3 | Status: CANCELLED | OUTPATIENT
Start: 2023-07-03

## 2023-07-03 NOTE — TELEPHONE ENCOUNTER
Pt states she is out of Synjardy and her insurance will only cover 30 day supply once daily. Would like new Rx sent to pharmacy.

## 2023-07-06 ENCOUNTER — TELEPHONE (OUTPATIENT)
Dept: PHARMACY | Facility: CLINIC | Age: 68
End: 2023-07-06
Payer: MEDICARE

## 2023-07-17 NOTE — TELEPHONE ENCOUNTER
Refill Routing Note   Medication(s) are not appropriate for processing by [facility] for the following reason(s):      Medication outside of protocol    [facility] action(s):  Route Care Due:  None identified            Appointments  past 12m or future 3m with PCP    Date Provider   Last Visit   3/23/2023 Hien Sparks MD   Next Visit   9/25/2023 Hien Sparks MD   ED visits in past 90 days: 0        Note composed:2:29 PM 07/17/2023

## 2023-07-18 RX ORDER — ASPIRIN 325 MG
TABLET, DELAYED RELEASE (ENTERIC COATED) ORAL
Qty: 12 CAPSULE | Refills: 1 | Status: SHIPPED | OUTPATIENT
Start: 2023-07-18

## 2023-07-27 ENCOUNTER — HOSPITAL ENCOUNTER (OUTPATIENT)
Dept: RADIOLOGY | Facility: HOSPITAL | Age: 68
Discharge: HOME | End: 2023-07-27
Attending: HOSPITALIST
Payer: MEDICARE

## 2023-07-27 DIAGNOSIS — Z12.31 ENCOUNTER FOR SCREENING MAMMOGRAM FOR BREAST CANCER: ICD-10-CM

## 2023-07-27 PROCEDURE — 77067 SCR MAMMO BI INCL CAD: CPT | Mod: 26,HCNC,, | Performed by: RADIOLOGY

## 2023-07-27 PROCEDURE — 77067 SCR MAMMO BI INCL CAD: CPT | Mod: TC,HCNC,PO

## 2023-07-27 PROCEDURE — 77063 MAMMO DIGITAL SCREENING BILAT WITH TOMO: ICD-10-PCS | Mod: 26,HCNC,, | Performed by: RADIOLOGY

## 2023-07-27 PROCEDURE — 77063 BREAST TOMOSYNTHESIS BI: CPT | Mod: 26,HCNC,, | Performed by: RADIOLOGY

## 2023-07-27 PROCEDURE — 77067 MAMMO DIGITAL SCREENING BILAT WITH TOMO: ICD-10-PCS | Mod: 26,HCNC,, | Performed by: RADIOLOGY

## 2023-08-31 ENCOUNTER — PATIENT MESSAGE (OUTPATIENT)
Dept: ELECTROPHYSIOLOGY | Facility: CLINIC | Age: 68
End: 2023-08-31
Payer: MEDICARE

## 2023-09-12 NOTE — PROGRESS NOTES
Ms. Wetzel is a patient of Dr. Mathias and was last seen in clinic 6/16/2023.      Subjective:   Patient ID:  Fely Wetzel is a 67 y.o. female who presents for follow up of Atrial Fibrillation  .     HPI:    Ms. Wetzel is a 67 y.o. female with AF (PVI 4/2023), HTN, DM, obesity here for follow up.     Background:    Referring Cardiologist: Marie Dominguez MD  Primary Care Physician: Paula Barkley DO     Mrs. Wetzel has a hx of hypertension, diabetes mellitus type 2, and obesity. She was in her usual state of health until June 2019 when she developed sudden palpitations. She called EMS and reportedly was in AF with ventricular rates up to 170s-220s. She was given IV cardizem and her ventricular rate lowered to 110s-170s. Per ER physician she was in atrial fibrillation however no ECGs document her arrhythmia. She was given 20mg more of cardizem and spontaneously converted to sinus rhythm. TSH was normal. She was discharged on eliquis and metoprolol. She was referred to Dr. Dominguez and discussed with her if there was possibility at some point of stopping anticoagulation. She reports for the past few weeks she has been having episodes of palpitations with radiation into her neck. These are different than when she went to the ER.      An exercise stress echocardiogram noted normal LV function and no ischemic changes. An echocardiogram performed on 10/15/2019 showed normal LV function, normal valves, and moderate LA enlargement (MURIEL 47).     Our plan was for a 30 day monitor, sleep study and possibly starting flecainide.     Mrs. Wetzel presented 5/2020 for follow-up. A 30 day monitor done in March of 2020 which showed no atrial fibrillation. She presented to the ER in November of 2019 with recurrent symptomatic AF with RVR that was rate controlled with cardizem and spontaneously converted. She notes occasional palpitations at night. She has not yet had a sleep study.    Mrs. Wetzel returned for  follow-up 12/2020. She is on flecainide. She continues on metoprolol/eliquis. She notes occasional episodes of chest discomfort at rest, not with activity. She drinks water and it goes away. She does have indigestion. No symptomatic AF. Recent CBC noted stable H/H.     Mrs. Wetzel returned for follow-up 7/2021. She was doing well from atrial fibrillation standpoint  without any reports of symptomatic AF. She reported chest discomfort that occurs after eating in the evening and was suspected to be due to dyspepsia. Protonix was prescribed at that time.      Mrs. Wetzel presented for follow up 2/2022 and she once again reported that she was doing well. She denied any sustained periods of atrial fibrillation. Her non cardiac chest discomfort has persistent in spite of protonix and she is planned for  coloscopy/EGD.  She was still employed and has no limitations with her usual activities.      Her [id number] score is 3 and indefinite anticoagulation is recommended. She remains on eliquis. She has had symptomatic benefit with flecainide/metoprolol.  Will order a 14 day holter. Will call with results. Discussed vascular surgery referral for painful varicose vein treatment. She will think about it.    1/19/2023: pt reported recurrence. Flec increased to 100mg BID    2/13/2023 Continued recurrence. Flec increased to 150mg BID and PVI planned.    4/19/2023: Successful pulmonary vein RF ablation. Continued on flecainide    6/16/2023: She is 2 months s/p PVI. She reports one breakthrough AF episode lasting about 5 min 2 weeks ago.   Remains on flecainide 150mg BID. Will continue this through blanking period, then wean if possible. CHADSVASc 4 and she should remain on anticoagulation indefinitely. She is on eliquis.  She reports low heart rates that are concerning for her. Will reduce toprol.    Update (09/14/2023):    Today she says she is feeling well overall. Since last clinic visit has felt 2 episodes of heart racing  lasting 2 minutes max. No CP, CHAVES, LH, syncope. She has started walking regularly again now that the weather is better.    She is currently taking eliquis 5mg BID for stroke prophylaxis and denies significant bleeding episodes. She is currently being treated with flecainide 150mg BID for rhythm control and toprol 100mg daily for HR control.  Kidney function is stable, with a creatinine of 0.9 on 4/12/2023.    I have personally reviewed the patient's EKG today, which shows sinus rhythm at 66bpm. NJ interval is 208. QRS is 128. QTc is 480.    Relevant Cardiac Test Results:    2D Echo (10/15/2019):  Normal left ventricular systolic function. The estimated ejection fraction is 70%  Normal right ventricular systolic function.  Indeterminate left ventricular diastolic function.  Moderate left atrial enlargement.  The estimated PA systolic pressure is 33 mm Hg  Normal central venous pressure (3 mm Hg).    Current Outpatient Medications   Medication Sig    ACCU-CHEK JUAN Misc CHECK FASTING GLUCOSE AND CHECK GLUCOSE TWO HOURS AFTER LUNCH OR DINNER    amLODIPine (NORVASC) 5 MG tablet Take 1 tablet (5 mg total) by mouth once daily.    atorvastatin (LIPITOR) 40 MG tablet TAKE 1 TABLET BY MOUTH EVERY DAY IN THE EVENING    blood-glucose meter Misc Check fasting glucose and check glucose two hours after lunch or dinner    cholecalciferol, vitamin D3, 1,250 mcg (50,000 unit) capsule TAKE 1 CAPSULE BY MOUTH ONE TIME PER WEEK    cyanocobalamin 500 MCG tablet Take 500 mcg by mouth once daily.    dulaglutide (TRULICITY) 3 mg/0.5 mL pen injector Inject 3 mg into the skin every 7 days.    ELIQUIS 5 mg Tab TAKE 1 TABLET BY MOUTH TWICE A DAY    empagliflozin-metformin (SYNJARDY XR) 10-1,000 mg TBph Take 1 tablet by mouth 2 (two) times a day.    fenofibrate 160 MG Tab TAKE 1 TABLET BY MOUTH ONCE DAILY.    flecainide (TAMBOCOR) 150 MG Tab Take 150 mg by mouth every 12 (twelve) hours.    hydroCHLOROthiazide (HYDRODIURIL) 25 MG tablet TAKE 1  TABLET (25 MG TOTAL) BY MOUTH ONCE DAILY. WITH BREAKFAST    losartan (COZAAR) 100 MG tablet TAKE 1 TABLET BY MOUTH EVERY DAY    metoprolol succinate (TOPROL-XL) 100 MG 24 hr tablet Take 1 tablet (100 mg total) by mouth once daily.    TRUE METRIX GLUCOSE TEST STRIP Strp USE 1 TEST STRIP TWICE DAILY TO MEASURE GLUCOSE    diclofenac sodium (VOLTAREN) 1 % Gel Apply 2 g topically 4 (four) times daily. for 10 days (Patient not taking: Reported on 6/16/2023)    flash glucose sensor (FREESTYLE PERLA 2 SENSOR) Kit 1 each by Misc.(Non-Drug; Combo Route) route every 14 (fourteen) days. (Patient not taking: Reported on 6/16/2023)    furosemide (LASIX) 20 MG tablet Take 1 tablet (20 mg total) by mouth as needed (3-5 pound weight gain or lower extremity edema, please take 1 pill by mouth for 3 days.). (Patient not taking: Reported on 6/16/2023)    lancing device with lancets (ONETOUCH DELICA LANC DEVICE) Kit 1 each by Misc.(Non-Drug; Combo Route) route 2 (two) times daily before meals. (Patient not taking: Reported on 6/16/2023)    pantoprazole (PROTONIX) 40 MG tablet Take 1 tablet (40 mg total) by mouth once daily. Start taking this medication on 4/5/2023. (Patient not taking: Reported on 6/16/2023)    sucralfate (CARAFATE) 1 gram tablet Take 1 tablet (1 g total) by mouth 4 (four) times daily. (Patient not taking: Reported on 6/16/2023)     No current facility-administered medications for this visit.       Review of Systems   Constitutional: Negative for malaise/fatigue.   Cardiovascular:  Positive for palpitations (brief). Negative for chest pain, dyspnea on exertion, irregular heartbeat and leg swelling.   Respiratory:  Negative for shortness of breath.    Hematologic/Lymphatic: Negative for bleeding problem.   Skin:  Negative for rash.   Musculoskeletal:  Negative for myalgias.   Gastrointestinal:  Negative for hematemesis, hematochezia and nausea.   Genitourinary:  Negative for hematuria.   Neurological:  Negative for  light-headedness.   Psychiatric/Behavioral:  Negative for altered mental status.    Allergic/Immunologic: Negative for persistent infections.     Objective:        /68   Pulse 66   Wt 73.8 kg (162 lb 11.2 oz)   BMI 29.76 kg/m²     Physical Exam  Vitals and nursing note reviewed.   Constitutional:       Appearance: Normal appearance. She is well-developed.   HENT:      Head: Normocephalic.      Nose: Nose normal.   Eyes:      Pupils: Pupils are equal, round, and reactive to light.   Cardiovascular:      Rate and Rhythm: Normal rate and regular rhythm.   Pulmonary:      Effort: No respiratory distress.      Breath sounds: Normal breath sounds.   Musculoskeletal:         General: Normal range of motion.   Skin:     General: Skin is warm and dry.      Findings: No erythema.   Neurological:      Mental Status: She is alert and oriented to person, place, and time.   Psychiatric:         Speech: Speech normal.         Behavior: Behavior normal.       Lab Results   Component Value Date     04/12/2023    K 3.8 04/12/2023    MG 1.7 01/13/2023    BUN 25 (H) 04/12/2023    CREATININE 0.9 04/12/2023    ALT 16 03/16/2023    AST 21 03/16/2023    HGB 13.5 04/12/2023    HCT 42.7 04/12/2023    TSH 1.518 03/16/2023    LDLCALC 95.8 03/16/2023       Recent Labs   Lab 04/12/23  1029   INR 1.0       Assessment:     1. PAF (paroxysmal atrial fibrillation)    2. Severe obesity (BMI 35.0-39.9) with comorbidity    3. Hypertension associated with diabetes    4. On anticoagulant therapy      Plan:     In summary, Ms. Wetzel is a 67 y.o. female with AF (PVI 4/2023), HTN, DM, obesity here for follow up.   She is now 5 months s/p PVI. Doing well overall from rhythm standpoint, with only brief palpitations since last clinic visit. Will start to wean off flecainide.   Otherwise feeling well. CHADSVASc 4 on eliquis.    Reduce flecainide to 100mg BID  Continue other meds  RTC 3 mo, sooner if needed    *A copy of this note has been sent  to Dr. Mathias*    Follow up in about 3 months (around 12/14/2023).    ------------------------------------------------------------------    ANIL Lambert, NP-C  Cardiac Electrophysiology

## 2023-09-14 ENCOUNTER — HOSPITAL ENCOUNTER (OUTPATIENT)
Dept: CARDIOLOGY | Facility: CLINIC | Age: 68
Discharge: HOME | End: 2023-09-14
Payer: MEDICARE

## 2023-09-14 ENCOUNTER — OFFICE VISIT (OUTPATIENT)
Dept: ELECTROPHYSIOLOGY | Facility: CLINIC | Age: 68
End: 2023-09-14
Payer: MEDICARE

## 2023-09-14 VITALS
DIASTOLIC BLOOD PRESSURE: 68 MMHG | WEIGHT: 162.69 LBS | SYSTOLIC BLOOD PRESSURE: 120 MMHG | HEART RATE: 66 BPM | BODY MASS INDEX: 29.76 KG/M2

## 2023-09-14 DIAGNOSIS — I15.2 HYPERTENSION ASSOCIATED WITH DIABETES: ICD-10-CM

## 2023-09-14 DIAGNOSIS — I48.0 PAF (PAROXYSMAL ATRIAL FIBRILLATION): Primary | ICD-10-CM

## 2023-09-14 DIAGNOSIS — E11.59 HYPERTENSION ASSOCIATED WITH DIABETES: ICD-10-CM

## 2023-09-14 DIAGNOSIS — E66.01 SEVERE OBESITY (BMI 35.0-39.9) WITH COMORBIDITY: ICD-10-CM

## 2023-09-14 DIAGNOSIS — Z79.01 ON ANTICOAGULANT THERAPY: ICD-10-CM

## 2023-09-14 DIAGNOSIS — I48.0 PAF (PAROXYSMAL ATRIAL FIBRILLATION): ICD-10-CM

## 2023-09-14 PROCEDURE — 3060F PR POS MICROALBUMINURIA RESULT DOCUMENTED/REVIEW: ICD-10-PCS | Mod: HCNC,CPTII,S$GLB, | Performed by: NURSE PRACTITIONER

## 2023-09-14 PROCEDURE — 1126F AMNT PAIN NOTED NONE PRSNT: CPT | Mod: HCNC,CPTII,S$GLB, | Performed by: NURSE PRACTITIONER

## 2023-09-14 PROCEDURE — 3288F PR FALLS RISK ASSESSMENT DOCUMENTED: ICD-10-PCS | Mod: HCNC,CPTII,S$GLB, | Performed by: NURSE PRACTITIONER

## 2023-09-14 PROCEDURE — 1160F PR REVIEW ALL MEDS BY PRESCRIBER/CLIN PHARMACIST DOCUMENTED: ICD-10-PCS | Mod: HCNC,CPTII,S$GLB, | Performed by: NURSE PRACTITIONER

## 2023-09-14 PROCEDURE — 1159F PR MEDICATION LIST DOCUMENTED IN MEDICAL RECORD: ICD-10-PCS | Mod: HCNC,CPTII,S$GLB, | Performed by: NURSE PRACTITIONER

## 2023-09-14 PROCEDURE — 3008F PR BODY MASS INDEX (BMI) DOCUMENTED: ICD-10-PCS | Mod: HCNC,CPTII,S$GLB, | Performed by: NURSE PRACTITIONER

## 2023-09-14 PROCEDURE — 3074F SYST BP LT 130 MM HG: CPT | Mod: HCNC,CPTII,S$GLB, | Performed by: NURSE PRACTITIONER

## 2023-09-14 PROCEDURE — 1101F PT FALLS ASSESS-DOCD LE1/YR: CPT | Mod: HCNC,CPTII,S$GLB, | Performed by: NURSE PRACTITIONER

## 2023-09-14 PROCEDURE — 99999 PR PBB SHADOW E&M-EST. PATIENT-LVL IV: CPT | Mod: PBBFAC,HCNC,, | Performed by: NURSE PRACTITIONER

## 2023-09-14 PROCEDURE — 3051F HG A1C>EQUAL 7.0%<8.0%: CPT | Mod: HCNC,CPTII,S$GLB, | Performed by: NURSE PRACTITIONER

## 2023-09-14 PROCEDURE — 4010F ACE/ARB THERAPY RXD/TAKEN: CPT | Mod: HCNC,CPTII,S$GLB, | Performed by: NURSE PRACTITIONER

## 2023-09-14 PROCEDURE — 93005 ELECTROCARDIOGRAM TRACING: CPT | Mod: HCNC,S$GLB,, | Performed by: INTERNAL MEDICINE

## 2023-09-14 PROCEDURE — 1160F RVW MEDS BY RX/DR IN RCRD: CPT | Mod: HCNC,CPTII,S$GLB, | Performed by: NURSE PRACTITIONER

## 2023-09-14 PROCEDURE — 3066F NEPHROPATHY DOC TX: CPT | Mod: HCNC,CPTII,S$GLB, | Performed by: NURSE PRACTITIONER

## 2023-09-14 PROCEDURE — 93005 RHYTHM STRIP: ICD-10-PCS | Mod: HCNC,S$GLB,, | Performed by: INTERNAL MEDICINE

## 2023-09-14 PROCEDURE — 99214 PR OFFICE/OUTPT VISIT, EST, LEVL IV, 30-39 MIN: ICD-10-PCS | Mod: HCNC,S$GLB,, | Performed by: NURSE PRACTITIONER

## 2023-09-14 PROCEDURE — 3051F PR MOST RECENT HEMOGLOBIN A1C LEVEL 7.0 - < 8.0%: ICD-10-PCS | Mod: HCNC,CPTII,S$GLB, | Performed by: NURSE PRACTITIONER

## 2023-09-14 PROCEDURE — 1126F PR PAIN SEVERITY QUANTIFIED, NO PAIN PRESENT: ICD-10-PCS | Mod: HCNC,CPTII,S$GLB, | Performed by: NURSE PRACTITIONER

## 2023-09-14 PROCEDURE — 3288F FALL RISK ASSESSMENT DOCD: CPT | Mod: HCNC,CPTII,S$GLB, | Performed by: NURSE PRACTITIONER

## 2023-09-14 PROCEDURE — 4010F PR ACE/ARB THEARPY RXD/TAKEN: ICD-10-PCS | Mod: HCNC,CPTII,S$GLB, | Performed by: NURSE PRACTITIONER

## 2023-09-14 PROCEDURE — 3074F PR MOST RECENT SYSTOLIC BLOOD PRESSURE < 130 MM HG: ICD-10-PCS | Mod: HCNC,CPTII,S$GLB, | Performed by: NURSE PRACTITIONER

## 2023-09-14 PROCEDURE — 3078F DIAST BP <80 MM HG: CPT | Mod: HCNC,CPTII,S$GLB, | Performed by: NURSE PRACTITIONER

## 2023-09-14 PROCEDURE — 99999 PR PBB SHADOW E&M-EST. PATIENT-LVL IV: ICD-10-PCS | Mod: PBBFAC,HCNC,, | Performed by: NURSE PRACTITIONER

## 2023-09-14 PROCEDURE — 3008F BODY MASS INDEX DOCD: CPT | Mod: HCNC,CPTII,S$GLB, | Performed by: NURSE PRACTITIONER

## 2023-09-14 PROCEDURE — 1101F PR PT FALLS ASSESS DOC 0-1 FALLS W/OUT INJ PAST YR: ICD-10-PCS | Mod: HCNC,CPTII,S$GLB, | Performed by: NURSE PRACTITIONER

## 2023-09-14 PROCEDURE — 93010 RHYTHM STRIP: ICD-10-PCS | Mod: HCNC,S$GLB,, | Performed by: INTERNAL MEDICINE

## 2023-09-14 PROCEDURE — 3060F POS MICROALBUMINURIA REV: CPT | Mod: HCNC,CPTII,S$GLB, | Performed by: NURSE PRACTITIONER

## 2023-09-14 PROCEDURE — 3066F PR DOCUMENTATION OF TREATMENT FOR NEPHROPATHY: ICD-10-PCS | Mod: HCNC,CPTII,S$GLB, | Performed by: NURSE PRACTITIONER

## 2023-09-14 PROCEDURE — 1159F MED LIST DOCD IN RCRD: CPT | Mod: HCNC,CPTII,S$GLB, | Performed by: NURSE PRACTITIONER

## 2023-09-14 PROCEDURE — 93010 ELECTROCARDIOGRAM REPORT: CPT | Mod: HCNC,S$GLB,, | Performed by: INTERNAL MEDICINE

## 2023-09-14 PROCEDURE — 99214 OFFICE O/P EST MOD 30 MIN: CPT | Mod: HCNC,S$GLB,, | Performed by: NURSE PRACTITIONER

## 2023-09-14 PROCEDURE — 3078F PR MOST RECENT DIASTOLIC BLOOD PRESSURE < 80 MM HG: ICD-10-PCS | Mod: HCNC,CPTII,S$GLB, | Performed by: NURSE PRACTITIONER

## 2023-09-14 RX ORDER — FLECAINIDE ACETATE 100 MG/1
100 TABLET ORAL EVERY 12 HOURS
Qty: 60 TABLET | Refills: 3 | Status: SHIPPED | OUTPATIENT
Start: 2023-09-14 | End: 2023-11-06

## 2023-09-15 ENCOUNTER — PATIENT MESSAGE (OUTPATIENT)
Dept: ADMINISTRATIVE | Facility: HOSPITAL | Age: 68
End: 2023-09-15
Payer: MEDICARE

## 2023-09-15 ENCOUNTER — PATIENT OUTREACH (OUTPATIENT)
Dept: ADMINISTRATIVE | Facility: HOSPITAL | Age: 68
End: 2023-09-15

## 2023-09-15 LAB
LEFT EYE DM RETINOPATHY: POSITIVE
RIGHT EYE DM RETINOPATHY: POSITIVE

## 2023-09-15 NOTE — LETTER
AUTHORIZATION FOR RELEASE OF   CONFIDENTIAL INFORMATION        We are seeing Fely Wetzel, date of birth 1955, in the clinic at [facility]. Hien Sparks MD is the patient's PCP. Fely Wetzel has an outstanding lab/procedure at the time we reviewed her chart. In order to help keep her health information updated, she has authorized us to request the following medical record(s):        (  )  MAMMOGRAM                                      (  )  COLONOSCOPY      (  )  PAP SMEAR                                          (  )  OUTSIDE LAB RESULTS     (  )  DEXA SCAN                                          (  )  EYE EXAM            (  )  FOOT EXAM                                          (  )  ENTIRE RECORD     (  )  OUTSIDE IMMUNIZATIONS                 (  )  _______________         Please fax records to [facility], Azuoru, Miriam C., MD, [phone]     If you have any questions, please contact Rosalind at [phone].           Patient Name: Fely Wetzel  : 1955  Patient Phone #: [phone]

## 2023-09-23 ENCOUNTER — IMMUNIZATION (OUTPATIENT)
Dept: INTERNAL MEDICINE | Facility: CLINIC | Age: 68
End: 2023-09-23
Payer: MEDICARE

## 2023-09-23 PROCEDURE — G0008 ADMIN INFLUENZA VIRUS VAC: HCPCS | Mod: HCNC,S$GLB,, | Performed by: INTERNAL MEDICINE

## 2023-09-23 PROCEDURE — 90694 VACC AIIV4 NO PRSRV 0.5ML IM: CPT | Mod: HCNC,S$GLB,, | Performed by: INTERNAL MEDICINE

## 2023-09-23 PROCEDURE — G0008 FLU VACCINE - QUADRIVALENT - ADJUVANTED: ICD-10-PCS | Mod: HCNC,S$GLB,, | Performed by: INTERNAL MEDICINE

## 2023-09-23 PROCEDURE — 90694 FLU VACCINE - QUADRIVALENT - ADJUVANTED: ICD-10-PCS | Mod: HCNC,S$GLB,, | Performed by: INTERNAL MEDICINE

## 2023-09-25 ENCOUNTER — PATIENT OUTREACH (OUTPATIENT)
Dept: ADMINISTRATIVE | Facility: HOSPITAL | Age: 68
End: 2023-09-25
Payer: MEDICARE

## 2023-09-25 ENCOUNTER — OFFICE VISIT (OUTPATIENT)
Dept: INTERNAL MEDICINE | Facility: CLINIC | Age: 68
End: 2023-09-25
Payer: MEDICARE

## 2023-09-25 VITALS
SYSTOLIC BLOOD PRESSURE: 118 MMHG | HEART RATE: 67 BPM | RESPIRATION RATE: 16 BRPM | TEMPERATURE: 98 F | OXYGEN SATURATION: 96 % | HEIGHT: 62 IN | BODY MASS INDEX: 29.62 KG/M2 | DIASTOLIC BLOOD PRESSURE: 66 MMHG | WEIGHT: 160.94 LBS

## 2023-09-25 DIAGNOSIS — I48.0 PAF (PAROXYSMAL ATRIAL FIBRILLATION): ICD-10-CM

## 2023-09-25 DIAGNOSIS — I15.2 HYPERTENSION ASSOCIATED WITH DIABETES: Primary | ICD-10-CM

## 2023-09-25 DIAGNOSIS — E11.65 TYPE 2 DIABETES MELLITUS WITH HYPERGLYCEMIA, WITHOUT LONG-TERM CURRENT USE OF INSULIN: ICD-10-CM

## 2023-09-25 DIAGNOSIS — I15.2 HYPERTENSION ASSOCIATED WITH DIABETES: ICD-10-CM

## 2023-09-25 DIAGNOSIS — Z00.00 ANNUAL PHYSICAL EXAM: Primary | ICD-10-CM

## 2023-09-25 DIAGNOSIS — E11.69 HYPERLIPIDEMIA ASSOCIATED WITH TYPE 2 DIABETES MELLITUS: ICD-10-CM

## 2023-09-25 DIAGNOSIS — E78.5 HYPERLIPIDEMIA ASSOCIATED WITH TYPE 2 DIABETES MELLITUS: ICD-10-CM

## 2023-09-25 DIAGNOSIS — E66.9 OBESITY, CLASS I, BMI 30-34.9: ICD-10-CM

## 2023-09-25 DIAGNOSIS — E11.59 HYPERTENSION ASSOCIATED WITH DIABETES: ICD-10-CM

## 2023-09-25 DIAGNOSIS — E11.59 HYPERTENSION ASSOCIATED WITH DIABETES: Primary | ICD-10-CM

## 2023-09-25 PROCEDURE — 3288F PR FALLS RISK ASSESSMENT DOCUMENTED: ICD-10-PCS | Mod: HCNC,CPTII,S$GLB, | Performed by: HOSPITALIST

## 2023-09-25 PROCEDURE — 1160F RVW MEDS BY RX/DR IN RCRD: CPT | Mod: HCNC,CPTII,S$GLB, | Performed by: HOSPITALIST

## 2023-09-25 PROCEDURE — 1101F PR PT FALLS ASSESS DOC 0-1 FALLS W/OUT INJ PAST YR: ICD-10-PCS | Mod: HCNC,CPTII,S$GLB, | Performed by: HOSPITALIST

## 2023-09-25 PROCEDURE — 3288F FALL RISK ASSESSMENT DOCD: CPT | Mod: HCNC,CPTII,S$GLB, | Performed by: HOSPITALIST

## 2023-09-25 PROCEDURE — 3060F POS MICROALBUMINURIA REV: CPT | Mod: HCNC,CPTII,S$GLB, | Performed by: HOSPITALIST

## 2023-09-25 PROCEDURE — 1159F PR MEDICATION LIST DOCUMENTED IN MEDICAL RECORD: ICD-10-PCS | Mod: HCNC,CPTII,S$GLB, | Performed by: HOSPITALIST

## 2023-09-25 PROCEDURE — 3051F HG A1C>EQUAL 7.0%<8.0%: CPT | Mod: HCNC,CPTII,S$GLB, | Performed by: HOSPITALIST

## 2023-09-25 PROCEDURE — 99999 PR PBB SHADOW E&M-EST. PATIENT-LVL V: ICD-10-PCS | Mod: PBBFAC,HCNC,, | Performed by: HOSPITALIST

## 2023-09-25 PROCEDURE — 3066F NEPHROPATHY DOC TX: CPT | Mod: HCNC,CPTII,S$GLB, | Performed by: HOSPITALIST

## 2023-09-25 PROCEDURE — 99214 PR OFFICE/OUTPT VISIT, EST, LEVL IV, 30-39 MIN: ICD-10-PCS | Mod: HCNC,S$GLB,, | Performed by: HOSPITALIST

## 2023-09-25 PROCEDURE — 3078F PR MOST RECENT DIASTOLIC BLOOD PRESSURE < 80 MM HG: ICD-10-PCS | Mod: HCNC,CPTII,S$GLB, | Performed by: HOSPITALIST

## 2023-09-25 PROCEDURE — 3074F SYST BP LT 130 MM HG: CPT | Mod: HCNC,CPTII,S$GLB, | Performed by: HOSPITALIST

## 2023-09-25 PROCEDURE — 3066F PR DOCUMENTATION OF TREATMENT FOR NEPHROPATHY: ICD-10-PCS | Mod: HCNC,CPTII,S$GLB, | Performed by: HOSPITALIST

## 2023-09-25 PROCEDURE — 4010F ACE/ARB THERAPY RXD/TAKEN: CPT | Mod: HCNC,CPTII,S$GLB, | Performed by: HOSPITALIST

## 2023-09-25 PROCEDURE — 3008F PR BODY MASS INDEX (BMI) DOCUMENTED: ICD-10-PCS | Mod: HCNC,CPTII,S$GLB, | Performed by: HOSPITALIST

## 2023-09-25 PROCEDURE — 3074F PR MOST RECENT SYSTOLIC BLOOD PRESSURE < 130 MM HG: ICD-10-PCS | Mod: HCNC,CPTII,S$GLB, | Performed by: HOSPITALIST

## 2023-09-25 PROCEDURE — 3051F PR MOST RECENT HEMOGLOBIN A1C LEVEL 7.0 - < 8.0%: ICD-10-PCS | Mod: HCNC,CPTII,S$GLB, | Performed by: HOSPITALIST

## 2023-09-25 PROCEDURE — 1160F PR REVIEW ALL MEDS BY PRESCRIBER/CLIN PHARMACIST DOCUMENTED: ICD-10-PCS | Mod: HCNC,CPTII,S$GLB, | Performed by: HOSPITALIST

## 2023-09-25 PROCEDURE — 99214 OFFICE O/P EST MOD 30 MIN: CPT | Mod: HCNC,S$GLB,, | Performed by: HOSPITALIST

## 2023-09-25 PROCEDURE — 3078F DIAST BP <80 MM HG: CPT | Mod: HCNC,CPTII,S$GLB, | Performed by: HOSPITALIST

## 2023-09-25 PROCEDURE — 3008F BODY MASS INDEX DOCD: CPT | Mod: HCNC,CPTII,S$GLB, | Performed by: HOSPITALIST

## 2023-09-25 PROCEDURE — 3060F PR POS MICROALBUMINURIA RESULT DOCUMENTED/REVIEW: ICD-10-PCS | Mod: HCNC,CPTII,S$GLB, | Performed by: HOSPITALIST

## 2023-09-25 PROCEDURE — 4010F PR ACE/ARB THEARPY RXD/TAKEN: ICD-10-PCS | Mod: HCNC,CPTII,S$GLB, | Performed by: HOSPITALIST

## 2023-09-25 PROCEDURE — 1159F MED LIST DOCD IN RCRD: CPT | Mod: HCNC,CPTII,S$GLB, | Performed by: HOSPITALIST

## 2023-09-25 PROCEDURE — 1101F PT FALLS ASSESS-DOCD LE1/YR: CPT | Mod: HCNC,CPTII,S$GLB, | Performed by: HOSPITALIST

## 2023-09-25 PROCEDURE — 1126F AMNT PAIN NOTED NONE PRSNT: CPT | Mod: HCNC,CPTII,S$GLB, | Performed by: HOSPITALIST

## 2023-09-25 PROCEDURE — 1126F PR PAIN SEVERITY QUANTIFIED, NO PAIN PRESENT: ICD-10-PCS | Mod: HCNC,CPTII,S$GLB, | Performed by: HOSPITALIST

## 2023-09-25 PROCEDURE — 99999 PR PBB SHADOW E&M-EST. PATIENT-LVL V: CPT | Mod: PBBFAC,HCNC,, | Performed by: HOSPITALIST

## 2023-09-25 RX ORDER — PANTOPRAZOLE SODIUM 40 MG/1
40 TABLET, DELAYED RELEASE ORAL DAILY
Qty: 90 TABLET | Refills: 3 | Status: SHIPPED | OUTPATIENT
Start: 2023-09-25 | End: 2023-11-16 | Stop reason: SDUPTHER

## 2023-09-25 RX ORDER — BENZONATATE 100 MG/1
100 CAPSULE ORAL 3 TIMES DAILY PRN
Qty: 30 CAPSULE | Refills: 0 | Status: SHIPPED | OUTPATIENT
Start: 2023-09-25 | End: 2023-10-05

## 2023-09-25 RX ORDER — LANCETS 26 GAUGE
1 EACH MISCELLANEOUS
Qty: 100 EACH | Refills: 5 | Status: SHIPPED | OUTPATIENT
Start: 2023-09-25 | End: 2023-11-16 | Stop reason: SDUPTHER

## 2023-09-25 NOTE — PROGRESS NOTES
Subjective:     @Patient ID: Fely Wetzel is a 67 y.o. female.    Chief Complaint: Follow-up, Hypertension, and Diabetes    HPI       67 y.o. female with dm2, afib, hld, osteopenia, psoriasis presents here for f/u of htn, dm2     1. Dm2: last a1c 7.7.   Utd on eye exam. Follows with diabetic NP. On dapagliflozin-metformin  5-1000 mg bid, trulicity 3 mg qweek. Reports has been eating more sweets over the holidays  2. HTN: on toprol-xl 50 mg qday, amlodipine 10 mg qday, losartan 100 mg qday, hctz 25 mg qday  3. HLD: on lipitor 40 mg qday and fenofibrate 160 mg qday  4. Paroxysmal Afib: follows with cardiology. On eliquis 5 mg bid, metoprolol xl 100 mg qday and flecanide 100 mg bid . S/p ablation  5. H pylori: completed treatment   6. Osteopenia: on vitamin d  7. Varicose veins: reports sometimes has leg swelling.          Review of Systems   Constitutional:  Negative for chills and fever.   HENT:  Negative for congestion and sore throat.    Eyes:  Negative for pain and visual disturbance.   Respiratory:  Negative for cough and shortness of breath.    Cardiovascular:  Negative for chest pain and leg swelling.   Gastrointestinal:  Negative for abdominal pain, nausea and vomiting.   Endocrine: Negative for polydipsia and polyuria.   Genitourinary:  Negative for difficulty urinating and dysuria.   Musculoskeletal:  Negative for arthralgias and back pain.   Skin:  Negative for rash and wound.   Neurological:  Negative for dizziness, weakness and headaches.   Psychiatric/Behavioral:  Negative for agitation and confusion.      Past medical history, surgical history, and family medical history reviewed and updated as appropriate.    Medications and allergies reviewed.     Objective:     Vitals:    09/25/23 1029   BP: 118/66   BP Location: Left arm   Patient Position: Sitting   BP Method: Medium (Manual)   Pulse: 67   Resp: 16   Temp: 98.1 °F (36.7 °C)   TempSrc: Temporal   SpO2: 96%   Weight: 73 kg (160 lb 15 oz)  "  Height: 5' 2" (1.575 m)     Body mass index is 29.44 kg/m².  Physical Exam  Constitutional:       Appearance: Normal appearance.   HENT:      Head: Normocephalic and atraumatic.   Eyes:      General:         Right eye: No discharge.         Left eye: No discharge.      Conjunctiva/sclera: Conjunctivae normal.   Cardiovascular:      Rate and Rhythm: Normal rate and regular rhythm.      Heart sounds: No murmur heard.  Pulmonary:      Effort: Pulmonary effort is normal.      Breath sounds: Normal breath sounds.   Musculoskeletal:         General: Normal range of motion.      Cervical back: Normal range of motion and neck supple.   Skin:     General: Skin is warm and dry.   Neurological:      Mental Status: She is alert and oriented to person, place, and time.   Psychiatric:         Mood and Affect: Mood normal.         Behavior: Behavior normal.         Lab Results   Component Value Date    WBC 6.29 04/12/2023    HGB 13.5 04/12/2023    HCT 42.7 04/12/2023     04/12/2023    CHOL 177 03/16/2023    TRIG 116 03/16/2023    HDL 58 03/16/2023    ALT 16 03/16/2023    AST 21 03/16/2023     04/12/2023    K 3.8 04/12/2023     04/12/2023    CREATININE 0.9 04/12/2023    BUN 25 (H) 04/12/2023    CO2 22 (L) 04/12/2023    TSH 1.518 03/16/2023    INR 1.0 04/12/2023    GLUF 126 (H) 07/07/2010    HGBA1C 7.7 (H) 03/16/2023       Assessment:     1. Hypertension associated with diabetes    2. Type 2 diabetes mellitus with hyperglycemia, without long-term current use of insulin    3. Hyperlipidemia associated with type 2 diabetes mellitus    4. PAF (paroxysmal atrial fibrillation)      Plan:   Fely was seen today for follow-up.    Diagnoses and all orders for this visit:    Hypertension associated with diabetes  - Stable. Continue home meds     Type 2 diabetes mellitus with hyperglycemia, without long-term current use of insulin  - Stable. Continue home meds     -     lancing device with lancets (ONETOUCH DELICA LANC " DEVICE) Kit; 1 each by Misc.(Non-Drug; Combo Route) route 2 (two) times daily before meals.    Hyperlipidemia associated with type 2 diabetes mellitus  - Stable. Continue home meds     PAF (paroxysmal atrial fibrillation)  - stable. Continue f/u with cardiology     Other orders  -     pantoprazole (PROTONIX) 40 MG tablet; Take 1 tablet (40 mg total) by mouth once daily. Start taking this medication on 4/5/2023.  -     benzonatate (TESSALON) 100 MG capsule; Take 1 capsule (100 mg total) by mouth 3 (three) times daily as needed for Cough.      Rtc 6 months for annual      Hien Sparks MD  Internal Medicine    9/25/2023

## 2023-09-25 NOTE — LETTER
AUTHORIZATION FOR RELEASE OF   CONFIDENTIAL INFORMATION        We are seeing Fely Wetzel, date of birth 1955, in the clinic at [facility]. Hien Sparks MD is the patient's PCP. Fely Wetzel has an outstanding lab/procedure at the time we reviewed her chart. In order to help keep her health information updated, she has authorized us to request the following medical record(s):        (  )  MAMMOGRAM                                      (  )  COLONOSCOPY      (  )  PAP SMEAR                                          (  )  OUTSIDE LAB RESULTS     (  )  DEXA SCAN                                          ( x )  EYE EXAM            (  )  FOOT EXAM                                          (  )  ENTIRE RECORD     (  )  OUTSIDE IMMUNIZATIONS                 (  )  _______________         Please fax records to [facility], Azuoru, Miriam C., MD, [phone]     If you have any questions, please contact Rosalind at [phone].           Patient Name: Fely Wetzel  : 1955  Patient Phone #: [phone]

## 2023-09-25 NOTE — LETTER
AUTHORIZATION FOR RELEASE OF   CONFIDENTIAL INFORMATION  ,    We are seeing Fely Wetzel, date of birth 1955, in the clinic at [facility]. Hien Sparks MD is the patient's PCP. Fely Wetzel has an outstanding lab/procedure at the time we reviewed her chart. In order to help keep her health information updated, she has authorized us to request the following medical record(s):        (  )  MAMMOGRAM                                      (  )  COLONOSCOPY      (  )  PAP SMEAR                                          (  )  OUTSIDE LAB RESULTS     (  )  DEXA SCAN                                          (  )  EYE EXAM            (  )  FOOT EXAM                                          (  )  ENTIRE RECORD     (  )  OUTSIDE IMMUNIZATIONS                 (  )  _______________         Please fax records to [facility], Azuoru, Miriam C., MD, [phone]     If you have any questions, please contact Rosalind at [phone].           Patient Name: Fely Wetzel  : 1955  Patient Phone #: [phone]

## 2023-09-27 ENCOUNTER — PATIENT OUTREACH (OUTPATIENT)
Dept: ADMINISTRATIVE | Facility: HOSPITAL | Age: 68
End: 2023-09-27
Payer: MEDICARE

## 2023-10-23 DIAGNOSIS — E11.65 TYPE 2 DIABETES MELLITUS WITH HYPERGLYCEMIA, WITHOUT LONG-TERM CURRENT USE OF INSULIN: ICD-10-CM

## 2023-10-23 RX ORDER — EMPAGLIFLOZIN, METFORMIN HYDROCHLORIDE 10; 1000 MG/1; MG/1
1 TABLET, EXTENDED RELEASE ORAL 2 TIMES DAILY
Qty: 60 TABLET | Refills: 3 | Status: SHIPPED | OUTPATIENT
Start: 2023-10-23 | End: 2023-11-16 | Stop reason: SDUPTHER

## 2023-11-06 RX ORDER — FLECAINIDE ACETATE 100 MG/1
100 TABLET ORAL EVERY 12 HOURS
Qty: 180 TABLET | Refills: 3 | Status: SHIPPED | OUTPATIENT
Start: 2023-11-06 | End: 2024-01-08

## 2023-11-10 ENCOUNTER — LAB VISIT (OUTPATIENT)
Dept: LAB | Facility: HOSPITAL | Age: 68
End: 2023-11-10
Payer: MEDICARE

## 2023-11-10 DIAGNOSIS — E11.65 TYPE 2 DIABETES MELLITUS WITH HYPERGLYCEMIA, WITHOUT LONG-TERM CURRENT USE OF INSULIN: ICD-10-CM

## 2023-11-10 DIAGNOSIS — E78.5 HYPERLIPIDEMIA ASSOCIATED WITH TYPE 2 DIABETES MELLITUS: ICD-10-CM

## 2023-11-10 DIAGNOSIS — E11.69 HYPERLIPIDEMIA ASSOCIATED WITH TYPE 2 DIABETES MELLITUS: ICD-10-CM

## 2023-11-10 LAB
ALBUMIN SERPL BCP-MCNC: 4 G/DL (ref 3.5–5.2)
ALP SERPL-CCNC: 58 U/L (ref 55–135)
ALT SERPL W/O P-5'-P-CCNC: 17 U/L (ref 10–44)
ANION GAP SERPL CALC-SCNC: 15 MMOL/L (ref 8–16)
AST SERPL-CCNC: 21 U/L (ref 10–40)
BILIRUB SERPL-MCNC: 0.6 MG/DL (ref 0.1–1)
BUN SERPL-MCNC: 29 MG/DL (ref 8–23)
CALCIUM SERPL-MCNC: 10.3 MG/DL (ref 8.7–10.5)
CHLORIDE SERPL-SCNC: 100 MMOL/L (ref 95–110)
CHOLEST SERPL-MCNC: 192 MG/DL (ref 120–199)
CHOLEST/HDLC SERPL: 3.6 {RATIO} (ref 2–5)
CO2 SERPL-SCNC: 21 MMOL/L (ref 23–29)
CREAT SERPL-MCNC: 0.8 MG/DL (ref 0.5–1.4)
EST. GFR  (NO RACE VARIABLE): >60 ML/MIN/1.73 M^2
ESTIMATED AVG GLUCOSE: 151 MG/DL (ref 68–131)
GLUCOSE SERPL-MCNC: 142 MG/DL (ref 70–110)
HBA1C MFR BLD: 6.9 % (ref 4–5.6)
HDLC SERPL-MCNC: 54 MG/DL (ref 40–75)
HDLC SERPL: 28.1 % (ref 20–50)
LDLC SERPL CALC-MCNC: 107.4 MG/DL (ref 63–159)
NONHDLC SERPL-MCNC: 138 MG/DL
POTASSIUM SERPL-SCNC: 3.6 MMOL/L (ref 3.5–5.1)
PROT SERPL-MCNC: 7.9 G/DL (ref 6–8.4)
SODIUM SERPL-SCNC: 136 MMOL/L (ref 136–145)
TRIGL SERPL-MCNC: 153 MG/DL (ref 30–150)

## 2023-11-10 PROCEDURE — 80053 COMPREHEN METABOLIC PANEL: CPT | Mod: HCNC | Performed by: NURSE PRACTITIONER

## 2023-11-10 PROCEDURE — 80061 LIPID PANEL: CPT | Mod: HCNC | Performed by: NURSE PRACTITIONER

## 2023-11-10 PROCEDURE — 83036 HEMOGLOBIN GLYCOSYLATED A1C: CPT | Mod: HCNC | Performed by: NURSE PRACTITIONER

## 2023-11-10 PROCEDURE — 36415 COLL VENOUS BLD VENIPUNCTURE: CPT | Mod: HCNC,PO | Performed by: NURSE PRACTITIONER

## 2023-11-12 ENCOUNTER — PATIENT MESSAGE (OUTPATIENT)
Dept: DIABETES | Facility: CLINIC | Age: 68
End: 2023-11-12
Payer: MEDICARE

## 2023-11-16 ENCOUNTER — OFFICE VISIT (OUTPATIENT)
Dept: INTERNAL MEDICINE | Facility: CLINIC | Age: 68
End: 2023-11-16
Payer: MEDICARE

## 2023-11-16 VITALS
HEART RATE: 72 BPM | SYSTOLIC BLOOD PRESSURE: 118 MMHG | OXYGEN SATURATION: 100 % | BODY MASS INDEX: 29.86 KG/M2 | TEMPERATURE: 97 F | DIASTOLIC BLOOD PRESSURE: 70 MMHG | WEIGHT: 162.25 LBS | HEIGHT: 62 IN | RESPIRATION RATE: 16 BRPM

## 2023-11-16 DIAGNOSIS — E11.65 TYPE 2 DIABETES MELLITUS WITH HYPERGLYCEMIA, WITHOUT LONG-TERM CURRENT USE OF INSULIN: Primary | ICD-10-CM

## 2023-11-16 DIAGNOSIS — E11.69 HYPERLIPIDEMIA ASSOCIATED WITH TYPE 2 DIABETES MELLITUS: ICD-10-CM

## 2023-11-16 DIAGNOSIS — E66.01 SEVERE OBESITY (BMI 35.0-39.9) WITH COMORBIDITY: ICD-10-CM

## 2023-11-16 DIAGNOSIS — K21.9 GASTROESOPHAGEAL REFLUX DISEASE, UNSPECIFIED WHETHER ESOPHAGITIS PRESENT: ICD-10-CM

## 2023-11-16 DIAGNOSIS — I15.2 HYPERTENSION ASSOCIATED WITH DIABETES: ICD-10-CM

## 2023-11-16 DIAGNOSIS — E78.5 HYPERLIPIDEMIA ASSOCIATED WITH TYPE 2 DIABETES MELLITUS: ICD-10-CM

## 2023-11-16 DIAGNOSIS — E66.3 OVERWEIGHT (BMI 25.0-29.9): ICD-10-CM

## 2023-11-16 DIAGNOSIS — E11.59 HYPERTENSION ASSOCIATED WITH DIABETES: ICD-10-CM

## 2023-11-16 PROCEDURE — 99215 OFFICE O/P EST HI 40 MIN: CPT | Mod: HCNC,S$GLB,, | Performed by: NURSE PRACTITIONER

## 2023-11-16 PROCEDURE — 1159F MED LIST DOCD IN RCRD: CPT | Mod: HCNC,CPTII,S$GLB, | Performed by: NURSE PRACTITIONER

## 2023-11-16 PROCEDURE — 1160F PR REVIEW ALL MEDS BY PRESCRIBER/CLIN PHARMACIST DOCUMENTED: ICD-10-PCS | Mod: HCNC,CPTII,S$GLB, | Performed by: NURSE PRACTITIONER

## 2023-11-16 PROCEDURE — 3288F PR FALLS RISK ASSESSMENT DOCUMENTED: ICD-10-PCS | Mod: HCNC,CPTII,S$GLB, | Performed by: NURSE PRACTITIONER

## 2023-11-16 PROCEDURE — 3066F PR DOCUMENTATION OF TREATMENT FOR NEPHROPATHY: ICD-10-PCS | Mod: HCNC,CPTII,S$GLB, | Performed by: NURSE PRACTITIONER

## 2023-11-16 PROCEDURE — 3288F FALL RISK ASSESSMENT DOCD: CPT | Mod: HCNC,CPTII,S$GLB, | Performed by: NURSE PRACTITIONER

## 2023-11-16 PROCEDURE — 1101F PR PT FALLS ASSESS DOC 0-1 FALLS W/OUT INJ PAST YR: ICD-10-PCS | Mod: HCNC,CPTII,S$GLB, | Performed by: NURSE PRACTITIONER

## 2023-11-16 PROCEDURE — 99999 PR PBB SHADOW E&M-EST. PATIENT-LVL V: ICD-10-PCS | Mod: PBBFAC,HCNC,, | Performed by: NURSE PRACTITIONER

## 2023-11-16 PROCEDURE — 3074F PR MOST RECENT SYSTOLIC BLOOD PRESSURE < 130 MM HG: ICD-10-PCS | Mod: HCNC,CPTII,S$GLB, | Performed by: NURSE PRACTITIONER

## 2023-11-16 PROCEDURE — 1160F RVW MEDS BY RX/DR IN RCRD: CPT | Mod: HCNC,CPTII,S$GLB, | Performed by: NURSE PRACTITIONER

## 2023-11-16 PROCEDURE — 1126F PR PAIN SEVERITY QUANTIFIED, NO PAIN PRESENT: ICD-10-PCS | Mod: HCNC,CPTII,S$GLB, | Performed by: NURSE PRACTITIONER

## 2023-11-16 PROCEDURE — 3060F PR POS MICROALBUMINURIA RESULT DOCUMENTED/REVIEW: ICD-10-PCS | Mod: HCNC,CPTII,S$GLB, | Performed by: NURSE PRACTITIONER

## 2023-11-16 PROCEDURE — 4010F ACE/ARB THERAPY RXD/TAKEN: CPT | Mod: HCNC,CPTII,S$GLB, | Performed by: NURSE PRACTITIONER

## 2023-11-16 PROCEDURE — 4010F PR ACE/ARB THEARPY RXD/TAKEN: ICD-10-PCS | Mod: HCNC,CPTII,S$GLB, | Performed by: NURSE PRACTITIONER

## 2023-11-16 PROCEDURE — 3066F NEPHROPATHY DOC TX: CPT | Mod: HCNC,CPTII,S$GLB, | Performed by: NURSE PRACTITIONER

## 2023-11-16 PROCEDURE — 3078F DIAST BP <80 MM HG: CPT | Mod: HCNC,CPTII,S$GLB, | Performed by: NURSE PRACTITIONER

## 2023-11-16 PROCEDURE — 3008F PR BODY MASS INDEX (BMI) DOCUMENTED: ICD-10-PCS | Mod: HCNC,CPTII,S$GLB, | Performed by: NURSE PRACTITIONER

## 2023-11-16 PROCEDURE — 3078F PR MOST RECENT DIASTOLIC BLOOD PRESSURE < 80 MM HG: ICD-10-PCS | Mod: HCNC,CPTII,S$GLB, | Performed by: NURSE PRACTITIONER

## 2023-11-16 PROCEDURE — 1126F AMNT PAIN NOTED NONE PRSNT: CPT | Mod: HCNC,CPTII,S$GLB, | Performed by: NURSE PRACTITIONER

## 2023-11-16 PROCEDURE — 3044F PR MOST RECENT HEMOGLOBIN A1C LEVEL <7.0%: ICD-10-PCS | Mod: HCNC,CPTII,S$GLB, | Performed by: NURSE PRACTITIONER

## 2023-11-16 PROCEDURE — 1101F PT FALLS ASSESS-DOCD LE1/YR: CPT | Mod: HCNC,CPTII,S$GLB, | Performed by: NURSE PRACTITIONER

## 2023-11-16 PROCEDURE — 3008F BODY MASS INDEX DOCD: CPT | Mod: HCNC,CPTII,S$GLB, | Performed by: NURSE PRACTITIONER

## 2023-11-16 PROCEDURE — 99999 PR PBB SHADOW E&M-EST. PATIENT-LVL V: CPT | Mod: PBBFAC,HCNC,, | Performed by: NURSE PRACTITIONER

## 2023-11-16 PROCEDURE — 1159F PR MEDICATION LIST DOCUMENTED IN MEDICAL RECORD: ICD-10-PCS | Mod: HCNC,CPTII,S$GLB, | Performed by: NURSE PRACTITIONER

## 2023-11-16 PROCEDURE — 3044F HG A1C LEVEL LT 7.0%: CPT | Mod: HCNC,CPTII,S$GLB, | Performed by: NURSE PRACTITIONER

## 2023-11-16 PROCEDURE — 3060F POS MICROALBUMINURIA REV: CPT | Mod: HCNC,CPTII,S$GLB, | Performed by: NURSE PRACTITIONER

## 2023-11-16 PROCEDURE — 3074F SYST BP LT 130 MM HG: CPT | Mod: HCNC,CPTII,S$GLB, | Performed by: NURSE PRACTITIONER

## 2023-11-16 PROCEDURE — 99215 PR OFFICE/OUTPT VISIT, EST, LEVL V, 40-54 MIN: ICD-10-PCS | Mod: HCNC,S$GLB,, | Performed by: NURSE PRACTITIONER

## 2023-11-16 RX ORDER — PANTOPRAZOLE SODIUM 40 MG/1
40 TABLET, DELAYED RELEASE ORAL DAILY
Qty: 90 TABLET | Refills: 0 | Status: SHIPPED | OUTPATIENT
Start: 2023-11-16

## 2023-11-16 RX ORDER — DEXTROSE 4 G
TABLET,CHEWABLE ORAL
Qty: 1 EACH | Refills: 0 | Status: SHIPPED | OUTPATIENT
Start: 2023-11-16

## 2023-11-16 RX ORDER — LANCETS 26 GAUGE
1 EACH MISCELLANEOUS
Qty: 100 EACH | Refills: 5 | Status: SHIPPED | OUTPATIENT
Start: 2023-11-16 | End: 2024-11-15

## 2023-11-16 RX ORDER — INFLUENZA A VIRUS A/VICTORIA/4897/2022 IVR-238 (H1N1) ANTIGEN (FORMALDEHYDE INACTIVATED), INFLUENZA A VIRUS A/DARWIN/6/2021 IVR-227 (H3N2) ANTIGEN (FORMALDEHYDE INACTIVATED), INFLUENZA B VIRUS B/AUSTRIA/1359417/2021 BVR-26 ANTIGEN (FORMALDEHYDE INACTIVATED), INFLUENZA B VIRUS B/PHUKET/3073/2013 BVR-1B ANTIGEN (FORMALDEHYDE INACTIVATED) 15; 15; 15; 15 UG/.5ML; UG/.5ML; UG/.5ML; UG/.5ML
INJECTION, SUSPENSION INTRAMUSCULAR
COMMUNITY
Start: 2023-09-23

## 2023-11-16 RX ORDER — EMPAGLIFLOZIN, METFORMIN HYDROCHLORIDE 10; 1000 MG/1; MG/1
1 TABLET, EXTENDED RELEASE ORAL 2 TIMES DAILY
Qty: 60 TABLET | Refills: 6 | Status: SHIPPED | OUTPATIENT
Start: 2023-11-16

## 2023-11-16 RX ORDER — TIRZEPATIDE 5 MG/.5ML
5 INJECTION, SOLUTION SUBCUTANEOUS
Qty: 4 PEN | Refills: 3 | Status: SHIPPED | OUTPATIENT
Start: 2023-11-16 | End: 2024-03-10 | Stop reason: SDUPTHER

## 2023-11-16 NOTE — PATIENT INSTRUCTIONS
Switch trulicity to Mounjaro once per week.   Try to eat small frequent meals,    To avoid symptoms of GI upset - I find limiting foods such as these below can often help with nausea or diarrhea when taking GLP1 or gip1 medications:    Seed oils (canola, vegetable oils), -- switch to olive or avocado oil  Milk products (cheese/icecream/yogurts/milks)  Fried foods or overly processed foods (sweets/cakes/cookies).   Or nightshades - veggies/fruits with the dark colored skins - (tomatoe, eggplant, dark grapes in excess)    Excess caffeine.     Check the glucose still daily -   If you lose weight and lowered blood sugars, you can decrease your synjardy tablet to just once per day.     I sent in protonix to take daily on empty stomach until you see the GI doctor for more recommendations.

## 2023-11-16 NOTE — PROGRESS NOTES
68 y.o. female here for routine follow up for management of T2dm -   Last seen may 2023 - those notes are below.     A1c is @ 6.9% - continues to be stable.   Continues on synjardy XR 10/1000mg tablets - 2 daily.   C/o size of pill - would like to go down if possible on dose?   Continues on trulicity 3mg every week - no side effects.   She would like to lose weight if possible.   Current weight is 162 lbs.   She c/o some acid reflux though, intermittent, had a gi scope done, but has not followed up with GI doctor for eval.   Previously was on protonix and carafate, no longer taking.   Related to trulicity?     Some walking, but wants to exercise more if possible.   Following diabetic diet.   Got a bit off track recently when she was sick for about a week - is better now though.   Checking sugars with fingerstick/home meter - needs new meter.   Glucoses are low 100's to 130's.   Wishes to get shingrix vaccine.     There is good renal function - Increased microalbumin creatinine ratio up to 264 - but bp and blood sugars are stable, so will trend this...             Last visit notes from May 2023 -   67 y.o. female here for routine follow up visit for management of T2dm -   Last seen April 2022 -     A1c is @ 7.7% (had gone up slightly from last visit) -   xigduo 5/1000mg tablet 2 times per day with meals. But ran out -   Insurance preferred synjardy. I did send it in - but she was nervous to take it - so has not.   Just didn't understand why the brand/formulary change.     Was on victoza daily previously, switched to trulicity instead - is doing well.   Like the once per week injection.  She is on trulicity 3 mg every week.   She is not exercising regularly b/c she's working so so much,.   Now is down to working 2 days per week so has more time to focus on her health.   Is trying now to walk 30 minutes after dinner time each day.   Traveling to [place] soon.   She is checking blood sugars on fingerstick and her  glucoses have improved this month -   They are 110 - 130's on average.   Blood sugar logs - they have improved per her report - see photos below.           Last visit notes as follows from 4/27/2022:   66 y.o. female here for follow up for T2dm -   a1c reduced from 8% to 7.1%.   She has also lost 10 lbs since her last visit.   On xigduo 5/1000mg tablet bid.   Also newly on trulicity 3mg every week - prior she was on victoza daily.   Has lost weight since last visit.   Checking sugars daily and they range 90 - 115 on average.   She has history of afib - so, she remains on xarelto.             Last visit notes as follows  66 y.o. female, here for for 4 month follow up visit for management of T2dm -   Last seen September 2021 -     a1c remains about the same @ 8%.   Taking xigduo 5/1000mg tablets - 2 per day (was just on metformin before at last visit - I changed her).    Also on victoza 1.8mg SC every morning. She's been on for about 6 months now. Taking highest dose for 4 months now.   Not really helping bring her sugars down further.   No longer on glipizide. (I took her off this when I began the glp1).     She is checking blood sugars daily - ranging usually 150 - 180's,   Now improving over the past few days - 113 - 120's.   She is feeling ok -   Had covid 19 a few weeks ago - recovered and treated at home.   Had been living in [place] and admits had not been eating well over the holidays.   She has just started walking again -   Trying to eat healthier, however admits has a weakness for eating sweets.   Lipids stable except for high trigs - on statin daily.   Mild urine mac noted, but improving - 50's.       Last visit notes as follows from September 2021:   65 y.o. female, here for 3 month follow up for management of Type 2 diabetes.   Last seen in June 2021 - those notes are below.     a1c stable at 8% - still is not to goal, but has improved from previous visit from 9.5% to 8%.   She has continued on metformin  1000mg twice per day.   No longer on glipizide.   She is taking victoza still every morning - has increase from 1.2 to 1.8mg sc every morning.   Has kept weight off - but admits hard for her to carb count and always follow proper diet.   Her job changes so that has been stressful.   She ran out of test strips so has not been checking her sugars.   No other complaints today's visit.       Last visit notes as follows from June 2021:   65 y.o. female, here for 2 mo follow up visit for management of T2sm -   Last seen April 2021 - those notes below.     No new labs for today's visit. Her a1c was last @ 9.5% with fasting bg's in the 250's.   Lost 10 lbs since last visit in just 2 months time.   Patient very motivated and following ADA diet now - really trying and putting forth effort.   Newly on victoza, has gone up to 1.2mg every morning and is taking.   No side effects, some appetite suppresion - no n/v/d.   Continues on metformin 1000mg bid.   Also on glipizide 10mg tablet once daily in am (was on bid).     Checking blood sugars - 1 - 2 times per day.   See list below. Running 130 - 140's on average.                     Last visit notes as follows from 4/9/2021:   HPI: Fely Wetzel is a 68 y.o.  female c/I for visit to address Diabetes Type 2  This is the first time I am seeing them for care, follows with Hien Tyson MD for primary care needs.   Has not seen endocrinology or diabetes specialist in the past.     was diagnosed with T2DM about 20 years ago.   Mother has type 2 diabetes.   Has never been hospitalized r/t DM.  Denies missing doses of DM medication.     a1c is up from 8% to 9.5% currently.   On metformin 1000 bid.   Glipizide 10 bid.   Was on tradjenta 5mg tablet daily, but states pharmacy didn't have any refills left and just stopped taking it 3 months ago.   So now that she thinks about it, this is likely why her a1c/sugasr went up.   Checks sugars on occasion. Last checked 6 months  ago.   Doesn't like to fingerstick, doesn't like current meter.   She is very motivated to lose weight and start eating healthier, here to discuss her options for care.     Complications from diabetes include:   Negative for DKA.   Has never taken insulin in life.   -negative for diabetic neuropathy.   -negative for nephropathy, but + microalbuminuria.   Eyes - negative for Diabetic retinopathy   Denies CV disease.   Denies known CAD. + for afib. On Eliquis.  HTN - controlled on current meds.   HLD - controlled on statin 40mg every Hs.     Past medical History:   Past Medical History:   Diagnosis Date    Colon polyp 11/2014    Diabetes mellitus type II     Hyperlipidemia     Hypertension     Obesity     Osteopenia       Family hx:   Family History   Problem Relation Age of Onset    Diabetes Mother     Glaucoma Mother     Hyperlipidemia Mother     Hypertension Mother     Heart disease Father     Cancer Father         prostate cancer    Heart attack Father     Heart disease Brother     Cataracts Brother     Heart failure Neg Hx     Stroke Neg Hx       Current meds:   Current Outpatient Medications:     amLODIPine (NORVASC) 5 MG tablet, Take 1 tablet (5 mg total) by mouth once daily., Disp: 90 tablet, Rfl: 3    atorvastatin (LIPITOR) 40 MG tablet, TAKE 1 TABLET BY MOUTH EVERY DAY IN THE EVENING, Disp: 30 tablet, Rfl: 11    cholecalciferol, vitamin D3, 1,250 mcg (50,000 unit) capsule, TAKE 1 CAPSULE BY MOUTH ONE TIME PER WEEK, Disp: 12 capsule, Rfl: 1    cyanocobalamin 500 MCG tablet, Take 500 mcg by mouth once daily., Disp: , Rfl:     ELIQUIS 5 mg Tab, TAKE 1 TABLET BY MOUTH TWICE A DAY, Disp: 60 tablet, Rfl: 11    fenofibrate 160 MG Tab, TAKE 1 TABLET BY MOUTH ONCE DAILY., Disp: 90 tablet, Rfl: 3    flecainide (TAMBOCOR) 100 MG Tab, TAKE 1 TABLET BY MOUTH EVERY 12 HOURS, Disp: 180 tablet, Rfl: 3    FLUAD QUAD 2023-24,65Y UP,,PF, 60 mcg (15 mcg x 4)/0.5 mL Syrg, , Disp: , Rfl:     furosemide (LASIX) 20 MG tablet, Take  1 tablet (20 mg total) by mouth as needed (3-5 pound weight gain or lower extremity edema, please take 1 pill by mouth for 3 days.)., Disp: 10 tablet, Rfl: 0    hydroCHLOROthiazide (HYDRODIURIL) 25 MG tablet, TAKE 1 TABLET (25 MG TOTAL) BY MOUTH ONCE DAILY. WITH BREAKFAST, Disp: 30 tablet, Rfl: 11    losartan (COZAAR) 100 MG tablet, TAKE 1 TABLET BY MOUTH EVERY DAY, Disp: 30 tablet, Rfl: 11    metoprolol succinate (TOPROL-XL) 100 MG 24 hr tablet, Take 1 tablet (100 mg total) by mouth once daily., Disp: 30 tablet, Rfl: 11    blood sugar diagnostic (TRUE METRIX GLUCOSE TEST STRIP) Strp, Inject 1 each into the skin 2 (two) times daily before meals., Disp: 200 strip, Rfl: 3    blood-glucose meter Misc, Check fasting glucose and check glucose two hours after lunch or dinner, Disp: 1 each, Rfl: 0    diclofenac sodium (VOLTAREN) 1 % Gel, Apply 2 g topically 4 (four) times daily. for 10 days (Patient not taking: Reported on 6/16/2023), Disp: 100 g, Rfl: 2    empagliflozin-metformin (SYNJARDY XR) 10-1,000 mg TBph, Take 1 tablet by mouth 2 (two) times a day., Disp: 60 tablet, Rfl: 6    lancing device with lancets (ONETOUCH DELICA LANC DEVICE) Kit, 1 each by Misc.(Non-Drug; Combo Route) route 2 (two) times daily before meals., Disp: 100 each, Rfl: 5    pantoprazole (PROTONIX) 40 MG tablet, Take 1 tablet (40 mg total) by mouth once daily. Start taking this medication on 4/5/2023., Disp: 90 tablet, Rfl: 0    tirzepatide (MOUNJARO) 5 mg/0.5 mL PnIj, Inject 5 mg into the skin every 7 days., Disp: 4 pen , Rfl: 3    varicella-zoster gE-AS01B, PF, (SHINGRIX) 50 mcg/0.5 mL injection, Inject 0.5 mLs into the muscle once. Give 1 vaccine, then repeat/schedule 2nd vaccine in 4 weeks. Total of 2 doses - 4 weeks apart. for 1 dose, Disp: 1 each, Rfl: 1     Current Diabetes medications:   Trulicity 3mg every week.   Synjardy 10/1000mg XR bid. (Was on xigduo twice daily but formulary change).      Medications Tried and Failed:   tradjenta 5mg  "daily - just stopped taking 3 months ago - no s/e's, just ran out of Rx.   Glipizide 10 daily in morning.  victoza 1.8 mg every morning.    switched to xigduo 5/1000mg 2 tablets daily/   Metformin 1000 bid. -->    Review of Pertinent co-morbidities/risk factors:   CV: Denies history of MI nor stroke.   CAD: Denies.  Does not take aspirin 81mg tablet daily  BP: has history of HTN  Statin: Taking  ACE/ARB: Taking    Social History     Tobacco Use   Smoking Status Never    Passive exposure: Never   Smokeless Tobacco Never        Social:   Lives at home by herself.    No current Life changes/stressors currently.  Diet: no following ADA diet   Meals: 3 per day and snacks.        Breakfast - eggs, tortillas, meats.         Lunch - salads and veggies.        Dinner - fish, veggies.        Snacks - veggies. Bananas.         Drinks - green veggie juices, water. Coffee    Exercise: none. Walking on occasion. Quit last year.   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     Glucose Monitoring:    checking blood sugars fingerstick 1 - 2 times per day.     Standards of care:   Eyes: .: 09/15/2023  Foot exam: : 05/15/2023   Diabetes education: None.    Vital Signs  /70 (BP Location: Left arm, Patient Position: Sitting, BP Method: Large (Manual))   Pulse 72   Temp 97.1 °F (36.2 °C) (Temporal)   Resp 16   Ht 5' 2" (1.575 m)   Wt 73.6 kg (162 lb 4.1 oz)   SpO2 100%   BMI 29.68 kg/m²     Pertinent Labs:   Hgba1c   Lab Results   Component Value Date    HGBA1C 6.9 (H) 11/10/2023    HGBA1C 7.7 (H) 03/16/2023    HGBA1C 7.4 (H) 09/29/2022     Lipid panel   Lab Results   Component Value Date    CHOL 192 11/10/2023    CHOL 177 03/16/2023    CHOL 183 09/29/2022     Lab Results   Component Value Date    HDL 54 11/10/2023    HDL 58 03/16/2023    HDL 56 09/29/2022     Lab Results   Component Value Date    LDLCALC 107.4 11/10/2023    LDLCALC 95.8 03/16/2023    LDLCALC 95.4 09/29/2022     Lab Results   Component Value Date "    TRIG 153 (H) 11/10/2023    TRIG 116 03/16/2023    TRIG 158 (H) 09/29/2022     Lab Results   Component Value Date    CHOLHDL 28.1 11/10/2023    CHOLHDL 32.8 03/16/2023    CHOLHDL 30.6 09/29/2022      CMP  Glucose   Date Value Ref Range Status   11/10/2023 142 (H) 70 - 110 mg/dL Final     BUN   Date Value Ref Range Status   11/10/2023 29 (H) 8 - 23 mg/dL Final     Creatinine   Date Value Ref Range Status   11/10/2023 0.8 0.5 - 1.4 mg/dL Final     eGFR if    Date Value Ref Range Status   04/20/2022 >60.0 >60 mL/min/1.73 m^2 Final     eGFR if non    Date Value Ref Range Status   04/20/2022 >60.0 >60 mL/min/1.73 m^2 Final     Comment:     Calculation used to obtain the estimated glomerular filtration  rate (eGFR) is the CKD-EPI equation.        AST   Date Value Ref Range Status   11/10/2023 21 10 - 40 U/L Final     ALT   Date Value Ref Range Status   11/10/2023 17 10 - 44 U/L Final     Microalbumin creatinine ratio:   Lab Results   Component Value Date    MICALBCREAT 264.9 (H) 11/10/2023       Review Of Systems:   Gen: Appetite good, 10 - 15+ lbs total of weight loss, has plateau'd, denies fatigue and weakness. Denies polydipsia.  Skin: Skin is intact and heals well, denies any rashes or hair changes.   EENT: Denies any acute visual disturbances, nor blurred vision.    Resp: Denies SOB or Dyspnea on exertion, denies cough.   Cardiac: Denies chest pain, palpitations, or swelling.   GI: Denies abdominal pain, nausea or vomiting, diarrhea, or constipation.   /GYN: Denies nocturia, nor burning, frequency or pain on urination.  MS/Neuro: Denies numbness/ tingling in BLE; Gait steady, speech clear  Psych: Denies drug/ETOH abuse, no hx of depression.  Feet: - bunion on right foot - following with podiatry.   Other systems: negative.    Physical Exam:   GENERAL: Well developed, well nourished in appearance.   PSYCH: AAOx3, appropriate mood and affect, pleasant expression, conversant, appears  relaxed, well groomed.   EYES: PERRL, Conjunctiva and corneas clear  NECK: Soft and Supple, trachea midline  ABDOMEN: Soft, non-tender, non-distended.  VASCULAR: pedal pulses palpable bilaterally, no edema.  NEURO:  cranial nerves II - XII intact   MUSCULOSKELETAL: Good ROM, steady gait.   SKIN: Skin warm, dry, and intact     Assessment and Plan of Care:     Fely was seen today for follow-up.    Diagnoses and all orders for this visit:    Type 2 diabetes mellitus with hyperglycemia, without long-term current use of insulin  -     tirzepatide (MOUNJARO) 5 mg/0.5 mL PnIj; Inject 5 mg into the skin every 7 days.  -     Discontinue: tirzepatide 7.5 mg/0.5 mL PnIj; Inject 7.5 mg into the skin every 7 days.  -     empagliflozin-metformin (SYNJARDY XR) 10-1,000 mg TBph; Take 1 tablet by mouth 2 (two) times a day.  -     blood-glucose meter Misc; Check fasting glucose and check glucose two hours after lunch or dinner  -     lancing device with lancets (ONETOUCH DELICA LANC DEVICE) Kit; 1 each by Misc.(Non-Drug; Combo Route) route 2 (two) times daily before meals.  -     blood sugar diagnostic (TRUE METRIX GLUCOSE TEST STRIP) Strp; Inject 1 each into the skin 2 (two) times daily before meals.  -     pantoprazole (PROTONIX) 40 MG tablet; Take 1 tablet (40 mg total) by mouth once daily. Start taking this medication on 4/5/2023.  -     Hemoglobin A1C; Future  -     Microalbumin/Creatinine Ratio, Urine; Future  -     Comprehensive Metabolic Panel; Future    Hyperlipidemia associated with type 2 diabetes mellitus  -     Lipid Panel; Future    Hypertension associated with diabetes    Obesity, Class I, BMI 30-34.9    Severe obesity (BMI 35.0-39.9) with comorbidity    Gastroesophageal reflux disease, unspecified whether esophagitis present  -     Ambulatory referral/consult to Gastroenterology; Future    Other orders  -     varicella-zoster gE-AS01B, PF, (SHINGRIX) 50 mcg/0.5 mL injection; Inject 0.5 mLs into the muscle once. Give  1 vaccine, then repeat/schedule 2nd vaccine in 4 weeks. Total of 2 doses - 4 weeks apart. for 1 dose       1. T2DM with hyperglycemia- Hgba1c goal is 7.5% or less without hypoglycemia - 8.5%---> 8%---> 9.5%--> 8%--> 8% --> 7.1%  --> 7/3%---> 7.7% --> 6.9% current.   Improving -   discussed DM, progression of disease, long term complications, CV risk factors and tx options.   Advise compliance with ADA diet and encourage exercise- gave list.   Continue  synjardy 10/1000mg xr bid - if mounjaro makes her lose weight/decreases blood sugar - we can take it down to just 1 per day - formulary change   Continue trulicity 3 mg every week.---> SWITCH to mounjaro start at 5mg per week x 1 month, then increase to 7.5mg per week.   Exercise more.   Discussed MOA, possible side effects including yeast infection, UTI, dehydration and ketoacidosis, importance of maintaining hydration and avoiding No carb diets. Good use hygiene. Notify my office if any side effects. Will monitor renal function closely. Stable at present.   glp1 - for cardiac protection, and weight loss also - which will then reverse her diabetes and treat the actual problem of insulin resistance.   No known history of pancreatitis or medullary thyroid cancer.   If you experience severe abdominal pain, nausea, or diarrhea - please call me or notify my office immediately.   Advised small, frequent meals -   Try to eliminate/cut down on sweets.   Keep walking.    Instructed to monitor Blood glucose 2x/day before meals and bring meter/ log to every clinic visit.   She requesting kathy 2 - send in  rx for cash price as she is not on insulin and now low/likely medicare will not approve.   Sent in refills for strips, new lancets, and new meter - true metrix requested.   Eyes - Dr. Sweeney 2021 -     2. HTN - controlled, continue meds as previously prescribed and monitor.   Urine mac + 170's--> 70's--. 50's --> 67 --- now in 260's... recheck in 3 months.    Improving.  Continuing on sglt2i.   Losartan, norvasc.     3. HLD - LDL goal < 100. Minimally meets goal. Trigs are still high - add on triglycerides.   Currently on statin therapy- lipitor 40mg every HS   On eliquis -     4. Weight - BMI Body mass index is 29.68 kg/m².   Encourage Ada diet and exercise.   Continue glp1 and sglt2i.      5. Renal Function - stable. No nephropathy but + urine mac.   On losartan. Will continue to monitor.     6. Osteopenia  - on ergo weekly. Sent in refill.   Advised to start walking     7. Afib - eliquis and metoprolol.   Having cardiac ablation soon -   Advised stay on sglt2i     8. GERD - maybe secondary to glp1 therapy - is on trulicity.   Switching to mounjaro to see if less symptoms.   Advised smaller meals.   Referral to GI for opinion. Dr. Middleton consulted.   I started her on protonix 40 daily empty stomach for now.   History of hpylori infection?          Labs and follow up in 6 months

## 2023-11-20 ENCOUNTER — PATIENT MESSAGE (OUTPATIENT)
Dept: INTERNAL MEDICINE | Facility: CLINIC | Age: 68
End: 2023-11-20
Payer: MEDICARE

## 2023-12-22 NOTE — TELEPHONE ENCOUNTER
No care due was identified.  [facility] Embedded Care Due Messages. Reference number: [id number].   12/22/2023 12:34:22 PM CST

## 2023-12-24 RX ORDER — HYDROCHLOROTHIAZIDE 25 MG/1
25 TABLET ORAL DAILY
Qty: 90 TABLET | Refills: 3 | Status: SHIPPED | OUTPATIENT
Start: 2023-12-24

## 2023-12-24 NOTE — TELEPHONE ENCOUNTER
Refill Decision Note   Fely Wetzel  is requesting a refill authorization.  Brief Assessment and Rationale for Refill:  Approve     Medication Therapy Plan:         Comments:     Note composed:3:16 AM 12/24/2023

## 2023-12-27 NOTE — PROGRESS NOTES
Chart reviewed  Immunizations reconciled   Population Health Chart Review & Patient Outreach Details      Further Action Needed If Patient Returns Outreach:            Updates Requested / Reviewed:     [x]  Care Everywhere    []     []  External Sources ([facility], [facility], [facility], etc.)    [] [facility]   [] [facility]   [] Other:    []  Care Team Updated   []  Removed  or Duplicate Orders   [x]  Immunization Reconciliation Completed / Queried    [] [place]   [] [place]   [] [place]   [] [place]      Health Maintenance Topics Addressed and Outreach Outcomes / Actions Taken:             Breast Cancer Screening []  Mammogram Order Placed    []  Mammogram Screening Scheduled    []  External Records Requested & Care Team Updated if Applicable    []  External Records Uploaded & Care Team Updated if Applicable    []  Pt Declined Scheduling Mammogram    []  Pt Will Schedule with External Provider / Order Routed & Care Team Updated if Applicable              Cervical Cancer Screening []  Pap Smear Scheduled in Primary Care or OBGYN    []  External Records Requested & Care Team Updated if Applicable       []  External Records Uploaded, Care Team Updated, & History Updated if Applicable    []  Patient Declined Scheduling Pap Smear    []  Patient Will Schedule with External Provider & Care Team Updated if Applicable                  Colorectal Cancer Screening []  Colonoscopy Case Request / Referral / Home Test Order Placed    []  External Records Requested & Care Team Updated if Applicable    []  External Records Uploaded, Care Team Updated, & History Updated if Applicable    []  Patient Declined Completing Colon Cancer Screening    []  Patient Will Schedule with External Provider & Care Team Updated if Applicable    []  Fit Kit Mailed (add the SmartPhrase under additional notes)    []  Reminded Patient to Complete Home Test                Diabetic Eye Exam []  Eye Exam Screening Order Placed    []  Eye Camera  Scheduled or Optometry/Ophthalmology Referral Placed    []  External Records Requested & Care Team Updated if Applicable    []  External Records Uploaded, Care Team Updated, & History Updated if Applicable    []  Patient Declined Scheduling Eye Exam    []  Patient Will Schedule with External Provider & Care Team Updated if Applicable             Blood Pressure Control []  Primary Care Follow Up Visit Scheduled     []  Remote Blood Pressure Reading Captured    []  Patient Declined Remote Reading or Scheduling Appt - Escalated to PCP    []  Patient Will Call Back or Send Portal Message with Reading                 HbA1c & Other Labs []  Overdue Lab(s) Ordered    []  Overdue Lab(s) Scheduled    []  External Records Uploaded & Care Team Updated if Applicable    []  Primary Care Follow Up Visit Scheduled     []  Reminded Patient to Complete A1c Home Test    []  Patient Declined Scheduling Labs or Will Call Back to Schedule    []  Patient Will Schedule with External Provider / Order Routed, & Care Team Updated if Applicable           Primary Care Appointment []  Primary Care Appt Scheduled    []  Patient Declined Scheduling or Will Call Back to Schedule    []  Pt Established with External Provider, Updated Care Team, & Informed Pt to Notify Payor if Applicable           Medication Adherence /    Statin Use []  Primary Care Appointment Scheduled    []  Patient Reminded to  Prescription    []  Patient Declined, Provider Notified if Needed    []  Sent Provider Message to Review to Evaluate Pt for Statin, Add Exclusion Dx Codes, Document   Exclusion in Problem List, Change Statin Intensity Level to Moderate or High Intensity if Applicable                Osteoporosis Screening []  Dexa Order Placed    []  Dexa Appointment Scheduled    []  External Records Requested & Care Team Updated    []  External Records Uploaded, Care Team Updated, & History Updated if Applicable    []  Patient Declined Scheduling Dexa or Will Call  Back to Schedule    []  Patient Will Schedule with External Provider / Order Routed & Care Team Updated if Applicable       Additional Notes:

## 2024-01-04 PROBLEM — Z79.01 ON ANTICOAGULANT THERAPY: Status: ACTIVE | Noted: 2024-01-04

## 2024-01-04 NOTE — PROGRESS NOTES
Ms. Wetzel is a patient of Dr. Mathias and was last seen in clinic 9/14/2023.      Subjective:   Patient ID:  Fely Wetzel is a 68 y.o. female who presents for follow up of Atrial Fibrillation  .     HPI:    Ms. Wetzel is a 68 y.o. female with AF (PVI 4/2023), HTN, DM, obesity here for follow up.     Background:    Referring Cardiologist: Marie Dominguez MD  Primary Care Physician: Paula Barkley DO     Mrs. Wetzel has a hx of hypertension, diabetes mellitus type 2, and obesity. She was in her usual state of health until June 2019 when she developed sudden palpitations. She called EMS and reportedly was in AF with ventricular rates up to 170s-220s. She was given IV cardizem and her ventricular rate lowered to 110s-170s. Per ER physician she was in atrial fibrillation however no ECGs document her arrhythmia. She was given 20mg more of cardizem and spontaneously converted to sinus rhythm. TSH was normal. She was discharged on eliquis and metoprolol. She was referred to Dr. Dominguez and discussed with her if there was possibility at some point of stopping anticoagulation. She reports for the past few weeks she has been having episodes of palpitations with radiation into her neck. These are different than when she went to the ER.      An exercise stress echocardiogram noted normal LV function and no ischemic changes. An echocardiogram performed on 10/15/2019 showed normal LV function, normal valves, and moderate LA enlargement (MURIEL 47).     Our plan was for a 30 day monitor, sleep study and possibly starting flecainide.     Mrs. Wetzel presented 5/2020 for follow-up. A 30 day monitor done in March of 2020 which showed no atrial fibrillation. She presented to the ER in November of 2019 with recurrent symptomatic AF with RVR that was rate controlled with cardizem and spontaneously converted. She notes occasional palpitations at night. She has not yet had a sleep study.    Mrs. Wetzel returned for  follow-up 12/2020. She is on flecainide. She continues on metoprolol/eliquis. She notes occasional episodes of chest discomfort at rest, not with activity. She drinks water and it goes away. She does have indigestion. No symptomatic AF. Recent CBC noted stable H/H.     Mrs. Wetzel returned for follow-up 7/2021. She was doing well from atrial fibrillation standpoint  without any reports of symptomatic AF. She reported chest discomfort that occurs after eating in the evening and was suspected to be due to dyspepsia. Protonix was prescribed at that time.      Mrs. Wetzel presented for follow up 2/2022 and she once again reported that she was doing well. She denied any sustained periods of atrial fibrillation. Her non cardiac chest discomfort has persistent in spite of protonix and she is planned for  coloscopy/EGD.  She was still employed and has no limitations with her usual activities.      Her VPFMV4OGLl score is 3 and indefinite anticoagulation is recommended. She remains on eliquis. She has had symptomatic benefit with flecainide/metoprolol.  Will order a 14 day holter. Will call with results. Discussed vascular surgery referral for painful varicose vein treatment. She will think about it.    1/19/2023: pt reported recurrence. Flec increased to 100mg BID    2/13/2023 Continued recurrence. Flec increased to 150mg BID and PVI planned.    4/19/2023: Successful pulmonary vein RF ablation. Continued on flecainide    6/16/2023: She is 2 months s/p PVI. She reports one breakthrough AF episode lasting about 5 min 2 weeks ago.   Remains on flecainide 150mg BID. Will continue this through blanking period, then wean if possible. CHADSVASc 4 and she should remain on anticoagulation indefinitely. She is on eliquis.  She reports low heart rates that are concerning for her. Will reduce toprol.    9/14/2023: She is now 5 months s/p PVI. Doing well overall from rhythm standpoint, with only brief palpitations since last clinic  visit. Will start to wean off flecainide.   Otherwise feeling well. CHADSVASc 4 on eliquis.      Update (01/08/2024):    Today she says she will sometimes note faster HRs when she takes her metoprolol late but no AF. No irreg HB. No CP, CHAVES, LH, syncope reported.  Varicose veins.    She is currently taking eliquis 5mg BID for stroke prophylaxis and denies significant bleeding episodes. She is currently being treated with flecainide 100mg BID for rhythm control and toprol 100mg daily for HR control.  Kidney function is stable, with a creatinine of 0.8 on 11/10/2023.    I have personally reviewed the patient's EKG today, which shows sinus rhythm at 69bpm. VT interval is 166. QRS is 110. QTc is 469.    Relevant Cardiac Test Results:    2D Echo (10/15/2019):  Normal left ventricular systolic function. The estimated ejection fraction is 70%  Normal right ventricular systolic function.  Indeterminate left ventricular diastolic function.  Moderate left atrial enlargement.  The estimated PA systolic pressure is 33 mm Hg  Normal central venous pressure (3 mm Hg).    Current Outpatient Medications   Medication Sig    amLODIPine (NORVASC) 5 MG tablet Take 1 tablet (5 mg total) by mouth once daily.    atorvastatin (LIPITOR) 40 MG tablet TAKE 1 TABLET BY MOUTH EVERY DAY IN THE EVENING    blood sugar diagnostic (TRUE METRIX GLUCOSE TEST STRIP) Strp Inject 1 each into the skin 2 (two) times daily before meals.    blood-glucose meter Misc Check fasting glucose and check glucose two hours after lunch or dinner    cholecalciferol, vitamin D3, 1,250 mcg (50,000 unit) capsule TAKE 1 CAPSULE BY MOUTH ONE TIME PER WEEK    cyanocobalamin 500 MCG tablet Take 500 mcg by mouth once daily.    diclofenac sodium (VOLTAREN) 1 % Gel Apply 2 g topically 4 (four) times daily. for 10 days (Patient not taking: Reported on 6/16/2023)    ELIQUIS 5 mg Tab TAKE 1 TABLET BY MOUTH TWICE A DAY    empagliflozin-metformin (SYNJARDY XR) 10-1,000 mg TBph Take 1  tablet by mouth 2 (two) times a day.    fenofibrate 160 MG Tab TAKE 1 TABLET BY MOUTH ONCE DAILY.    flecainide (TAMBOCOR) 100 MG Tab TAKE 1 TABLET BY MOUTH EVERY 12 HOURS    FLUAD QUAD 2023-24,65Y UP,,PF, 60 mcg (15 mcg x 4)/0.5 mL Syrg     furosemide (LASIX) 20 MG tablet Take 1 tablet (20 mg total) by mouth as needed (3-5 pound weight gain or lower extremity edema, please take 1 pill by mouth for 3 days.).    hydroCHLOROthiazide (HYDRODIURIL) 25 MG tablet TAKE 1 TABLET (25 MG TOTAL) BY MOUTH ONCE DAILY. WITH BREAKFAST    lancing device with lancets (ONETOUCH DELICA LANC DEVICE) Kit 1 each by Misc.(Non-Drug; Combo Route) route 2 (two) times daily before meals.    losartan (COZAAR) 100 MG tablet TAKE 1 TABLET BY MOUTH EVERY DAY    metoprolol succinate (TOPROL-XL) 100 MG 24 hr tablet Take 1 tablet (100 mg total) by mouth once daily.    pantoprazole (PROTONIX) 40 MG tablet Take 1 tablet (40 mg total) by mouth once daily. Start taking this medication on 4/5/2023.    tirzepatide (MOUNJARO) 5 mg/0.5 mL PnIj Inject 5 mg into the skin every 7 days.    varicella-zoster [id number], PF, (SHINGRIX) 50 mcg/0.5 mL injection Inject 0.5 mLs into the muscle once. Give 1 vaccine, then repeat/schedule 2nd vaccine in 8 weeks. Total of 2 doses - 8 weeks apart. for 1 dose     No current facility-administered medications for this visit.       Review of Systems   Constitutional: Negative for malaise/fatigue.   Cardiovascular:  Positive for palpitations. Negative for chest pain, dyspnea on exertion, irregular heartbeat and leg swelling.   Respiratory:  Negative for shortness of breath.    Hematologic/Lymphatic: Negative for bleeding problem.   Skin:  Negative for rash.   Musculoskeletal:  Negative for myalgias.   Gastrointestinal:  Negative for hematemesis, hematochezia and nausea.   Genitourinary:  Negative for hematuria.   Neurological:  Negative for light-headedness.   Psychiatric/Behavioral:  Negative for altered mental status.   "  Allergic/Immunologic: Negative for persistent infections.       Objective:          /60   Pulse 69   Ht 5' 2" (1.575 m)   Wt 72.7 kg (160 lb 4.4 oz)   BMI 29.31 kg/m²     Physical Exam  Vitals and nursing note reviewed.   Constitutional:       Appearance: Normal appearance. She is well-developed.   HENT:      Head: Normocephalic.      Nose: Nose normal.   Eyes:      Pupils: Pupils are equal, round, and reactive to light.   Cardiovascular:      Rate and Rhythm: Normal rate and regular rhythm.   Pulmonary:      Effort: No respiratory distress.      Breath sounds: Normal breath sounds.   Musculoskeletal:         General: Normal range of motion.   Skin:     General: Skin is warm and dry.      Findings: No erythema.   Neurological:      Mental Status: She is alert and oriented to person, place, and time.   Psychiatric:         Speech: Speech normal.         Behavior: Behavior normal.           Lab Results   Component Value Date     11/10/2023    K 3.6 11/10/2023    MG 1.7 01/13/2023    BUN 29 (H) 11/10/2023    CREATININE 0.8 11/10/2023    ALT 17 11/10/2023    AST 21 11/10/2023    HGB 13.5 04/12/2023    HCT 42.7 04/12/2023    TSH 1.518 03/16/2023    LDLCALC 107.4 11/10/2023       Recent Labs   Lab 04/12/23  1029   INR 1.0       Assessment:     1. Hypertension associated with diabetes    2. PAF (paroxysmal atrial fibrillation)    3. On anticoagulant therapy    4. Encounter for monitoring flecainide therapy        Plan:     In summary, Ms. Wetzel is a 68 y.o. female with AF (PVI 4/2023), HTN, DM, obesity here for follow up.   She is ~9 mo s/p PVI. She continues to do well from a rhythm standpoint, with no documented or symptomatic recurrence of arrhythmia since procedure.  No AF on Smartwatch. Will continue to wean flecainide. Plan on stopping at next clinic visit. CHADSVASc 4 on eliquis.      Reduce flecainide to 50mg BID  Compression stockings for varicose veins  Continue other meds  RTC 3 mo, sooner " if needed      *A copy of this note has been sent to Dr. Mathias*    Follow up in about 3 months (around 4/8/2024).    ------------------------------------------------------------------    ANIL Lambert, NP-C  Cardiac Electrophysiology

## 2024-01-05 ENCOUNTER — TELEPHONE (OUTPATIENT)
Dept: ELECTROPHYSIOLOGY | Facility: CLINIC | Age: 69
End: 2024-01-05
Payer: MEDICAID

## 2024-01-08 ENCOUNTER — HOSPITAL ENCOUNTER (OUTPATIENT)
Dept: CARDIOLOGY | Facility: CLINIC | Age: 69
Discharge: HOME | End: 2024-01-08
Payer: MEDICARE

## 2024-01-08 ENCOUNTER — OFFICE VISIT (OUTPATIENT)
Dept: ELECTROPHYSIOLOGY | Facility: CLINIC | Age: 69
End: 2024-01-08
Payer: MEDICARE

## 2024-01-08 VITALS
WEIGHT: 160.25 LBS | HEIGHT: 62 IN | HEART RATE: 69 BPM | DIASTOLIC BLOOD PRESSURE: 60 MMHG | SYSTOLIC BLOOD PRESSURE: 120 MMHG | BODY MASS INDEX: 29.49 KG/M2

## 2024-01-08 DIAGNOSIS — I48.0 PAF (PAROXYSMAL ATRIAL FIBRILLATION): ICD-10-CM

## 2024-01-08 DIAGNOSIS — E11.59 HYPERTENSION ASSOCIATED WITH DIABETES: Primary | ICD-10-CM

## 2024-01-08 DIAGNOSIS — Z79.899 ENCOUNTER FOR MONITORING FLECAINIDE THERAPY: ICD-10-CM

## 2024-01-08 DIAGNOSIS — Z51.81 ENCOUNTER FOR MONITORING FLECAINIDE THERAPY: ICD-10-CM

## 2024-01-08 DIAGNOSIS — I15.2 HYPERTENSION ASSOCIATED WITH DIABETES: Primary | ICD-10-CM

## 2024-01-08 DIAGNOSIS — Z79.01 ON ANTICOAGULANT THERAPY: ICD-10-CM

## 2024-01-08 PROCEDURE — 93005 ELECTROCARDIOGRAM TRACING: CPT | Mod: HCNC,S$GLB,, | Performed by: INTERNAL MEDICINE

## 2024-01-08 PROCEDURE — 99999 PR PBB SHADOW E&M-EST. PATIENT-LVL V: CPT | Mod: PBBFAC,HCNC,, | Performed by: NURSE PRACTITIONER

## 2024-01-08 PROCEDURE — 3078F DIAST BP <80 MM HG: CPT | Mod: HCNC,CPTII,S$GLB, | Performed by: NURSE PRACTITIONER

## 2024-01-08 PROCEDURE — 3288F FALL RISK ASSESSMENT DOCD: CPT | Mod: HCNC,CPTII,S$GLB, | Performed by: NURSE PRACTITIONER

## 2024-01-08 PROCEDURE — 1159F MED LIST DOCD IN RCRD: CPT | Mod: HCNC,CPTII,S$GLB, | Performed by: NURSE PRACTITIONER

## 2024-01-08 PROCEDURE — 93010 ELECTROCARDIOGRAM REPORT: CPT | Mod: HCNC,S$GLB,, | Performed by: INTERNAL MEDICINE

## 2024-01-08 PROCEDURE — 1101F PT FALLS ASSESS-DOCD LE1/YR: CPT | Mod: HCNC,CPTII,S$GLB, | Performed by: NURSE PRACTITIONER

## 2024-01-08 PROCEDURE — 1126F AMNT PAIN NOTED NONE PRSNT: CPT | Mod: HCNC,CPTII,S$GLB, | Performed by: NURSE PRACTITIONER

## 2024-01-08 PROCEDURE — 1160F RVW MEDS BY RX/DR IN RCRD: CPT | Mod: HCNC,CPTII,S$GLB, | Performed by: NURSE PRACTITIONER

## 2024-01-08 PROCEDURE — 3074F SYST BP LT 130 MM HG: CPT | Mod: HCNC,CPTII,S$GLB, | Performed by: NURSE PRACTITIONER

## 2024-01-08 PROCEDURE — 99214 OFFICE O/P EST MOD 30 MIN: CPT | Mod: HCNC,S$GLB,, | Performed by: NURSE PRACTITIONER

## 2024-01-08 PROCEDURE — 3008F BODY MASS INDEX DOCD: CPT | Mod: HCNC,CPTII,S$GLB, | Performed by: NURSE PRACTITIONER

## 2024-01-08 RX ORDER — FLECAINIDE ACETATE 50 MG/1
50 TABLET ORAL EVERY 12 HOURS
Qty: 60 TABLET | Refills: 3 | Status: SHIPPED | OUTPATIENT
Start: 2024-01-08

## 2024-01-11 DIAGNOSIS — Z00.00 ENCOUNTER FOR MEDICARE ANNUAL WELLNESS EXAM: ICD-10-CM

## 2024-01-19 DIAGNOSIS — E11.69 HYPERLIPIDEMIA ASSOCIATED WITH TYPE 2 DIABETES MELLITUS: ICD-10-CM

## 2024-01-19 DIAGNOSIS — E78.5 HYPERLIPIDEMIA ASSOCIATED WITH TYPE 2 DIABETES MELLITUS: ICD-10-CM

## 2024-01-19 RX ORDER — LOSARTAN POTASSIUM 100 MG/1
100 TABLET ORAL DAILY
Qty: 90 TABLET | Refills: 2 | Status: SHIPPED | OUTPATIENT
Start: 2024-01-19

## 2024-01-19 RX ORDER — FENOFIBRATE 160 MG/1
160 TABLET ORAL
Qty: 90 TABLET | Refills: 3 | Status: SHIPPED | OUTPATIENT
Start: 2024-01-19

## 2024-01-19 NOTE — TELEPHONE ENCOUNTER
Refill Routing Note   Medication(s) are not appropriate for processing by [facility] for the following reason(s):        No active prescription written by provider    [facility] action(s):  Defer               Appointments  past 12m or future 3m with PCP    Date Provider   Last Visit   9/25/2023 Hien Sparks MD   Next Visit   1/19/2024 Hien Sparks MD   ED visits in past 90 days: 0        Note composed:7:10 AM 01/19/2024

## 2024-01-19 NOTE — TELEPHONE ENCOUNTER
No care due was identified.  Health [facility] Embedded Care Due Messages. Reference number: [id number].   1/19/2024 12:09:43 AM CST

## 2024-01-19 NOTE — TELEPHONE ENCOUNTER
Refill Decision Note   Fely Wetzel  is requesting a refill authorization.  Brief Assessment and Rationale for Refill:  Approve     Medication Therapy Plan:         Comments:     Note composed:11:28 AM 01/19/2024

## 2024-01-19 NOTE — TELEPHONE ENCOUNTER
No care due was identified.  Health [facility] Embedded Care Due Messages. Reference number: [id number].   1/19/2024 12:10:21 AM CST

## 2024-01-22 ENCOUNTER — OFFICE VISIT (OUTPATIENT)
Dept: GASTROENTEROLOGY | Facility: CLINIC | Age: 69
End: 2024-01-22
Payer: MEDICARE

## 2024-01-22 VITALS
HEIGHT: 62 IN | BODY MASS INDEX: 29.82 KG/M2 | DIASTOLIC BLOOD PRESSURE: 67 MMHG | SYSTOLIC BLOOD PRESSURE: 120 MMHG | HEART RATE: 72 BPM | WEIGHT: 162.06 LBS

## 2024-01-22 DIAGNOSIS — K21.9 GASTROESOPHAGEAL REFLUX DISEASE, UNSPECIFIED WHETHER ESOPHAGITIS PRESENT: ICD-10-CM

## 2024-01-22 DIAGNOSIS — Z86.19 HISTORY OF HELICOBACTER PYLORI INFECTION: ICD-10-CM

## 2024-01-22 DIAGNOSIS — R14.0 BLOATING: Primary | ICD-10-CM

## 2024-01-22 PROCEDURE — 3078F DIAST BP <80 MM HG: CPT | Mod: CPTII,S$GLB,, | Performed by: STUDENT IN AN ORGANIZED HEALTH CARE EDUCATION/TRAINING PROGRAM

## 2024-01-22 PROCEDURE — 4010F ACE/ARB THERAPY RXD/TAKEN: CPT | Mod: CPTII,S$GLB,, | Performed by: STUDENT IN AN ORGANIZED HEALTH CARE EDUCATION/TRAINING PROGRAM

## 2024-01-22 PROCEDURE — 99999 PR PBB SHADOW E&M-EST. PATIENT-LVL IV: CPT | Mod: PBBFAC,,, | Performed by: STUDENT IN AN ORGANIZED HEALTH CARE EDUCATION/TRAINING PROGRAM

## 2024-01-22 PROCEDURE — 1159F MED LIST DOCD IN RCRD: CPT | Mod: CPTII,S$GLB,, | Performed by: STUDENT IN AN ORGANIZED HEALTH CARE EDUCATION/TRAINING PROGRAM

## 2024-01-22 PROCEDURE — 99214 OFFICE O/P EST MOD 30 MIN: CPT | Mod: S$GLB,,, | Performed by: STUDENT IN AN ORGANIZED HEALTH CARE EDUCATION/TRAINING PROGRAM

## 2024-01-22 PROCEDURE — 3008F BODY MASS INDEX DOCD: CPT | Mod: CPTII,S$GLB,, | Performed by: STUDENT IN AN ORGANIZED HEALTH CARE EDUCATION/TRAINING PROGRAM

## 2024-01-22 PROCEDURE — 3074F SYST BP LT 130 MM HG: CPT | Mod: CPTII,S$GLB,, | Performed by: STUDENT IN AN ORGANIZED HEALTH CARE EDUCATION/TRAINING PROGRAM

## 2024-01-22 NOTE — PROGRESS NOTES
"      [facility] Follow-UP Note    Reason for Follow-Up:  The primary encounter diagnosis was Bloating. Diagnoses of Gastroesophageal reflux disease, unspecified whether esophagitis present and History of Helicobacter pylori infection were also pertinent to this visit.    PCP:   Hien Sparks   [address] / [place] [id number]        HPI:  This is a 68 y.o. female last seen in GI clinic on March 17, 2022 for her EGD.  This was done in the setting of GERD.  Her EGD was endoscopically normal, but gastric biopsies were notable for H. Pylori.  She was treated with quad therapy, but eradication testing on 4/26/2022 with stool antigen was positive for HP.    Interval History:  She reports intermittent gassiness in her abdomen with eating.  She was given a prescription for PPI therapy, but is not taking it.  She denies any abdominal pain or burning, but feels bloated.      Her HP was not retreated per her report.    Of note, she was previously on Trulicity, but was recently switched to Mounjaro.  She does not comment on much change in symptoms since transitioning.      She is moving her bowels daily without much difficulty.    Objective Findings:    Vital Signs:  /67   Pulse 72   Ht 5' 2" (1.575 m)   Wt 73.5 kg (162 lb 0.6 oz)   BMI 29.64 kg/m²   Body mass index is 29.64 kg/m².    Physical Exam:  General Appearance: Well appearing in no acute distress  Abdomen: Soft, non tender, non distended with positive bowel sounds in all four quadrants. No hepatosplenomegaly, ascites, or mass      Assessment:  1. Bloating    2. Gastroesophageal reflux disease, unspecified whether esophagitis present    3. History of Helicobacter pylori infection      In brief, the patient presents with ongoing bloating and a prior HP infection.  From my review, I am unable to find that she was retreated after her stool Ag was positive.  I will check a stool antigen today given that she is off of PPI " therapy.  I suspect she has ongoing infection and this is driving her symptoms.  She does not have pain or typical GERD symptoms, but it seems more consistent with dyspepsia.    Additionally, she is taking a GLP-1 agonist which may be contributing to or driving her symptoms.  I would not modify anything at present, but this would be something to consider if symptoms persist despite appropriate management of HP.    Follow up in about 3 months (around 4/22/2024).      Order summary:  Orders Placed This Encounter    H. pylori antigen, stool         Thank you so much for allowing me to participate in the care of Fely Middleton MD

## 2024-01-26 ENCOUNTER — LAB VISIT (OUTPATIENT)
Dept: LAB | Facility: HOSPITAL | Age: 69
End: 2024-01-26
Attending: STUDENT IN AN ORGANIZED HEALTH CARE EDUCATION/TRAINING PROGRAM
Payer: MEDICARE

## 2024-01-26 DIAGNOSIS — R14.0 BLOATING: ICD-10-CM

## 2024-01-26 PROCEDURE — 87338 HPYLORI STOOL AG IA: CPT | Mod: HCNC | Performed by: STUDENT IN AN ORGANIZED HEALTH CARE EDUCATION/TRAINING PROGRAM

## 2024-01-31 ENCOUNTER — TELEPHONE (OUTPATIENT)
Dept: INTERNAL MEDICINE | Facility: CLINIC | Age: 69
End: 2024-01-31
Payer: MEDICAID

## 2024-01-31 NOTE — TELEPHONE ENCOUNTER
Called Pt.  Regarding Rescheduling DM f/u appt.    Appt set for 4/29/2024 @1030a.  Labs booked 4[place]2024 [place] [facility] [place]

## 2024-01-31 NOTE — TELEPHONE ENCOUNTER
----- Message from Radha Wyatt sent at 1/31/2024  9:22 AM CST -----  Contact: [phone]  Patient is requesting to reschedule her appointment. Patient will be out of the town and will be back on April or May 2024. Also is requesting to schedule her lab  a week before her appointment .  Patient state can get a respond thru the portal .

## 2024-02-02 LAB — H PYLORI AG STL QL IA: NOT DETECTED

## 2024-02-06 RX ORDER — ATORVASTATIN CALCIUM 40 MG/1
TABLET, FILM COATED ORAL
Qty: 90 TABLET | Refills: 2 | Status: SHIPPED | OUTPATIENT
Start: 2024-02-06

## 2024-02-06 NOTE — TELEPHONE ENCOUNTER
Care Due:                  Date            Visit Type   Department     Provider  --------------------------------------------------------------------------------                                EP -                              PRIMARY      [facility] INTERNAL  Last Visit: 09-      CARE ([facility])   MEDICINE       Hien  Clare                              EP -                              PRIMARY      [facility] INTERNAL  Next Visit: 03-      [facility] ([facility])   [facility]                                                            Last  Test          Frequency    Reason                     Performed    Due Date  --------------------------------------------------------------------------------    CBC.........  12 months..  fenofibrate..............  04- 04-    Vitamin D...  12 months..  cholecalciferol,.........  03-   03-    Health [facility] Embedded Care Due Messages. Reference number: [id number].   2/06/2024 9:51:40 AM CST

## 2024-02-07 NOTE — TELEPHONE ENCOUNTER
Provider Staff:  Action required for this patient    Requires labs      Please see care gap opportunities below in Care Due Message.    Thanks!  [facility]     Appointments      Date Provider   Last Visit   9/25/2023 Hien Sparks MD   Next Visit   3/27/2024 Hien Sparks MD     Refill Decision Note   Fely Wetzel  is requesting a refill authorization.  Brief Assessment and Rationale for Refill:  Approve     Medication Therapy Plan: Allergy/intolerance reviewed.      Pharmacist review requested: Yes   Comments:     Note composed:7:44 PM 02/06/2024

## 2024-02-07 NOTE — TELEPHONE ENCOUNTER
Refill Routing Note   Medication(s) are not appropriate for processing by [facility] for the following reason(s):     [facility] not previously overridden by current provider--after initial override, the Refill Center will be able to continue overrides      Allergy or intolerance: Allergy/Contraindication: atorvastatin     [facility] action(s):  Defer   Requires labs : Yes           Pharmacist review requested: Yes     Appointments  past 12m or future 3m with PCP    Date Provider   Last Visit   9/25/2023 Hien Sparks MD   Next Visit   3/27/2024 Hien Sparks MD   ED visits in past 90 days: 0        Note composed:6:35 PM 02/06/2024

## 2024-03-10 DIAGNOSIS — E11.65 TYPE 2 DIABETES MELLITUS WITH HYPERGLYCEMIA, WITHOUT LONG-TERM CURRENT USE OF INSULIN: ICD-10-CM

## 2024-03-11 RX ORDER — TIRZEPATIDE 5 MG/.5ML
5 INJECTION, SOLUTION SUBCUTANEOUS
Qty: 4 PEN | Refills: 3 | Status: SHIPPED | OUTPATIENT
Start: 2024-03-11 | End: 2024-03-27 | Stop reason: DRUGHIGH

## 2024-03-14 LAB
LEFT EYE DM RETINOPATHY: POSITIVE
RIGHT EYE DM RETINOPATHY: POSITIVE

## 2024-03-15 ENCOUNTER — PATIENT OUTREACH (OUTPATIENT)
Dept: ADMINISTRATIVE | Facility: HOSPITAL | Age: 69
End: 2024-03-15
Payer: MEDICAID

## 2024-03-20 ENCOUNTER — LAB VISIT (OUTPATIENT)
Dept: LAB | Facility: HOSPITAL | Age: 69
End: 2024-03-20
Attending: HOSPITALIST
Payer: MEDICARE

## 2024-03-20 DIAGNOSIS — E11.65 TYPE 2 DIABETES MELLITUS WITH HYPERGLYCEMIA, WITHOUT LONG-TERM CURRENT USE OF INSULIN: ICD-10-CM

## 2024-03-20 DIAGNOSIS — E11.59 HYPERTENSION ASSOCIATED WITH DIABETES: ICD-10-CM

## 2024-03-20 DIAGNOSIS — I15.2 HYPERTENSION ASSOCIATED WITH DIABETES: ICD-10-CM

## 2024-03-20 DIAGNOSIS — E11.69 HYPERLIPIDEMIA ASSOCIATED WITH TYPE 2 DIABETES MELLITUS: ICD-10-CM

## 2024-03-20 DIAGNOSIS — E78.5 HYPERLIPIDEMIA ASSOCIATED WITH TYPE 2 DIABETES MELLITUS: ICD-10-CM

## 2024-03-20 DIAGNOSIS — I48.0 PAF (PAROXYSMAL ATRIAL FIBRILLATION): ICD-10-CM

## 2024-03-20 DIAGNOSIS — E66.9 OBESITY, CLASS I, BMI 30-34.9: ICD-10-CM

## 2024-03-20 DIAGNOSIS — Z00.00 ANNUAL PHYSICAL EXAM: ICD-10-CM

## 2024-03-20 LAB
ALBUMIN/CREAT UR: 65 UG/MG (ref 0–30)
BACTERIA #/AREA URNS AUTO: NORMAL /HPF
BILIRUB UR QL STRIP: NEGATIVE
CLARITY UR REFRACT.AUTO: CLEAR
COLOR UR AUTO: YELLOW
CREAT UR-MCNC: 60 MG/DL (ref 15–325)
GLUCOSE UR QL STRIP: ABNORMAL
HGB UR QL STRIP: NEGATIVE
KETONES UR QL STRIP: NEGATIVE
LEUKOCYTE ESTERASE UR QL STRIP: NEGATIVE
MICROALBUMIN UR DL<=1MG/L-MCNC: 39 UG/ML
MICROSCOPIC COMMENT: NORMAL
NITRITE UR QL STRIP: NEGATIVE
PH UR STRIP: 5 [PH] (ref 5–8)
PROT UR QL STRIP: NEGATIVE
SP GR UR STRIP: 1.03 (ref 1–1.03)
URN SPEC COLLECT METH UR: ABNORMAL
YEAST UR QL AUTO: NORMAL

## 2024-03-20 PROCEDURE — 82043 UR ALBUMIN QUANTITATIVE: CPT | Mod: HCNC | Performed by: HOSPITALIST

## 2024-03-20 PROCEDURE — 81001 URINALYSIS AUTO W/SCOPE: CPT | Mod: HCNC | Performed by: HOSPITALIST

## 2024-03-27 ENCOUNTER — OFFICE VISIT (OUTPATIENT)
Dept: INTERNAL MEDICINE | Facility: CLINIC | Age: 69
End: 2024-03-27
Payer: MEDICARE

## 2024-03-27 VITALS
DIASTOLIC BLOOD PRESSURE: 80 MMHG | HEIGHT: 62 IN | TEMPERATURE: 97 F | BODY MASS INDEX: 29.74 KG/M2 | RESPIRATION RATE: 18 BRPM | OXYGEN SATURATION: 99 % | HEART RATE: 66 BPM | SYSTOLIC BLOOD PRESSURE: 120 MMHG | WEIGHT: 161.63 LBS

## 2024-03-27 DIAGNOSIS — E11.65 TYPE 2 DIABETES MELLITUS WITH HYPERGLYCEMIA, WITHOUT LONG-TERM CURRENT USE OF INSULIN: ICD-10-CM

## 2024-03-27 DIAGNOSIS — I15.2 HYPERTENSION ASSOCIATED WITH DIABETES: ICD-10-CM

## 2024-03-27 DIAGNOSIS — E11.69 HYPERLIPIDEMIA ASSOCIATED WITH TYPE 2 DIABETES MELLITUS: ICD-10-CM

## 2024-03-27 DIAGNOSIS — E66.3 OVERWEIGHT (BMI 25.0-29.9): ICD-10-CM

## 2024-03-27 DIAGNOSIS — Z00.00 ANNUAL PHYSICAL EXAM: Primary | ICD-10-CM

## 2024-03-27 DIAGNOSIS — I48.0 PAF (PAROXYSMAL ATRIAL FIBRILLATION): ICD-10-CM

## 2024-03-27 DIAGNOSIS — E11.59 HYPERTENSION ASSOCIATED WITH DIABETES: ICD-10-CM

## 2024-03-27 DIAGNOSIS — E78.5 HYPERLIPIDEMIA ASSOCIATED WITH TYPE 2 DIABETES MELLITUS: ICD-10-CM

## 2024-03-27 PROCEDURE — 3060F POS MICROALBUMINURIA REV: CPT | Mod: HCNC,CPTII,S$GLB, | Performed by: HOSPITALIST

## 2024-03-27 PROCEDURE — 1160F RVW MEDS BY RX/DR IN RCRD: CPT | Mod: HCNC,CPTII,S$GLB, | Performed by: HOSPITALIST

## 2024-03-27 PROCEDURE — 3079F DIAST BP 80-89 MM HG: CPT | Mod: HCNC,CPTII,S$GLB, | Performed by: HOSPITALIST

## 2024-03-27 PROCEDURE — 3074F SYST BP LT 130 MM HG: CPT | Mod: HCNC,CPTII,S$GLB, | Performed by: HOSPITALIST

## 2024-03-27 PROCEDURE — 99397 PER PM REEVAL EST PAT 65+ YR: CPT | Mod: HCNC,S$GLB,, | Performed by: HOSPITALIST

## 2024-03-27 PROCEDURE — 99999 PR PBB SHADOW E&M-EST. PATIENT-LVL III: CPT | Mod: PBBFAC,HCNC,, | Performed by: HOSPITALIST

## 2024-03-27 PROCEDURE — 3288F FALL RISK ASSESSMENT DOCD: CPT | Mod: HCNC,CPTII,S$GLB, | Performed by: HOSPITALIST

## 2024-03-27 PROCEDURE — 2023F DILAT RTA XM W/O RTNOPTHY: CPT | Mod: HCNC,CPTII,S$GLB, | Performed by: HOSPITALIST

## 2024-03-27 PROCEDURE — 1159F MED LIST DOCD IN RCRD: CPT | Mod: HCNC,CPTII,S$GLB, | Performed by: HOSPITALIST

## 2024-03-27 PROCEDURE — 3008F BODY MASS INDEX DOCD: CPT | Mod: HCNC,CPTII,S$GLB, | Performed by: HOSPITALIST

## 2024-03-27 PROCEDURE — 1101F PT FALLS ASSESS-DOCD LE1/YR: CPT | Mod: HCNC,CPTII,S$GLB, | Performed by: HOSPITALIST

## 2024-03-27 PROCEDURE — 3051F HG A1C>EQUAL 7.0%<8.0%: CPT | Mod: HCNC,CPTII,S$GLB, | Performed by: HOSPITALIST

## 2024-03-27 PROCEDURE — 3066F NEPHROPATHY DOC TX: CPT | Mod: HCNC,CPTII,S$GLB, | Performed by: HOSPITALIST

## 2024-03-27 PROCEDURE — 1126F AMNT PAIN NOTED NONE PRSNT: CPT | Mod: HCNC,CPTII,S$GLB, | Performed by: HOSPITALIST

## 2024-03-27 PROCEDURE — 4010F ACE/ARB THERAPY RXD/TAKEN: CPT | Mod: HCNC,CPTII,S$GLB, | Performed by: HOSPITALIST

## 2024-03-27 RX ORDER — TYROSINE 500 MG
CAPSULE ORAL
COMMUNITY

## 2024-03-27 NOTE — PROGRESS NOTES
Subjective:     @Patient ID: Fely Wetzel is a 68 y.o. female.    Chief Complaint: Annual Exam (Blood work done )    HPI       68 y.o. female with pmhx of dm2, afib, hld, osteopenia, psoriasis presents  here for annual exam.      1. Dm2: last a1c 7.0. Follows with diabetic NP. On dapagliflozin-metformin  5-1000 mg bid, mounjaro 5 mg qweek.   2. HTN: on toprol-xl 50 mg qday, amlodipine 10 mg qday, losartan 100 mg qday, hctz 25 mg qday  3. HLD: on lipitor 40 mg qday and fenofibrate 160 mg qday  4. Paroxysmal Afib: follows with cardiology. On eliquis 5 mg bid, metoprolol xl 50 mg qday and flecanide 50 mg bid .   5. H pylori: completed treatment   6. Osteopenia: on vitamin d  7. Varicose veins:             Lipid disorders/ASCVD risk (ages >/= 45 or >/= 20 if increased risk ): ordered  DM (>45y yearly or if obese, HTN): A1c ordered  Eye exam:  utd   Eye doctor Dr Burciaga   Breast Cancer (40-50y discretion of pt, 50-74y every 1-2 years): Mammogram done    Colorectal Cancer (normal risk 50-75yr): Colonoscopy utd 3/17/22   DEXA (F>64 yo, M >69yo, M&F 50-70 yo with risk factors (smoking, previous fx, wt <70kg; etoh abuse, chronic steroids, RA)): utd 4/2022        Vaccines:   Influenza (yearly):  utd    Tetanus (every 10 yrs - 1st tdap): utd 2014   Pna: due   Zoster (>59yo): Due   Covid19: utd      Exercise: walking  Diet:  reg      Review of Systems   Constitutional:  Negative for chills and fever.   HENT:  Negative for congestion and sore throat.    Eyes:  Negative for pain and visual disturbance.   Respiratory:  Negative for cough and shortness of breath.    Cardiovascular:  Negative for chest pain and leg swelling.   Gastrointestinal:  Negative for abdominal pain, nausea and vomiting.   Endocrine: Negative for polydipsia and polyuria.   Genitourinary:  Negative for difficulty urinating and dysuria.   Musculoskeletal:  Negative for arthralgias and back pain.   Skin:  Negative for rash and wound.   Neurological:   "Negative for dizziness, weakness and headaches.   Psychiatric/Behavioral:  Negative for agitation and confusion.      Past medical history, surgical history, and family medical history reviewed and updated as appropriate.    Medications and allergies reviewed.     Objective:     Vitals:    03/27/24 1036   BP: 120/80   BP Location: Left arm   Patient Position: Sitting   BP Method: Large (Manual)   Pulse: 66   Resp: 18   Temp: 97.1 °F (36.2 °C)   TempSrc: Temporal   SpO2: 99%   Weight: 73.3 kg (161 lb 9.6 oz)   Height: 5' 2" (1.575 m)     Body mass index is 29.56 kg/m².  Physical Exam  Vitals reviewed.   Constitutional:       General: She is not in acute distress.     Appearance: She is well-developed.   HENT:      Head: Normocephalic and atraumatic.      Right Ear: Tympanic membrane normal.      Left Ear: Tympanic membrane normal.      Mouth/Throat:      Mouth: Mucous membranes are moist.      Pharynx: No oropharyngeal exudate.   Eyes:      General:         Right eye: No discharge.         Left eye: No discharge.      Conjunctiva/sclera: Conjunctivae normal.   Cardiovascular:      Rate and Rhythm: Normal rate and regular rhythm.      Heart sounds: No murmur heard.     No friction rub.   Pulmonary:      Effort: Pulmonary effort is normal.      Breath sounds: Normal breath sounds.   Abdominal:      General: Bowel sounds are normal. There is no distension.      Palpations: Abdomen is soft.      Tenderness: There is no abdominal tenderness. There is no guarding.   Musculoskeletal:         General: Normal range of motion.      Cervical back: Normal range of motion and neck supple.      Right lower leg: No edema.      Left lower leg: No edema.   Lymphadenopathy:      Cervical: No cervical adenopathy.   Skin:     General: Skin is warm and dry.   Neurological:      Mental Status: She is alert and oriented to person, place, and time.   Psychiatric:         Mood and Affect: Mood normal.         Behavior: Behavior normal. "         Lab Results   Component Value Date    WBC 7.54 03/20/2024    HGB 13.8 03/20/2024    HCT 42.1 03/20/2024     03/20/2024    CHOL 179 03/20/2024    TRIG 108 03/20/2024    HDL 53 03/20/2024    ALT 17 03/20/2024    AST 22 03/20/2024     03/20/2024    K 3.9 03/20/2024     03/20/2024    CREATININE 0.9 03/20/2024    BUN 21 03/20/2024    CO2 19 (L) 03/20/2024    TSH 1.862 03/20/2024    INR 1.0 04/12/2023    GLUF 126 (H) 07/07/2010    HGBA1C 7.0 (H) 03/20/2024       Assessment:     1. Annual physical exam    2. Hypertension associated with diabetes    3. Type 2 diabetes mellitus with hyperglycemia, without long-term current use of insulin    4. Hyperlipidemia associated with type 2 diabetes mellitus    5. PAF (paroxysmal atrial fibrillation)    6. Overweight (BMI 25.0-29.9)      Plan:   Fely was seen today for annual exam.    Diagnoses and all orders for this visit:    Annual physical exam  - labs reviewed in clinic     Hypertension associated with diabetes  - Stable. Continue home meds     -     Hemoglobin A1C; Future  -     Comprehensive Metabolic Panel; Future  -     Lipid Panel; Future  -     Microalbumin/Creatinine Ratio, Urine; Future    Type 2 diabetes mellitus with hyperglycemia, without long-term current use of insulin  - chronic. Will increase dose of mounjaro   -     tirzepatide 7.5 mg/0.5 mL PnIj; Inject 7.5 mg into the skin every 7 days.  -     Hemoglobin A1C; Future  -     Comprehensive Metabolic Panel; Future  -     Lipid Panel; Future  -     Microalbumin/Creatinine Ratio, Urine; Future    Hyperlipidemia associated with type 2 diabetes mellitus  - Stable. Continue home meds     -     Hemoglobin A1C; Future  -     Comprehensive Metabolic Panel; Future  -     Lipid Panel; Future  -     Microalbumin/Creatinine Ratio, Urine; Future    PAF (paroxysmal atrial fibrillation)  - Stable. Continue home meds     Overweight (BMI 25.0-29.9)  -chronic. Improving while on mounjaro            Hien  MONTSERRAT Sparks MD  Internal Medicine    3/27/2024

## 2024-04-02 ENCOUNTER — TELEPHONE (OUTPATIENT)
Dept: ELECTROPHYSIOLOGY | Facility: CLINIC | Age: 69
End: 2024-04-02
Payer: MEDICAID

## 2024-04-05 ENCOUNTER — TELEPHONE (OUTPATIENT)
Dept: ELECTROPHYSIOLOGY | Facility: CLINIC | Age: 69
End: 2024-04-05
Payer: MEDICAID

## 2024-04-05 NOTE — TELEPHONE ENCOUNTER
----- Message from Rivera Armstrong sent at 4/5/2024  9:51 AM CDT -----  Name of Who is Calling:YANDY STEVENS [[id number]]        What is the request in detail:Pt would like a callback from the office in regards to avery 4/10 appts to possibly Friday 4/19 as pt will be out of town anytime. Please advise thank you       Can the clinic reply by [facility]:NO        What Number to Call Back if not in [facility]:.Telephone Information:  Mobile          [phone]

## 2024-04-10 NOTE — PROGRESS NOTES
Ms. Wetzel is a patient of Dr. Mathias and was last seen in clinic 1/8/2024.      Subjective:   Patient ID:  Fely Wetzel is a 68 y.o. female who presents for follow up of Atrial Fibrillation  .   The patient location is: [place]  The chief complaint leading to consultation is: Atrial fibrillation  Visit type: audiovisual  Face to Face time with patient: 9  25 minutes of total time spent on the encounter, which includes face to face time and non-face to face time preparing to see the patient (eg, review of tests), Obtaining and/or reviewing separately obtained history, Documenting clinical information in the electronic or other health record, Independently interpreting results (not separately reported) and communicating results to the patient/family/caregiver, or Care coordination (not separately reported).   Each patient to whom he or she provides medical services by telemedicine is:  (1) informed of the relationship between the physician and patient and the respective role of any other health care provider with respect to management of the patient; and (2) notified that he or she may decline to receive medical services by telemedicine and may withdraw from such care at any time.    HPI:    Ms. Wetzel is a 68 y.o. female with AF (PVI 4/2023), HTN, DM, obesity with a telemedicine visit for follow up.     Background:    Referring Cardiologist: Marie Dominguez MD  Primary Care Physician: Paula Barkley DO     Mrs. Wetzel has a hx of hypertension, diabetes mellitus type 2, and obesity. She was in her usual state of health until June 2019 when she developed sudden palpitations. She called EMS and reportedly was in AF with ventricular rates up to 170s-220s. She was given IV cardizem and her ventricular rate lowered to 110s-170s. Per ER physician she was in atrial fibrillation however no ECGs document her arrhythmia. She was given 20mg more of cardizem and spontaneously converted to sinus rhythm. TSH  was normal. She was discharged on eliquis and metoprolol. She was referred to Dr. Dominguez and discussed with her if there was possibility at some point of stopping anticoagulation. She reports for the past few weeks she has been having episodes of palpitations with radiation into her neck. These are different than when she went to the ER.      An exercise stress echocardiogram noted normal LV function and no ischemic changes. An echocardiogram performed on 10/15/2019 showed normal LV function, normal valves, and moderate LA enlargement (MURIEL 47).     Our plan was for a 30 day monitor, sleep study and possibly starting flecainide.     Mrs. Wetzel presented 5/2020 for follow-up. A 30 day monitor done in March of 2020 which showed no atrial fibrillation. She presented to the ER in November of 2019 with recurrent symptomatic AF with RVR that was rate controlled with cardizem and spontaneously converted. She notes occasional palpitations at night. She has not yet had a sleep study.    Mrs. Wetzel returned for follow-up 12/2020. She is on flecainide. She continues on metoprolol/eliquis. She notes occasional episodes of chest discomfort at rest, not with activity. She drinks water and it goes away. She does have indigestion. No symptomatic AF. Recent CBC noted stable H/H.     Mrs. Wetzel returned for follow-up 7/2021. She was doing well from atrial fibrillation standpoint  without any reports of symptomatic AF. She reported chest discomfort that occurs after eating in the evening and was suspected to be due to dyspepsia. Protonix was prescribed at that time.      Mrs. Wetzel presented for follow up 2/2022 and she once again reported that she was doing well. She denied any sustained periods of atrial fibrillation. Her non cardiac chest discomfort has persistent in spite of protonix and she is planned for  coloscopy/EGD.  She was still employed and has no limitations with her usual activities.      Her [id number]  score is 3 and indefinite anticoagulation is recommended. She remains on eliquis. She has had symptomatic benefit with flecainide/metoprolol.  Will order a 14 day holter. Will call with results. Discussed vascular surgery referral for painful varicose vein treatment. She will think about it.    1/19/2023: pt reported recurrence. Flec increased to 100mg BID    2/13/2023 Continued recurrence. Flec increased to 150mg BID and PVI planned.    4/19/2023: Successful pulmonary vein RF ablation. Continued on flecainide    6/16/2023: She is 2 months s/p PVI. She reports one breakthrough AF episode lasting about 5 min 2 weeks ago.   Remains on flecainide 150mg BID. Will continue this through blanking period, then wean if possible. CHADSVASc 4 and she should remain on anticoagulation indefinitely. She is on eliquis.  She reports low heart rates that are concerning for her. Will reduce toprol.    9/14/2023: She is now 5 months s/p PVI. Doing well overall from rhythm standpoint, with only brief palpitations since last clinic visit. Will start to wean off flecainide.   Otherwise feeling well. CHADSVASc 4 on eliquis.    1/8/2024: She is ~9 mo s/p PVI. She continues to do well from a rhythm standpoint, with no documented or symptomatic recurrence of arrhythmia since procedure. No AF on Smartwatch. Will continue to wean flecainide. Plan on stopping at next clinic visit. CHADSVASc 4 on eliquis. Reduce flecainide to 50mg BID    Update (04/15/2024):    Today she denies recent palpitations. Epigastric pain at night, guzman if eats later. Follows with GI. On protonix. No exertional CP reported. No CHAVES, LH, syncope reported.    She is currently taking eliquis 5mg BID for stroke prophylaxis and denies significant bleeding episodes. She is currently being treated with flecainide 50mg BID for rhythm control and toprol 100mg daily for HR control.  Kidney function is stable, with a creatinine of 0.9 on 3/20/2024.    I have personally reviewed the  patient's EKG from 4/12/2024, which shows sinus rhythm at 66bpm. [place] interval is 174. QRS is 102. QTc is 446.    Relevant Cardiac Test Results:    2D Echo (10/15/2019):  Normal left ventricular systolic function. The estimated ejection fraction is 70%  Normal right ventricular systolic function.  Indeterminate left ventricular diastolic function.  Moderate left atrial enlargement.  The estimated PA systolic pressure is 33 mm Hg  Normal central venous pressure (3 mm Hg).    Current Outpatient Medications   Medication Sig    amLODIPine (NORVASC) 5 MG tablet Take 1 tablet (5 mg total) by mouth once daily.    atorvastatin (LIPITOR) 40 MG tablet TAKE 1 TABLET BY MOUTH EVERY DAY IN THE EVENING    blood sugar diagnostic (TRUE METRIX GLUCOSE TEST STRIP) Strp Inject 1 each into the skin 2 (two) times daily before meals.    blood-glucose meter Misc Check fasting glucose and check glucose two hours after lunch or dinner    pepito/ala/linoleic/oleic/dha (ADULT OMEGA PLUS DHA ORAL) Take by mouth.    cholecalciferol, vitamin D3, 1,250 mcg (50,000 unit) capsule TAKE 1 CAPSULE BY MOUTH ONE TIME PER WEEK    COQ10, UBIQUINOL, ORAL Take by mouth.    cyanocobalamin 500 MCG tablet Take 1,000 mcg by mouth once daily.    diclofenac sodium (VOLTAREN) 1 % Gel Apply 2 g topically 4 (four) times daily. for 10 days    ELIQUIS 5 mg Tab TAKE 1 TABLET BY MOUTH TWICE A DAY    empagliflozin-metformin (SYNJARDY XR) 10-1,000 mg TBph Take 1 tablet by mouth 2 (two) times a day.    fenofibrate 160 MG Tab TAKE 1 TABLET BY MOUTH EVERY DAY    flecainide (TAMBOCOR) 50 MG Tab Take 1 tablet (50 mg total) by mouth every 12 (twelve) hours.    FLUAD QUAD 2023-24,65Y UP,,PF, 60 mcg (15 mcg x 4)/0.5 mL Syrg     furosemide (LASIX) 20 MG tablet Take 1 tablet (20 mg total) by mouth as needed (3-5 pound weight gain or lower extremity edema, please take 1 pill by mouth for 3 days.). (Patient not taking: Reported on 1/22/2024)    glutathione 500 mg Cap Take by mouth.     hydroCHLOROthiazide (HYDRODIURIL) 25 MG tablet TAKE 1 TABLET (25 MG TOTAL) BY MOUTH ONCE DAILY. WITH BREAKFAST    lancing device with lancets (ONETOUCH DELICA LANC DEVICE) Kit 1 each by Misc.(Non-Drug; Combo Route) route 2 (two) times daily before meals.    losartan (COZAAR) 100 MG tablet Take 1 tablet (100 mg total) by mouth once daily.    MAGNESIUM ORAL Take by mouth.    metoprolol succinate (TOPROL-XL) 100 MG 24 hr tablet Take 1 tablet (100 mg total) by mouth once daily.    pantoprazole (PROTONIX) 40 MG tablet Take 1 tablet (40 mg total) by mouth once daily. Start taking this medication on 4/5/2023.    tirzepatide 7.5 mg/0.5 mL PnIj Inject 7.5 mg into the skin every 7 days.    varicella-zoster [id number], PF, (SHINGRIX) 50 mcg/0.5 mL injection Inject 0.5 mLs into the muscle once. Give 1 vaccine, then repeat/schedule 2nd vaccine in 8 weeks. Total of 2 doses - 8 weeks apart. for 1 dose    vit C/E/Zn/coppr/lutein/zeaxan (PRESERVISION AREDS-2 ORAL) Take by mouth.     No current facility-administered medications for this visit.       Review of Systems   Constitutional: Negative for malaise/fatigue.   Cardiovascular:  Negative for chest pain, dyspnea on exertion, irregular heartbeat, leg swelling and palpitations.   Respiratory:  Negative for shortness of breath.    Hematologic/Lymphatic: Negative for bleeding problem.   Skin:  Negative for rash.   Musculoskeletal:  Negative for myalgias.   Gastrointestinal:  Positive for heartburn. Negative for hematemesis, hematochezia and nausea.   Genitourinary:  Negative for hematuria.   Neurological:  Negative for light-headedness.   Psychiatric/Behavioral:  Negative for altered mental status.    Allergic/Immunologic: Negative for persistent infections.       Objective:        /65   - N/A telemedicine visit    Physical Exam  - N/A telemedicine visit    Lab Results   Component Value Date     03/20/2024    K 3.9 03/20/2024    MG 1.7 01/13/2023    BUN 21 03/20/2024     CREATININE 0.9 03/20/2024    ALT 17 03/20/2024    AST 22 03/20/2024    HGB 13.8 03/20/2024    HCT 42.1 03/20/2024    TSH 1.862 03/20/2024    LDLCALC 104.4 03/20/2024       Recent Labs   Lab 04/12/23  1029   INR 1.0       Assessment:     1. PAF (paroxysmal atrial fibrillation)    2. Hypertension associated with diabetes    3. On anticoagulant therapy    4. Encounter for monitoring flecainide therapy      Plan:     In summary, Ms. Wetzel is a 68 y.o. female with AF (PVI 4/2023), HTN, DM, obesity with a telemedicine visit for follow up.   She is now 1 year s/p PVI. Continues to do well from a rhythm standpoint, with no recent documented or symptomatic recurrence of arrhythmia. On low dose flecainide. ECG shows NSR and normal intervals.  Will stop flecainide. CHADSVASc 4 and she should continue oral anticoagulation indefinitely. She is on eliquis.    Stop flecainide  Continue other meds  RTC 6 mo, sooner if needed    *A copy of this note has been sent to Dr. Mathias*    Follow up in about 6 months (around 10/15/2024).    ------------------------------------------------------------------    ANIL Lambert, NP-C  Cardiac Electrophysiology

## 2024-04-12 ENCOUNTER — HOSPITAL ENCOUNTER (OUTPATIENT)
Dept: CARDIOLOGY | Facility: CLINIC | Age: 69
Discharge: HOME | End: 2024-04-12
Payer: MEDICARE

## 2024-04-12 DIAGNOSIS — I48.0 PAF (PAROXYSMAL ATRIAL FIBRILLATION): ICD-10-CM

## 2024-04-12 DIAGNOSIS — I15.2 HYPERTENSION DUE TO ENDOCRINE DISORDER: Chronic | ICD-10-CM

## 2024-04-12 LAB
OHS QRS DURATION: 102 MS
OHS QTC CALCULATION: 446 MS

## 2024-04-12 PROCEDURE — 93005 ELECTROCARDIOGRAM TRACING: CPT | Mod: HCNC,S$GLB,, | Performed by: INTERNAL MEDICINE

## 2024-04-12 PROCEDURE — 93010 ELECTROCARDIOGRAM REPORT: CPT | Mod: HCNC,S$GLB,, | Performed by: INTERNAL MEDICINE

## 2024-04-12 RX ORDER — FLECAINIDE ACETATE 50 MG/1
50 TABLET ORAL EVERY 12 HOURS
Qty: 180 TABLET | Refills: 3 | Status: SHIPPED | OUTPATIENT
Start: 2024-04-12 | End: 2024-04-15

## 2024-04-12 RX ORDER — APIXABAN 5 MG/1
TABLET, FILM COATED ORAL
Qty: 60 TABLET | Refills: 11 | Status: SHIPPED | OUTPATIENT
Start: 2024-04-12 | End: 2024-04-15 | Stop reason: SDUPTHER

## 2024-04-12 NOTE — TELEPHONE ENCOUNTER
Care Due:                  Date            Visit Type   Department     Provider  --------------------------------------------------------------------------------                                EP -                              PRIMARY      MET INTERNAL  Last Visit: 03-      CARE ([facility])   MEDICINE       [facility]  [place]                              EP -                              PRIMARY      [facility] INTERNAL  Next Visit: 09-      CARE ([facility])   [facility]                                                            Last  Test          Frequency    Reason                     Performed    Due Date  --------------------------------------------------------------------------------    Vitamin D...  12 months..  cholecalciferol,.........  03-   03-    [facility] Embedded Care Due Messages. Reference number: [id number].   4/12/2024 9:05:48 AM CDT

## 2024-04-13 RX ORDER — AMLODIPINE BESYLATE 5 MG/1
5 TABLET ORAL
Qty: 90 TABLET | Refills: 3 | Status: SHIPPED | OUTPATIENT
Start: 2024-04-13

## 2024-04-13 NOTE — TELEPHONE ENCOUNTER
Provider Staff:  Action required for this patient    Requires labs      Please see care gap opportunities below in Care Due Message.    Thanks!  [facility]     Appointments      Date Provider   Last Visit   3/27/2024 Hien Sparks MD   Next Visit   9/26/2024 Hien Sparks MD     Refill Decision Note   Fely Wetzel  is requesting a refill authorization.  Brief Assessment and Rationale for Refill:  Approve     Medication Therapy Plan:         Comments:     Note composed:10:44 AM 04/13/2024

## 2024-04-15 ENCOUNTER — PATIENT MESSAGE (OUTPATIENT)
Dept: ELECTROPHYSIOLOGY | Facility: CLINIC | Age: 69
End: 2024-04-15

## 2024-04-15 ENCOUNTER — OFFICE VISIT (OUTPATIENT)
Dept: ELECTROPHYSIOLOGY | Facility: CLINIC | Age: 69
End: 2024-04-15
Payer: MEDICARE

## 2024-04-15 VITALS — SYSTOLIC BLOOD PRESSURE: 120 MMHG | DIASTOLIC BLOOD PRESSURE: 65 MMHG

## 2024-04-15 DIAGNOSIS — E11.59 HYPERTENSION ASSOCIATED WITH DIABETES: ICD-10-CM

## 2024-04-15 DIAGNOSIS — Z51.81 ENCOUNTER FOR MONITORING FLECAINIDE THERAPY: ICD-10-CM

## 2024-04-15 DIAGNOSIS — I15.2 HYPERTENSION ASSOCIATED WITH DIABETES: ICD-10-CM

## 2024-04-15 DIAGNOSIS — I48.0 PAF (PAROXYSMAL ATRIAL FIBRILLATION): Primary | ICD-10-CM

## 2024-04-15 DIAGNOSIS — Z79.01 ON ANTICOAGULANT THERAPY: ICD-10-CM

## 2024-04-15 DIAGNOSIS — Z79.899 ENCOUNTER FOR MONITORING FLECAINIDE THERAPY: ICD-10-CM

## 2024-04-15 PROCEDURE — 3051F HG A1C>EQUAL 7.0%<8.0%: CPT | Mod: HCNC,CPTII,95, | Performed by: NURSE PRACTITIONER

## 2024-04-15 PROCEDURE — 3060F POS MICROALBUMINURIA REV: CPT | Mod: HCNC,CPTII,95, | Performed by: NURSE PRACTITIONER

## 2024-04-15 PROCEDURE — 3066F NEPHROPATHY DOC TX: CPT | Mod: HCNC,CPTII,95, | Performed by: NURSE PRACTITIONER

## 2024-04-15 PROCEDURE — 4010F ACE/ARB THERAPY RXD/TAKEN: CPT | Mod: HCNC,CPTII,95, | Performed by: NURSE PRACTITIONER

## 2024-04-15 PROCEDURE — 3078F DIAST BP <80 MM HG: CPT | Mod: HCNC,CPTII,95, | Performed by: NURSE PRACTITIONER

## 2024-04-15 PROCEDURE — 1160F RVW MEDS BY RX/DR IN RCRD: CPT | Mod: HCNC,CPTII,95, | Performed by: NURSE PRACTITIONER

## 2024-04-15 PROCEDURE — 1126F AMNT PAIN NOTED NONE PRSNT: CPT | Mod: HCNC,CPTII,95, | Performed by: NURSE PRACTITIONER

## 2024-04-15 PROCEDURE — 1101F PT FALLS ASSESS-DOCD LE1/YR: CPT | Mod: HCNC,CPTII,95, | Performed by: NURSE PRACTITIONER

## 2024-04-15 PROCEDURE — 1159F MED LIST DOCD IN RCRD: CPT | Mod: HCNC,CPTII,95, | Performed by: NURSE PRACTITIONER

## 2024-04-15 PROCEDURE — 3074F SYST BP LT 130 MM HG: CPT | Mod: HCNC,CPTII,95, | Performed by: NURSE PRACTITIONER

## 2024-04-15 PROCEDURE — 99214 OFFICE O/P EST MOD 30 MIN: CPT | Mod: HCNC,95,, | Performed by: NURSE PRACTITIONER

## 2024-04-15 PROCEDURE — 3288F FALL RISK ASSESSMENT DOCD: CPT | Mod: HCNC,CPTII,95, | Performed by: NURSE PRACTITIONER

## 2024-04-19 ENCOUNTER — PATIENT MESSAGE (OUTPATIENT)
Dept: ADMINISTRATIVE | Facility: HOSPITAL | Age: 69
End: 2024-04-19
Payer: MEDICAID

## 2024-04-23 ENCOUNTER — PATIENT MESSAGE (OUTPATIENT)
Dept: INTERNAL MEDICINE | Facility: CLINIC | Age: 69
End: 2024-04-23
Payer: MEDICAID

## 2024-04-23 NOTE — TELEPHONE ENCOUNTER
Pt states she received a letter about [facility] departure, and would like a referral to another diabetes specialist.

## 2024-05-17 ENCOUNTER — TELEPHONE (OUTPATIENT)
Dept: INTERNAL MEDICINE | Facility: CLINIC | Age: 69
End: 2024-05-17
Payer: MEDICARE

## 2024-05-17 DIAGNOSIS — H93.19 TINNITUS, UNSPECIFIED LATERALITY: Primary | ICD-10-CM

## 2024-05-17 DIAGNOSIS — H61.20 WAX IN EAR: ICD-10-CM

## 2024-05-17 NOTE — TELEPHONE ENCOUNTER
----- Message from Larissa Betancur sent at 5/17/2024 12:56 PM CDT -----  Contact: [phone] Patient  Patient would like to get a referral.  Referral to what specialty:  ENT  Does the patient want the referral with a specific physician:  No  Is the specialist an [facility]:    Reason (be specific):  Ringing in ears, Ear Wax, Ear pain  Does the patient already have the specialty clinic appointment scheduled:  No  If yes, what date is the appointment scheduled:     Is the insurance listed in Epic correct? (this is important for a referral):  yes  Advised patient that once provider approves this either a nurse or  will return their call?: yes  Would the patient like a call back, or a response through their [facility] portal?:   Call Back Please. Thank you  Comments:

## 2024-05-17 NOTE — TELEPHONE ENCOUNTER
Called and spoke to pt in regards to ENT referral was ordered. Pt was notified referral was ordered and scheduling team was notified as well.

## 2024-05-17 NOTE — TELEPHONE ENCOUNTER
Pt stated she having Ringing in ears, Ear Wax, Ear pain (Both) Been almost two weeks pt stated.  Pt requesting referral for ENT

## 2024-05-20 ENCOUNTER — PATIENT MESSAGE (OUTPATIENT)
Dept: ADMINISTRATIVE | Facility: HOSPITAL | Age: 69
End: 2024-05-20
Payer: MEDICARE

## 2024-05-21 ENCOUNTER — TELEPHONE (OUTPATIENT)
Dept: OTOLARYNGOLOGY | Facility: CLINIC | Age: 69
End: 2024-05-21
Payer: MEDICARE

## 2024-05-22 DIAGNOSIS — Z78.0 MENOPAUSE: ICD-10-CM

## 2024-05-31 ENCOUNTER — OFFICE VISIT (OUTPATIENT)
Dept: INTERNAL MEDICINE | Facility: CLINIC | Age: 69
End: 2024-05-31
Payer: MEDICARE

## 2024-05-31 ENCOUNTER — HOSPITAL ENCOUNTER (OUTPATIENT)
Dept: RADIOLOGY | Facility: HOSPITAL | Age: 69
Discharge: HOME | End: 2024-05-31
Attending: NURSE PRACTITIONER
Payer: MEDICARE

## 2024-05-31 VITALS
BODY MASS INDEX: 29.78 KG/M2 | OXYGEN SATURATION: 96 % | TEMPERATURE: 98 F | HEIGHT: 62 IN | HEART RATE: 64 BPM | RESPIRATION RATE: 18 BRPM | WEIGHT: 161.81 LBS | SYSTOLIC BLOOD PRESSURE: 132 MMHG | DIASTOLIC BLOOD PRESSURE: 72 MMHG

## 2024-05-31 DIAGNOSIS — Z00.00 ENCOUNTER FOR MEDICARE ANNUAL WELLNESS EXAM: Primary | ICD-10-CM

## 2024-05-31 DIAGNOSIS — R51.9 ACUTE NONINTRACTABLE HEADACHE, UNSPECIFIED HEADACHE TYPE: ICD-10-CM

## 2024-05-31 DIAGNOSIS — E66.3 OVERWEIGHT (BMI 25.0-29.9): ICD-10-CM

## 2024-05-31 DIAGNOSIS — I48.0 PAF (PAROXYSMAL ATRIAL FIBRILLATION): ICD-10-CM

## 2024-05-31 DIAGNOSIS — E78.5 HYPERLIPIDEMIA ASSOCIATED WITH TYPE 2 DIABETES MELLITUS: ICD-10-CM

## 2024-05-31 DIAGNOSIS — E11.65 TYPE 2 DIABETES MELLITUS WITH HYPERGLYCEMIA, WITHOUT LONG-TERM CURRENT USE OF INSULIN: ICD-10-CM

## 2024-05-31 DIAGNOSIS — E11.59 HYPERTENSION ASSOCIATED WITH DIABETES: ICD-10-CM

## 2024-05-31 DIAGNOSIS — E11.69 HYPERLIPIDEMIA ASSOCIATED WITH TYPE 2 DIABETES MELLITUS: ICD-10-CM

## 2024-05-31 DIAGNOSIS — M85.80 OSTEOPENIA, UNSPECIFIED LOCATION: ICD-10-CM

## 2024-05-31 DIAGNOSIS — I15.2 HYPERTENSION ASSOCIATED WITH DIABETES: ICD-10-CM

## 2024-05-31 DIAGNOSIS — Z79.01 ON ANTICOAGULANT THERAPY: ICD-10-CM

## 2024-05-31 DIAGNOSIS — K21.9 GASTROESOPHAGEAL REFLUX DISEASE, UNSPECIFIED WHETHER ESOPHAGITIS PRESENT: ICD-10-CM

## 2024-05-31 DIAGNOSIS — E55.9 VITAMIN D DEFICIENCY: ICD-10-CM

## 2024-05-31 PROCEDURE — 3078F DIAST BP <80 MM HG: CPT | Mod: HCNC,CPTII,S$GLB, | Performed by: NURSE PRACTITIONER

## 2024-05-31 PROCEDURE — 3060F POS MICROALBUMINURIA REV: CPT | Mod: HCNC,CPTII,S$GLB, | Performed by: NURSE PRACTITIONER

## 2024-05-31 PROCEDURE — 1101F PT FALLS ASSESS-DOCD LE1/YR: CPT | Mod: HCNC,CPTII,S$GLB, | Performed by: NURSE PRACTITIONER

## 2024-05-31 PROCEDURE — 3051F HG A1C>EQUAL 7.0%<8.0%: CPT | Mod: HCNC,CPTII,S$GLB, | Performed by: NURSE PRACTITIONER

## 2024-05-31 PROCEDURE — 70450 CT HEAD/BRAIN W/O DYE: CPT | Mod: 26,HCNC,, | Performed by: STUDENT IN AN ORGANIZED HEALTH CARE EDUCATION/TRAINING PROGRAM

## 2024-05-31 PROCEDURE — 1159F MED LIST DOCD IN RCRD: CPT | Mod: HCNC,CPTII,S$GLB, | Performed by: NURSE PRACTITIONER

## 2024-05-31 PROCEDURE — 4010F ACE/ARB THERAPY RXD/TAKEN: CPT | Mod: HCNC,CPTII,S$GLB, | Performed by: NURSE PRACTITIONER

## 2024-05-31 PROCEDURE — G0438 PPPS, INITIAL VISIT: HCPCS | Mod: HCNC,S$GLB,, | Performed by: NURSE PRACTITIONER

## 2024-05-31 PROCEDURE — 3066F NEPHROPATHY DOC TX: CPT | Mod: HCNC,CPTII,S$GLB, | Performed by: NURSE PRACTITIONER

## 2024-05-31 PROCEDURE — 70450 CT HEAD/BRAIN W/O DYE: CPT | Mod: TC,HCNC

## 2024-05-31 PROCEDURE — 3075F SYST BP GE 130 - 139MM HG: CPT | Mod: HCNC,CPTII,S$GLB, | Performed by: NURSE PRACTITIONER

## 2024-05-31 PROCEDURE — 1158F ADVNC CARE PLAN TLK DOCD: CPT | Mod: HCNC,CPTII,S$GLB, | Performed by: NURSE PRACTITIONER

## 2024-05-31 PROCEDURE — 3288F FALL RISK ASSESSMENT DOCD: CPT | Mod: HCNC,CPTII,S$GLB, | Performed by: NURSE PRACTITIONER

## 2024-05-31 PROCEDURE — G9919 SCRN ND POS ND PROV OF REC: HCPCS | Mod: HCNC,CPTII,S$GLB, | Performed by: NURSE PRACTITIONER

## 2024-05-31 PROCEDURE — 99999 PR PBB SHADOW E&M-EST. PATIENT-LVL V: CPT | Mod: PBBFAC,HCNC,, | Performed by: NURSE PRACTITIONER

## 2024-05-31 NOTE — PATIENT INSTRUCTIONS
Counseling and Referral of Other Preventative  (Italic type indicates deductible and co-insurance are waived)    Patient Name: Fely Wetzel  Today's Date: 6/3/2024    Health Maintenance         Date Due Completion Date    Shingles Vaccine (1 of 2) Never done ---    RSV Vaccine (Age 60+ and Pregnant patients) (1 - 1-dose 60+ series) Never done ---    COVID-19 Vaccine (7 - 2023-24 season) 09/01/2023 10/10/2022    DEXA Scan 04/19/2024 4/19/2022    Override on 2/3/2017: Declined    Foot Exam 05/15/2024 5/15/2023    Mammogram 07/27/2024 7/27/2023    Override on 5/22/2018: Done    TETANUS VACCINE 08/05/2024 8/5/2014 (Done)    Override on 8/5/2014: Done    Hemoglobin A1c 09/20/2024 3/20/2024    Eye Exam 03/14/2025 3/14/2024    Override on 2/15/2018: Done    Override on 1/26/2016: Done    Override on 5/7/2014: Done    Override on 4/1/2013: Done    Diabetes Urine Screening 03/20/2025 3/20/2024    Lipid Panel 03/20/2025 3/20/2024    Low Dose Statin 05/31/2025 5/31/2024    Colorectal Cancer Screening 03/17/2032 3/17/2022          Orders Placed This Encounter   Procedures    CT Head Without Contrast     The following information is provided to all patients.  This information is to help you find resources for any of the problems found today that may be affecting your health:                  Living healthy guide: www.[facility].[place].gov      Understanding Diabetes: www.diabetes.org      Eating healthy: www.cdc.gov/healthyweight      CDC home safety checklist: www.cdc.gov/steadi/patient.html      Agency on Aging: www.goea.[place].gov      Alcoholics anonymous (AA): www.aa.org      Physical Activity: www.christoph.nih.gov/xn7cshb      Tobacco use: www.quitwithusla.org

## 2024-05-31 NOTE — PROGRESS NOTES
"Fely Wetzel presented for an initial Medicare AWV today. The following components were reviewed and updated:    Medical history  Family History  Social history  Allergies and Current Medications  Health Risk Assessment  Health Maintenance  Care Team    **See Completed Assessments for Annual Wellness visit with in the encounter summary    The following assessments were completed:  Depression Screening  Cognitive function Screening  Timed Get Up Test  Whisper Test    Health Maintenance         Date Due Completion Date    Shingles Vaccine (1 of 2) Never done ---    RSV Vaccine (Age 60+ and Pregnant patients) (1 - 1-dose 60+ series) Never done ---    COVID-19 Vaccine (7 - 2023-24 season) 09/01/2023 10/10/2022    DEXA Scan 04/19/2024 4/19/2022    Override on 2/3/2017: Declined    Foot Exam 05/15/2024 5/15/2023    Mammogram 07/27/2024 7/27/2023    Override on 5/22/2018: Done    TETANUS VACCINE 08/05/2024 8/5/2014 (Done)    Override on 8/5/2014: Done    Hemoglobin A1c 09/20/2024 3/20/2024    Eye Exam 03/14/2025 3/14/2024    Override on 2/15/2018: Done    Override on 1/26/2016: Done    Override on 5/7/2014: Done    Override on 4/1/2013: Done    Diabetes Urine Screening 03/20/2025 3/20/2024    Lipid Panel 03/20/2025 3/20/2024    Low Dose Statin 05/31/2025 5/31/2024    Colorectal Cancer Screening 03/17/2032 3/17/2022            Vitals:    05/31/24 0936   BP: 132/72   BP Location: Right arm   Patient Position: Sitting   BP Method: Medium (Manual)   Pulse: 64   Resp: 18   Temp: 97.9 °F (36.6 °C)   TempSrc: Temporal   SpO2: 96%   Weight: 73.4 kg (161 lb 13.1 oz)   Height: 5' 2" (1.575 m)     BP Readings from Last 3 Encounters:   05/31/24 132/72   04/15/24 120/65   03/27/24 120/80     Body mass index is 29.6 kg/m².  Wt Readings from Last 3 Encounters:   05/31/24 0936 73.4 kg (161 lb 13.1 oz)   03/27/24 1036 73.3 kg (161 lb 9.6 oz)   01/22/24 1028 73.5 kg (162 lb 0.6 oz)           Review for Opioid Screening: Patient does not " "have a prescription for Opioids.   Review of Substance Use Disorders: Patient does not use substances.     Pain level assessed and reviewed.     Diagnoses and health risks identified today and associated recommendations/orders:  1. Encounter for Medicare annual wellness exam    - Ambulatory Referral/Consult to Enhanced Annual Wellness Visit (eAWV)    2. Acute nonintractable headache, unspecified headache type    Patient reports a posterior headache described as "electric shock".     - CT Head Without Contrast; Future    3. Hypertension associated with diabetes    Chronic, stable, continue current medications, follow up with PCP & Cardiology     4. PAF (paroxysmal atrial fibrillation)    Chronic, stable, continue current medications, follow up with PCP & EP     5. Type 2 diabetes mellitus with hyperglycemia, without long-term current use of insulin    Chronic, stable, continue current medications, follow up with PCP     6. Hyperlipidemia associated with type 2 diabetes mellitus    Chronic, stable, continue current medication, follow up with PCP     7. Vitamin D deficiency    Chronic, stable, continue current medication, follow up with PCP     8. Gastroesophageal reflux disease, unspecified whether esophagitis present    Chronic, stable, continue current medication, follow up with PCP     9. Osteopenia, unspecified location    Chronic, stable, follow up with PCP     10. Overweight (BMI 25.0-29.9)    Chronic, stable, follow up with PCP     11. On anticoagulant therapy      Provided Fely with a 5-10 year written screening schedule and personal prevention plan. Recommendations were developed using the USPSTF age appropriate recommendations. Education, counseling, and referrals were provided as needed.  After Visit Summary printed and given to patient which includes a list of additional screenings\tests needed.    Follow up in about 1 year (around 5/31/2025) for Medicare Annual Wellness Visit.      Jodi Martinez, " NP    I offered to discuss advanced care planning, including how to pick a person who would make decisions for you if you were unable to make them for yourself, called a health care power of , and what kind of decisions you might make such as use of life sustaining treatments such as ventilators and tube feeding when faced with a life limiting illness recorded on a living will that they will need to know. (How you want to be cared for as you near the end of your natural life)     X Patient is interested in learning more about how to make advanced directives.  I provided them paperwork and offered to discuss this with them.

## 2024-06-03 PROBLEM — A04.8 H. PYLORI INFECTION: Status: RESOLVED | Noted: 2021-10-08 | Resolved: 2024-06-03

## 2024-06-03 PROBLEM — M79.671 RIGHT FOOT PAIN: Status: RESOLVED | Noted: 2021-06-01 | Resolved: 2024-06-03

## 2024-06-13 ENCOUNTER — OFFICE VISIT (OUTPATIENT)
Dept: PODIATRY | Facility: CLINIC | Age: 69
End: 2024-06-13
Payer: MEDICARE

## 2024-06-13 VITALS
BODY MASS INDEX: 29.78 KG/M2 | HEIGHT: 62 IN | HEART RATE: 78 BPM | SYSTOLIC BLOOD PRESSURE: 130 MMHG | TEMPERATURE: 98 F | DIASTOLIC BLOOD PRESSURE: 76 MMHG | WEIGHT: 161.81 LBS

## 2024-06-13 DIAGNOSIS — E11.42 TYPE 2 DIABETES MELLITUS WITH PERIPHERAL NEUROPATHY: Primary | ICD-10-CM

## 2024-06-13 DIAGNOSIS — B35.3 TINEA PEDIS OF BOTH FEET: ICD-10-CM

## 2024-06-13 PROCEDURE — 99214 OFFICE O/P EST MOD 30 MIN: CPT | Mod: S$GLB,,, | Performed by: STUDENT IN AN ORGANIZED HEALTH CARE EDUCATION/TRAINING PROGRAM

## 2024-06-13 PROCEDURE — 3051F HG A1C>EQUAL 7.0%<8.0%: CPT | Mod: CPTII,S$GLB,, | Performed by: STUDENT IN AN ORGANIZED HEALTH CARE EDUCATION/TRAINING PROGRAM

## 2024-06-13 PROCEDURE — 99999 PR PBB SHADOW E&M-EST. PATIENT-LVL IV: CPT | Mod: PBBFAC,,, | Performed by: STUDENT IN AN ORGANIZED HEALTH CARE EDUCATION/TRAINING PROGRAM

## 2024-06-13 PROCEDURE — 3078F DIAST BP <80 MM HG: CPT | Mod: CPTII,S$GLB,, | Performed by: STUDENT IN AN ORGANIZED HEALTH CARE EDUCATION/TRAINING PROGRAM

## 2024-06-13 PROCEDURE — 4010F ACE/ARB THERAPY RXD/TAKEN: CPT | Mod: CPTII,S$GLB,, | Performed by: STUDENT IN AN ORGANIZED HEALTH CARE EDUCATION/TRAINING PROGRAM

## 2024-06-13 PROCEDURE — 1159F MED LIST DOCD IN RCRD: CPT | Mod: CPTII,S$GLB,, | Performed by: STUDENT IN AN ORGANIZED HEALTH CARE EDUCATION/TRAINING PROGRAM

## 2024-06-13 PROCEDURE — 1101F PT FALLS ASSESS-DOCD LE1/YR: CPT | Mod: CPTII,S$GLB,, | Performed by: STUDENT IN AN ORGANIZED HEALTH CARE EDUCATION/TRAINING PROGRAM

## 2024-06-13 PROCEDURE — 1126F AMNT PAIN NOTED NONE PRSNT: CPT | Mod: CPTII,S$GLB,, | Performed by: STUDENT IN AN ORGANIZED HEALTH CARE EDUCATION/TRAINING PROGRAM

## 2024-06-13 PROCEDURE — 3066F NEPHROPATHY DOC TX: CPT | Mod: CPTII,S$GLB,, | Performed by: STUDENT IN AN ORGANIZED HEALTH CARE EDUCATION/TRAINING PROGRAM

## 2024-06-13 PROCEDURE — 3075F SYST BP GE 130 - 139MM HG: CPT | Mod: CPTII,S$GLB,, | Performed by: STUDENT IN AN ORGANIZED HEALTH CARE EDUCATION/TRAINING PROGRAM

## 2024-06-13 PROCEDURE — 3008F BODY MASS INDEX DOCD: CPT | Mod: CPTII,S$GLB,, | Performed by: STUDENT IN AN ORGANIZED HEALTH CARE EDUCATION/TRAINING PROGRAM

## 2024-06-13 PROCEDURE — 3288F FALL RISK ASSESSMENT DOCD: CPT | Mod: CPTII,S$GLB,, | Performed by: STUDENT IN AN ORGANIZED HEALTH CARE EDUCATION/TRAINING PROGRAM

## 2024-06-13 PROCEDURE — 3060F POS MICROALBUMINURIA REV: CPT | Mod: CPTII,S$GLB,, | Performed by: STUDENT IN AN ORGANIZED HEALTH CARE EDUCATION/TRAINING PROGRAM

## 2024-06-13 RX ORDER — KETOCONAZOLE 20 MG/G
CREAM TOPICAL DAILY
Qty: 60 G | Refills: 2 | Status: SHIPPED | OUTPATIENT
Start: 2024-06-13

## 2024-06-13 NOTE — PROGRESS NOTES
Subjective:     Patient    Fely Wetzel is a 68 y.o. female.    Problem    Previously followed by Dr Rondon then Dr Koehler for foot exams and bunions. Presents for diabetic foot exam. Has occasional numbness, tingling, stinging, cramping. Has dry scaly skin for which she uses OTC lotion.     Primary Care Provider    Primary Care Provider: Hien Sparks MD   Last Seen: 3/27/2024     History    History obtained from patient and review of medical records.     Past Medical History:   Diagnosis Date    Colon polyp 11/2014    Diabetes mellitus type II     H. pylori infection 10/08/2021    Hyperlipidemia     Hypertension     Obesity     Osteopenia     Right foot pain 06/01/2021    TMJ (temporomandibular joint disorder)        Past Surgical History:   Procedure Laterality Date    ABLATION OF ARRHYTHMOGENIC FOCUS FOR ATRIAL FIBRILLATION N/A 4/19/2023    Procedure: Ablation atrial fibrillation;  Surgeon: Beck Mathias MD;  Location: [facility] EP LAB;  Service: Cardiology;  Laterality: N/A;  AF, GABRIELLA (Cx if SR), PVI, RFA, Carto, Gen, IA, 3 Prep    COLONOSCOPY N/A 3/17/2022    Procedure: COLONOSCOPY;  Surgeon: Дмитрий Middleton MD;  Location: [facility] ENDO (4TH FLR);  Service: Endoscopy;  Laterality: N/A;  Covid Positive on 1/31. no further testing needed.Holding Eliquis for 2 days prior to .EC  3/8 new instructions mailed-tb    ESOPHAGOGASTRODUODENOSCOPY N/A 3/17/2022    Procedure: EGD (ESOPHAGOGASTRODUODENOSCOPY);  Surgeon: Дмитрий Middleton MD;  Location: [facility] ENDO (4TH FLR);  Service: Endoscopy;  Laterality: N/A;    HYSTERECTOMY      TREATMENT OF CARDIAC ARRHYTHMIA  4/19/2023    Procedure: Cardioversion or Defibrillation;  Surgeon: Beck Mathias MD;  Location: [facility] EP LAB;  Service: Cardiology;;        Objective:     Vitals  Wt Readings from Last 1 Encounters:   06/13/24 73.4 kg (161 lb 13.1 oz)     Temp Readings from Last 1 Encounters:   06/13/24 97.9 °F (36.6 °C) (Oral)     BP Readings from Last 1  Encounters:   06/13/24 130/76     Pulse Readings from Last 1 Encounters:   06/13/24 78       Dermatological Exam    Skin:  Pedal hair growth, skin color, and skin texture normal on right; dry scaly skin  Pedal hair growth, skin color, and skin texture normal on left; dry scaly skin     Nails:  All nails normal in length, thickness, color    Vascular Exam    Arteries:  Posterior tibial artery palpable on right  Dorsalis pedis artery palpable on right  Posterior tibial artery palpable on left  Dorsalis pedis artery palpable on left    Veins:  Superficial veins unremarkable on right  Superficial veins unremarkable on left    Swelling:  None on right  None on left    Neurological Exam    Casa touch test:  6/6 sites sensed, light touch intact    Provocation Sign:  + at tibial nerve bilaterally     Musculoskeletal Exam    Footwear:  Casual on right  Casual on left    Gait Exam:   Ambulatory Status: Ambulatory  Gait: Normal  Assistive Devices: None    Foot Progression Angle:  Normal on right  Normal on left     Right Lower Extremity Additional Findings:  Right foot and ankle function, strength, and range of motion unremarkable except as noted above.     Left Lower Extremity Additional Findings:  Left foot and ankle function, strength, and range of motion unremarkable except as noted above.    Imaging and Other Tests    Imaging:  Independently reviewed and interpreted imaging, findings are as follows: N/A    Other Tests: The following A1c results were reviewed.   Hemoglobin A1C   Date Value Ref Range Status   03/20/2024 7.0 (H) 4.0 - 5.6 % Final     Comment:     ADA Screening Guidelines:  5.7-6.4%  Consistent with prediabetes  >or=6.5%  Consistent with diabetes    High levels of fetal hemoglobin interfere with the HbA1C  assay. Heterozygous hemoglobin variants (HbS, HgC, etc)do  not significantly interfere with this assay.   However, presence of multiple variants may affect accuracy.     11/10/2023 6.9 (H) 4.0 - 5.6 %  Final     Comment:     ADA Screening Guidelines:  5.7-6.4%  Consistent with prediabetes  >or=6.5%  Consistent with diabetes    High levels of fetal hemoglobin interfere with the HbA1C  assay. Heterozygous hemoglobin variants (HbS, HgC, etc)do  not significantly interfere with this assay.   However, presence of multiple variants may affect accuracy.     03/16/2023 7.7 (H) 4.0 - 5.6 % Final     Comment:     ADA Screening Guidelines:  5.7-6.4%  Consistent with prediabetes  >or=6.5%  Consistent with diabetes    High levels of fetal hemoglobin interfere with the HbA1C  assay. Heterozygous hemoglobin variants (HbS, HgC, etc)do  not significantly interfere with this assay.   However, presence of multiple variants may affect accuracy.           Assessment:     Encounter Diagnoses   Name Primary?    Type 2 diabetes mellitus with peripheral neuropathy Yes    Tinea pedis of both feet         Plan:     I counseled the patient on her conditions, their implications and medical management.    Diabetic foot with peripheral neuropathy: chronic stable  -Diabetic foot exam performed.  -Performed shoe inspection and diabetic foot education. Reviewed importance of blood glucose control, proper nutrition, and foot hygiene to minimize risk of complications of diabetes. Recommended daily foot inspections, daily moisturizer to feet, avoiding sharp instruments to feet, appropriate footwear at all times when ambulating, and following up regularly for routine foot care.   -Diabetic foot risk: 2023 IWGDF very low ulcer risk.   -Next foot exam due June 2025.    Tinea pedis: chronic stable  -Ketoconazole cream until cleared.       Return to clinic in 1 year for foot exam, call sooner PRN.

## 2024-06-30 DIAGNOSIS — E11.65 TYPE 2 DIABETES MELLITUS WITH HYPERGLYCEMIA, WITHOUT LONG-TERM CURRENT USE OF INSULIN: ICD-10-CM

## 2024-06-30 RX ORDER — TIRZEPATIDE 7.5 MG/.5ML
7.5 INJECTION, SOLUTION SUBCUTANEOUS
Qty: 12 PEN | Refills: 0 | Status: SHIPPED | OUTPATIENT
Start: 2024-06-30

## 2024-06-30 NOTE — TELEPHONE ENCOUNTER
Provider Staff:  Action required for this patient    Requires labs      Please see care gap opportunities below in Care Due Message.    Thanks!  [facility]     Appointments      Date Provider   Last Visit   3/27/2024 Hien Sparks MD   Next Visit   9/26/2024 Hien Sparks MD     Refill Decision Note   Fely Wetzel  is requesting a refill authorization.  Brief Assessment and Rationale for Refill:  Approve     Medication Therapy Plan:         Comments:     Note composed:1:09 PM 06/30/2024

## 2024-06-30 NOTE — TELEPHONE ENCOUNTER
Care Due:                  Date            Visit Type   Department     Provider  --------------------------------------------------------------------------------                                EP -                              PRIMARY      MET INTERNAL  Last Visit: 03-      CARE ([facility])   MEDICINE       Hien Sparks  Next Visit: None Scheduled  None         None Found                                                            Last  Test          Frequency    Reason                     Performed    Due Date  --------------------------------------------------------------------------------    HBA1C.......  6 months...  tirzepatide..............  03- 09-    Vitamin D...  12 months..  cholecalciferol,.........  03-   03-    [employer] Embedded Care Due Messages. Reference number: [id number].   6/30/2024 10:14:37 AM CDT

## 2024-07-03 ENCOUNTER — TELEPHONE (OUTPATIENT)
Dept: INTERNAL MEDICINE | Facility: CLINIC | Age: 69
End: 2024-07-03
Payer: MEDICARE

## 2024-07-03 DIAGNOSIS — I48.0 PAF (PAROXYSMAL ATRIAL FIBRILLATION): ICD-10-CM

## 2024-07-03 DIAGNOSIS — E11.65 TYPE 2 DIABETES MELLITUS WITH HYPERGLYCEMIA, WITHOUT LONG-TERM CURRENT USE OF INSULIN: ICD-10-CM

## 2024-07-03 DIAGNOSIS — E11.59 HYPERTENSION ASSOCIATED WITH DIABETES: ICD-10-CM

## 2024-07-03 DIAGNOSIS — I15.2 HYPERTENSION ASSOCIATED WITH DIABETES: ICD-10-CM

## 2024-07-03 RX ORDER — METOPROLOL SUCCINATE 100 MG/1
100 TABLET, EXTENDED RELEASE ORAL
Qty: 30 TABLET | Refills: 11 | Status: SHIPPED | OUTPATIENT
Start: 2024-07-03

## 2024-07-03 RX ORDER — EMPAGLIFLOZIN, METFORMIN HYDROCHLORIDE 10; 1000 MG/1; MG/1
1 TABLET, EXTENDED RELEASE ORAL 2 TIMES DAILY
Qty: 60 TABLET | Refills: 6 | Status: SHIPPED | OUTPATIENT
Start: 2024-07-03

## 2024-07-03 NOTE — TELEPHONE ENCOUNTER
----- Message from Larissa Betancur sent at 7/3/2024 10:46 AM CDT -----  Contact: [phone] PAtient  Pt is calling in regards to the status of her empagliflozin-metformin (SYNJARDY XR) 10-1,000 mg Tbph being filled. Pt states she is out and would like to  the medication tomorrow the latest. Pt is also asking about refills for her tirzepatide (MOUNJARO) 7.5 mg/0.5 mL PnIj. Pt stated she does not have any more refills after this fill. Please call and advise. Thank you.

## 2024-07-03 NOTE — TELEPHONE ENCOUNTER
Unable to retrieve patient chart and identify care due.  [facility] Embedded Care Due Messages. Reference number: [id number].   7/03/2024 10:01:13 AM CDT

## 2024-07-27 NOTE — TELEPHONE ENCOUNTER
No care due was identified.  [facility] Embedded Care Due Messages. Reference number: [id number].   7/27/2024 11:09:38 AM CDT

## 2024-07-31 RX ORDER — ASPIRIN 325 MG
TABLET, DELAYED RELEASE (ENTERIC COATED) ORAL
Qty: 12 CAPSULE | Refills: 1 | Status: SHIPPED | OUTPATIENT
Start: 2024-07-31

## 2024-08-22 ENCOUNTER — CLINICAL SUPPORT (OUTPATIENT)
Dept: AUDIOLOGY | Facility: CLINIC | Age: 69
End: 2024-08-22
Payer: MEDICARE

## 2024-08-22 ENCOUNTER — OFFICE VISIT (OUTPATIENT)
Dept: OTOLARYNGOLOGY | Facility: CLINIC | Age: 69
End: 2024-08-22
Payer: MEDICARE

## 2024-08-22 DIAGNOSIS — Z01.10 NORMAL HEARING EXAM: ICD-10-CM

## 2024-08-22 DIAGNOSIS — H93.12 TINNITUS OF LEFT EAR: Primary | ICD-10-CM

## 2024-08-22 DIAGNOSIS — R42 LIGHTHEADEDNESS: ICD-10-CM

## 2024-08-22 DIAGNOSIS — H93.12 TINNITUS, SUBJECTIVE, LEFT: Primary | ICD-10-CM

## 2024-08-22 PROCEDURE — 92567 TYMPANOMETRY: CPT | Mod: HCNC,S$GLB,,

## 2024-08-22 PROCEDURE — 99999 PR PBB SHADOW E&M-EST. PATIENT-LVL IV: CPT | Mod: PBBFAC,HCNC,, | Performed by: NURSE PRACTITIONER

## 2024-08-22 PROCEDURE — 99999 PR PBB SHADOW E&M-EST. PATIENT-LVL I: CPT | Mod: PBBFAC,HCNC,,

## 2024-08-22 PROCEDURE — 92557 COMPREHENSIVE HEARING TEST: CPT | Mod: HCNC,S$GLB,,

## 2024-08-22 NOTE — PROGRESS NOTES
Fely Wetzel was seen today in the clinic for an audiologic evaluation.  Patient's main complaint was constant left-sided tinnitus. Ms. Wetzel reported her tinnitus is constant but fluctuates in intensity throughout the day. Ms. Wetzel denied decreased hearing, otalgia, aural fullness, and dizziness.    Tympanometry revealed Type As in the right ear and Type As in the left ear.     Audiogram results revealed normal hearing sensitivity in the right ear and normal hearing sensitivity in the left ear. Slight asymmetry noted at 6000 and 8000 with the right ear being the better ear.    Speech reception thresholds were noted at 15 dBHL in the right ear and 15 dBHL in the left ear.    Speech discrimination scores were 96% in the right ear and 88% in the left ear.    Recommendations:  Otologic evaluation  Annual audiogram or sooner if change perceived  Hearing protection in noise

## 2024-08-22 NOTE — PROGRESS NOTES
Subjective:   Fely Wetzel is a 68 y.o. female who was referred to me by Dr. Hien Sparks in consultation for   tinnitus and cerumen impaction removal . She has a past medical history of Colon polyp (11/2014), Diabetes mellitus type II, H. pylori infection (10/08/2021), Hyperlipidemia, Hypertension, Obesity, Osteopenia, Right foot pain (06/01/2021), and TMJ (temporomandibular joint disorder). She reports about 3 months of left ear buzzing that improved after a recent ear cleaning, but still persists. She feels that she hears well overall. And denies any otalgia, otorrhea, ear fullness/pressure, or vertigo. She does reports some dizziness that often follows standing up quickly. She notes it is a movement sensation, but also endorses some lightheadedness. There is not a family history of hearing loss at a young age. There is not a prior history of ear surgery. There is not a prior history of ear infections. There is not a history of ear trauma. She denies a history of significant noise exposure.     Past Medical History  She has a past medical history of Colon polyp, Diabetes mellitus type II, H. pylori infection, Hyperlipidemia, Hypertension, Obesity, Osteopenia, Right foot pain, and TMJ (temporomandibular joint disorder).    Past Surgical History  She has a past surgical history that includes Hysterectomy; Esophagogastroduodenoscopy (N/A, 3/17/2022); Colonoscopy (N/A, 3/17/2022); Ablation of arrhythmogenic focus for atrial fibrillation (N/A, 4/19/2023); and Treatment of cardiac arrhythmia (4/19/2023).    Family History  Her family history includes Cancer in her father; Cataracts in her brother; Diabetes in her mother; Glaucoma in her mother; Heart attack in her father; Heart disease in her brother and father; Hyperlipidemia in her mother; Hypertension in her mother.    Social History  She reports that she has never smoked. She has never been exposed to tobacco smoke. She has never used smokeless  tobacco. She reports that she does not currently use alcohol. She reports that she does not use drugs.    Allergies  She is allergic to corticosteroids (glucocorticoids); latex, natural rubber; shellfish containing products; and vicodin [hydrocodone-acetaminophen].    Medications  She has a current medication list which includes the following prescription(s): amlodipine, apixaban, atorvastatin, blood sugar diagnostic, blood-glucose meter, pepito/ala/linoleic/oleic/dha, cholecalciferol (vitamin d3), ubiquinol, cyanocobalamin, synjardy xr, fenofibrate, fluad quad 2023-24(65y up)(pf), furosemide, glutathione, hydrochlorothiazide, ketoconazole, losartan, magnesium, metoprolol succinate, pantoprazole, mounjaro, varicella-zoster ge-as01b (pf), and vit c/e/zn/coppr/lutein/zeaxan.  Review of Systems     Constitutional: Positive for fatigue.      HENT: Positive for ear pain, sinus pressure, sore throat and trouble swallowing.      Eyes:  Positive for eye itching and photophobia.     Respiratory:  Positive for cough and snoring.      Cardiovascular:  Positive for irregular heartbeat.     Gastrointestinal:  Positive for abdominal pain and acid reflux.     Genitourinary: Positive for frequent urination.     Musc: Positive for neck pain.     Skin: Positive for rash.     Allergy: Positive for food allergies.     Endocrine: Negative for cold intolerance and heat intolerance.      Neurological: Positive for dizziness and light-headedness.     Hematologic: Positive for bruises/bleeds easily.     Psychiatric: Positive for decreased concentration.       Objective:     Constitutional:   She is oriented to person, place, and time. She appears well-developed and well-nourished. She appears alert. She is cooperative.  Non-toxic appearance. She does not have a sickly appearance. She does not appear ill. Normal speech.      Head:  Normocephalic and atraumatic. Not macrocephalic and not microcephalic. Head is without abrasion, without right  periorbital erythema, without left periorbital erythema and without TMJ tenderness.     Ears:    Right Ear: No drainage, swelling or tenderness. No mastoid tenderness. Tympanic membrane is not scarred, not perforated, not erythematous, not retracted and not bulging. No middle ear effusion.   Left Ear: No drainage, swelling or tenderness. No mastoid tenderness. Tympanic membrane is not scarred, not perforated, not erythematous, not retracted and not bulging.  No middle ear effusion.     Pulmonary/Chest:   Effort normal.     Psychiatric:   She has a normal mood and affect. Her speech is normal and behavior is normal.     Neurological:   She is alert and oriented to person, place, and time.   Oblong-Hallpike Left: Negative for torsional and up-beating nystagmus    Hali-Hallpike Right: Negative for torsional and up-beating nystagmus    Tandem Gait: normal    Romberg: Negative    Procedure  None    Imaging  No pertinent imaging available.  Audiogram    I independently reviewed the tracings of the complete audiometric evaluation. I reviewed the audiogram with the patient as well. Pertinent findings include revealed normal hearing sensitivity in the right ear and normal hearing sensitivity in the left ear.  Assessment:     1. Tinnitus of left ear    2. Normal hearing exam    3. Lightheadedness      Plan:     Tinnitus of left ear  Discussed the etiology of tinnitus and management strategies including the use of background sound enrichment. Further instructed that avoidance of excess caffeine, alcohol, tobacco, sodium and stress can be of benefit.     Normal hearing exam  Reviewed patient's audiometric testing interpretation which is consistent with normal hearing bilaterally. Hearing conservation strongly recommended when in noisy environments.  Small asymmetry noted in the high frequencies in the left ear. We discussed the presence of this asymmetry and the need for annual audiograms for monitoring-if the asymmetry were to  progress will consider imaging at that time. Patient was instructed to follow-up sooner if hearing changes.    Lightheadedness  Do not suspect an inner ear disorder at this time. Hali-Hallpike was negative bilaterally and otoscopic exam was benign. Patient encouraged to be careful when moving from sitting to standing and to stay well hydrated/ and monitor for symptoms of light-headedness.

## 2024-09-13 ENCOUNTER — HOSPITAL ENCOUNTER (OUTPATIENT)
Dept: RADIOLOGY | Facility: CLINIC | Age: 69
Discharge: HOME | End: 2024-09-13
Attending: HOSPITALIST
Payer: MEDICARE

## 2024-09-13 DIAGNOSIS — Z78.0 MENOPAUSE: ICD-10-CM

## 2024-09-13 PROCEDURE — 77080 DXA BONE DENSITY AXIAL: CPT | Mod: 26,HCNC,ICN, | Performed by: INTERNAL MEDICINE

## 2024-09-13 PROCEDURE — 77080 DXA BONE DENSITY AXIAL: CPT | Mod: TC,HCNC

## 2024-09-20 ENCOUNTER — HOSPITAL ENCOUNTER (OUTPATIENT)
Dept: RADIOLOGY | Facility: HOSPITAL | Age: 69
Discharge: HOME | End: 2024-09-20
Attending: HOSPITALIST
Payer: MEDICARE

## 2024-09-20 DIAGNOSIS — Z12.31 ENCOUNTER FOR SCREENING MAMMOGRAM FOR BREAST CANCER: ICD-10-CM

## 2024-09-20 PROCEDURE — 77063 BREAST TOMOSYNTHESIS BI: CPT | Mod: 26,HCNC,, | Performed by: RADIOLOGY

## 2024-09-20 PROCEDURE — 77063 BREAST TOMOSYNTHESIS BI: CPT | Mod: TC,HCNC

## 2024-09-20 PROCEDURE — 77067 SCR MAMMO BI INCL CAD: CPT | Mod: TC,HCNC

## 2024-09-20 PROCEDURE — 77067 SCR MAMMO BI INCL CAD: CPT | Mod: 26,HCNC,, | Performed by: RADIOLOGY

## 2024-09-23 ENCOUNTER — TELEPHONE (OUTPATIENT)
Dept: RADIOLOGY | Facility: HOSPITAL | Age: 69
End: 2024-09-23
Payer: MEDICARE

## 2024-09-24 ENCOUNTER — TELEPHONE (OUTPATIENT)
Dept: RADIOLOGY | Facility: HOSPITAL | Age: 69
End: 2024-09-24
Payer: MEDICARE

## 2024-09-24 ENCOUNTER — PATIENT MESSAGE (OUTPATIENT)
Dept: RADIOLOGY | Facility: HOSPITAL | Age: 69
End: 2024-09-24
Payer: MEDICARE

## 2024-09-26 ENCOUNTER — OFFICE VISIT (OUTPATIENT)
Dept: INTERNAL MEDICINE | Facility: CLINIC | Age: 69
End: 2024-09-26
Payer: MEDICARE

## 2024-09-26 ENCOUNTER — LAB VISIT (OUTPATIENT)
Dept: LAB | Facility: HOSPITAL | Age: 69
End: 2024-09-26
Attending: HOSPITALIST
Payer: MEDICARE

## 2024-09-26 ENCOUNTER — PATIENT MESSAGE (OUTPATIENT)
Dept: ELECTROPHYSIOLOGY | Facility: CLINIC | Age: 69
End: 2024-09-26
Payer: MEDICARE

## 2024-09-26 VITALS
TEMPERATURE: 97 F | OXYGEN SATURATION: 96 % | WEIGHT: 160.5 LBS | RESPIRATION RATE: 10 BRPM | HEIGHT: 62 IN | DIASTOLIC BLOOD PRESSURE: 68 MMHG | HEART RATE: 58 BPM | BODY MASS INDEX: 29.53 KG/M2 | SYSTOLIC BLOOD PRESSURE: 120 MMHG

## 2024-09-26 DIAGNOSIS — K21.9 GASTROESOPHAGEAL REFLUX DISEASE, UNSPECIFIED WHETHER ESOPHAGITIS PRESENT: ICD-10-CM

## 2024-09-26 DIAGNOSIS — E11.69 HYPERLIPIDEMIA ASSOCIATED WITH TYPE 2 DIABETES MELLITUS: ICD-10-CM

## 2024-09-26 DIAGNOSIS — I48.0 PAF (PAROXYSMAL ATRIAL FIBRILLATION): ICD-10-CM

## 2024-09-26 DIAGNOSIS — I15.2 HYPERTENSION ASSOCIATED WITH DIABETES: ICD-10-CM

## 2024-09-26 DIAGNOSIS — E11.59 HYPERTENSION ASSOCIATED WITH DIABETES: ICD-10-CM

## 2024-09-26 DIAGNOSIS — Z00.00 ANNUAL PHYSICAL EXAM: Primary | ICD-10-CM

## 2024-09-26 DIAGNOSIS — Z23 NEED FOR VACCINATION: ICD-10-CM

## 2024-09-26 DIAGNOSIS — Z00.00 ANNUAL PHYSICAL EXAM: ICD-10-CM

## 2024-09-26 DIAGNOSIS — Z23 NEED FOR TETANUS BOOSTER: ICD-10-CM

## 2024-09-26 DIAGNOSIS — E11.65 TYPE 2 DIABETES MELLITUS WITH HYPERGLYCEMIA, WITHOUT LONG-TERM CURRENT USE OF INSULIN: ICD-10-CM

## 2024-09-26 DIAGNOSIS — E78.5 HYPERLIPIDEMIA ASSOCIATED WITH TYPE 2 DIABETES MELLITUS: ICD-10-CM

## 2024-09-26 DIAGNOSIS — E11.65 TYPE 2 DIABETES MELLITUS WITH HYPERGLYCEMIA, WITHOUT LONG-TERM CURRENT USE OF INSULIN: Primary | ICD-10-CM

## 2024-09-26 LAB
OHS QRS DURATION: 116 MS
OHS QTC CALCULATION: 459 MS

## 2024-09-26 PROCEDURE — 81001 URINALYSIS AUTO W/SCOPE: CPT | Mod: HCNC | Performed by: HOSPITALIST

## 2024-09-26 PROCEDURE — 93010 ELECTROCARDIOGRAM REPORT: CPT | Mod: HCNC,S$GLB,, | Performed by: INTERNAL MEDICINE

## 2024-09-26 PROCEDURE — 82043 UR ALBUMIN QUANTITATIVE: CPT | Mod: HCNC | Performed by: HOSPITALIST

## 2024-09-26 PROCEDURE — 93005 ELECTROCARDIOGRAM TRACING: CPT | Mod: HCNC,S$GLB,, | Performed by: HOSPITALIST

## 2024-09-26 PROCEDURE — 82570 ASSAY OF URINE CREATININE: CPT | Mod: HCNC | Performed by: HOSPITALIST

## 2024-09-26 PROCEDURE — 99999 PR PBB SHADOW E&M-EST. PATIENT-LVL V: CPT | Mod: PBBFAC,HCNC,, | Performed by: HOSPITALIST

## 2024-09-26 RX ORDER — ATORVASTATIN CALCIUM 40 MG/1
40 TABLET, FILM COATED ORAL NIGHTLY
Qty: 90 TABLET | Refills: 3 | Status: SHIPPED | OUTPATIENT
Start: 2024-09-26

## 2024-09-26 RX ORDER — TIRZEPATIDE 7.5 MG/.5ML
7.5 INJECTION, SOLUTION SUBCUTANEOUS
Qty: 12 PEN | Refills: 1 | Status: SHIPPED | OUTPATIENT
Start: 2024-09-26

## 2024-09-26 RX ORDER — FLECAINIDE ACETATE 50 MG/1
50 TABLET ORAL EVERY 12 HOURS
COMMUNITY
Start: 2024-07-31

## 2024-09-26 RX ORDER — LOSARTAN POTASSIUM 100 MG/1
100 TABLET ORAL DAILY
Qty: 90 TABLET | Refills: 3 | Status: SHIPPED | OUTPATIENT
Start: 2024-09-26

## 2024-09-26 NOTE — PROGRESS NOTES
"Subjective:     @Patient ID: Fely Wetzel is a 68 y.o. female.    Chief Complaint: Follow-up    HPI    68 y.o. female with pmhx of dm2, afib, hld, osteopenia, psoriasis presents  here for annual exam.      1. Dm2: last a1c 7.5. Follows with diabetic NP. On dapagliflozin-metformin  5-1000 mg bid, mounjaro 5 mg qweek. Plans to adjust her diet   2. HTN: on toprol-xl 50 mg qday, amlodipine 10 mg qday, losartan 100 mg qday, hctz 25 mg qday  3. HLD: on lipitor 40 mg qday and fenofibrate 160 mg qday  4. Paroxysmal Afib: follows with cardiology. On eliquis 5 mg bid, metoprolol xl 50 mg qday and flecanide 50 mg bid .   5. H pylori: completed treatment   6. Osteopenia: on vitamin d  7. Varicose veins:  chronic. Wears compression stockings        DIABETES MANAGEMENT:  She reports her A1C has increased to 7.5. She is currently taking Synjardy and Mounjaro 7.5 mg for diabetes management. She acknowledges recent changes in her exercise routine due to moving to a new apartment, which may have contributed to the A1C increase. She has started walking again last week and expresses intention to improve her diabetes management for the next follow-up.    CARDIOVASCULAR CONCERNS:  She reports a low resting heart rate of 55-56 BPM (bpm) in the past two weeks, which is lower than her usual resting rate of 65 bpm. She notes fluctuations in her heart rate, ranging from 59 to 80 bpm. She has a history of atrial fibrillation and previously underwent an ablation procedure. She denies experiencing palpitations or heart racing, stating she feels "very peaceful." She reports feeling well overall and notes that symptoms are not as severe as they were before her ablation procedure.    BREAST HEALTH:  She reports recent mammogram results showing an abnormality in the right breast, while the left breast was normal. A diagnostic mammogram has been scheduled for October 3rd to further evaluate the findings. She has a history of calcifications " noted in previous mammograms. She expresses concern about the abnormality but remains optimistic. She has been diligent with annual mammogram screenings.    LEG SWELLING:  She reports experiencing mild leg swelling. She wears compression stockings, which she finds helpful in managing the swelling. She notes improvement in symptoms when walking.    MEDICATIONS:  She reports discontinuing Lasix (furosemide). Her current medications include hydrochlorothiazide 25 mg daily, losartan 100 mg daily, flecainide, atorvastatin 40 mg, pantoprazole as needed, Eliquis twice daily, amlodipine 5 mg daily, metoprolol daily, and fenofibrate 160 mg daily. She has sufficient pantoprazole for now and does not require a refill at this time.    IMMUNIZATIONS:  She is due for flu vaccine and will receive it during the current visit. She is also due for a tetanus vaccine, as her last one  in . She expresses willingness to receive both the tetanus and shingles vaccines at the pharmacy if possible.    ALLERGIES:  She reports a latex allergy, which causes a rash when exposed. She denies any other allergic reactions to latex.    RECENT LAB RESULTS:  Her recent lab results show a normal cholesterol panel and good electrolyte levels. Glucose levels are appropriate for a diabetic patient. BUN is slightly elevated, possibly due to dehydration before the blood draw, but creatinine is normal. Calcium, protein, and liver tests are all within normal limits.      ROS:  Cardiovascular: -palpitations  Integumentary: +rash          Review of Systems  Past medical history, surgical history, and family medical history reviewed and updated as appropriate.    Medications and allergies reviewed.     Objective:     Vitals:    24 1431   BP: 120/68   BP Location: Right arm   Patient Position: Sitting   BP Method: Medium (Manual)   Pulse: (!) 58   Resp: 10   Temp: 97.1 °F (36.2 °C)   TempSrc: Temporal   SpO2: 96%   Weight: 72.8 kg (160 lb 7.9 oz)  "  Height: 5' 2" (1.575 m)     Body mass index is 29.35 kg/m².  Physical Exam  Constitutional:       Appearance: Normal appearance.   HENT:      Head: Normocephalic and atraumatic.   Eyes:      General:         Right eye: No discharge.         Left eye: No discharge.      Conjunctiva/sclera: Conjunctivae normal.   Cardiovascular:      Rate and Rhythm: Bradycardia present.      Heart sounds: No murmur heard.  Pulmonary:      Effort: Pulmonary effort is normal.      Breath sounds: Normal breath sounds.   Musculoskeletal:         General: Normal range of motion.      Cervical back: Normal range of motion and neck supple.      Comments: + 1 edema BLE; + varicose veins b/l    Skin:     General: Skin is warm and dry.   Neurological:      Mental Status: She is alert and oriented to person, place, and time.   Psychiatric:         Mood and Affect: Mood normal.         Behavior: Behavior normal.         Lab Results   Component Value Date    WBC 7.54 03/20/2024    HGB 13.8 03/20/2024    HCT 42.1 03/20/2024     03/20/2024    CHOL 178 09/20/2024    TRIG 127 09/20/2024    HDL 49 09/20/2024    ALT 16 09/20/2024    AST 20 09/20/2024     09/20/2024    K 3.7 09/20/2024     09/20/2024    CREATININE 0.9 09/20/2024    BUN 27 (H) 09/20/2024    CO2 24 09/20/2024    TSH 1.862 03/20/2024    INR 1.0 04/12/2023    GLUF 126 (H) 07/07/2010    HGBA1C 7.5 (H) 09/20/2024       Assessment:     1. Type 2 diabetes mellitus with hyperglycemia, without long-term current use of insulin    2. Hypertension associated with diabetes    3. Need for tetanus booster    4. PAF (paroxysmal atrial fibrillation)    5. Need for vaccination      Plan:   Fely was seen today for follow-up.    Diagnoses and all orders for this visit:    Type 2 diabetes mellitus with hyperglycemia, without long-term current use of insulin  -     tirzepatide (MOUNJARO) 7.5 mg/0.5 mL PnIj; Inject 7.5 mg into the skin every 7 days.  -     varicella-zoster gE-AS01B, PF, " (SHINGRIX) 50 mcg/0.5 mL injection; Inject 0.5 mLs into the muscle once. for 1 dose  -     Microalbumin/Creatinine Ratio, Urine    Hypertension associated with diabetes    Need for tetanus booster    PAF (paroxysmal atrial fibrillation)  -     IN OFFICE EKG 12-LEAD (to [place])    Need for vaccination  -     Influenza - Trivalent (Adjuvanted)    Other orders  -     losartan (COZAAR) 100 MG tablet; Take 1 tablet (100 mg total) by mouth once daily.  -     atorvastatin (LIPITOR) 40 MG tablet; Take 1 tablet (40 mg total) by mouth every evening.  -     tetanus and diphther. tox, PF, (TENIVAC, PF,) 5-2 Lf unit/0.5 mL Syrg; Inject 0.5 mLs into the muscle once. for 1 dose      Assessment & Plan    BRADYCARDIA:   Explained that lower heart rate (56 bpm) likely due to metoprolol.   Ms. Wetzel to message Dr. Mathias' office about recent heart rate fluctuations.   Performed in-office EKG.    BREAST HEALTH:   Discussed that diagnostic mammogram often performed for benign findings like cysts or calcifications.   Follow up on October 3rd for scheduled diagnostic mammogram.    EXERCISE REGIMEN:   Ms. Wetzel to continue gradual increase in exercise, such as walking.    LEG SWELLING:   Recommend wearing compression stockings to manage leg swelling.    DIABETES:   Continued Mounjaro 7.5 mg, provided refill.   Continued Synjardy, refills available.    HYPERTENSION:   Continued hydrochlorothiazide 25 mg daily.   Continued losartan 100 mg daily.   Continued amlodipine 5 mg daily.    ARRHYTHMIA:   Continued flecainide and toprol xl     HYPERLIPIDEMIA:   Continued atorvastatin 40 mg daily, provided refill.   Continued fenofibrate 160 mg daily.    GASTROESOPHAGEAL REFLUX DISEASE:   Continued pantoprazole as needed.    ANTICOAGULATION:   Continued Eliquis, refills available.    MEDICATIONS/SUPPLEMENTS:   Continued metoprolol.    FLU VACCINATION:   Administered flu vaccine.    LABS:   CBC, CMP, and urine test ordered prior to next  appointment.    CARDIOLOGY REFERRAL:   Previously referred to Dr. Jenkins.   Follow up on October 26th with Dr. Jenkins as scheduled.    FOLLOW UP:   Follow up in 6 months for annual check-up.         Visit time 40 min. Includes pre-charting, face-to-face encounter, medical decision making and documentation.       Hien Sparks MD  Internal Medicine    9/26/2024

## 2024-09-27 LAB
ALBUMIN/CREAT UR: 48.9 UG/MG (ref 0–30)
BACTERIA #/AREA URNS AUTO: NORMAL /HPF
BILIRUB UR QL STRIP: NEGATIVE
CLARITY UR REFRACT.AUTO: CLEAR
COLOR UR AUTO: YELLOW
CREAT UR-MCNC: 45 MG/DL (ref 15–325)
GLUCOSE UR QL STRIP: ABNORMAL
HGB UR QL STRIP: NEGATIVE
KETONES UR QL STRIP: NEGATIVE
LEUKOCYTE ESTERASE UR QL STRIP: NEGATIVE
MICROALBUMIN UR DL<=1MG/L-MCNC: 22 UG/ML
MICROSCOPIC COMMENT: NORMAL
NITRITE UR QL STRIP: NEGATIVE
PH UR STRIP: 5 [PH] (ref 5–8)
PROT UR QL STRIP: NEGATIVE
RBC #/AREA URNS AUTO: 1 /HPF (ref 0–4)
SP GR UR STRIP: 1.02 (ref 1–1.03)
SQUAMOUS #/AREA URNS AUTO: 2 /HPF
URN SPEC COLLECT METH UR: ABNORMAL
WBC #/AREA URNS AUTO: 1 /HPF (ref 0–5)
YEAST UR QL AUTO: NORMAL

## 2024-09-30 ENCOUNTER — PATIENT MESSAGE (OUTPATIENT)
Dept: INTERNAL MEDICINE | Facility: CLINIC | Age: 69
End: 2024-09-30
Payer: MEDICARE

## 2024-10-01 ENCOUNTER — PATIENT MESSAGE (OUTPATIENT)
Dept: INTERNAL MEDICINE | Facility: CLINIC | Age: 69
End: 2024-10-01
Payer: MEDICARE

## 2024-10-01 DIAGNOSIS — E11.65 TYPE 2 DIABETES MELLITUS WITH HYPERGLYCEMIA, WITHOUT LONG-TERM CURRENT USE OF INSULIN: ICD-10-CM

## 2024-10-01 RX ORDER — TIRZEPATIDE 7.5 MG/.5ML
7.5 INJECTION, SOLUTION SUBCUTANEOUS
Qty: 12 PEN | Refills: 1 | Status: SHIPPED | OUTPATIENT
Start: 2024-10-01

## 2024-10-01 NOTE — TELEPHONE ENCOUNTER
----- Message from Lelia sent at 10/1/2024  9:05 AM CDT -----  Contact: pt  [phone]  Requesting an RX refill or new RX.    Is this a refill or new RX: Refill    RX name and strength (copy/paste from chart):  tirzepatide (MOUNJARO) 7.5 mg/0.5 mL PnIj    Is this a 30 day or 90 day RX: 30    Pharmacy name and phone # (copy/paste from chart):    [facility] [address] [address]  Phone: [phone] Fax: [phone]    Comment:  Patient states [facility] is out of stock.    Please call and advise.    Thank You

## 2024-10-01 NOTE — TELEPHONE ENCOUNTER
Care Due:                  Date            Visit Type   Department     Provider  --------------------------------------------------------------------------------                                EP -                              PRIMARY      MET INTERNAL  Last Visit: 09-      CARE ([facility])   MEDICINE       [facility]  [place]                              EP -                              PRIMARY      [facility] INTERNAL  Next Visit: 03-      CARE ([facility])   [facility]                                                            Last  Test          Frequency    Reason                     Performed    Due Date  --------------------------------------------------------------------------------    Vitamin D...  12 months..  cholecalciferol,.........  03-   03-    Health [facility] Embedded Care Due Messages. Reference number: [id number].   10/01/2024 9:33:30 AM CDT

## 2024-10-03 ENCOUNTER — HOSPITAL ENCOUNTER (OUTPATIENT)
Dept: RADIOLOGY | Facility: HOSPITAL | Age: 69
Discharge: HOME | End: 2024-10-03
Attending: HOSPITALIST
Payer: MEDICARE

## 2024-10-03 DIAGNOSIS — R92.8 ABNORMAL FINDING ON BREAST IMAGING: ICD-10-CM

## 2024-10-03 PROCEDURE — 77061 BREAST TOMOSYNTHESIS UNI: CPT | Mod: 26,HCNC,RT, | Performed by: RADIOLOGY

## 2024-10-03 PROCEDURE — 77065 DX MAMMO INCL CAD UNI: CPT | Mod: 26,HCNC,RT, | Performed by: RADIOLOGY

## 2024-10-03 PROCEDURE — 77061 BREAST TOMOSYNTHESIS UNI: CPT | Mod: TC,HCNC,RT

## 2024-10-14 DIAGNOSIS — I48.0 PAF (PAROXYSMAL ATRIAL FIBRILLATION): Primary | ICD-10-CM

## 2024-10-17 ENCOUNTER — OFFICE VISIT (OUTPATIENT)
Dept: ELECTROPHYSIOLOGY | Facility: CLINIC | Age: 69
End: 2024-10-17
Payer: MEDICARE

## 2024-10-17 ENCOUNTER — HOSPITAL ENCOUNTER (OUTPATIENT)
Dept: CARDIOLOGY | Facility: CLINIC | Age: 69
Discharge: HOME | End: 2024-10-17
Payer: MEDICARE

## 2024-10-17 VITALS
DIASTOLIC BLOOD PRESSURE: 60 MMHG | SYSTOLIC BLOOD PRESSURE: 124 MMHG | BODY MASS INDEX: 29.44 KG/M2 | WEIGHT: 160 LBS | HEIGHT: 62 IN | HEART RATE: 67 BPM

## 2024-10-17 DIAGNOSIS — E11.65 TYPE 2 DIABETES MELLITUS WITH HYPERGLYCEMIA, WITHOUT LONG-TERM CURRENT USE OF INSULIN: ICD-10-CM

## 2024-10-17 DIAGNOSIS — I48.0 PAF (PAROXYSMAL ATRIAL FIBRILLATION): ICD-10-CM

## 2024-10-17 DIAGNOSIS — I48.0 PAF (PAROXYSMAL ATRIAL FIBRILLATION): Primary | ICD-10-CM

## 2024-10-17 LAB
OHS QRS DURATION: 96 MS
OHS QTC CALCULATION: 450 MS

## 2024-10-17 PROCEDURE — 3078F DIAST BP <80 MM HG: CPT | Mod: HCNC,CPTII,S$GLB, | Performed by: INTERNAL MEDICINE

## 2024-10-17 PROCEDURE — 1159F MED LIST DOCD IN RCRD: CPT | Mod: HCNC,CPTII,S$GLB, | Performed by: INTERNAL MEDICINE

## 2024-10-17 PROCEDURE — 3060F POS MICROALBUMINURIA REV: CPT | Mod: HCNC,CPTII,S$GLB, | Performed by: INTERNAL MEDICINE

## 2024-10-17 PROCEDURE — 3051F HG A1C>EQUAL 7.0%<8.0%: CPT | Mod: HCNC,CPTII,S$GLB, | Performed by: INTERNAL MEDICINE

## 2024-10-17 PROCEDURE — 1101F PT FALLS ASSESS-DOCD LE1/YR: CPT | Mod: HCNC,CPTII,S$GLB, | Performed by: INTERNAL MEDICINE

## 2024-10-17 PROCEDURE — 99214 OFFICE O/P EST MOD 30 MIN: CPT | Mod: HCNC,S$GLB,, | Performed by: INTERNAL MEDICINE

## 2024-10-17 PROCEDURE — 3008F BODY MASS INDEX DOCD: CPT | Mod: HCNC,CPTII,S$GLB, | Performed by: INTERNAL MEDICINE

## 2024-10-17 PROCEDURE — 1160F RVW MEDS BY RX/DR IN RCRD: CPT | Mod: HCNC,CPTII,S$GLB, | Performed by: INTERNAL MEDICINE

## 2024-10-17 PROCEDURE — 4010F ACE/ARB THERAPY RXD/TAKEN: CPT | Mod: HCNC,CPTII,S$GLB, | Performed by: INTERNAL MEDICINE

## 2024-10-17 PROCEDURE — 93010 ELECTROCARDIOGRAM REPORT: CPT | Mod: HCNC,S$GLB,, | Performed by: INTERNAL MEDICINE

## 2024-10-17 PROCEDURE — 1126F AMNT PAIN NOTED NONE PRSNT: CPT | Mod: HCNC,CPTII,S$GLB, | Performed by: INTERNAL MEDICINE

## 2024-10-17 PROCEDURE — 99999 PR PBB SHADOW E&M-EST. PATIENT-LVL IV: CPT | Mod: PBBFAC,HCNC,, | Performed by: INTERNAL MEDICINE

## 2024-10-17 PROCEDURE — 93005 ELECTROCARDIOGRAM TRACING: CPT | Mod: HCNC,S$GLB,, | Performed by: INTERNAL MEDICINE

## 2024-10-17 PROCEDURE — 3288F FALL RISK ASSESSMENT DOCD: CPT | Mod: HCNC,CPTII,S$GLB, | Performed by: INTERNAL MEDICINE

## 2024-10-17 PROCEDURE — 3066F NEPHROPATHY DOC TX: CPT | Mod: HCNC,CPTII,S$GLB, | Performed by: INTERNAL MEDICINE

## 2024-10-17 PROCEDURE — 3074F SYST BP LT 130 MM HG: CPT | Mod: HCNC,CPTII,S$GLB, | Performed by: INTERNAL MEDICINE

## 2024-10-17 NOTE — PROGRESS NOTES
Subjective:    Patient ID:  Fely Wetzel is a 68 y.o. female who presents for evaluation of symptomatic atrial fibrillation.     Referring Cardiologist: Marie Dominguez MD  Primary Care Physician: Paula Barkley DO    Prior Hx:  I had the pleasure of seeing Mrs. Wetzel today in our electrophysiology clinic in follow-up for her atrial fibrillation. As you are aware she is a pleasant 66 year-old woman with hypertension, diabetes mellitus type 2, and obesity. She was in her usual state of health until June 2019 when she developed sudden palpitations. She called EMS and reportedly was in AF with ventricular rates up to 170s-220s. She was given IV cardizem and her ventricular rate lowered to 110s-170s. Per ER physician she was in atrial fibrillation however no ECGs document her arrhythmia. She was given 20mg more of cardizem and spontaneously converted to sinus rhythm. TSH was normal. She was discharged on eliquis and metoprolol. She was referred to Dr. Dominguez and discussed with her if there was possibility at some point of stopping anticoagulation. She reports for the past few weeks she has been having episodes of palpitations with radiation into her neck. These are different than when she went to the ER.     An exercise stress echocardiogram noted normal LV function and no ischemic changes. An echocardiogram performed on 10/15/2019 showed normal LV function, normal valves, and moderate LA enlargement ([id number] [id number]).    Our plan was for a 30 day monitor, sleep study and possibly starting flecainide.    Mrs. Wetzel presented 5/2020 for follow-up. A 30 day monitor done in March of 2020 which showed no atrial fibrillation. She presented to the ER in November of 2019 with recurrent symptomatic AF with RVR that was rate controlled with cardizem and spontaneously converted. She notes occasional palpitations at night. She has not yet had a sleep study.    Mrs. Wetzel returned for follow-up 12/2020. She is on  flecainide. She continues on metoprolol/eliquis. She notes occasional episodes of chest discomfort at rest, not with activity. She drinks water and it goes away. She does have indigestion. No symptomatic AF. Recent CBC noted stable H/H.    Mrs. Wetzel returned for follow-up 7/2021. She was doing well from atrial fibrillation standpoint  without any reports of symptomatic AF. She reported chest discomfort that occurs after eating in the evening and was suspected to be due to dyspepsia. Protonix was prescribed at that time.     Mrs. Wetzel presented for follow up 2/2022 and she once again reported that she was doing well. She denied any sustained periods of atrial fibrillation. Her non cardiac chest discomfort has persistent in spite of protonix and she is planned for  coloscopy/EGD.  She was still employed and has no limitations with her usual activities.     8/2022: Mrs. Wetzel returns for follow-up. She reports 1 month ago she had a few days of palpitations. Her main complaint is regarding varicose veins in her legs.    1/19/2023: pt reported recurrence. Flec increased to 100mg BID     2/13/2023 Continued recurrence. Flec increased to 150mg BID and PVI planned.     4/19/2023: Successful pulmonary vein RF ablation. Continued on flecainide     6/16/2023: She is 2 months s/p PVI. She reports one breakthrough AF episode lasting about 5 min 2 weeks ago.   Remained on flecainide 150mg BID. Will continue this through blanking period, then wean if possible. CHADSVASc 4 and she should remain on anticoagulation indefinitely. She is on eliquis. She reports low heart rates that are concerning for her. Will reduce toprol.     9/14/2023: She is now 5 months s/p PVI. Doing well overall from rhythm standpoint, with only brief palpitations since last clinic visit. Will start to wean off flecainide.   Otherwise feeling well. CHADSVASc 4 on eliquis.     1/8/2024: She is ~9 mo s/p PVI. She continues to do well from a rhythm  "standpoint, with no documented or symptomatic recurrence of arrhythmia since procedure. No AF on Smartwatch. Will continue to wean flecainide. Plan on stopping at next clinic visit. VICc 4 on eliquis. Reduce flecainide to 50mg BID    4/2024: Today she denies recent palpitations. Epigastric pain at night, guzman if eats later. Follows with GI. On protonix. No exertional CP reported. No CHAVES, LH, syncope reported. Flecainide stopped.     Interim Hx:   Mrs. Wetzel returns for follow-up. She did not stop flecainide. Feels well. No symptomatic AF. She does report intermittent little "pinch" in her left chest. Fleeting.    My interpretation of today's in clinic ECG is sinus rhythm with narrow QRS      Review of Systems   Constitutional: Negative for fever and malaise/fatigue.   HENT:  Negative for congestion and sore throat.    Eyes:  Negative for blurred vision and visual disturbance.   Cardiovascular:  Negative for chest pain, dyspnea on exertion, irregular heartbeat and palpitations.   Respiratory:  Positive for snoring. Negative for cough and shortness of breath.    Hematologic/Lymphatic: Negative for bleeding problem. Does not bruise/bleed easily.   Skin: Negative.    Musculoskeletal: Negative.    Gastrointestinal:  Positive for heartburn. Negative for bloating and abdominal pain.   Neurological:  Positive for excessive daytime sleepiness. Negative for dizziness and focal weakness.        Objective:    Physical Exam  Vitals reviewed.   Constitutional:       General: She is not in acute distress.     Appearance: She is well-developed. She is not diaphoretic.   HENT:      Head: Normocephalic and atraumatic.   Eyes:      General:         Right eye: No discharge.         Left eye: No discharge.      Conjunctiva/sclera: Conjunctivae normal.   Neck:      Vascular: No JVD.   Cardiovascular:      Rate and Rhythm: Normal rate and regular rhythm.      Heart sounds: No murmur heard.     No friction rub. No gallop. "   Pulmonary:      Effort: Pulmonary effort is normal. No respiratory distress.      Breath sounds: Normal breath sounds. No wheezing or rales.   Abdominal:      General: Bowel sounds are normal. There is no distension.      Palpations: Abdomen is soft.      Tenderness: There is no abdominal tenderness. There is no rebound.   Musculoskeletal:      Cervical back: Neck supple.   Skin:     General: Skin is warm and dry.   Neurological:      Mental Status: She is alert and oriented to person, place, and time.   Psychiatric:         Behavior: Behavior normal.         Thought Content: Thought content normal.         Judgment: Judgment normal.           Assessment:       1. PAF (paroxysmal atrial fibrillation)    2. Type 2 diabetes mellitus with hyperglycemia, without long-term current use of insulin         Plan:       In summary, Mrs. Wetzel is a pleasant 68 year-old woman with hypertension, diabetes mellitus type 2, and obesity presenting for follow-up for recurrent symptomatic paroxysmal atrial fibrillation with RVR despite AAD therapy now s/p PVI (4/2023). Her [id number] score is 3 and indefinite anticoagulation is recommended. She remains on eliquis. No symptomatic recurrences post-PVI. Stop flecainide. Continue metoprolol.    RTC in 6 months, sooner if needed      Thank you for allowing me to participate in the care of this patient. Please do not hesitate to call me with any questions or concerns.    Beck Mathias MD, PhD  Cardiac Electrophysiology

## 2024-12-14 NOTE — TELEPHONE ENCOUNTER
Care Due:                  Date            Visit Type   Department     Provider  --------------------------------------------------------------------------------                                EP -                              PRIMARY      MET INTERNAL  Last Visit: 09-      CARE ([facility])   MEDICINE       [facility]  [place]                              EP -                              PRIMARY      [facility] INTERNAL  Next Visit: 03-      CARE ([facility])   [facility]                                                            Last  Test          Frequency    Reason                     Performed    Due Date  --------------------------------------------------------------------------------    Vitamin D...  12 months..  cholecalciferol,.........  03-   03-    Health [facility] Embedded Care Due Messages. Reference number: [id number].   12/14/2024 7:01:25 AM CST

## 2024-12-15 RX ORDER — HYDROCHLOROTHIAZIDE 25 MG/1
25 TABLET ORAL DAILY
Qty: 90 TABLET | Refills: 3 | Status: SHIPPED | OUTPATIENT
Start: 2024-12-15

## 2024-12-15 NOTE — TELEPHONE ENCOUNTER
Refill Decision Note   Fely Wetzel  is requesting a refill authorization.  Brief Assessment and Rationale for Refill:  Approve     Medication Therapy Plan:         Comments:     Note composed:2:11 PM 12/15/2024

## 2025-01-01 NOTE — TELEPHONE ENCOUNTER
Care Due:                  Date            Visit Type   Department     Provider  --------------------------------------------------------------------------------                                EP -                              PRIMARY      [facility] INTERNAL  Last Visit: 09-      CARE ([facility])   [facility]       [facility]                              EP -                              PRIMARY      [facility] INTERNAL  Next Visit: 03-      [facility] ([facility])   [facility]                                                            Last  Test          Frequency    Reason                     Performed    Due Date  --------------------------------------------------------------------------------    CBC.........  12 months..  fenofibrate..............  03-   03-    HBA1C.......  6 months...  empagliflozin-metformin,   09- 03-                             tirzepatide..............    [employer] Embedded Care Due Messages. Reference number: [id number].   1/01/2025 12:08:55 AM CST

## 2025-01-02 RX ORDER — ASPIRIN 325 MG
TABLET, DELAYED RELEASE (ENTERIC COATED) ORAL
Qty: 12 CAPSULE | Refills: 3 | Status: SHIPPED | OUTPATIENT
Start: 2025-01-02

## 2025-01-02 NOTE — TELEPHONE ENCOUNTER
Refill Routing Note   Medication(s) are not appropriate for processing by [facility] for the following reason(s):        Outside of protocol    [facility] action(s):  Route     Requires labs : Yes             Appointments  past 12m or future 3m with PCP    Date Provider   Last Visit   9/26/2024 Hien Sparks MD   Next Visit   3/27/2025 Hien Sparks MD   ED visits in past 90 days: 0        Note composed:9:19 AM 01/02/2025

## 2025-01-13 DIAGNOSIS — Z00.00 ENCOUNTER FOR MEDICARE ANNUAL WELLNESS EXAM: ICD-10-CM

## 2025-02-05 ENCOUNTER — OFFICE VISIT (OUTPATIENT)
Dept: INTERNAL MEDICINE | Facility: CLINIC | Age: 70
End: 2025-02-05
Payer: MEDICARE

## 2025-02-05 VITALS
OXYGEN SATURATION: 97 % | DIASTOLIC BLOOD PRESSURE: 74 MMHG | SYSTOLIC BLOOD PRESSURE: 118 MMHG | HEIGHT: 62 IN | RESPIRATION RATE: 18 BRPM | BODY MASS INDEX: 28.88 KG/M2 | TEMPERATURE: 98 F | HEART RATE: 64 BPM | WEIGHT: 156.94 LBS

## 2025-02-05 DIAGNOSIS — M85.80 OSTEOPENIA, UNSPECIFIED LOCATION: ICD-10-CM

## 2025-02-05 DIAGNOSIS — E11.59 HYPERTENSION ASSOCIATED WITH DIABETES: ICD-10-CM

## 2025-02-05 DIAGNOSIS — I83.893 VARICOSE VEINS OF BOTH LOWER EXTREMITIES WITH COMPLICATIONS: ICD-10-CM

## 2025-02-05 DIAGNOSIS — Z00.00 ENCOUNTER FOR ANNUAL HEALTH EXAMINATION: Primary | ICD-10-CM

## 2025-02-05 DIAGNOSIS — E11.69 HYPERLIPIDEMIA ASSOCIATED WITH TYPE 2 DIABETES MELLITUS: ICD-10-CM

## 2025-02-05 DIAGNOSIS — I15.2 HYPERTENSION ASSOCIATED WITH DIABETES: ICD-10-CM

## 2025-02-05 DIAGNOSIS — E78.5 HYPERLIPIDEMIA ASSOCIATED WITH TYPE 2 DIABETES MELLITUS: ICD-10-CM

## 2025-02-05 DIAGNOSIS — I48.0 PAF (PAROXYSMAL ATRIAL FIBRILLATION): ICD-10-CM

## 2025-02-05 DIAGNOSIS — Z79.01 ON ANTICOAGULANT THERAPY: ICD-10-CM

## 2025-02-05 DIAGNOSIS — E55.9 VITAMIN D DEFICIENCY: ICD-10-CM

## 2025-02-05 DIAGNOSIS — E66.3 OVERWEIGHT (BMI 25.0-29.9): ICD-10-CM

## 2025-02-05 DIAGNOSIS — Z23 NEED FOR VACCINATION: ICD-10-CM

## 2025-02-05 DIAGNOSIS — J06.9 UPPER RESPIRATORY TRACT INFECTION, UNSPECIFIED TYPE: ICD-10-CM

## 2025-02-05 DIAGNOSIS — E11.3513 TYPE 2 DIABETES MELLITUS WITH BOTH EYES AFFECTED BY PROLIFERATIVE RETINOPATHY AND MACULAR EDEMA, WITHOUT LONG-TERM CURRENT USE OF INSULIN: ICD-10-CM

## 2025-02-05 PROCEDURE — 3008F BODY MASS INDEX DOCD: CPT | Mod: HCNC,CPTII,S$GLB, | Performed by: NURSE PRACTITIONER

## 2025-02-05 PROCEDURE — 99214 OFFICE O/P EST MOD 30 MIN: CPT | Mod: HCNC,S$GLB,, | Performed by: NURSE PRACTITIONER

## 2025-02-05 PROCEDURE — 3288F FALL RISK ASSESSMENT DOCD: CPT | Mod: HCNC,CPTII,S$GLB, | Performed by: NURSE PRACTITIONER

## 2025-02-05 PROCEDURE — 1160F RVW MEDS BY RX/DR IN RCRD: CPT | Mod: HCNC,CPTII,S$GLB, | Performed by: NURSE PRACTITIONER

## 2025-02-05 PROCEDURE — 3078F DIAST BP <80 MM HG: CPT | Mod: HCNC,CPTII,S$GLB, | Performed by: NURSE PRACTITIONER

## 2025-02-05 PROCEDURE — 4010F ACE/ARB THERAPY RXD/TAKEN: CPT | Mod: HCNC,CPTII,S$GLB, | Performed by: NURSE PRACTITIONER

## 2025-02-05 PROCEDURE — 3074F SYST BP LT 130 MM HG: CPT | Mod: HCNC,CPTII,S$GLB, | Performed by: NURSE PRACTITIONER

## 2025-02-05 PROCEDURE — 99999 PR PBB SHADOW E&M-EST. PATIENT-LVL V: CPT | Mod: PBBFAC,HCNC,, | Performed by: NURSE PRACTITIONER

## 2025-02-05 PROCEDURE — 1101F PT FALLS ASSESS-DOCD LE1/YR: CPT | Mod: HCNC,CPTII,S$GLB, | Performed by: NURSE PRACTITIONER

## 2025-02-05 PROCEDURE — 1126F AMNT PAIN NOTED NONE PRSNT: CPT | Mod: HCNC,CPTII,S$GLB, | Performed by: NURSE PRACTITIONER

## 2025-02-05 PROCEDURE — 1159F MED LIST DOCD IN RCRD: CPT | Mod: HCNC,CPTII,S$GLB, | Performed by: NURSE PRACTITIONER

## 2025-02-05 RX ORDER — EMPAGLIFLOZIN, METFORMIN HYDROCHLORIDE 10; 1000 MG/1; MG/1
1 TABLET, EXTENDED RELEASE ORAL 2 TIMES DAILY
Qty: 180 TABLET | Refills: 3 | Status: SHIPPED | OUTPATIENT
Start: 2025-02-05 | End: 2025-02-05 | Stop reason: SDUPTHER

## 2025-02-05 RX ORDER — PROMETHAZINE HYDROCHLORIDE AND DEXTROMETHORPHAN HYDROBROMIDE 6.25; 15 MG/5ML; MG/5ML
5 SYRUP ORAL 2 TIMES DAILY PRN
Qty: 118 ML | Refills: 0 | Status: SHIPPED | OUTPATIENT
Start: 2025-02-05 | End: 2025-02-18

## 2025-02-05 RX ORDER — EMPAGLIFLOZIN, METFORMIN HYDROCHLORIDE 10; 1000 MG/1; MG/1
1 TABLET, EXTENDED RELEASE ORAL 2 TIMES DAILY
Qty: 180 TABLET | Refills: 3 | Status: SHIPPED | OUTPATIENT
Start: 2025-02-05

## 2025-02-05 RX ORDER — DEXTROSE 4 G
TABLET,CHEWABLE ORAL
Qty: 1 EACH | Refills: 0 | Status: SHIPPED | OUTPATIENT
Start: 2025-02-05

## 2025-02-05 RX ORDER — LANCETS
EACH MISCELLANEOUS
Qty: 100 EACH | Refills: 0 | Status: SHIPPED | OUTPATIENT
Start: 2025-02-05

## 2025-02-05 RX ORDER — FLECAINIDE ACETATE 50 MG/1
50 TABLET ORAL EVERY 12 HOURS
COMMUNITY
Start: 2025-01-02 | End: 2025-02-19 | Stop reason: SDUPTHER

## 2025-02-05 NOTE — PROGRESS NOTES
Subjective:       Patient ID: Fely Wetzel is a 69 y.o. female.    Chief Complaint: Annual Wellness Visit    Fely Wetzel is a 69 y.o. female who presents today for an annual wellness visit.     Patient reports flu like symptoms that began on Friday and last through the night. She awoke with cough on Saturday.     She reports varicose veins to BLE that are at times painful and cosmetically disturbing. Has worsen knee high compression socks in the past and would be willing to try thigh high.   Reports discoloration of inner thighs without rash or itching. Appears to resembling mottling.     Reports concern about the number of pills she takes.     Review of patient's allergies indicates:   Allergen Reactions    Corticosteroids (glucocorticoids)     Latex, natural rubber     Shellfish containing products Other (See Comments)     Other reaction(s): Unknown  Other reaction(s): Anaphylaxis    Vicodin [hydrocodone-acetaminophen]       Medication List with Changes/Refills   New Medications    BLOOD SUGAR DIAGNOSTIC STRP    To check BG 1 times daily, to use with insurance preferred meter    BLOOD-GLUCOSE METER MISC    To check BG 1 times daily, to use with insurance preferred meter    LANCETS MISC    To check BG 1 times daily, to use with insurance preferred meter    PROMETHAZINE-DEXTROMETHORPHAN (PROMETHAZINE-DM) 6.25-15 MG/5 ML SYRP    Take 5 mLs by mouth 2 (two) times daily as needed (cough).    TETANUS AND DIPHTHER. TOX, PF, (TENIVAC, PF,) 5-2 LF UNIT/0.5 ML SYRG    Inject 0.5 mLs into the muscle once. for 1 dose   Current Medications    AMLODIPINE (NORVASC) 5 MG TABLET    TAKE 1 TABLET BY MOUTH EVERY DAY    APIXABAN (ELIQUIS) 5 MG TAB    Take 1 tablet (5 mg total) by mouth 2 (two) times daily.    ATORVASTATIN (LIPITOR) 40 MG TABLET    Take 1 tablet (40 mg total) by mouth every evening.    BLOOD SUGAR DIAGNOSTIC (TRUE METRIX GLUCOSE TEST STRIP) STRP    Inject 1 each into the skin 2 (two) times daily  before meals.    BLOOD-GLUCOSE METER MISC    Check fasting glucose and check glucose two hours after lunch or dinner    OLIVER/ALA/LINOLEIC/OLEIC/DHA (ADULT OMEGA PLUS DHA ORAL)    Take by mouth.    CHOLECALCIFEROL, VITAMIN D3, 1,250 MCG (50,000 UNIT) CAPSULE    TAKE 1 CAPSULE BY MOUTH ONE TIME PER WEEK    COQ10, UBIQUINOL, ORAL    Take by mouth.    FENOFIBRATE 160 MG TAB    TAKE 1 TABLET BY MOUTH EVERY DAY    FLECAINIDE (TAMBOCOR) 50 MG TAB    Take 50 mg by mouth every 12 (twelve) hours.    GLUTATHIONE 500 MG CAP    Take by mouth.    HYDROCHLOROTHIAZIDE (HYDRODIURIL) 25 MG TABLET    TAKE 1 TABLET (25 MG TOTAL) BY MOUTH ONCE DAILY. WITH BREAKFAST    LOSARTAN (COZAAR) 100 MG TABLET    Take 1 tablet (100 mg total) by mouth once daily.    MAGNESIUM ORAL    Take by mouth.    METOPROLOL SUCCINATE (TOPROL-XL) 100 MG 24 HR TABLET    TAKE 1 TABLET BY MOUTH EVERY DAY    PANTOPRAZOLE (PROTONIX) 40 MG TABLET    Take 1 tablet (40 mg total) by mouth once daily. Start taking this medication on 4/5/2023.    TIRZEPATIDE (MOUNJARO) 7.5 MG/0.5 ML PNIJ    Inject 7.5 mg into the skin every 7 days.    VIT C/E/ZN/COPPR/LUTEIN/ZEAXAN (PRESERVISION AREDS-2 ORAL)    Take by mouth.   Changed and/or Refilled Medications    Modified Medication Previous Medication    EMPAGLIFLOZIN-METFORMIN (SYNJARDY XR) 10-1,000 MG TBPH empagliflozin-metformin (SYNJARDY XR) 10-1,000 mg TBph       Take 1 tablet by mouth 2 (two) times a day.    Take 1 tablet by mouth 2 (two) times a day.    VARICELLA-ZOSTER GE-AS01B, PF, (SHINGRIX) 50 MCG/0.5 ML INJECTION varicella-zoster gE-AS01B, PF, (SHINGRIX) 50 mcg/0.5 mL injection       Inject 0.5 mLs into the muscle once. for 1 dose    Inject 0.5 mLs into the muscle once. for 1 dose   Discontinued Medications    CYANOCOBALAMIN 500 MCG TABLET    Take 1,000 mcg by mouth once daily.    FLUAD QUAD 2023-24,65Y UP,,PF, 60 MCG (15 MCG X 4)/0.5 ML SYRG        KETOCONAZOLE (NIZORAL) 2 % CREAM    Apply topically once daily.  "        Health Maintenance         Date Due Completion Date    Shingles Vaccine (1 of 2) Never done ---    RSV Vaccine (Age 60+ and Pregnant patients) (1 - Risk 60-74 years 1-dose series) Never done ---    TETANUS VACCINE 08/05/2024 8/5/2014 (Done)    Override on 8/5/2014: Done    COVID-19 Vaccine (7 - 2024-25 season) 09/01/2024 10/10/2022    Hemoglobin A1c 03/20/2025 9/20/2024    Foot Exam 06/13/2025 6/13/2024    Diabetic Eye Exam 07/11/2025 7/11/2024    Override on 2/15/2018: Done    Override on 1/26/2016: Done    Override on 5/7/2014: Done    Override on 4/1/2013: Done    Lipid Panel 09/20/2025 9/20/2024    Diabetes Urine Screening 09/26/2025 9/26/2024    Mammogram 10/03/2025 10/3/2024    Override on 5/22/2018: Done    Low Dose Statin 02/05/2026 2/5/2025    DEXA Scan 09/13/2026 9/13/2024    Override on 2/3/2017: Declined    Colorectal Cancer Screening 03/17/2032 3/17/2022          Patient ambulates on her own without an assistive device.     We encourage you to start exercising if you do not already, continue at your current level or increase your level of activity.     Do you still have a menstrual cycle? No       If not,  menopause         For post-menopausal women:  Are you taking a daily supplement with both Vitamin D and Calcium? Yes    Have you often been bothered by feeling down, depressed, or hopeless?  Not at all    Review of Systems   Constitutional:  Negative for chills and fever.   Respiratory:  Positive for cough. Negative for shortness of breath.    Cardiovascular:  Positive for leg swelling. Negative for chest pain.   Musculoskeletal:  Positive for joint pain and myalgias.   Neurological:  Positive for headaches. Negative for dizziness.       Objective:     /74 (BP Location: Left arm, Patient Position: Sitting)   Pulse 64   Temp 97.8 °F (36.6 °C) (Temporal)   Resp 18   Ht 5' 2" (1.575 m)   Wt 71.2 kg (156 lb 15.5 oz)   SpO2 97%   BMI 28.71 kg/m²     Physical Exam  Vitals reviewed. "   Constitutional:       Appearance: Normal appearance.   HENT:      Head: Normocephalic and atraumatic.      Right Ear: Tympanic membrane normal.      Left Ear: A middle ear effusion is present.      Nose:      Right Sinus: Maxillary sinus tenderness present.      Left Sinus: Maxillary sinus tenderness present.      Mouth/Throat:      Pharynx: Posterior oropharyngeal erythema present.   Cardiovascular:      Rate and Rhythm: Normal rate and regular rhythm.      Heart sounds: Normal heart sounds. No murmur heard.  Pulmonary:      Effort: Pulmonary effort is normal.      Breath sounds: Normal breath sounds. No wheezing.   Lymphadenopathy:      Head:      Right side of head: Submental adenopathy present. No submandibular or tonsillar adenopathy.      Left side of head: Submental adenopathy present. No submandibular or tonsillar adenopathy.   Skin:     General: Skin is warm and dry.   Neurological:      Mental Status: She is alert and oriented to person, place, and time.       BP Readings from Last 3 Encounters:   02/05/25 118/74   10/17/24 124/60   09/26/24 120/68     Wt Readings from Last 3 Encounters:   02/05/25 71.2 kg (156 lb 15.5 oz)   10/17/24 72.6 kg (160 lb)   09/26/24 72.8 kg (160 lb 7.9 oz)       I reviewed and independently interpreted the labs and imaging below:  Last Labs:  Glucose   Date Value Ref Range Status   09/20/2024 125 (H) 70 - 110 mg/dL Final   03/20/2024 123 (H) 70 - 110 mg/dL Final     BUN   Date Value Ref Range Status   09/20/2024 27 (H) 8 - 23 mg/dL Final   03/20/2024 21 8 - 23 mg/dL Final     Creatinine   Date Value Ref Range Status   09/20/2024 0.9 0.5 - 1.4 mg/dL Final   03/20/2024 0.9 0.5 - 1.4 mg/dL Final     Sodium   Date Value Ref Range Status   09/20/2024 140 136 - 145 mmol/L Final     Potassium   Date Value Ref Range Status   09/20/2024 3.7 3.5 - 5.1 mmol/L Final     AST   Date Value Ref Range Status   09/20/2024 20 10 - 40 U/L Final     ALT   Date Value Ref Range Status   09/20/2024  16 10 - 44 U/L Final     eGFR if    Date Value Ref Range Status   04/20/2022 >60.0 >60 mL/min/1.73 m^2 Final     eGFR if non    Date Value Ref Range Status   04/20/2022 >60.0 >60 mL/min/1.73 m^2 Final     Comment:     Calculation used to obtain the estimated glomerular filtration  rate (eGFR) is the CKD-EPI equation.        Cholesterol   Date Value Ref Range Status   09/20/2024 178 120 - 199 mg/dL Final     Comment:     The National Cholesterol Education Program (NCEP) has set the  following guidelines (reference ranges) for Cholesterol:  Optimal.....................<200 mg/dL  Borderline High.............200-239 mg/dL  High........................> or = 240 mg/dL     03/20/2024 179 120 - 199 mg/dL Final     Comment:     The National Cholesterol Education Program (NCEP) has set the  following guidelines (reference ranges) for Cholesterol:  Optimal.....................<200 mg/dL  Borderline High.............200-239 mg/dL  High........................> or = 240 mg/dL       Hemoglobin A1C   Date Value Ref Range Status   09/20/2024 7.5 (H) 4.0 - 5.6 % Final     Comment:     ADA Screening Guidelines:  5.7-6.4%  Consistent with prediabetes  >or=6.5%  Consistent with diabetes    High levels of fetal hemoglobin interfere with the HbA1C  assay. Heterozygous hemoglobin variants (HbS, HgC, etc)do  not significantly interfere with this assay.   However, presence of multiple variants may affect accuracy.     03/20/2024 7.0 (H) 4.0 - 5.6 % Final     Comment:     ADA Screening Guidelines:  5.7-6.4%  Consistent with prediabetes  >or=6.5%  Consistent with diabetes    High levels of fetal hemoglobin interfere with the HbA1C  assay. Heterozygous hemoglobin variants (HbS, HgC, etc)do  not significantly interfere with this assay.   However, presence of multiple variants may affect accuracy.       Lab Results   Component Value Date    WBC 7.54 03/20/2024    HGB 13.8 03/20/2024    HCT 42.1 03/20/2024    PLT  338 03/20/2024     Lab Results   Component Value Date    TSH 1.862 03/20/2024       Assessment and Plan:     1. Encounter for annual health examination    --Nutrition: Discussed the importance of moderate caffeine intake. Adhering to a low sodium, low saturated fat/low cholesterol diet.   --Exercise: Discussed the importance of regular exercise. At least 30 minutes 5 days per week.   --Immunizations reviewed.  --Discussed benefits of maintaining up to date health maintenance.    - CBC Auto Differential; Future  - Comprehensive Metabolic Panel; Future  - Lipid Panel; Future  - TSH; Future  - Hemoglobin A1C; Future  - Vitamin D; Future    2. Upper respiratory tract infection, unspecified type    Patient to begin OTC Mucinex and take Prom DM up to BID for cough. Rest and hydrate. Should symptoms still persist come Monday contact office.     - promethazine-dextromethorphan (PROMETHAZINE-DM) 6.25-15 mg/5 mL Syrp; Take 5 mLs by mouth 2 (two) times daily as needed (cough).  Dispense: 118 mL; Refill: 0    3. Varicose veins of both lower extremities with complications    Chronic, stable. Discussed conservative measures and the fact that removal is considered cosmetic and not typically covered by insurance. Due to intermittent discomfort will still refer to Vascular for their recs.     - Ambulatory referral/consult to Vascular Surgery; Future  - COMPRESSION STOCKINGS    4. Type 2 diabetes mellitus with both eyes affected by proliferative retinopathy and macular edema, without long-term current use of insulin    Chronic, labs to assess stability, continue current medication     - Hemoglobin A1C; Future  - blood-glucose meter Misc; To check BG 1 times daily, to use with insurance preferred meter  Dispense: 1 each; Refill: 0  - lancets Misc; To check BG 1 times daily, to use with insurance preferred meter  Dispense: 1 each; Refill: 0  - blood sugar diagnostic Strp; To check BG 1 times daily, to use with insurance preferred meter   Dispense: 100 strip; Refill: 0  - Microalbumin/Creatinine Ratio, Urine; Future  - empagliflozin-metformin (SYNJARDY XR) 10-1,000 mg TBph; Take 1 tablet by mouth 2 (two) times a day.  Dispense: 180 tablet; Refill: 3    5. Hypertension associated with diabetes    Chronic, stable, continue Norvasc, Metoprolol, Losartan and HCTZ.   Monitors BP at home and notes readings are typically as they are in clinic.   Discuss possibly stopping either HCTZ or Norvasc due to polypharmacy concerns. Prefers to wait at this time.     - CBC Auto Differential; Future  - Comprehensive Metabolic Panel; Future  - TSH; Future    6. PAF (paroxysmal atrial fibrillation)  7. On anticoagulant therapy    Chronic, stable, continue current medications, follow up with EP    8. Hyperlipidemia associated with type 2 diabetes mellitus    Chronic, labs to assess stability, continue Lipitor and Fenofibrate.   Discussed possible stopping Fenofibrate but cannot stop Lipitor.     - Lipid Panel; Future    9. Vitamin D deficiency    Chronic, labs to assess stability     - Vitamin D; Future    10. Osteopenia, unspecified location    Chronic, stable    11. Overweight (BMI 25.0-29.9)    Chronic, stable    12. Need for vaccination  - tetanus and diphther. tox, PF, (TENIVAC, PF,) 5-2 Lf unit/0.5 mL Syrg; Inject 0.5 mLs into the muscle once. for 1 dose  Dispense: 0.5 mL; Refill: 0  - varicella-zoster gE-AS01B, PF, (SHINGRIX) 50 mcg/0.5 mL injection; Inject 0.5 mLs into the muscle once. for 1 dose  Dispense: 1 each; Refill: 1

## 2025-02-10 DIAGNOSIS — E11.69 HYPERLIPIDEMIA ASSOCIATED WITH TYPE 2 DIABETES MELLITUS: ICD-10-CM

## 2025-02-10 DIAGNOSIS — I83.813 VARICOSE VEINS OF BILATERAL LOWER EXTREMITIES WITH PAIN: ICD-10-CM

## 2025-02-10 DIAGNOSIS — E78.5 HYPERLIPIDEMIA ASSOCIATED WITH TYPE 2 DIABETES MELLITUS: ICD-10-CM

## 2025-02-10 DIAGNOSIS — M79.89 PAIN AND SWELLING OF LOWER EXTREMITY, UNSPECIFIED LATERALITY: Primary | ICD-10-CM

## 2025-02-10 DIAGNOSIS — M79.606 PAIN AND SWELLING OF LOWER EXTREMITY, UNSPECIFIED LATERALITY: Primary | ICD-10-CM

## 2025-02-10 RX ORDER — FENOFIBRATE 160 MG/1
160 TABLET ORAL
Qty: 90 TABLET | Refills: 2 | Status: SHIPPED | OUTPATIENT
Start: 2025-02-10

## 2025-02-10 NOTE — TELEPHONE ENCOUNTER
No care due was identified.  [facility] Embedded Care Due Messages. Reference number: [id number].   2/10/2025 10:26:27 AM CST

## 2025-02-10 NOTE — TELEPHONE ENCOUNTER
Refill Decision Note   Fely Wetzel  is requesting a refill authorization.  Brief Assessment and Rationale for Refill:  Approve     Medication Therapy Plan:         Comments:     Note composed:5:58 PM 02/10/2025

## 2025-02-11 ENCOUNTER — LAB VISIT (OUTPATIENT)
Dept: LAB | Facility: HOSPITAL | Age: 70
End: 2025-02-11
Attending: NURSE PRACTITIONER
Payer: MEDICARE

## 2025-02-11 DIAGNOSIS — E11.3513 TYPE 2 DIABETES MELLITUS WITH BOTH EYES AFFECTED BY PROLIFERATIVE RETINOPATHY AND MACULAR EDEMA, WITHOUT LONG-TERM CURRENT USE OF INSULIN: ICD-10-CM

## 2025-02-11 DIAGNOSIS — Z00.00 ENCOUNTER FOR ANNUAL HEALTH EXAMINATION: ICD-10-CM

## 2025-02-11 DIAGNOSIS — E78.5 HYPERLIPIDEMIA ASSOCIATED WITH TYPE 2 DIABETES MELLITUS: ICD-10-CM

## 2025-02-11 DIAGNOSIS — I15.2 HYPERTENSION ASSOCIATED WITH DIABETES: ICD-10-CM

## 2025-02-11 DIAGNOSIS — E55.9 VITAMIN D DEFICIENCY: ICD-10-CM

## 2025-02-11 DIAGNOSIS — E11.59 HYPERTENSION ASSOCIATED WITH DIABETES: ICD-10-CM

## 2025-02-11 DIAGNOSIS — E11.69 HYPERLIPIDEMIA ASSOCIATED WITH TYPE 2 DIABETES MELLITUS: ICD-10-CM

## 2025-02-11 LAB
25(OH)D3+25(OH)D2 SERPL-MCNC: 74 NG/ML (ref 30–96)
ALBUMIN SERPL BCP-MCNC: 3.7 G/DL (ref 3.5–5.2)
ALP SERPL-CCNC: 58 U/L (ref 40–150)
ALT SERPL W/O P-5'-P-CCNC: 22 U/L (ref 10–44)
ANION GAP SERPL CALC-SCNC: 10 MMOL/L (ref 8–16)
AST SERPL-CCNC: 28 U/L (ref 10–40)
BASOPHILS # BLD AUTO: 0.01 K/UL (ref 0–0.2)
BASOPHILS NFR BLD: 0.2 % (ref 0–1.9)
BILIRUB SERPL-MCNC: 0.4 MG/DL (ref 0.1–1)
BUN SERPL-MCNC: 17 MG/DL (ref 8–23)
CALCIUM SERPL-MCNC: 10 MG/DL (ref 8.7–10.5)
CHLORIDE SERPL-SCNC: 106 MMOL/L (ref 95–110)
CHOLEST SERPL-MCNC: 175 MG/DL (ref 120–199)
CHOLEST/HDLC SERPL: 4.3 {RATIO} (ref 2–5)
CO2 SERPL-SCNC: 20 MMOL/L (ref 23–29)
CREAT SERPL-MCNC: 0.8 MG/DL (ref 0.5–1.4)
DIFFERENTIAL METHOD BLD: NORMAL
EOSINOPHIL # BLD AUTO: 0.2 K/UL (ref 0–0.5)
EOSINOPHIL NFR BLD: 2.9 % (ref 0–8)
ERYTHROCYTE [DISTWIDTH] IN BLOOD BY AUTOMATED COUNT: 13.4 % (ref 11.5–14.5)
EST. GFR  (NO RACE VARIABLE): >60 ML/MIN/1.73 M^2
ESTIMATED AVG GLUCOSE: 177 MG/DL (ref 68–131)
GLUCOSE SERPL-MCNC: 134 MG/DL (ref 70–110)
HBA1C MFR BLD: 7.8 % (ref 4–5.6)
HCT VFR BLD AUTO: 41.2 % (ref 37–48.5)
HDLC SERPL-MCNC: 41 MG/DL (ref 40–75)
HDLC SERPL: 23.4 % (ref 20–50)
HGB BLD-MCNC: 13.3 G/DL (ref 12–16)
IMM GRANULOCYTES # BLD AUTO: 0.03 K/UL (ref 0–0.04)
IMM GRANULOCYTES NFR BLD AUTO: 0.5 % (ref 0–0.5)
LDLC SERPL CALC-MCNC: 97 MG/DL (ref 63–159)
LYMPHOCYTES # BLD AUTO: 2.3 K/UL (ref 1–4.8)
LYMPHOCYTES NFR BLD: 35 % (ref 18–48)
MCH RBC QN AUTO: 29 PG (ref 27–31)
MCHC RBC AUTO-ENTMCNC: 32.3 G/DL (ref 32–36)
MCV RBC AUTO: 90 FL (ref 82–98)
MONOCYTES # BLD AUTO: 0.4 K/UL (ref 0.3–1)
MONOCYTES NFR BLD: 6.3 % (ref 4–15)
NEUTROPHILS # BLD AUTO: 3.7 K/UL (ref 1.8–7.7)
NEUTROPHILS NFR BLD: 55.1 % (ref 38–73)
NONHDLC SERPL-MCNC: 134 MG/DL
NRBC BLD-RTO: 0 /100 WBC
PLATELET # BLD AUTO: 350 K/UL (ref 150–450)
PMV BLD AUTO: 11.5 FL (ref 9.2–12.9)
POTASSIUM SERPL-SCNC: 4 MMOL/L (ref 3.5–5.1)
PROT SERPL-MCNC: 7.5 G/DL (ref 6–8.4)
RBC # BLD AUTO: 4.58 M/UL (ref 4–5.4)
SODIUM SERPL-SCNC: 136 MMOL/L (ref 136–145)
TRIGL SERPL-MCNC: 185 MG/DL (ref 30–150)
TSH SERPL DL<=0.005 MIU/L-ACNC: 1.81 UIU/ML (ref 0.4–4)
WBC # BLD AUTO: 6.63 K/UL (ref 3.9–12.7)

## 2025-02-11 PROCEDURE — 85025 COMPLETE CBC W/AUTO DIFF WBC: CPT | Mod: HCNC | Performed by: NURSE PRACTITIONER

## 2025-02-11 PROCEDURE — 83036 HEMOGLOBIN GLYCOSYLATED A1C: CPT | Mod: HCNC | Performed by: NURSE PRACTITIONER

## 2025-02-11 PROCEDURE — 80053 COMPREHEN METABOLIC PANEL: CPT | Mod: HCNC | Performed by: NURSE PRACTITIONER

## 2025-02-11 PROCEDURE — 84443 ASSAY THYROID STIM HORMONE: CPT | Mod: HCNC | Performed by: NURSE PRACTITIONER

## 2025-02-11 PROCEDURE — 82306 VITAMIN D 25 HYDROXY: CPT | Mod: HCNC | Performed by: NURSE PRACTITIONER

## 2025-02-11 PROCEDURE — 36415 COLL VENOUS BLD VENIPUNCTURE: CPT | Mod: HCNC,PO | Performed by: NURSE PRACTITIONER

## 2025-02-11 PROCEDURE — 80061 LIPID PANEL: CPT | Mod: HCNC | Performed by: NURSE PRACTITIONER

## 2025-02-18 NOTE — PROGRESS NOTES
Ms. Wetzel is a patient of Dr. Mathias and was last seen in clinic 10/17/2024.      Subjective:   Patient ID:  Fely Wetzel is a 69 y.o. female who presents for follow up of Atrial Fibrillation  .     HPI:    Ms. Wetzel is a 69 y.o. female with AF (PVI 4/2023), HTN, DM, obesity here for follow up.    Background:    Referring Cardiologist: Marie Dominguez MD  Primary Care Physician: Paula Barkley DO    Mrs. Wetzel has a hx of hypertension, diabetes mellitus type 2, and obesity. She was in her usual state of health until June 2019 when she developed sudden palpitations. She called EMS and reportedly was in AF with ventricular rates up to 170s-220s. She was given IV cardizem and her ventricular rate lowered to 110s-170s. Per ER physician she was in atrial fibrillation however no ECGs document her arrhythmia. She was given 20mg more of cardizem and spontaneously converted to sinus rhythm. TSH was normal. She was discharged on eliquis and metoprolol. She was referred to Dr. Dominguez and discussed with her if there was possibility at some point of stopping anticoagulation. She reports for the past few weeks she has been having episodes of palpitations with radiation into her neck. These are different than when she went to the ER.     An exercise stress echocardiogram noted normal LV function and no ischemic changes. An echocardiogram performed on 10/15/2019 showed normal LV function, normal valves, and moderate LA enlargement (MURIEL 47).    Our plan was for a 30 day monitor, sleep study and possibly starting flecainide.    Mrs. Wetzel presented 5/2020 for follow-up. A 30 day monitor done in March of 2020 which showed no atrial fibrillation. She presented to the ER in November of 2019 with recurrent symptomatic AF with RVR that was rate controlled with cardizem and spontaneously converted. She notes occasional palpitations at night. She has not yet had a sleep study.    Mrs. Wetzel returned for  follow-up 12/2020. She is on flecainide. She continues on metoprolol/eliquis. She notes occasional episodes of chest discomfort at rest, not with activity. She drinks water and it goes away. She does have indigestion. No symptomatic AF. Recent CBC noted stable H/H.    Mrs. Wetzel returned for follow-up 7/2021. She was doing well from atrial fibrillation standpoint  without any reports of symptomatic AF. She reported chest discomfort that occurs after eating in the evening and was suspected to be due to dyspepsia. Protonix was prescribed at that time.     Mrs. Wetzel presented for follow up 2/2022 and she once again reported that she was doing well. She denied any sustained periods of atrial fibrillation. Her non cardiac chest discomfort has persistent in spite of protonix and she is planned for  coloscopy/EGD.  She was still employed and has no limitations with her usual activities.     8/2022: Mrs. Wetzel returns for follow-up. She reports 1 month ago she had a few days of palpitations. Her main complaint is regarding varicose veins in her legs.    1/19/2023: pt reported recurrence. Flec increased to 100mg BID     2/13/2023 Continued recurrence. Flec increased to 150mg BID and PVI planned.     4/19/2023: Successful pulmonary vein RF ablation. Continued on flecainide     6/16/2023: She is 2 months s/p PVI. She reports one breakthrough AF episode lasting about 5 min 2 weeks ago.   Remained on flecainide 150mg BID. Will continue this through blanking period, then wean if possible. CHADSVASc 4 and she should remain on anticoagulation indefinitely. She is on eliquis. She reports low heart rates that are concerning for her. Will reduce toprol.     9/14/2023: She is now 5 months s/p PVI. Doing well overall from rhythm standpoint, with only brief palpitations since last clinic visit. Will start to wean off flecainide.   Otherwise feeling well. CHADSVASc 4 on eliquis.     1/8/2024: She is ~9 mo s/p PVI. She continues  "to do well from a rhythm standpoint, with no documented or symptomatic recurrence of arrhythmia since procedure. No AF on Smartwatch. Will continue to wean flecainide. Plan on stopping at next clinic visit. CHADSVASc 4 on eliquis. Reduce flecainide to 50mg BID    4/2024: Today she denies recent palpitations. Epigastric pain at night, guzman if eats later. Follows with GI. On protonix. No exertional CP reported. No CHAVES, LH, syncope reported. Flecainide stopped.     10/17/2024: Mrs. Wetzel returns for follow-up. She did not stop flecainide. Feels well. No symptomatic AF. She does report intermittent little "pinch" in her left chest. Fleeting.  ECG is sinus rhythm with narrow QRS  In summary, Mrs. Wetzel is a pleasant 68 year-old woman with hypertension, diabetes mellitus type 2, and obesity presenting for follow-up for recurrent symptomatic paroxysmal atrial fibrillation with RVR despite AAD therapy now s/p PVI (4/2023). Her [id number] score is 3 and indefinite anticoagulation is recommended. She remains on eliquis. No symptomatic recurrences post-PVI. Stop flecainide. Continue metoprolol.  RTC in 6 months, sooner if needed    Update (02/19/2025):    Today she says that she felt "funny" when she stopped her flecainide so she has restarted it. Also high HR (90s) on her Apple Watch. Feels much better back on flecainide. No sustained palpitations. Can feel palps if she misses a dose of metoprolol as well but not if she takes as usual. Otherwise her only complaint is varicose veins which are painful for her. She has compression stockings and an appt with vein clinic in May. She denies CP, worsening CHAVES, LH, syncope. Planning on starting walking.    She is currently taking eliquis 5mg BID for stroke prophylaxis and denies significant bleeding episodes. She is currently being treated with flecainide 50mg BID and toprol 100mg daily for HR control.  Kidney function is stable, with a creatinine of 0.8 on 2/11/2025.    I have " personally reviewed the patient's EKG today, which shows sinus rhythm at 70bpm. WI interval is 188. QRS is 96. QTc is 429.    Relevant Cardiac Test Results:    2D Echo (2/22/2022):  Normal left ventricular systolic function. The estimated ejection fraction is 70%  Normal right ventricular systolic function.  Indeterminate left ventricular diastolic function.  Moderate left atrial enlargement.  The estimated PA systolic pressure is 33 mm Hg  Normal central venous pressure (3 mm Hg).    Current Outpatient Medications   Medication Sig    amLODIPine (NORVASC) 5 MG tablet TAKE 1 TABLET BY MOUTH EVERY DAY    apixaban (ELIQUIS) 5 mg Tab Take 1 tablet (5 mg total) by mouth 2 (two) times daily.    atorvastatin (LIPITOR) 40 MG tablet Take 1 tablet (40 mg total) by mouth every evening.    blood sugar diagnostic (TRUE METRIX GLUCOSE TEST STRIP) Strp Inject 1 each into the skin 2 (two) times daily before meals.    blood sugar diagnostic Strp To check BG 1 times daily, to use with insurance preferred meter    blood-glucose meter Misc To check BG 1 times daily, to use with insurance preferred meter    blood-glucose meter Misc Check fasting glucose and check glucose two hours after lunch or dinner    pepito/ala/linoleic/oleic/dha (ADULT OMEGA PLUS DHA ORAL) Take by mouth.    cholecalciferol, vitamin D3, 1,250 mcg (50,000 unit) capsule TAKE 1 CAPSULE BY MOUTH ONE TIME PER WEEK    COQ10, UBIQUINOL, ORAL Take by mouth.    empagliflozin-metformin (SYNJARDY XR) 10-1,000 mg TBph Take 1 tablet by mouth 2 (two) times a day.    fenofibrate 160 MG Tab TAKE 1 TABLET BY MOUTH EVERY DAY    flecainide (TAMBOCOR) 50 MG Tab Take 50 mg by mouth every 12 (twelve) hours.    glutathione 500 mg Cap Take by mouth.    hydroCHLOROthiazide (HYDRODIURIL) 25 MG tablet TAKE 1 TABLET (25 MG TOTAL) BY MOUTH ONCE DAILY. WITH BREAKFAST    lancets Misc To check BG 1 times daily, to use with insurance preferred meter    losartan (COZAAR) 100 MG tablet Take 1 tablet  "(100 mg total) by mouth once daily.    MAGNESIUM ORAL Take by mouth.    metoprolol succinate (TOPROL-XL) 100 MG 24 hr tablet TAKE 1 TABLET BY MOUTH EVERY DAY    pantoprazole (PROTONIX) 40 MG tablet Take 1 tablet (40 mg total) by mouth once daily. Start taking this medication on 4/5/2023.    promethazine-dextromethorphan (PROMETHAZINE-DM) 6.25-15 mg/5 mL Syrp Take 5 mLs by mouth 2 (two) times daily as needed (cough).    tirzepatide (MOUNJARO) 7.5 mg/0.5 mL PnIj Inject 7.5 mg into the skin every 7 days.    varicella-zoster gE-AS01B, PF, (SHINGRIX) 50 mcg/0.5 mL injection Inject 0.5 mLs into the muscle once. for 1 dose    vit C/E/Zn/coppr/lutein/zeaxan (PRESERVISION AREDS-2 ORAL) Take by mouth.     No current facility-administered medications for this visit.       Review of Systems   Constitutional: Negative for malaise/fatigue.   Cardiovascular:  Positive for leg swelling (varicose veins). Negative for chest pain, dyspnea on exertion, irregular heartbeat and palpitations.   Respiratory:  Negative for shortness of breath.    Hematologic/Lymphatic: Negative for bleeding problem.   Skin:  Negative for rash.   Musculoskeletal:  Negative for myalgias.   Gastrointestinal:  Negative for hematemesis, hematochezia and nausea.   Genitourinary:  Negative for hematuria.   Neurological:  Negative for light-headedness.   Psychiatric/Behavioral:  Negative for altered mental status.    Allergic/Immunologic: Negative for persistent infections.     Objective:        /72 (Patient Position: Sitting)   Pulse 70   Ht 5' 2" (1.575 m)   Wt 72.5 kg (159 lb 13.3 oz)   BMI 29.23 kg/m²     Physical Exam  Vitals and nursing note reviewed.   Constitutional:       Appearance: Normal appearance. She is well-developed.   HENT:      Head: Normocephalic.      Nose: Nose normal.   Eyes:      Pupils: Pupils are equal, round, and reactive to light.   Cardiovascular:      Rate and Rhythm: Normal rate and regular rhythm.   Pulmonary:      Effort: " "No respiratory distress.   Musculoskeletal:         General: Normal range of motion.   Skin:     General: Skin is warm and dry.      Findings: No erythema.      Comments: Varicosities to bilateral lower legs   Neurological:      Mental Status: She is alert and oriented to person, place, and time.   Psychiatric:         Speech: Speech normal.         Behavior: Behavior normal.           Lab Results   Component Value Date     02/11/2025    K 4.0 02/11/2025    MG 1.7 01/13/2023    BUN 17 02/11/2025    CREATININE 0.8 02/11/2025    ALT 22 02/11/2025    AST 28 02/11/2025    HGB 13.3 02/11/2025    HCT 41.2 02/11/2025    TSH 1.811 02/11/2025    LDLCALC 97.0 02/11/2025       Recent Labs   Lab 04/12/23  1029   INR 1.0       Assessment:     1. PAF (paroxysmal atrial fibrillation)    2. Hypertension associated with diabetes    3. On anticoagulant therapy      Plan:     In summary, Ms. Wetzel is a 69 y.o. female with AF (PVI 4/2023), HTN, DM, obesity here for follow up.  She is doing well from a rhythm standpoint, with no sustained palpitations - she didn't feel as well off (felt "funny," episode of elevated HR) flecainide 50mg BID so she restarted it. ECG with normal intervals. Only complaint is varicose veins and she has compression stockings and an appt with vein clinic in May. CHADSVASc 4 and she is on eliquis for CVA prophylaxis. Overall doing well. She plans to start walking more regularly.    Continue current meds  RTC 6 mo, sooner if needed      *A copy of this note has been sent to Dr. Mathias*    Follow up in about 6 months (around 8/19/2025).      ------------------------------------------------------------------    ANIL Lambert, NP-C  Cardiac Electrophysiology    "

## 2025-02-19 ENCOUNTER — OFFICE VISIT (OUTPATIENT)
Dept: ELECTROPHYSIOLOGY | Facility: CLINIC | Age: 70
End: 2025-02-19
Payer: MEDICARE

## 2025-02-19 ENCOUNTER — HOSPITAL ENCOUNTER (OUTPATIENT)
Dept: CARDIOLOGY | Facility: CLINIC | Age: 70
Discharge: HOME | End: 2025-02-19
Payer: MEDICARE

## 2025-02-19 VITALS
HEART RATE: 70 BPM | BODY MASS INDEX: 29.41 KG/M2 | DIASTOLIC BLOOD PRESSURE: 72 MMHG | WEIGHT: 159.81 LBS | HEIGHT: 62 IN | SYSTOLIC BLOOD PRESSURE: 128 MMHG

## 2025-02-19 DIAGNOSIS — Z79.01 ON ANTICOAGULANT THERAPY: ICD-10-CM

## 2025-02-19 DIAGNOSIS — I15.2 HYPERTENSION ASSOCIATED WITH DIABETES: ICD-10-CM

## 2025-02-19 DIAGNOSIS — I48.0 PAF (PAROXYSMAL ATRIAL FIBRILLATION): ICD-10-CM

## 2025-02-19 DIAGNOSIS — I48.0 PAF (PAROXYSMAL ATRIAL FIBRILLATION): Primary | ICD-10-CM

## 2025-02-19 DIAGNOSIS — E11.59 HYPERTENSION ASSOCIATED WITH DIABETES: ICD-10-CM

## 2025-02-19 LAB
OHS QRS DURATION: 96 MS
OHS QTC CALCULATION: 429 MS

## 2025-02-19 RX ORDER — METOPROLOL SUCCINATE 100 MG/1
100 TABLET, EXTENDED RELEASE ORAL DAILY
Qty: 90 TABLET | Refills: 1 | Status: SHIPPED | OUTPATIENT
Start: 2025-02-19

## 2025-02-19 RX ORDER — FLECAINIDE ACETATE 50 MG/1
50 TABLET ORAL EVERY 12 HOURS
Qty: 180 TABLET | Refills: 1 | Status: SHIPPED | OUTPATIENT
Start: 2025-02-19

## 2025-02-20 RX ORDER — ATORVASTATIN CALCIUM 40 MG/1
40 TABLET, FILM COATED ORAL NIGHTLY
Qty: 90 TABLET | Refills: 2 | Status: SHIPPED | OUTPATIENT
Start: 2025-02-20

## 2025-02-20 NOTE — TELEPHONE ENCOUNTER
No care due was identified.  [facility] Embedded Care Due Messages. Reference number: [id number].   2/20/2025 12:54:30 PM CST

## 2025-03-03 NOTE — TELEPHONE ENCOUNTER
LVM for pt to give us a call back to confirm appointment on tomorrow   [facility] - NEUROLOGY [address] [facility], [place]    Date: 3/3/25  Patient Name: Leo Kemp   MRN: [id number]   PCP: René Wing  Referring Provider: No ref. provider found    Assessment:   Leo Kemp is a 21 y.o. male with epilepsy presenting for follow up in adult Neurology Clinic.  He is stable on his medications. 2 breakthrough seizures since August. Stress likely triggering symptoms. He has not had anymore seizures since January.  He reports tolerating the lacosamide without complication.  He currently has 5 tablets.    Vimpat level 8.3 when checked at 5pm 12.3.2024.  Mom is helping him maintain schedule to prevent breakthru events from stress, missing meals, and lack of sleep.    CT head from recent hospitalization reviewed and interpreted as normal with no acute hemorrhage or large territory infarctions.  No encephalomalacia noted    Plan:   --continue lacosamide 150 mg b.i.d.  --letter to be sent confirming diagnosis  --patient with nasal midazolam for abortive seizure greater than 5  --return to clinic in 6 months or as needed  --Can discuss FMLA for doctor appointments in future.      It is [place] state law that you do not drive for the next six months as you have had an episode of loss of consciousness.     Please do not take baths, swim alone, climb heights or operate heavy machinery as you have had an episode of loss of consciousness.       Problem List Items Addressed This Visit       Autism spectrum    Nonintractable generalized idiopathic epilepsy without status epilepticus - Primary     Other Visit Diagnoses         Seizure disorder        Relevant Medications    lacosamide (VIMPAT) 150 mg Tab tablet              Uriel Whitley MD    Patient note was created using MModal Dictation.  Any errors in syntax or even information may not have been identified and edited on initial review prior to signing this note.  Subjective:   Patient seen in  consultation at the request of No ref. provider found for the evaluation of seizures. A copy of this note will be sent to the referring physician.        HPI:   Mr. Leo Kemp is a 21 y.o. male with autism and epilepsy presenting seizure management.  Was previously managed by Dr. Jackson in pediatric Neurology, and has turned 20 and aged out.  The patient was recently hospitalized for breakthrough seizures in the setting of discontinuing levetiracetam her family.  His home levetiracetam was changed to lacosamide during that hospitalization.  This was done because he had been having issues at school with aggression or agitation which was uncharacteristic of him.  Today he notes that he has not had any further seizures and is tolerating the medication just fine.  Mom is accompanying him today and providing collaborative history      Seizure history:   Onset: 18 years old  Frequency: Rarely  Last seizure:7/17/2022, 12/2022, 7/6/204  History of status: No  Semiology: arms stiffen to side and shakes  Risk factors: family history of seizures maternal aunt and maternal cousin  Last seizure date: 7/6/24  Prior anti-seizure medications: Keppra -->SE agitation  Current anti-seizure medications: Lacosimide 150mg BID  Routine EEGs: Normal  EMU admissions: No  Head imaging: MRI @ [facility]; [facility] normal  Epilepsy syndrome: Generalized     Birth history: Vaginal delivery; 40 weeks, born after [place].   Developmental history: Online school made him nervous; High school ch[employer]ere. Repeated  and the 8th grade during COVID. In honors English, Math, and Art. Wants to go to art school.    3/3/2025 Interval History:    Presents with mother. Last seen in July 2024. Since then has had one hospitalization for breakthrough seizure in December 2024, which were likely insomnia/stress induced. He had a second breakthrough seizure in January. Stress has been a major part of his seizure breakthroughs since he is in school taking  online classes. Needs letter documenting seizure disorder and autism diagnosis.    PAST MEDICAL HISTORY:  Past Medical History:   Diagnosis Date    Autism     Seizures        PAST SURGICAL HISTORY:  No past surgical history on file.    CURRENT MEDS:  Current Outpatient Medications   Medication Sig Dispense Refill    benzoyl peroxide (BENZAC AC) 10 % external wash Apply topically.      clindamycin (CLEOCIN T) 1 % external solution Apply 1 Application topically every morning.      fluticasone propionate (FLONASE) 50 mcg/actuation nasal spray 1 spray by Each Nostril route once daily.      lacosamide (VIMPAT) 150 mg Tab tablet Take 1 tablet (150 mg total) by mouth every 12 (twelve) hours. 180 tablet 4    mirabegron (MYRBETRIQ) 25 mg Tb24 ER tablet Take 1 tablet (25 mg total) by mouth once daily. 30 tablet 11    RETIN-A 0.025 % cream APPLY A SMALL AMOUNT EXTERNALLY ON THE FACE EVERY NIGHT AT BEDTIME. DO NOT USE IF PREGANT OR NURSING      VALTOCO 15 mg/2 spray (7.5/0.1mL x 2) Spry 15 mg by Nasal route daily as needed.       No current facility-administered medications for this visit.       ALLERGIES:  Review of patient's allergies indicates:   Allergen Reactions    Serotonin 5ht-3 antagonists Other (See Comments)     Patient states had seizure on medication       FAMILY HISTORY:  No family history on file.    SOCIAL HISTORY:  Social History     Tobacco Use    Smoking status: Never     Passive exposure: Never    Smokeless tobacco: Never   Substance Use Topics    Alcohol use: Never    Drug use: Never       Review of Systems:  12 system review of systems is negative except for the symptoms mentioned in HPI.      Objective:     There were no vitals filed for this visit.    General: NAD, well nourished   Eyes: no tearing, discharge, no erythema   ENT: moist mucous membranes of the oral cavity, nares patent    Neck: Supple, full range of motion  Cardiovascular: Warm and well perfused, pulses equal and symmetrical  Lungs: Normal  work of breathing, normal chest wall excursions  Skin: No rash, lesions, or breakdown on exposed skin  Psychiatry: Mood and affect are appropriate   Abdomen: soft, non tender, non distended  Extremeties: No cyanosis, clubbing or edema.    Neurological   MENTAL STATUS: Alert and oriented to person, place, and time. Attention and concentration within normal limits. Speech without dysarthria, able to name and repeat without difficulty. Recent and remote memory within normal limits   CRANIAL NERVES: Visual fields intact. PERRL. EOMI. Facial sensation intact. Face symmetrical. Hearing grossly intact. Full shoulder shrug bilaterally. Tongue protrudes midline     MOTOR: Normal bulk and tone. No pronator drift.  5/5 deltoid, biceps, triceps, interosseous, hand  bilaterally. 5/5 iliopsoas, knee extension/flexion, foot dorsi/plantarflexion bilaterally.    CEREBELLAR/COORDINATION/GAIT: Gait steady with normal arm swing and stride length.  Heel to shin intact. Finger to nose intact. Normal rapid alternating movements.

## 2025-03-11 DIAGNOSIS — I15.2 HYPERTENSION DUE TO ENDOCRINE DISORDER: Chronic | ICD-10-CM

## 2025-03-11 RX ORDER — AMLODIPINE BESYLATE 5 MG/1
5 TABLET ORAL
Qty: 90 TABLET | Refills: 1 | Status: SHIPPED | OUTPATIENT
Start: 2025-03-11

## 2025-03-11 NOTE — TELEPHONE ENCOUNTER
No care due was identified.  [facility] Embedded Care Due Messages. Reference number: [id number].   3/11/2025 10:32:10 AM CDT

## 2025-03-12 NOTE — TELEPHONE ENCOUNTER
Refill Decision Note   Fely Wetzel  is requesting a refill authorization.  Brief Assessment and Rationale for Refill:  Approve     Medication Therapy Plan:         Comments:     Note composed:8:28 PM 03/11/2025

## 2025-03-17 DIAGNOSIS — E11.65 TYPE 2 DIABETES MELLITUS WITH HYPERGLYCEMIA, WITHOUT LONG-TERM CURRENT USE OF INSULIN: ICD-10-CM

## 2025-03-17 NOTE — TELEPHONE ENCOUNTER
No care due was identified.  [facility] Embedded Care Due Messages. Reference number: [id number].   3/17/2025 6:41:41 PM CDT

## 2025-03-18 LAB
LEFT EYE DM RETINOPATHY: NORMAL
RIGHT EYE DM RETINOPATHY: NORMAL

## 2025-03-18 RX ORDER — TIRZEPATIDE 7.5 MG/.5ML
INJECTION, SOLUTION SUBCUTANEOUS
Qty: 12 PEN | Refills: 0 | Status: SHIPPED | OUTPATIENT
Start: 2025-03-18

## 2025-03-18 NOTE — TELEPHONE ENCOUNTER
Refill Decision Note   Fely Wetzel  is requesting a refill authorization.  Brief Assessment and Rationale for Refill:  Approve     Medication Therapy Plan:         Comments:     Note composed:10:42 AM 03/18/2025

## 2025-04-04 ENCOUNTER — OFFICE VISIT (OUTPATIENT)
Dept: INTERNAL MEDICINE | Facility: CLINIC | Age: 70
End: 2025-04-04
Payer: MEDICARE

## 2025-04-04 VITALS
WEIGHT: 160.69 LBS | RESPIRATION RATE: 18 BRPM | DIASTOLIC BLOOD PRESSURE: 68 MMHG | BODY MASS INDEX: 29.57 KG/M2 | SYSTOLIC BLOOD PRESSURE: 130 MMHG | HEART RATE: 66 BPM | HEIGHT: 62 IN | OXYGEN SATURATION: 97 % | TEMPERATURE: 97 F

## 2025-04-04 DIAGNOSIS — M85.80 OSTEOPENIA, UNSPECIFIED LOCATION: ICD-10-CM

## 2025-04-04 DIAGNOSIS — I15.2 HYPERTENSION ASSOCIATED WITH DIABETES: ICD-10-CM

## 2025-04-04 DIAGNOSIS — E11.59 HYPERTENSION ASSOCIATED WITH DIABETES: ICD-10-CM

## 2025-04-04 DIAGNOSIS — K21.9 GASTROESOPHAGEAL REFLUX DISEASE, UNSPECIFIED WHETHER ESOPHAGITIS PRESENT: ICD-10-CM

## 2025-04-04 DIAGNOSIS — E11.3513 TYPE 2 DIABETES MELLITUS WITH BOTH EYES AFFECTED BY PROLIFERATIVE RETINOPATHY AND MACULAR EDEMA, WITHOUT LONG-TERM CURRENT USE OF INSULIN: ICD-10-CM

## 2025-04-04 DIAGNOSIS — E11.69 HYPERLIPIDEMIA ASSOCIATED WITH TYPE 2 DIABETES MELLITUS: ICD-10-CM

## 2025-04-04 DIAGNOSIS — E66.3 OVERWEIGHT (BMI 25.0-29.9): ICD-10-CM

## 2025-04-04 DIAGNOSIS — Z00.00 ENCOUNTER FOR MEDICARE ANNUAL WELLNESS EXAM: Primary | ICD-10-CM

## 2025-04-04 DIAGNOSIS — E55.9 VITAMIN D DEFICIENCY: ICD-10-CM

## 2025-04-04 DIAGNOSIS — I48.0 PAF (PAROXYSMAL ATRIAL FIBRILLATION): ICD-10-CM

## 2025-04-04 DIAGNOSIS — E78.5 HYPERLIPIDEMIA ASSOCIATED WITH TYPE 2 DIABETES MELLITUS: ICD-10-CM

## 2025-04-04 PROCEDURE — 99999 PR PBB SHADOW E&M-EST. PATIENT-LVL V: CPT | Mod: PBBFAC,HCNC,, | Performed by: NURSE PRACTITIONER

## 2025-04-04 NOTE — PROGRESS NOTES
"Fely Wetzel presented for an initial Medicare AWV today. The following components were reviewed and updated:    Medical history  Family History  Social history  Allergies and Current Medications  Health Risk Assessment  Health Maintenance  Care Team    **See Completed Assessments for Annual Wellness visit with in the encounter summary    The following assessments were completed:  Depression Screening  Cognitive function Screening  Timed Get Up Test  Whisper Test      Opioid documentation:      Patient does not have a current opioid prescription.          Vitals:    04/04/25 0950   BP: 130/68   BP Location: Left arm   Patient Position: Sitting   Pulse: 66   Resp: 18   Temp: 96.9 °F (36.1 °C)   TempSrc: Temporal   SpO2: 97%   Weight: 72.9 kg (160 lb 11.5 oz)   Height: 5' 2" (1.575 m)     Body mass index is 29.4 kg/m².           Physical Exam  Vitals reviewed.   Constitutional:       Appearance: Normal appearance.   HENT:      Head: Normocephalic and atraumatic.   Pulmonary:      Effort: Pulmonary effort is normal.   Neurological:      Mental Status: She is alert and oriented to person, place, and time.       Diagnoses and health risks identified today and associated recommendations/orders:  1. Encounter for Medicare annual wellness exam  -     Ambulatory Referral/Consult to Enhanced Annual Wellness Visit (eAWV)    2. Hypertension associated with diabetes    Chronic, stable, continue Norvasc, HCTZ and Losartan, follow up with PCP     3. PAF (paroxysmal atrial fibrillation)    Chronic, stable, continue Eliquis, Flecainide, Metoprolol, follow up with PCP & Cardiology     4. Type 2 diabetes mellitus with both eyes affected by proliferative retinopathy and macular edema, without long-term current use of insulin    Chronic, stable, continue Mounjaro and Synjardy, follow up with PCP     5. Hyperlipidemia associated with type 2 diabetes mellitus    Chronic, stable, continue Lipitor and Flecainide, follow up with PCP "     6. Vitamin D deficiency    Chronic, stable, follow up with PCP     7. Gastroesophageal reflux disease, unspecified whether esophagitis present    Chronic, stable, continue Protonix, follow up with PCP     8. Osteopenia, unspecified location    Chronic, stable, follow up with PCP     9. Overweight (BMI 25.0-29.9)    Chronic, stable, follow up with PCP          Provided Fely with a 5-10 year written screening schedule and personal prevention plan. Recommendations were developed using the USPSTF age appropriate recommendations. Education, counseling, and referrals were provided as needed.  After Visit Summary printed and given to patient which includes a list of additional screenings\tests needed.    Follow up in about 1 year (around 4/4/2026) for Medicare Annual Wellness Visit.      Jodi Martinez NP    I offered to discuss advanced care planning, including how to pick a person who would make decisions for you if you were unable to make them for yourself, called a health care power of , and what kind of decisions you might make such as use of life sustaining treatments such as ventilators and tube feeding when faced with a life limiting illness recorded on a living will that they will need to know. (How you want to be cared for as you near the end of your natural life)     X Patient is interested in learning more about how to make advanced directives.  I provided them paperwork and offered to discuss this with them.

## 2025-04-04 NOTE — PATIENT INSTRUCTIONS
Counseling and Referral of Other Preventative  (Italic type indicates deductible and co-insurance are waived)    Patient Name: Fely Wetzel  Today's Date: 4/4/2025    Health Maintenance       Date Due Completion Date    RSV Vaccine (Age 60+ and Pregnant patients) (1 - Risk 60-74 years 1-dose series) Never done ---    COVID-19 Vaccine (7 - 2024-25 season) 09/01/2024 10/10/2022    Shingles Vaccine (2 of 2) 05/25/2025 3/30/2025    Foot Exam 06/13/2025 6/13/2024    Hemoglobin A1c 08/11/2025 2/11/2025    Mammogram 10/03/2025 10/3/2024    Override on 5/22/2018: Done    Diabetic Eye Exam 12/13/2025 12/13/2024    Override on 2/15/2018: Done    Override on 1/26/2016: Done    Override on 5/7/2014: Done    Override on 4/1/2013: Done    Diabetes Urine Screening 02/11/2026 2/11/2025    Lipid Panel 02/11/2026 2/11/2025    Low Dose Statin 02/20/2026 2/20/2025    DEXA Scan 09/13/2026 9/13/2024    Override on 2/3/2017: Declined    Colorectal Cancer Screening 03/17/2032 3/17/2022    TETANUS VACCINE 03/30/2035 3/30/2025    Override on 8/5/2014: Done        No orders of the defined types were placed in this encounter.    The following information is provided to all patients.  This information is to help you find resources for any of the problems found today that may be affecting your health:                  Living healthy guide: www.[facility].[place].gov      Understanding Diabetes: www.diabetes.org      Eating healthy: www.cdc.gov/healthyweight      CDC home safety checklist: www.cdc.gov/steadi/patient.html      Agency on Aging: www.goea.[place].gov      Alcoholics anonymous (AA): www.aa.org      Physical Activity: www.christoph.nih.gov/pz6elbr      Tobacco use: www.quitwithusla.org

## 2025-05-14 LAB
LEFT EYE DM RETINOPATHY: NORMAL
RIGHT EYE DM RETINOPATHY: NORMAL

## 2025-05-20 ENCOUNTER — HOSPITAL ENCOUNTER (OUTPATIENT)
Dept: RADIOLOGY | Facility: OTHER | Age: 70
Discharge: HOME | End: 2025-05-20
Attending: SURGERY
Payer: MEDICARE

## 2025-05-20 DIAGNOSIS — M79.606 PAIN AND SWELLING OF LOWER EXTREMITY, UNSPECIFIED LATERALITY: ICD-10-CM

## 2025-05-20 DIAGNOSIS — I83.813 VARICOSE VEINS OF BILATERAL LOWER EXTREMITIES WITH PAIN: ICD-10-CM

## 2025-05-20 DIAGNOSIS — M79.89 PAIN AND SWELLING OF LOWER EXTREMITY, UNSPECIFIED LATERALITY: ICD-10-CM

## 2025-05-20 PROCEDURE — 93970 EXTREMITY STUDY: CPT | Mod: TC,HCNC

## 2025-05-21 ENCOUNTER — TELEPHONE (OUTPATIENT)
Dept: VASCULAR SURGERY | Facility: CLINIC | Age: 70
End: 2025-05-21

## 2025-05-21 ENCOUNTER — OFFICE VISIT (OUTPATIENT)
Dept: VASCULAR SURGERY | Facility: CLINIC | Age: 70
End: 2025-05-21
Payer: MEDICARE

## 2025-05-21 VITALS
RESPIRATION RATE: 12 BRPM | WEIGHT: 160.69 LBS | HEART RATE: 60 BPM | SYSTOLIC BLOOD PRESSURE: 145 MMHG | HEIGHT: 62 IN | OXYGEN SATURATION: 100 % | BODY MASS INDEX: 29.57 KG/M2 | DIASTOLIC BLOOD PRESSURE: 79 MMHG

## 2025-05-21 DIAGNOSIS — M79.89 PAIN AND SWELLING OF LOWER EXTREMITY, UNSPECIFIED LATERALITY: ICD-10-CM

## 2025-05-21 DIAGNOSIS — I87.2 VENOUS INSUFFICIENCY OF BOTH LOWER EXTREMITIES: ICD-10-CM

## 2025-05-21 DIAGNOSIS — M79.606 PAIN AND SWELLING OF LOWER EXTREMITY, UNSPECIFIED LATERALITY: ICD-10-CM

## 2025-05-21 DIAGNOSIS — I83.813 VARICOSE VEINS OF BILATERAL LOWER EXTREMITIES WITH PAIN: ICD-10-CM

## 2025-05-21 DIAGNOSIS — I83.813 VARICOSE VEINS OF BILATERAL LOWER EXTREMITIES WITH PAIN: Primary | ICD-10-CM

## 2025-05-21 DIAGNOSIS — I83.893 VARICOSE VEINS OF BOTH LOWER EXTREMITIES WITH COMPLICATIONS: Primary | ICD-10-CM

## 2025-05-21 PROCEDURE — 3060F POS MICROALBUMINURIA REV: CPT | Mod: CPTII,S$GLB,, | Performed by: SURGERY

## 2025-05-21 PROCEDURE — 3288F FALL RISK ASSESSMENT DOCD: CPT | Mod: CPTII,S$GLB,, | Performed by: SURGERY

## 2025-05-21 PROCEDURE — 3008F BODY MASS INDEX DOCD: CPT | Mod: CPTII,S$GLB,, | Performed by: SURGERY

## 2025-05-21 PROCEDURE — 99203 OFFICE O/P NEW LOW 30 MIN: CPT | Mod: S$GLB,,, | Performed by: SURGERY

## 2025-05-21 PROCEDURE — 1126F AMNT PAIN NOTED NONE PRSNT: CPT | Mod: CPTII,S$GLB,, | Performed by: SURGERY

## 2025-05-21 PROCEDURE — 3051F HG A1C>EQUAL 7.0%<8.0%: CPT | Mod: CPTII,S$GLB,, | Performed by: SURGERY

## 2025-05-21 PROCEDURE — 3066F NEPHROPATHY DOC TX: CPT | Mod: CPTII,S$GLB,, | Performed by: SURGERY

## 2025-05-21 PROCEDURE — 1101F PT FALLS ASSESS-DOCD LE1/YR: CPT | Mod: CPTII,S$GLB,, | Performed by: SURGERY

## 2025-05-21 PROCEDURE — 3077F SYST BP >= 140 MM HG: CPT | Mod: CPTII,S$GLB,, | Performed by: SURGERY

## 2025-05-21 PROCEDURE — 4010F ACE/ARB THERAPY RXD/TAKEN: CPT | Mod: CPTII,S$GLB,, | Performed by: SURGERY

## 2025-05-21 PROCEDURE — 1159F MED LIST DOCD IN RCRD: CPT | Mod: CPTII,S$GLB,, | Performed by: SURGERY

## 2025-05-21 PROCEDURE — 3078F DIAST BP <80 MM HG: CPT | Mod: CPTII,S$GLB,, | Performed by: SURGERY

## 2025-05-21 NOTE — PROGRESS NOTES
VASCULAR SURGERY CLINIC NOTE    Patient ID: Fely Wetzel is a 69 y.o. female.    I. HISTORY     Chief Complaint: painful varicose veins    HPI: Fely Wetzel is a 69 y.o. female who is here today for evaluation of bilateral lower extremety varicose veins.  Symptoms include pain, itching. Symptoms began years ago.  Location is bilateral but worse on left than right. Symptoms are worse at the end of the day. Patient has tried wearing compression stockings daily for more than 3 months without relief. Patient has no history of DVT. History of venous interventions includes none.  Paternal family history of venous disease.  Symptoms do limit patient's functional status and daily activities. 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    Migraine with aura: No  PFO/ASD/right to left shunt:  no    MI: no  Stroke: No  Seizure Disorder: No      Past Medical History:   Diagnosis Date    Colon polyp 11/2014    Diabetes mellitus type II     H. pylori infection 10/08/2021    Hyperlipidemia     Hypertension     Obesity     Osteopenia     Right foot pain 06/01/2021    TMJ (temporomandibular joint disorder)         Past Surgical History:   Procedure Laterality Date    ABLATION OF ARRHYTHMOGENIC FOCUS FOR ATRIAL FIBRILLATION N/A 4/19/2023    Procedure: Ablation atrial fibrillation;  Surgeon: Beck Mathias MD;  Location: [facility] EP LAB;  Service: Cardiology;  Laterality: N/A;  AF, GABRIELLA (Cx if SR), PVI, RFA, Carto, Gen, MN, 3 Prep    COLONOSCOPY N/A 3/17/2022    Procedure: COLONOSCOPY;  Surgeon: Дмитрий Middleton MD;  Location: [facility] (4TH FLR);  Service: Endoscopy;  Laterality: N/A;  Covid Positive on 1/31. no further testing needed.Holding Eliquis for 2 days prior to .EC  3/8 new instructions mailed-tb    ESOPHAGOGASTRODUODENOSCOPY N/A 3/17/2022    Procedure: EGD (ESOPHAGOGASTRODUODENOSCOPY);  Surgeon: Дмитрий Middleton MD;  Location: [facility] (4TH FLR);  Service: Endoscopy;  Laterality:  N/A;    HYSTERECTOMY      TREATMENT OF CARDIAC ARRHYTHMIA  4/19/2023    Procedure: Cardioversion or Defibrillation;  Surgeon: Beck Mathias MD;  Location: [facility] EP LAB;  Service: Cardiology;;       Social History     Occupational History    Occupation: works at a [employer]     Employer: [employer]    Occupation: CNA     Employer: [employer]   Tobacco Use    Smoking status: Never     Passive exposure: Never    Smokeless tobacco: Never   Substance and Sexual Activity    Alcohol use: Not Currently    Drug use: Never    Sexual activity: Never     Partners: Male       Current Medications[1]    Review of Systems   Constitutional: Negative for weight loss.   HENT:  Negative for ear pain and nosebleeds.    Eyes:  Negative for discharge and pain.   Cardiovascular:  Negative for chest pain and palpitations.   Respiratory:  Negative for cough, shortness of breath and wheezing.    Endocrine: Negative for cold intolerance, heat intolerance and polyphagia.   Hematologic/Lymphatic: Negative for adenopathy. Does not bruise/bleed easily.   Skin:  Negative for itching and rash.   Musculoskeletal:  Negative for joint swelling and muscle cramps.   Gastrointestinal:  Negative for abdominal pain, diarrhea, nausea and vomiting.   Genitourinary:  Negative for dysuria and flank pain.   Neurological:  Negative for numbness and seizures.         II. PHYSICAL EXAM     Physical Exam  Constitutional:       General: She is not in acute distress.     Appearance: Normal appearance. She is normal weight. She is not ill-appearing or diaphoretic.   HENT:      Head: Normocephalic and atraumatic.   Eyes:      General: No scleral icterus.        Right eye: No discharge.         Left eye: No discharge.      Extraocular Movements: Extraocular movements intact.      Conjunctiva/sclera: Conjunctivae normal.   Cardiovascular:      Rate and Rhythm: Normal rate and regular rhythm.      Pulses: Normal pulses.   Pulmonary:       Effort: Pulmonary effort is normal. No respiratory distress.   Musculoskeletal:         General: Normal range of motion.      Right lower leg: No edema.      Left lower leg: No edema.   Skin:     General: Skin is warm and dry.      Coloration: Skin is not jaundiced or pale.      Findings: No erythema or rash.   Neurological:      General: No focal deficit present.      Mental Status: She is alert and oriented to person, place, and time.   Psychiatric:         Mood and Affect: Mood normal.         Behavior: Behavior normal.         Reticular/Spider veins noted:  RLE: scattered  LLE: scattered    Varicose veins noted:  RLE: medial calf and thigh  LLE:  medial calf and thigh    Imaging Results: (I have personally reviewed the images/studies and provided my interpretation below)  BLE Venous Duplex ultrasound  Impression:   Right Leg: Deep Venous system: no DVT, no reflux. GSV: + reflux. LSV: no reflux  Left Leg: Deep Venous system:  no DVT, no reflux. GSV: + reflux. LSV: no reflux    III. ASSESSMENT & PLAN (MEDICAL DECISION MAKING)     1. Varicose veins of bilateral lower extremities with pain    2. Venous insufficiency of both lower extremities      Venous Clinical Severity Score  Pain:2=Daily, moderate activity limitation, occasional analgesics  Varicose Veins: 3=Extensive. Thigh and calf or GS and LS distribution  Venous Edema: 0=None  Pigmentation: 0=None or focal, low intensity (tan)  Inflammation: 0=None  Induration: 0=None  Number of Active Ulcers: 0=0  Active Ulceration, Duration: 0=None  Active Ulcer Size: 0=None  Compressive Therapy: 3=Full compliance, stockings + elevation  Total Score: 8        Assessment/Diagnosis and Plan:  69 y.o. female here  for evaluation of BLE painful symptomatic varicose veins. CEAP class 2. VCSS 8. Ultrasound of lower extremities reveals evidence of venous insufficiency in the bilateral GSV. She has failed to improve despite conservative management with compression and elevation.  The patient would benefit from left GSV ablation to improve her left sided symptoms. Assuming she gets adequate relief on the left then this would be followed at later date by right GSV ablation to resolve her right sided symptoms. WE discussed risks, benefits, and alternatives to the procedure. The patient expressed understanding and agreed to proceed.    -Recommend compression with 20-30mmHg Rx stockings, elevation  -Exercise regularly  -Plan left GSV ablation to improve lower extremity varicose vein/venous insufficiency symptoms      DONATO Lorenzo II, MD, [facility]  Vascular Surgery  [facility]  [phone]         [1]   Current Outpatient Medications:     amLODIPine (NORVASC) 5 MG tablet, TAKE 1 TABLET BY MOUTH EVERY DAY, Disp: 90 tablet, Rfl: 1    apixaban (ELIQUIS) 5 mg Tab, Take 1 tablet (5 mg total) by mouth 2 (two) times daily., Disp: 60 tablet, Rfl: 11    atorvastatin (LIPITOR) 40 MG tablet, TAKE 1 TABLET BY MOUTH EVERY DAY IN THE EVENING, Disp: 90 tablet, Rfl: 2    blood sugar diagnostic (TRUE METRIX GLUCOSE TEST STRIP) Strp, Inject 1 each into the skin 2 (two) times daily before meals., Disp: 200 strip, Rfl: 3    blood sugar diagnostic Strp, To check BG 1 times daily, to use with insurance preferred meter, Disp: 100 strip, Rfl: 0    blood-glucose meter Misc, Check fasting glucose and check glucose two hours after lunch or dinner, Disp: 1 each, Rfl: 0    blood-glucose meter Misc, To check BG 1 times daily, to use with insurance preferred meter, Disp: 1 each, Rfl: 0    pepito/ala/linoleic/oleic/dha (ADULT OMEGA PLUS DHA ORAL), Take by mouth., Disp: , Rfl:     cholecalciferol, vitamin D3, 1,250 mcg (50,000 unit) capsule, TAKE 1 CAPSULE BY MOUTH ONE TIME PER WEEK, Disp: 12 capsule, Rfl: 3    COQ10, UBIQUINOL, ORAL, Take by mouth., Disp: , Rfl:     empagliflozin-metformin (SYNJARDY XR) 10-1,000 mg TBph, Take 1 tablet by mouth 2 (two) times a day., Disp: 180 tablet, Rfl: 3    fenofibrate 160 MG Tab,  TAKE 1 TABLET BY MOUTH EVERY DAY, Disp: 90 tablet, Rfl: 2    flecainide (TAMBOCOR) 50 MG Tab, Take 1 tablet (50 mg total) by mouth every 12 (twelve) hours., Disp: 180 tablet, Rfl: 1    glutathione 500 mg Cap, Take by mouth., Disp: , Rfl:     hydroCHLOROthiazide (HYDRODIURIL) 25 MG tablet, TAKE 1 TABLET (25 MG TOTAL) BY MOUTH ONCE DAILY. WITH BREAKFAST, Disp: 90 tablet, Rfl: 3    lancets Misc, To check BG 1 times daily, to use with insurance preferred meter, Disp: 100 each, Rfl: 0    losartan (COZAAR) 100 MG tablet, Take 1 tablet (100 mg total) by mouth once daily., Disp: 90 tablet, Rfl: 3    MAGNESIUM ORAL, Take by mouth., Disp: , Rfl:     metoprolol succinate (TOPROL-XL) 100 MG 24 hr tablet, Take 1 tablet (100 mg total) by mouth once daily., Disp: 90 tablet, Rfl: 1    MOUNJARO 7.5 mg/0.5 mL PnIj, INJECT 7.5 MG SUBCUTANEOUSLY EVERY 7 DAYS, Disp: 12 Pen, Rfl: 0    pantoprazole (PROTONIX) 40 MG tablet, Take 1 tablet (40 mg total) by mouth once daily. Start taking this medication on 4/5/2023., Disp: 90 tablet, Rfl: 0    vit C/E/Zn/coppr/lutein/zeaxan (PRESERVISION AREDS-2 ORAL), Take by mouth., Disp: , Rfl:     varicella-zoster [id number], PF, (SHINGRIX) 50 mcg/0.5 mL injection, Inject 0.5 mLs into the muscle once. for 1 dose, Disp: 1 each, Rfl: 1

## 2025-05-22 ENCOUNTER — TELEPHONE (OUTPATIENT)
Dept: VASCULAR SURGERY | Facility: CLINIC | Age: 70
End: 2025-05-22
Payer: MEDICARE

## 2025-06-03 ENCOUNTER — PATIENT OUTREACH (OUTPATIENT)
Dept: ADMINISTRATIVE | Facility: HOSPITAL | Age: 70
End: 2025-06-03
Payer: MEDICARE

## 2025-08-14 DIAGNOSIS — I48.0 PAF (PAROXYSMAL ATRIAL FIBRILLATION): Primary | ICD-10-CM

## 2025-08-19 ENCOUNTER — TELEPHONE (OUTPATIENT)
Dept: ELECTROPHYSIOLOGY | Facility: CLINIC | Age: 70
End: 2025-08-19
Payer: MEDICARE

## 2025-08-20 ENCOUNTER — HOSPITAL ENCOUNTER (OUTPATIENT)
Dept: CARDIOLOGY | Facility: CLINIC | Age: 70
Discharge: HOME | End: 2025-08-20
Payer: MEDICARE

## 2025-08-20 ENCOUNTER — OFFICE VISIT (OUTPATIENT)
Dept: ELECTROPHYSIOLOGY | Facility: CLINIC | Age: 70
End: 2025-08-20
Payer: MEDICARE

## 2025-08-20 VITALS
HEIGHT: 62 IN | HEART RATE: 65 BPM | WEIGHT: 158.75 LBS | SYSTOLIC BLOOD PRESSURE: 142 MMHG | BODY MASS INDEX: 29.21 KG/M2 | DIASTOLIC BLOOD PRESSURE: 78 MMHG

## 2025-08-20 DIAGNOSIS — I48.0 PAF (PAROXYSMAL ATRIAL FIBRILLATION): ICD-10-CM

## 2025-08-20 DIAGNOSIS — I48.0 PAF (PAROXYSMAL ATRIAL FIBRILLATION): Primary | ICD-10-CM

## 2025-08-20 DIAGNOSIS — E11.59 HYPERTENSION ASSOCIATED WITH DIABETES: ICD-10-CM

## 2025-08-20 DIAGNOSIS — E11.3513 TYPE 2 DIABETES MELLITUS WITH BOTH EYES AFFECTED BY PROLIFERATIVE RETINOPATHY AND MACULAR EDEMA, WITHOUT LONG-TERM CURRENT USE OF INSULIN: ICD-10-CM

## 2025-08-20 DIAGNOSIS — I15.2 HYPERTENSION ASSOCIATED WITH DIABETES: ICD-10-CM

## 2025-08-20 LAB
OHS QRS DURATION: 96 MS
OHS QTC CALCULATION: 451 MS

## 2025-08-20 PROCEDURE — 1126F AMNT PAIN NOTED NONE PRSNT: CPT | Mod: CPTII,HCNC,S$GLB, | Performed by: INTERNAL MEDICINE

## 2025-08-20 PROCEDURE — 4010F ACE/ARB THERAPY RXD/TAKEN: CPT | Mod: CPTII,HCNC,S$GLB, | Performed by: INTERNAL MEDICINE

## 2025-08-20 PROCEDURE — 3078F DIAST BP <80 MM HG: CPT | Mod: CPTII,HCNC,S$GLB, | Performed by: INTERNAL MEDICINE

## 2025-08-20 PROCEDURE — 3288F FALL RISK ASSESSMENT DOCD: CPT | Mod: CPTII,HCNC,S$GLB, | Performed by: INTERNAL MEDICINE

## 2025-08-20 PROCEDURE — 3077F SYST BP >= 140 MM HG: CPT | Mod: CPTII,HCNC,S$GLB, | Performed by: INTERNAL MEDICINE

## 2025-08-20 PROCEDURE — 99999 PR PBB SHADOW E&M-EST. PATIENT-LVL IV: CPT | Mod: PBBFAC,HCNC,, | Performed by: INTERNAL MEDICINE

## 2025-08-20 PROCEDURE — 3008F BODY MASS INDEX DOCD: CPT | Mod: CPTII,HCNC,S$GLB, | Performed by: INTERNAL MEDICINE

## 2025-08-20 PROCEDURE — 1101F PT FALLS ASSESS-DOCD LE1/YR: CPT | Mod: CPTII,HCNC,S$GLB, | Performed by: INTERNAL MEDICINE

## 2025-08-20 PROCEDURE — 93010 ELECTROCARDIOGRAM REPORT: CPT | Mod: HCNC,S$GLB,, | Performed by: INTERNAL MEDICINE

## 2025-08-20 PROCEDURE — 1160F RVW MEDS BY RX/DR IN RCRD: CPT | Mod: CPTII,HCNC,S$GLB, | Performed by: INTERNAL MEDICINE

## 2025-08-20 PROCEDURE — G2211 COMPLEX E/M VISIT ADD ON: HCPCS | Mod: HCNC,S$GLB,, | Performed by: INTERNAL MEDICINE

## 2025-08-20 PROCEDURE — 99214 OFFICE O/P EST MOD 30 MIN: CPT | Mod: HCNC,S$GLB,, | Performed by: INTERNAL MEDICINE

## 2025-08-20 PROCEDURE — 3066F NEPHROPATHY DOC TX: CPT | Mod: CPTII,HCNC,S$GLB, | Performed by: INTERNAL MEDICINE

## 2025-08-20 PROCEDURE — 1159F MED LIST DOCD IN RCRD: CPT | Mod: CPTII,HCNC,S$GLB, | Performed by: INTERNAL MEDICINE

## 2025-08-20 PROCEDURE — 3060F POS MICROALBUMINURIA REV: CPT | Mod: CPTII,HCNC,S$GLB, | Performed by: INTERNAL MEDICINE

## 2025-08-20 PROCEDURE — 3051F HG A1C>EQUAL 7.0%<8.0%: CPT | Mod: CPTII,HCNC,S$GLB, | Performed by: INTERNAL MEDICINE

## 2025-08-20 RX ORDER — CARVEDILOL 25 MG/1
25 TABLET ORAL 2 TIMES DAILY WITH MEALS
Qty: 180 TABLET | Refills: 3 | Status: SHIPPED | OUTPATIENT
Start: 2025-08-20 | End: 2026-08-20

## 2025-08-26 ENCOUNTER — DOCUMENTATION ONLY (OUTPATIENT)
Dept: CARDIOLOGY | Facility: HOSPITAL | Age: 70
End: 2025-08-26
Payer: MEDICARE

## 2025-08-26 ENCOUNTER — CLINICAL SUPPORT (OUTPATIENT)
Dept: CARDIOLOGY | Facility: HOSPITAL | Age: 70
End: 2025-08-26
Attending: INTERNAL MEDICINE
Payer: MEDICARE

## 2025-08-26 DIAGNOSIS — I48.0 PAF (PAROXYSMAL ATRIAL FIBRILLATION): ICD-10-CM

## 2025-08-26 PROCEDURE — 93246 EXT ECG>7D<15D RECORDING: CPT | Mod: HCNC

## 2025-08-27 ENCOUNTER — TELEPHONE (OUTPATIENT)
Dept: ELECTROPHYSIOLOGY | Facility: CLINIC | Age: 70
End: 2025-08-27
Payer: MEDICARE

## 2025-09-02 ENCOUNTER — TELEPHONE (OUTPATIENT)
Dept: INTERNAL MEDICINE | Facility: CLINIC | Age: 70
End: 2025-09-02
Payer: MEDICARE

## 2025-09-02 DIAGNOSIS — I15.2 HYPERTENSION ASSOCIATED WITH DIABETES: ICD-10-CM

## 2025-09-02 DIAGNOSIS — E11.69 HYPERLIPIDEMIA ASSOCIATED WITH TYPE 2 DIABETES MELLITUS: ICD-10-CM

## 2025-09-02 DIAGNOSIS — E78.5 HYPERLIPIDEMIA ASSOCIATED WITH TYPE 2 DIABETES MELLITUS: ICD-10-CM

## 2025-09-02 DIAGNOSIS — E11.59 HYPERTENSION ASSOCIATED WITH DIABETES: ICD-10-CM

## 2025-09-02 DIAGNOSIS — E11.3513 TYPE 2 DIABETES MELLITUS WITH BOTH EYES AFFECTED BY PROLIFERATIVE RETINOPATHY AND MACULAR EDEMA, WITHOUT LONG-TERM CURRENT USE OF INSULIN: ICD-10-CM

## 2025-09-02 DIAGNOSIS — Z00.00 ENCOUNTER FOR ANNUAL HEALTH EXAMINATION: ICD-10-CM

## 2025-09-05 ENCOUNTER — TELEPHONE (OUTPATIENT)
Dept: PHARMACY | Facility: CLINIC | Age: 70
End: 2025-09-05
Payer: MEDICARE

## (undated) DEVICE — COVER TABLE 44X90 STERILE

## (undated) DEVICE — NDL TRNSSPTL BRK-1 18GA 98CM

## (undated) DEVICE — ELECTRODE REM PLYHSV RETURN 9

## (undated) DEVICE — INTRO FAST-CATH SL1 8.5FR 63CM

## (undated) DEVICE — PACK EP DRAPE OMC

## (undated) DEVICE — CATH PENTARY F 2-6-2MM 115CM

## (undated) DEVICE — KIT PROBE COVER WITH GEL

## (undated) DEVICE — INTRODUCER HEMOSTASIS 7.5F

## (undated) DEVICE — SHEATH INTRODUCER 9FR 11CM

## (undated) DEVICE — PATCH CARTO REFERENCE

## (undated) DEVICE — R CATH BIDIRECTIONL DF CRV 7FR

## (undated) DEVICE — CATH THERMOCOOL SMTCH SF D F

## (undated) DEVICE — SHEATH HEMOSTASIS 8.5FR

## (undated) DEVICE — PAD DEFIB CADENCE ADULT R2

## (undated) DEVICE — R CATH ACUSON ACUNAV 8FR

## (undated) DEVICE — INTRO AGILIS MED CRL 8.5F 71CM

## (undated) DEVICE — COVER DRAPE ACUSON STERILE

## (undated) DEVICE — BOWL FLUID - BACK STOP

## (undated) DEVICE — NDL BROCKENBROUGH ADULT

## (undated) DEVICE — SET SMARTABLATE IRR TUBE